# Patient Record
Sex: FEMALE | Race: WHITE | Employment: FULL TIME | URBAN - METROPOLITAN AREA
[De-identification: names, ages, dates, MRNs, and addresses within clinical notes are randomized per-mention and may not be internally consistent; named-entity substitution may affect disease eponyms.]

---

## 2017-07-18 ENCOUNTER — OFFICE VISIT (OUTPATIENT)
Dept: FAMILY MEDICINE CLINIC | Age: 37
End: 2017-07-18

## 2017-07-18 VITALS
HEART RATE: 106 BPM | SYSTOLIC BLOOD PRESSURE: 138 MMHG | HEIGHT: 66 IN | DIASTOLIC BLOOD PRESSURE: 98 MMHG | BODY MASS INDEX: 36.48 KG/M2 | WEIGHT: 227 LBS

## 2017-07-18 DIAGNOSIS — I10 ESSENTIAL HYPERTENSION: Primary | ICD-10-CM

## 2017-07-18 DIAGNOSIS — L40.9 PSORIASIS: ICD-10-CM

## 2017-07-18 DIAGNOSIS — Z23 NEED FOR TDAP VACCINATION: ICD-10-CM

## 2017-07-18 DIAGNOSIS — E11.9 TYPE 2 DIABETES MELLITUS WITHOUT COMPLICATION, WITHOUT LONG-TERM CURRENT USE OF INSULIN (HCC): ICD-10-CM

## 2017-07-18 DIAGNOSIS — R49.0 HOARSENESS: ICD-10-CM

## 2017-07-18 PROCEDURE — 99204 OFFICE O/P NEW MOD 45 MIN: CPT | Performed by: FAMILY MEDICINE

## 2017-07-18 RX ORDER — PREDNISONE 20 MG/1
TABLET ORAL
Refills: 0 | COMMUNITY
Start: 2017-05-10 | End: 2017-09-18 | Stop reason: ALTCHOICE

## 2017-07-18 RX ORDER — FOLIC ACID 1 MG/1
TABLET ORAL
Refills: 1 | COMMUNITY
Start: 2017-07-03 | End: 2017-09-18 | Stop reason: SDUPTHER

## 2017-07-18 RX ORDER — LISINOPRIL 10 MG/1
10 TABLET ORAL DAILY
Qty: 30 TABLET | Refills: 2 | Status: SHIPPED | OUTPATIENT
Start: 2017-07-18 | End: 2017-10-22 | Stop reason: SDUPTHER

## 2017-07-18 RX ORDER — METFORMIN HYDROCHLORIDE 500 MG/1
TABLET, EXTENDED RELEASE ORAL
Refills: 1 | COMMUNITY
Start: 2017-07-03 | End: 2017-09-18 | Stop reason: SDUPTHER

## 2017-07-18 RX ORDER — PHENOBARBITAL, HYOSCYAMINE SULFATE, ATROPINE SULFATE, SCOPOLAMINE HYDROBROMIDE 16.2; .1037; .0194; .0065 MG/1; MG/1; MG/1; MG/1
TABLET ORAL
Refills: 1 | COMMUNITY
Start: 2017-06-05 | End: 2018-08-28

## 2017-07-18 RX ORDER — RANITIDINE 150 MG/1
TABLET ORAL
Refills: 2 | COMMUNITY
Start: 2017-05-04 | End: 2018-08-28

## 2017-07-18 RX ORDER — LISINOPRIL 5 MG/1
TABLET ORAL
Refills: 1 | COMMUNITY
Start: 2017-05-04 | End: 2017-07-18 | Stop reason: SDUPTHER

## 2017-07-22 ASSESSMENT — ENCOUNTER SYMPTOMS
EYES NEGATIVE: 1
TROUBLE SWALLOWING: 0
RESPIRATORY NEGATIVE: 1
RHINORRHEA: 0
BACK PAIN: 0
COLOR CHANGE: 0
ALLERGIC/IMMUNOLOGIC NEGATIVE: 1
GASTROINTESTINAL NEGATIVE: 1
SINUS PRESSURE: 0
VOICE CHANGE: 1
SORE THROAT: 1

## 2017-07-25 ENCOUNTER — OFFICE VISIT (OUTPATIENT)
Dept: ENT CLINIC | Age: 37
End: 2017-07-25

## 2017-07-25 VITALS
BODY MASS INDEX: 37.82 KG/M2 | HEART RATE: 87 BPM | WEIGHT: 227 LBS | DIASTOLIC BLOOD PRESSURE: 77 MMHG | HEIGHT: 65 IN | SYSTOLIC BLOOD PRESSURE: 120 MMHG

## 2017-07-25 DIAGNOSIS — J03.91 RECURRENT TONSILLITIS: Primary | ICD-10-CM

## 2017-07-25 DIAGNOSIS — J35.1 TONSILLAR HYPERTROPHY: ICD-10-CM

## 2017-07-25 PROCEDURE — 99203 OFFICE O/P NEW LOW 30 MIN: CPT | Performed by: OTOLARYNGOLOGY

## 2017-08-24 LAB
ALBUMIN SERPL-MCNC: 4.5 G/DL (ref 3.4–5)
ALP BLD-CCNC: 64 U/L (ref 40–129)
ALT SERPL-CCNC: 49 U/L (ref 10–40)
ANION GAP SERPL CALCULATED.3IONS-SCNC: 15 MMOL/L (ref 3–16)
AST SERPL-CCNC: 20 U/L (ref 15–37)
BILIRUB SERPL-MCNC: 0.4 MG/DL (ref 0–1)
BILIRUBIN DIRECT: <0.2 MG/DL (ref 0–0.3)
BILIRUBIN, INDIRECT: ABNORMAL MG/DL (ref 0–1)
BUN BLDV-MCNC: 12 MG/DL (ref 7–20)
CALCIUM SERPL-MCNC: 9.1 MG/DL (ref 8.3–10.6)
CHLORIDE BLD-SCNC: 104 MMOL/L (ref 99–110)
CHOLESTEROL, TOTAL: 179 MG/DL (ref 0–199)
CO2: 19 MMOL/L (ref 21–32)
CREAT SERPL-MCNC: 0.6 MG/DL (ref 0.6–1.1)
GFR AFRICAN AMERICAN: >60
GFR NON-AFRICAN AMERICAN: >60
GLUCOSE BLD-MCNC: 134 MG/DL (ref 70–99)
HDLC SERPL-MCNC: 39 MG/DL (ref 40–60)
LDL CHOLESTEROL CALCULATED: 123 MG/DL
POTASSIUM SERPL-SCNC: 4.5 MMOL/L (ref 3.5–5.1)
SODIUM BLD-SCNC: 138 MMOL/L (ref 136–145)
TOTAL PROTEIN: 7.9 G/DL (ref 6.4–8.2)
TRIGL SERPL-MCNC: 85 MG/DL (ref 0–150)
VLDLC SERPL CALC-MCNC: 17 MG/DL

## 2017-09-18 ENCOUNTER — OFFICE VISIT (OUTPATIENT)
Dept: FAMILY MEDICINE CLINIC | Age: 37
End: 2017-09-18

## 2017-09-18 VITALS
SYSTOLIC BLOOD PRESSURE: 120 MMHG | BODY MASS INDEX: 35.82 KG/M2 | WEIGHT: 215 LBS | DIASTOLIC BLOOD PRESSURE: 88 MMHG | HEART RATE: 84 BPM | HEIGHT: 65 IN

## 2017-09-18 DIAGNOSIS — Z23 NEED FOR TDAP VACCINATION: ICD-10-CM

## 2017-09-18 DIAGNOSIS — E53.8 B12 DEFICIENCY: ICD-10-CM

## 2017-09-18 DIAGNOSIS — I10 ESSENTIAL HYPERTENSION: ICD-10-CM

## 2017-09-18 DIAGNOSIS — Z23 NEEDS FLU SHOT: ICD-10-CM

## 2017-09-18 DIAGNOSIS — Z23 NEED FOR PROPHYLACTIC VACCINATION AGAINST DIPHTHERIA-TETANUS-PERTUSSIS (DTP): ICD-10-CM

## 2017-09-18 DIAGNOSIS — E11.9 TYPE 2 DIABETES MELLITUS WITHOUT COMPLICATION, WITHOUT LONG-TERM CURRENT USE OF INSULIN (HCC): Primary | ICD-10-CM

## 2017-09-18 LAB
BASOPHILS ABSOLUTE: 0.1 K/UL (ref 0–0.2)
BASOPHILS RELATIVE PERCENT: 0.7 %
EOSINOPHILS ABSOLUTE: 0.5 K/UL (ref 0–0.6)
EOSINOPHILS RELATIVE PERCENT: 6.1 %
FOLATE: 11.64 NG/ML (ref 4.78–24.2)
HCT VFR BLD CALC: 40.7 % (ref 36–48)
HEMOGLOBIN: 13.5 G/DL (ref 12–16)
LYMPHOCYTES ABSOLUTE: 1.8 K/UL (ref 1–5.1)
LYMPHOCYTES RELATIVE PERCENT: 23.8 %
MCH RBC QN AUTO: 31.3 PG (ref 26–34)
MCHC RBC AUTO-ENTMCNC: 33.2 G/DL (ref 31–36)
MCV RBC AUTO: 94.4 FL (ref 80–100)
MONOCYTES ABSOLUTE: 0.6 K/UL (ref 0–1.3)
MONOCYTES RELATIVE PERCENT: 7.2 %
NEUTROPHILS ABSOLUTE: 4.8 K/UL (ref 1.7–7.7)
NEUTROPHILS RELATIVE PERCENT: 62.2 %
PDW BLD-RTO: 14.2 % (ref 12.4–15.4)
PLATELET # BLD: 261 K/UL (ref 135–450)
PMV BLD AUTO: 8.8 FL (ref 5–10.5)
RBC # BLD: 4.32 M/UL (ref 4–5.2)
VITAMIN B-12: 269 PG/ML (ref 211–911)
VITAMIN D 25-HYDROXY: 41.2 NG/ML
WBC # BLD: 7.8 K/UL (ref 4–11)

## 2017-09-18 PROCEDURE — 99213 OFFICE O/P EST LOW 20 MIN: CPT | Performed by: FAMILY MEDICINE

## 2017-09-18 PROCEDURE — 90471 IMMUNIZATION ADMIN: CPT | Performed by: FAMILY MEDICINE

## 2017-09-18 PROCEDURE — 90715 TDAP VACCINE 7 YRS/> IM: CPT | Performed by: FAMILY MEDICINE

## 2017-09-18 PROCEDURE — 90472 IMMUNIZATION ADMIN EACH ADD: CPT | Performed by: FAMILY MEDICINE

## 2017-09-18 PROCEDURE — 90654 INFLUENZA, TRIV, 18-64 YRS, ID, PF, MICRO INJ, 0.1ML (FLUZONE TRIV, PF, ID): CPT | Performed by: FAMILY MEDICINE

## 2017-09-18 RX ORDER — FOLIC ACID 1 MG/1
1 TABLET ORAL DAILY
Qty: 30 TABLET | Refills: 5 | Status: SHIPPED | OUTPATIENT
Start: 2017-09-18 | End: 2018-03-02 | Stop reason: SDUPTHER

## 2017-09-18 RX ORDER — METFORMIN HYDROCHLORIDE 500 MG/1
500 TABLET, EXTENDED RELEASE ORAL
Qty: 30 TABLET | Refills: 5 | Status: SHIPPED | OUTPATIENT
Start: 2017-09-18 | End: 2018-03-02 | Stop reason: SDUPTHER

## 2017-09-19 LAB
ESTIMATED AVERAGE GLUCOSE: 148.5 MG/DL
HBA1C MFR BLD: 6.8 %

## 2017-10-22 DIAGNOSIS — I10 ESSENTIAL HYPERTENSION: ICD-10-CM

## 2017-10-23 RX ORDER — LISINOPRIL 10 MG/1
10 TABLET ORAL DAILY
Qty: 30 TABLET | Refills: 2 | Status: SHIPPED | OUTPATIENT
Start: 2017-10-23 | End: 2018-01-24 | Stop reason: SDUPTHER

## 2018-01-24 DIAGNOSIS — I10 ESSENTIAL HYPERTENSION: ICD-10-CM

## 2018-01-25 RX ORDER — LISINOPRIL 10 MG/1
10 TABLET ORAL DAILY
Qty: 30 TABLET | Refills: 2 | Status: SHIPPED | OUTPATIENT
Start: 2018-01-25 | End: 2018-05-02 | Stop reason: SDUPTHER

## 2018-03-02 DIAGNOSIS — E11.9 TYPE 2 DIABETES MELLITUS WITHOUT COMPLICATION, WITHOUT LONG-TERM CURRENT USE OF INSULIN (HCC): ICD-10-CM

## 2018-03-02 RX ORDER — METFORMIN HYDROCHLORIDE 500 MG/1
500 TABLET, EXTENDED RELEASE ORAL
Qty: 30 TABLET | Refills: 0 | Status: SHIPPED | OUTPATIENT
Start: 2018-03-02 | End: 2018-04-20 | Stop reason: SDUPTHER

## 2018-03-02 RX ORDER — FOLIC ACID 1 MG/1
1 TABLET ORAL DAILY
Qty: 90 TABLET | Refills: 0 | Status: SHIPPED | OUTPATIENT
Start: 2018-03-02 | End: 2018-07-15 | Stop reason: SDUPTHER

## 2018-03-19 ENCOUNTER — OFFICE VISIT (OUTPATIENT)
Dept: FAMILY MEDICINE CLINIC | Age: 38
End: 2018-03-19

## 2018-03-19 VITALS
RESPIRATION RATE: 14 BRPM | DIASTOLIC BLOOD PRESSURE: 90 MMHG | BODY MASS INDEX: 35.51 KG/M2 | WEIGHT: 213.4 LBS | OXYGEN SATURATION: 98 % | HEART RATE: 70 BPM | SYSTOLIC BLOOD PRESSURE: 134 MMHG

## 2018-03-19 DIAGNOSIS — E11.9 TYPE 2 DIABETES MELLITUS WITHOUT COMPLICATION, WITHOUT LONG-TERM CURRENT USE OF INSULIN (HCC): ICD-10-CM

## 2018-03-19 DIAGNOSIS — I10 ESSENTIAL HYPERTENSION: Primary | ICD-10-CM

## 2018-03-19 PROCEDURE — 99213 OFFICE O/P EST LOW 20 MIN: CPT | Performed by: FAMILY MEDICINE

## 2018-03-19 ASSESSMENT — PATIENT HEALTH QUESTIONNAIRE - PHQ9
1. LITTLE INTEREST OR PLEASURE IN DOING THINGS: 0
SUM OF ALL RESPONSES TO PHQ9 QUESTIONS 1 & 2: 0
2. FEELING DOWN, DEPRESSED OR HOPELESS: 0
SUM OF ALL RESPONSES TO PHQ QUESTIONS 1-9: 0

## 2018-03-19 NOTE — PROGRESS NOTES
date: none. Foot right at the arch no trauma no injury no pain unless she is tender from the hours she does not be attention much to her shoes   Review of Systems   Constitutional: Negative. Negative for chills and fever. HENT:  Negative for postnasal drip, rhinorrhea, sinus pressure, tinnitus and trouble swallowing. Eyes: Negative. Respiratory: Negative. Cardiovascular: Negative. Gastrointestinal: Negative. Endocrine: Negative. Genitourinary: Negative. Musculoskeletal: . Negative for back pain, gait problem, joint swelling, neck pain and neck stiffness. Skin:. Negative for color change and pallor. Allergic/Immunologic: Negative. Neurological: Negative. Psychiatric/Behavioral: Negative. All other systems reviewed and are negative. Objective:   Physical Exam   Constitutional: She appears well-developed and well-nourished. No distress. HENT:   Head: Normocephalic and atraumatic. Right Ear: External ear normal.   Left Ear: External ear normal.   Nose: Nose normal.   Mouth/Throat: Oropharynx is clear and moist. No oropharyngeal exudate. Eyes: Conjunctivae and EOM are normal. Pupils are equal, round, and reactive to light. Right eye exhibits no discharge. Left eye exhibits no discharge. No scleral icterus. Neck: Normal range of motion. Neck supple. No JVD present. No tracheal deviation present. No thyromegaly present. Cardiovascular: Normal rate, regular rhythm, normal heart sounds and intact distal pulses. Pulmonary/Chest: Effort normal and breath sounds normal. No stridor. No respiratory distress. She has no wheezes. She has no rales. She exhibits no tenderness. Abdominal: Soft. Bowel sounds are normal. She exhibits no distension and no mass. There is no tenderness. There is no rebound and no guarding. Musculoskeletal: Normal range of motion. She exhibits no edema or tenderness. Lymphadenopathy:     She has no cervical adenopathy.    Skin: Skin is warm and dry. No rash noted. She is not diaphoretic. No erythema. No pallor. Left foot on the medial aspect anterior to the heel. He soft subcutaneous mass very soft rounded no pain no skin lesion changes   microfilaments test exam was negative   Vitals reviewed. Assessment:        1. Essential hypertension  Basic Metabolic Panel    Hepatic Function Panel    CBC   2.  Type 2 diabetes mellitus without complication, without long-term current use of insulin (HCC)  Basic Metabolic Panel    Lipid Panel    Hemoglobin A1C    TSH without Reflex    CBC     DIABETES EYE EXAM    Ambulatory referral to Ophthalmology     DIABETES FOOT EXAM     DIABETES FOOT EXAM         Plan:      See orders,   Assure the patient about what she felt and no foot to come in to monitor it, supportive shoes, continue to have issue my need to podiatrist he'll let me now  Normal HEENT exam just monitor the symptoms

## 2018-04-20 DIAGNOSIS — E11.9 TYPE 2 DIABETES MELLITUS WITHOUT COMPLICATION, WITHOUT LONG-TERM CURRENT USE OF INSULIN (HCC): ICD-10-CM

## 2018-04-20 RX ORDER — METFORMIN HYDROCHLORIDE 500 MG/1
500 TABLET, EXTENDED RELEASE ORAL
Qty: 30 TABLET | Refills: 0 | Status: SHIPPED | OUTPATIENT
Start: 2018-04-20 | End: 2018-06-14 | Stop reason: SDUPTHER

## 2018-05-02 DIAGNOSIS — I10 ESSENTIAL HYPERTENSION: ICD-10-CM

## 2018-05-03 RX ORDER — LISINOPRIL 10 MG/1
10 TABLET ORAL DAILY
Qty: 30 TABLET | Refills: 2 | Status: SHIPPED | OUTPATIENT
Start: 2018-05-03 | End: 2018-06-30 | Stop reason: SDUPTHER

## 2018-06-05 ENCOUNTER — OFFICE VISIT (OUTPATIENT)
Dept: FAMILY MEDICINE CLINIC | Age: 38
End: 2018-06-05

## 2018-06-05 VITALS
TEMPERATURE: 98 F | DIASTOLIC BLOOD PRESSURE: 70 MMHG | SYSTOLIC BLOOD PRESSURE: 124 MMHG | OXYGEN SATURATION: 98 % | BODY MASS INDEX: 34.58 KG/M2 | WEIGHT: 207.8 LBS | HEART RATE: 91 BPM

## 2018-06-05 DIAGNOSIS — J01.10 ACUTE NON-RECURRENT FRONTAL SINUSITIS: Primary | ICD-10-CM

## 2018-06-05 PROCEDURE — 99214 OFFICE O/P EST MOD 30 MIN: CPT | Performed by: FAMILY MEDICINE

## 2018-06-05 PROCEDURE — G8427 DOCREV CUR MEDS BY ELIG CLIN: HCPCS | Performed by: FAMILY MEDICINE

## 2018-06-05 PROCEDURE — G8417 CALC BMI ABV UP PARAM F/U: HCPCS | Performed by: FAMILY MEDICINE

## 2018-06-05 PROCEDURE — 1036F TOBACCO NON-USER: CPT | Performed by: FAMILY MEDICINE

## 2018-06-05 RX ORDER — BENZONATATE 100 MG/1
100 CAPSULE ORAL 3 TIMES DAILY
Refills: 0 | COMMUNITY
Start: 2018-06-03 | End: 2018-08-17 | Stop reason: ALTCHOICE

## 2018-06-05 RX ORDER — METHYLPREDNISOLONE 4 MG/1
TABLET ORAL
Qty: 1 KIT | Refills: 0 | Status: SHIPPED | OUTPATIENT
Start: 2018-06-05 | End: 2018-08-17 | Stop reason: ALTCHOICE

## 2018-06-05 RX ORDER — AMOXICILLIN 500 MG/1
2 CAPSULE ORAL 2 TIMES DAILY
Refills: 0 | COMMUNITY
Start: 2018-06-03 | End: 2018-08-17 | Stop reason: ALTCHOICE

## 2018-06-14 DIAGNOSIS — E11.9 TYPE 2 DIABETES MELLITUS WITHOUT COMPLICATION, WITHOUT LONG-TERM CURRENT USE OF INSULIN (HCC): ICD-10-CM

## 2018-06-14 RX ORDER — METFORMIN HYDROCHLORIDE 500 MG/1
TABLET, EXTENDED RELEASE ORAL
Qty: 30 TABLET | Refills: 0 | Status: SHIPPED | OUTPATIENT
Start: 2018-06-14 | End: 2018-07-15 | Stop reason: SDUPTHER

## 2018-06-30 DIAGNOSIS — I10 ESSENTIAL HYPERTENSION: ICD-10-CM

## 2018-07-02 RX ORDER — LISINOPRIL 10 MG/1
TABLET ORAL
Qty: 30 TABLET | Refills: 2 | Status: SHIPPED | OUTPATIENT
Start: 2018-07-02 | End: 2018-10-08 | Stop reason: SDUPTHER

## 2018-07-15 DIAGNOSIS — E11.9 TYPE 2 DIABETES MELLITUS WITHOUT COMPLICATION, WITHOUT LONG-TERM CURRENT USE OF INSULIN (HCC): ICD-10-CM

## 2018-07-16 RX ORDER — METFORMIN HYDROCHLORIDE 500 MG/1
TABLET, EXTENDED RELEASE ORAL
Qty: 30 TABLET | Refills: 0 | Status: SHIPPED | OUTPATIENT
Start: 2018-07-16 | End: 2018-08-17 | Stop reason: SDUPTHER

## 2018-07-16 RX ORDER — FOLIC ACID 1 MG/1
1 TABLET ORAL DAILY
Qty: 90 TABLET | Refills: 0 | Status: SHIPPED | OUTPATIENT
Start: 2018-07-16 | End: 2018-10-16 | Stop reason: SDUPTHER

## 2018-08-17 ENCOUNTER — OFFICE VISIT (OUTPATIENT)
Dept: FAMILY MEDICINE CLINIC | Age: 38
End: 2018-08-17

## 2018-08-17 VITALS
WEIGHT: 209.2 LBS | RESPIRATION RATE: 14 BRPM | DIASTOLIC BLOOD PRESSURE: 70 MMHG | SYSTOLIC BLOOD PRESSURE: 112 MMHG | OXYGEN SATURATION: 98 % | HEART RATE: 84 BPM | BODY MASS INDEX: 34.81 KG/M2

## 2018-08-17 DIAGNOSIS — J06.9 VIRAL URI: Primary | ICD-10-CM

## 2018-08-17 DIAGNOSIS — R53.83 FATIGUE, UNSPECIFIED TYPE: ICD-10-CM

## 2018-08-17 DIAGNOSIS — E11.9 TYPE 2 DIABETES MELLITUS WITHOUT COMPLICATION, WITHOUT LONG-TERM CURRENT USE OF INSULIN (HCC): ICD-10-CM

## 2018-08-17 LAB
ALBUMIN SERPL-MCNC: 4.6 G/DL (ref 3.4–5)
ALP BLD-CCNC: 80 U/L (ref 40–129)
ALT SERPL-CCNC: 31 U/L (ref 10–40)
ANION GAP SERPL CALCULATED.3IONS-SCNC: 12 MMOL/L (ref 3–16)
AST SERPL-CCNC: 16 U/L (ref 15–37)
BILIRUB SERPL-MCNC: 0.3 MG/DL (ref 0–1)
BILIRUBIN DIRECT: <0.2 MG/DL (ref 0–0.3)
BILIRUBIN, INDIRECT: ABNORMAL MG/DL (ref 0–1)
BILIRUBIN, POC: NORMAL
BLOOD URINE, POC: NORMAL
BUN BLDV-MCNC: 11 MG/DL (ref 7–20)
CALCIUM SERPL-MCNC: 9.5 MG/DL (ref 8.3–10.6)
CHLORIDE BLD-SCNC: 101 MMOL/L (ref 99–110)
CLARITY, POC: CLEAR
CO2: 24 MMOL/L (ref 21–32)
COLOR, POC: YELLOW
CREAT SERPL-MCNC: 0.6 MG/DL (ref 0.6–1.1)
CREATININE URINE: 46.9 MG/DL (ref 28–259)
GFR AFRICAN AMERICAN: >60
GFR NON-AFRICAN AMERICAN: >60
GLUCOSE BLD-MCNC: 118 MG/DL (ref 70–99)
GLUCOSE URINE, POC: NORMAL
HCT VFR BLD CALC: 42.6 % (ref 36–48)
HEMOGLOBIN: 14.1 G/DL (ref 12–16)
KETONES, POC: NORMAL
LEUKOCYTE EST, POC: NORMAL
MCH RBC QN AUTO: 30.9 PG (ref 26–34)
MCHC RBC AUTO-ENTMCNC: 33 G/DL (ref 31–36)
MCV RBC AUTO: 93.5 FL (ref 80–100)
MICROALBUMIN UR-MCNC: <1.2 MG/DL
MICROALBUMIN/CREAT UR-RTO: NORMAL MG/G (ref 0–30)
MONO TEST: NEGATIVE
NITRITE, POC: NORMAL
PDW BLD-RTO: 13.9 % (ref 12.4–15.4)
PH, POC: 6
PLATELET # BLD: 336 K/UL (ref 135–450)
PMV BLD AUTO: 8.5 FL (ref 5–10.5)
POTASSIUM SERPL-SCNC: 4.6 MMOL/L (ref 3.5–5.1)
PROTEIN, POC: NORMAL
RBC # BLD: 4.55 M/UL (ref 4–5.2)
S PYO AG THROAT QL: NORMAL
SODIUM BLD-SCNC: 137 MMOL/L (ref 136–145)
SPECIFIC GRAVITY, POC: 1.01
TOTAL PROTEIN: 8.6 G/DL (ref 6.4–8.2)
TSH SERPL DL<=0.05 MIU/L-ACNC: 2.23 UIU/ML (ref 0.27–4.2)
UROBILINOGEN, POC: 0.2
WBC # BLD: 7.6 K/UL (ref 4–11)

## 2018-08-17 PROCEDURE — 36415 COLL VENOUS BLD VENIPUNCTURE: CPT | Performed by: FAMILY MEDICINE

## 2018-08-17 PROCEDURE — G8417 CALC BMI ABV UP PARAM F/U: HCPCS | Performed by: FAMILY MEDICINE

## 2018-08-17 PROCEDURE — 87880 STREP A ASSAY W/OPTIC: CPT | Performed by: FAMILY MEDICINE

## 2018-08-17 PROCEDURE — G8427 DOCREV CUR MEDS BY ELIG CLIN: HCPCS | Performed by: FAMILY MEDICINE

## 2018-08-17 PROCEDURE — 81002 URINALYSIS NONAUTO W/O SCOPE: CPT | Performed by: FAMILY MEDICINE

## 2018-08-17 PROCEDURE — 99214 OFFICE O/P EST MOD 30 MIN: CPT | Performed by: FAMILY MEDICINE

## 2018-08-17 PROCEDURE — 1036F TOBACCO NON-USER: CPT | Performed by: FAMILY MEDICINE

## 2018-08-17 RX ORDER — DIPHENOXYLATE HYDROCHLORIDE AND ATROPINE SULFATE 2.5; .025 MG/1; MG/1
TABLET ORAL
COMMUNITY

## 2018-08-17 RX ORDER — METFORMIN HYDROCHLORIDE 500 MG/1
TABLET, EXTENDED RELEASE ORAL
Qty: 30 TABLET | Refills: 0 | Status: SHIPPED | OUTPATIENT
Start: 2018-08-17 | End: 2018-09-16 | Stop reason: SDUPTHER

## 2018-08-17 RX ORDER — BUDESONIDE 3 MG/1
CAPSULE, COATED PELLETS ORAL
COMMUNITY
End: 2018-08-28

## 2018-08-17 NOTE — PROGRESS NOTES
abnormal findings: mild oropharyngeal erythema  Neck: no adenopathy, no carotid bruit, no JVD, supple, symmetrical, trachea midline and thyroid not enlarged, symmetric, no tenderness/mass/nodules  Lungs: clear to auscultation bilaterally  Heart: regular rate and rhythm, S1, S2 normal, no murmur, click, rub or gallop  Abdomen: soft, non-tender; bowel sounds normal; no masses,  no organomegaly     Assessment:      Diagnosis Orders   1. Viral URI  POCT rapid strep A    Mononucleosis Screen    Mononucleosis Screen   2. Fatigue, unspecified type  POCT rapid strep A    CBC    Basic Metabolic Panel    TSH without Reflex    Hepatic Function Panel    Hemoglobin A1C    MICROALBUMIN / CREATININE URINE RATIO    Mononucleosis Screen    CBC    Basic Metabolic Panel    TSH without Reflex    Hepatic Function Panel    Hemoglobin A1C    MICROALBUMIN / CREATININE URINE RATIO    Mononucleosis Screen    POCT Urinalysis no Micro         Plan:      Discussed dx and tx of URIs  Suggested symptomatic OTC remedies. Nasal saline sprays for congestion. RTC prn.     See orders

## 2018-08-19 LAB
ESTIMATED AVERAGE GLUCOSE: 142.7 MG/DL
HBA1C MFR BLD: 6.6 %

## 2018-08-28 ENCOUNTER — OFFICE VISIT (OUTPATIENT)
Dept: ENT CLINIC | Age: 38
End: 2018-08-28

## 2018-08-28 VITALS — DIASTOLIC BLOOD PRESSURE: 82 MMHG | OXYGEN SATURATION: 98 % | SYSTOLIC BLOOD PRESSURE: 120 MMHG | HEART RATE: 88 BPM

## 2018-08-28 DIAGNOSIS — R43.0 ANOSMIA: Primary | ICD-10-CM

## 2018-08-28 PROCEDURE — G8427 DOCREV CUR MEDS BY ELIG CLIN: HCPCS | Performed by: OTOLARYNGOLOGY

## 2018-08-28 PROCEDURE — G8417 CALC BMI ABV UP PARAM F/U: HCPCS | Performed by: OTOLARYNGOLOGY

## 2018-08-28 PROCEDURE — 1036F TOBACCO NON-USER: CPT | Performed by: OTOLARYNGOLOGY

## 2018-08-28 PROCEDURE — 99202 OFFICE O/P NEW SF 15 MIN: CPT | Performed by: OTOLARYNGOLOGY

## 2018-08-28 RX ORDER — DICYCLOMINE HCL 20 MG
20 TABLET ORAL EVERY 6 HOURS
Status: ON HOLD | COMMUNITY
End: 2020-04-03

## 2018-08-28 RX ORDER — PREDNISONE 10 MG/1
10 TABLET ORAL DAILY
Qty: 30 TABLET | Refills: 0 | Status: SHIPPED | OUTPATIENT
Start: 2018-08-28 | End: 2020-02-06 | Stop reason: ALTCHOICE

## 2018-08-28 RX ORDER — ZINC SULFATE 50(220)MG
220 CAPSULE ORAL DAILY
Qty: 30 CAPSULE | Refills: 1 | Status: SHIPPED | OUTPATIENT
Start: 2018-08-28 | End: 2018-09-07 | Stop reason: SDUPTHER

## 2018-08-28 NOTE — PROGRESS NOTES
The patient presents with a loss of olfaction coinciding with an upper respiratory infection approximately 2 months ago. Prior to that time there had been no medical or surgical issues related to the nose, upper airway, or olfaction. There is a general absence of olfaction other than extremely pungent odors. This has been accompanied by alteration in her sensation of sweet and salty taste. Denies epistaxis or facial hypesthesias. No recent dental problems. No past history of nasal surgery or nasal trauma. No family history of nasal polyps or allergy. She has been described as borderline diabetic, recently with a normal A1c. Her other medications include lisinopril, methotrexate, Humira. Her irritable bowel is generally well controlled. The patient is a non smoker and is not exposed to second hand smoke or irritants. There have been no known contagious contacts. There are  no other symptoms referable to the upper aerodigestive tract. Exam:    The patient appears well developed and well nourished.; oriented to person, place, and time. The head is normocephalic and atraumatic; no facial scars or weakness are noted. The facial skeleton is normal.The voice has a normal quality and tonality to it. Eyes: Conjunctivae and lids normal. Full EOM. The skin is warm and dry. No pallor. The external nose is unremarkable including the dorsum, columella, nasion, and alae. The turbinates respond well to vasoconstriction. The middle and inferior meatuses appeared clear. There is no polyp, ulceration, or masses visualized either naris. The septum is not deviated or deflected to a significant degree. The mirror exam of the nasopharynx shows no mucosal disease or obstruction. The lips and oral cavity are normal with no discrete mucosal disease, and normally hydrated. Tongue mobility is normal. The fungiform and vallate papillae are normal appearing.  Dentition is unremarkable including the

## 2018-09-07 ENCOUNTER — TELEPHONE (OUTPATIENT)
Dept: ENT CLINIC | Age: 38
End: 2018-09-07

## 2018-09-07 DIAGNOSIS — R43.0 ANOSMIA: ICD-10-CM

## 2018-09-07 RX ORDER — ZINC SULFATE 50(220)MG
220 CAPSULE ORAL DAILY
Qty: 30 CAPSULE | Refills: 1 | COMMUNITY
Start: 2018-09-07 | End: 2020-12-30

## 2018-09-07 RX ORDER — METHYLPREDNISOLONE 4 MG/1
TABLET ORAL
Qty: 1 KIT | Refills: 0 | Status: SHIPPED | OUTPATIENT
Start: 2018-09-07 | End: 2018-09-13

## 2018-09-16 DIAGNOSIS — E11.9 TYPE 2 DIABETES MELLITUS WITHOUT COMPLICATION, WITHOUT LONG-TERM CURRENT USE OF INSULIN (HCC): ICD-10-CM

## 2018-09-17 RX ORDER — METFORMIN HYDROCHLORIDE 500 MG/1
TABLET, EXTENDED RELEASE ORAL
Qty: 30 TABLET | Refills: 0 | Status: SHIPPED | OUTPATIENT
Start: 2018-09-17 | End: 2018-10-08 | Stop reason: SDUPTHER

## 2018-09-17 NOTE — TELEPHONE ENCOUNTER
Medication:   Requested Prescriptions     Pending Prescriptions Disp Refills    metFORMIN (GLUCOPHAGE-XR) 500 MG extended release tablet [Pharmacy Med Name: METFORMIN  MG TABLET] 30 tablet 0     Sig: TAKE 1 TABLET BY MOUTH EVERY DAY     Last Filled:  8.23.18    Last appt: 8/17/2018   Next appt: Visit date not found    Last Labs DM:   Lab Results   Component Value Date    LABA1C 6.6 08/17/2018

## 2018-09-19 ENCOUNTER — TELEPHONE (OUTPATIENT)
Dept: ENT CLINIC | Age: 38
End: 2018-09-19

## 2018-09-19 NOTE — TELEPHONE ENCOUNTER
2nd update: The patient called stating that there has been some improvement but not much she states that she can now smell coffee but it is faint. She can also smell fresh edinson and orange.      Should she continue the zinc?

## 2018-09-21 ENCOUNTER — TELEPHONE (OUTPATIENT)
Dept: ENT CLINIC | Age: 38
End: 2018-09-21

## 2018-09-21 DIAGNOSIS — R43.0 ANOSMIA: Primary | ICD-10-CM

## 2018-09-21 NOTE — TELEPHONE ENCOUNTER
Called and spoke to patient with Dr. Semaj Penaloza message below. Central Scheduling phone number given. Patient expresses understanding.

## 2018-10-08 ENCOUNTER — OFFICE VISIT (OUTPATIENT)
Dept: FAMILY MEDICINE CLINIC | Age: 38
End: 2018-10-08
Payer: COMMERCIAL

## 2018-10-08 VITALS
WEIGHT: 213 LBS | RESPIRATION RATE: 14 BRPM | BODY MASS INDEX: 35.45 KG/M2 | HEART RATE: 77 BPM | SYSTOLIC BLOOD PRESSURE: 136 MMHG | OXYGEN SATURATION: 98 % | DIASTOLIC BLOOD PRESSURE: 88 MMHG

## 2018-10-08 DIAGNOSIS — Z23 NEED FOR INFLUENZA VACCINATION: ICD-10-CM

## 2018-10-08 DIAGNOSIS — I10 ESSENTIAL HYPERTENSION: ICD-10-CM

## 2018-10-08 DIAGNOSIS — F41.8 ANXIETY WITH DEPRESSION: ICD-10-CM

## 2018-10-08 DIAGNOSIS — E11.9 TYPE 2 DIABETES MELLITUS WITHOUT COMPLICATION, WITHOUT LONG-TERM CURRENT USE OF INSULIN (HCC): Primary | ICD-10-CM

## 2018-10-08 PROCEDURE — 90686 IIV4 VACC NO PRSV 0.5 ML IM: CPT | Performed by: FAMILY MEDICINE

## 2018-10-08 PROCEDURE — 1036F TOBACCO NON-USER: CPT | Performed by: FAMILY MEDICINE

## 2018-10-08 PROCEDURE — G8482 FLU IMMUNIZE ORDER/ADMIN: HCPCS | Performed by: FAMILY MEDICINE

## 2018-10-08 PROCEDURE — G8427 DOCREV CUR MEDS BY ELIG CLIN: HCPCS | Performed by: FAMILY MEDICINE

## 2018-10-08 PROCEDURE — G8417 CALC BMI ABV UP PARAM F/U: HCPCS | Performed by: FAMILY MEDICINE

## 2018-10-08 PROCEDURE — 3044F HG A1C LEVEL LT 7.0%: CPT | Performed by: FAMILY MEDICINE

## 2018-10-08 PROCEDURE — 90471 IMMUNIZATION ADMIN: CPT | Performed by: FAMILY MEDICINE

## 2018-10-08 PROCEDURE — 2022F DILAT RTA XM EVC RTNOPTHY: CPT | Performed by: FAMILY MEDICINE

## 2018-10-08 PROCEDURE — 99214 OFFICE O/P EST MOD 30 MIN: CPT | Performed by: FAMILY MEDICINE

## 2018-10-08 RX ORDER — LISINOPRIL 10 MG/1
TABLET ORAL
Qty: 90 TABLET | Refills: 1 | Status: SHIPPED | OUTPATIENT
Start: 2018-10-08 | End: 2019-07-07 | Stop reason: SDUPTHER

## 2018-10-08 RX ORDER — BLOOD-GLUCOSE METER
1 KIT MISCELLANEOUS DAILY PRN
Qty: 1 KIT | Refills: 0 | Status: SHIPPED | OUTPATIENT
Start: 2018-10-08

## 2018-10-08 RX ORDER — METFORMIN HYDROCHLORIDE 500 MG/1
TABLET, EXTENDED RELEASE ORAL
Qty: 90 TABLET | Refills: 1 | Status: SHIPPED | OUTPATIENT
Start: 2018-10-08 | End: 2019-04-30 | Stop reason: SDUPTHER

## 2018-10-08 NOTE — PATIENT INSTRUCTIONS
virus vaccine is also available in a nasal spray form, which is a \"live virus\" vaccine. Influenza virus vaccine works by exposing you to a small dose of the virus, which helps your body to develop immunity to the disease. Influenza virus vaccine will not treat an active infection that has already developed in the body. Influenza virus vaccine is for use in adults and children who are at least 7 months old. Becoming infected with influenza is much more dangerous to your health than receiving this vaccine. Influenza causes thousands of deaths each year, and hundreds of thousands of hospitalizations. However, like any medicine, this vaccine can cause side effects but the risk of serious side effects is extremely low. Like any vaccine, influenza virus vaccine may not provide protection from disease in every person. This vaccine will not prevent illness caused by yemi flu (\"bird flu\"). What should I discuss with my healthcare provider before receiving this vaccine? You may not be able to receive this vaccine if you are allergic to eggs, or if you have:  · a history of severe allergic reaction to a flu vaccine; or  · a history of Guillain-[place] syndrome (within 6 weeks after receiving a flu vaccine). To make sure this vaccine is safe for you, tell your doctor if you have ever had:  · a bleeding or blood clotting disorder such as hemophilia or easy bruising;  · a neurologic disorder or disease affecting the brain (or if this was a reaction to a previous vaccine);  · seizures;  · a weak immune system caused by disease, bone marrow transplant, or by using certain medicines or receiving cancer treatments; or  · if you are allergic to latex rubber. You can still receive a vaccine if you have a minor cold. If you have a severe illness with a fever or any type of infection, wait until you get better before receiving this vaccine.   The Centers for Disease Control and Prevention recommends that pregnant women get a flu instructions. What happens if I overdose? An overdose of this vaccine is unlikely to occur. What should I avoid before or after receiving this vaccine? Follow your doctor's instructions about any restrictions on food, beverages, or activity. What are the possible side effects of influenza virus injectable vaccine? Get emergency medical help if you have signs of an allergic reaction: hives; difficulty breathing; swelling of your face, lips, tongue, or throat. You should not receive a booster vaccine if you had a life-threatening allergic reaction after the first shot. Keep track of any and all side effects you have after receiving this vaccine. If you ever need to receive influenza virus vaccine in the future, you will need to tell your doctor if the previous shot caused any side effects. Influenza virus injectable (killed virus) vaccine will not cause you to become ill with the flu virus that it contains. However, you may have flu-like symptoms at any time during flu season that may be caused by other strains of influenza virus. Call your doctor at once if you have:  · a light-headed feeling, like you might pass out;  · severe weakness or unusual feeling in your arms and legs (may occur 2 to 4 weeks after you receive the vaccine);  · high fever;  · seizure (convulsions); or  · unusual bleeding. Common side effects may include:  · low fever, chills;  · mild fussiness or crying;  · redness, bruising, pain, swelling, or a lump where the vaccine was injected;  · headache, tired feeling; or  · joint or muscle pain. This is not a complete list of side effects and others may occur. Call your doctor for medical advice about side effects. You may report vaccine side effects to the [place] [address] and Human Services at [phone]. What other drugs will affect influenza virus injectable vaccine?   If you are using any of the following medicines, you may not be able to receive the vaccine, or may need to

## 2018-10-12 RX ORDER — LANCETS 30 GAUGE
1 EACH MISCELLANEOUS DAILY
Qty: 100 EACH | Refills: 0 | Status: SHIPPED | OUTPATIENT
Start: 2018-10-12 | End: 2019-02-13 | Stop reason: SDUPTHER

## 2018-10-12 NOTE — TELEPHONE ENCOUNTER
Pt received Blood Glucose Monitoring Suppl (ONE TOUCH ULTRA MINI) kit   But never received the test strips or lancets   Pt states she doesn't know how often to test.   Please review

## 2018-10-16 ENCOUNTER — HOSPITAL ENCOUNTER (OUTPATIENT)
Dept: CT IMAGING | Age: 38
Discharge: HOME | End: 2018-10-16
Payer: COMMERCIAL

## 2018-10-16 DIAGNOSIS — R43.0 ANOSMIA: ICD-10-CM

## 2018-10-16 PROCEDURE — 70486 CT MAXILLOFACIAL W/O DYE: CPT

## 2018-10-16 RX ORDER — FOLIC ACID 1 MG/1
1 TABLET ORAL DAILY
Qty: 90 TABLET | Refills: 0 | Status: SHIPPED | OUTPATIENT
Start: 2018-10-16 | End: 2019-02-20 | Stop reason: SDUPTHER

## 2018-11-06 DIAGNOSIS — I10 ESSENTIAL HYPERTENSION: ICD-10-CM

## 2018-11-06 DIAGNOSIS — E11.9 TYPE 2 DIABETES MELLITUS WITHOUT COMPLICATION, WITHOUT LONG-TERM CURRENT USE OF INSULIN (HCC): ICD-10-CM

## 2018-11-06 LAB
ANION GAP SERPL CALCULATED.3IONS-SCNC: 12 MMOL/L (ref 3–16)
BUN BLDV-MCNC: 9 MG/DL (ref 7–20)
CALCIUM SERPL-MCNC: 9.2 MG/DL (ref 8.3–10.6)
CHLORIDE BLD-SCNC: 104 MMOL/L (ref 99–110)
CHOLESTEROL, TOTAL: 162 MG/DL (ref 0–199)
CO2: 23 MMOL/L (ref 21–32)
CREAT SERPL-MCNC: 0.6 MG/DL (ref 0.6–1.1)
GFR AFRICAN AMERICAN: >60
GFR NON-AFRICAN AMERICAN: >60
GLUCOSE BLD-MCNC: 140 MG/DL (ref 70–99)
HDLC SERPL-MCNC: 42 MG/DL (ref 40–60)
LDL CHOLESTEROL CALCULATED: 107 MG/DL
POTASSIUM SERPL-SCNC: 5.1 MMOL/L (ref 3.5–5.1)
SODIUM BLD-SCNC: 139 MMOL/L (ref 136–145)
TRIGL SERPL-MCNC: 64 MG/DL (ref 0–150)
VLDLC SERPL CALC-MCNC: 13 MG/DL

## 2018-11-06 PROCEDURE — 36415 COLL VENOUS BLD VENIPUNCTURE: CPT | Performed by: FAMILY MEDICINE

## 2018-11-07 LAB
ESTIMATED AVERAGE GLUCOSE: 165.7 MG/DL
HBA1C MFR BLD: 7.4 %

## 2018-11-08 ENCOUNTER — OFFICE VISIT (OUTPATIENT)
Dept: FAMILY MEDICINE CLINIC | Age: 38
End: 2018-11-08
Payer: COMMERCIAL

## 2018-11-08 ENCOUNTER — TELEPHONE (OUTPATIENT)
Dept: FAMILY MEDICINE CLINIC | Age: 38
End: 2018-11-08

## 2018-11-08 VITALS
DIASTOLIC BLOOD PRESSURE: 70 MMHG | OXYGEN SATURATION: 99 % | BODY MASS INDEX: 35.78 KG/M2 | WEIGHT: 215 LBS | RESPIRATION RATE: 15 BRPM | TEMPERATURE: 98.7 F | SYSTOLIC BLOOD PRESSURE: 122 MMHG | HEART RATE: 86 BPM

## 2018-11-08 DIAGNOSIS — H65.92 OME (OTITIS MEDIA WITH EFFUSION), LEFT: ICD-10-CM

## 2018-11-08 DIAGNOSIS — J20.9 ACUTE BRONCHITIS, UNSPECIFIED ORGANISM: Primary | ICD-10-CM

## 2018-11-08 PROCEDURE — G8427 DOCREV CUR MEDS BY ELIG CLIN: HCPCS | Performed by: FAMILY MEDICINE

## 2018-11-08 PROCEDURE — 99214 OFFICE O/P EST MOD 30 MIN: CPT | Performed by: FAMILY MEDICINE

## 2018-11-08 PROCEDURE — 1036F TOBACCO NON-USER: CPT | Performed by: FAMILY MEDICINE

## 2018-11-08 PROCEDURE — G8417 CALC BMI ABV UP PARAM F/U: HCPCS | Performed by: FAMILY MEDICINE

## 2018-11-08 PROCEDURE — G8482 FLU IMMUNIZE ORDER/ADMIN: HCPCS | Performed by: FAMILY MEDICINE

## 2018-11-08 RX ORDER — ALBUTEROL SULFATE 90 UG/1
2 AEROSOL, METERED RESPIRATORY (INHALATION) EVERY 6 HOURS PRN
Qty: 1 INHALER | Refills: 3 | Status: ON HOLD | OUTPATIENT
Start: 2018-11-08 | End: 2020-04-03 | Stop reason: ALTCHOICE

## 2018-11-08 RX ORDER — AMOXICILLIN AND CLAVULANATE POTASSIUM 875; 125 MG/1; MG/1
1 TABLET, FILM COATED ORAL 2 TIMES DAILY
Qty: 20 TABLET | Refills: 0 | Status: SHIPPED | OUTPATIENT
Start: 2018-11-08 | End: 2018-11-18

## 2019-02-11 ENCOUNTER — OFFICE VISIT (OUTPATIENT)
Dept: FAMILY MEDICINE CLINIC | Age: 39
End: 2019-02-11
Payer: COMMERCIAL

## 2019-02-11 VITALS
OXYGEN SATURATION: 98 % | SYSTOLIC BLOOD PRESSURE: 126 MMHG | WEIGHT: 211.2 LBS | RESPIRATION RATE: 14 BRPM | HEART RATE: 85 BPM | DIASTOLIC BLOOD PRESSURE: 84 MMHG | BODY MASS INDEX: 35.15 KG/M2

## 2019-02-11 DIAGNOSIS — E11.9 TYPE 2 DIABETES MELLITUS WITHOUT COMPLICATION, WITHOUT LONG-TERM CURRENT USE OF INSULIN (HCC): ICD-10-CM

## 2019-02-11 DIAGNOSIS — Z23 NEED FOR PROPHYLACTIC VACCINATION AGAINST STREPTOCOCCUS PNEUMONIAE (PNEUMOCOCCUS): Primary | ICD-10-CM

## 2019-02-11 DIAGNOSIS — F41.8 ANXIETY WITH DEPRESSION: ICD-10-CM

## 2019-02-11 DIAGNOSIS — I10 ESSENTIAL HYPERTENSION: ICD-10-CM

## 2019-02-11 PROCEDURE — 3046F HEMOGLOBIN A1C LEVEL >9.0%: CPT | Performed by: FAMILY MEDICINE

## 2019-02-11 PROCEDURE — 2022F DILAT RTA XM EVC RTNOPTHY: CPT | Performed by: FAMILY MEDICINE

## 2019-02-11 PROCEDURE — G8417 CALC BMI ABV UP PARAM F/U: HCPCS | Performed by: FAMILY MEDICINE

## 2019-02-11 PROCEDURE — 1036F TOBACCO NON-USER: CPT | Performed by: FAMILY MEDICINE

## 2019-02-11 PROCEDURE — G8482 FLU IMMUNIZE ORDER/ADMIN: HCPCS | Performed by: FAMILY MEDICINE

## 2019-02-11 PROCEDURE — G8427 DOCREV CUR MEDS BY ELIG CLIN: HCPCS | Performed by: FAMILY MEDICINE

## 2019-02-11 PROCEDURE — 99214 OFFICE O/P EST MOD 30 MIN: CPT | Performed by: FAMILY MEDICINE

## 2019-02-11 PROCEDURE — 90732 PPSV23 VACC 2 YRS+ SUBQ/IM: CPT | Performed by: FAMILY MEDICINE

## 2019-02-11 PROCEDURE — 90471 IMMUNIZATION ADMIN: CPT | Performed by: FAMILY MEDICINE

## 2019-02-11 RX ORDER — ESCITALOPRAM OXALATE 10 MG/1
10 TABLET ORAL DAILY
Qty: 30 TABLET | Refills: 3 | Status: SHIPPED | OUTPATIENT
Start: 2019-02-11 | End: 2019-06-06 | Stop reason: SDUPTHER

## 2019-02-11 ASSESSMENT — PATIENT HEALTH QUESTIONNAIRE - PHQ9
SUM OF ALL RESPONSES TO PHQ QUESTIONS 1-9: 0
1. LITTLE INTEREST OR PLEASURE IN DOING THINGS: 0
SUM OF ALL RESPONSES TO PHQ QUESTIONS 1-9: 0
SUM OF ALL RESPONSES TO PHQ9 QUESTIONS 1 & 2: 0
2. FEELING DOWN, DEPRESSED OR HOPELESS: 0

## 2019-02-14 DIAGNOSIS — I10 ESSENTIAL HYPERTENSION: ICD-10-CM

## 2019-02-14 DIAGNOSIS — E11.9 TYPE 2 DIABETES MELLITUS WITHOUT COMPLICATION, WITHOUT LONG-TERM CURRENT USE OF INSULIN (HCC): ICD-10-CM

## 2019-02-14 LAB
ALBUMIN SERPL-MCNC: 3.9 G/DL (ref 3.4–5)
ALP BLD-CCNC: 75 U/L (ref 40–129)
ALT SERPL-CCNC: 18 U/L (ref 10–40)
ANION GAP SERPL CALCULATED.3IONS-SCNC: 11 MMOL/L (ref 3–16)
AST SERPL-CCNC: 13 U/L (ref 15–37)
BILIRUB SERPL-MCNC: 0.4 MG/DL (ref 0–1)
BILIRUBIN DIRECT: <0.2 MG/DL (ref 0–0.3)
BILIRUBIN, INDIRECT: ABNORMAL MG/DL (ref 0–1)
BUN BLDV-MCNC: 9 MG/DL (ref 7–20)
CALCIUM SERPL-MCNC: 8.8 MG/DL (ref 8.3–10.6)
CHLORIDE BLD-SCNC: 102 MMOL/L (ref 99–110)
CHOLESTEROL, TOTAL: 155 MG/DL (ref 0–199)
CO2: 21 MMOL/L (ref 21–32)
CREAT SERPL-MCNC: 0.7 MG/DL (ref 0.6–1.1)
GFR AFRICAN AMERICAN: >60
GFR NON-AFRICAN AMERICAN: >60
GLUCOSE BLD-MCNC: 156 MG/DL (ref 70–99)
HCT VFR BLD CALC: 40.8 % (ref 36–48)
HDLC SERPL-MCNC: 42 MG/DL (ref 40–60)
HEMOGLOBIN: 13.4 G/DL (ref 12–16)
LDL CHOLESTEROL CALCULATED: 96 MG/DL
MCH RBC QN AUTO: 29.9 PG (ref 26–34)
MCHC RBC AUTO-ENTMCNC: 32.9 G/DL (ref 31–36)
MCV RBC AUTO: 90.8 FL (ref 80–100)
PDW BLD-RTO: 14.3 % (ref 12.4–15.4)
PLATELET # BLD: 312 K/UL (ref 135–450)
PMV BLD AUTO: 8.3 FL (ref 5–10.5)
POTASSIUM SERPL-SCNC: 4.3 MMOL/L (ref 3.5–5.1)
RBC # BLD: 4.49 M/UL (ref 4–5.2)
SODIUM BLD-SCNC: 134 MMOL/L (ref 136–145)
TOTAL PROTEIN: 7.8 G/DL (ref 6.4–8.2)
TRIGL SERPL-MCNC: 87 MG/DL (ref 0–150)
TSH SERPL DL<=0.05 MIU/L-ACNC: 1.23 UIU/ML (ref 0.27–4.2)
VLDLC SERPL CALC-MCNC: 17 MG/DL
WBC # BLD: 8.1 K/UL (ref 4–11)

## 2019-02-14 PROCEDURE — 36415 COLL VENOUS BLD VENIPUNCTURE: CPT | Performed by: FAMILY MEDICINE

## 2019-02-14 RX ORDER — LANCETS 33 GAUGE
EACH MISCELLANEOUS
Qty: 100 EACH | Refills: 0 | Status: SHIPPED | OUTPATIENT
Start: 2019-02-14 | End: 2019-05-21 | Stop reason: SDUPTHER

## 2019-02-15 LAB
ESTIMATED AVERAGE GLUCOSE: 157.1 MG/DL
HBA1C MFR BLD: 7.1 %

## 2019-02-21 RX ORDER — FOLIC ACID 1 MG/1
1 TABLET ORAL DAILY
Qty: 90 TABLET | Refills: 0 | Status: SHIPPED | OUTPATIENT
Start: 2019-02-21 | End: 2019-05-17 | Stop reason: SDUPTHER

## 2019-03-08 ENCOUNTER — OFFICE VISIT (OUTPATIENT)
Dept: ORTHOPEDIC SURGERY | Age: 39
End: 2019-03-08
Payer: COMMERCIAL

## 2019-03-08 ENCOUNTER — OFFICE VISIT (OUTPATIENT)
Dept: FAMILY MEDICINE CLINIC | Age: 39
End: 2019-03-08
Payer: COMMERCIAL

## 2019-03-08 VITALS
HEART RATE: 85 BPM | SYSTOLIC BLOOD PRESSURE: 129 MMHG | DIASTOLIC BLOOD PRESSURE: 87 MMHG | WEIGHT: 208 LBS | HEIGHT: 64 IN | BODY MASS INDEX: 35.51 KG/M2

## 2019-03-08 VITALS
RESPIRATION RATE: 14 BRPM | BODY MASS INDEX: 34.71 KG/M2 | OXYGEN SATURATION: 98 % | HEART RATE: 87 BPM | DIASTOLIC BLOOD PRESSURE: 76 MMHG | WEIGHT: 208.6 LBS | SYSTOLIC BLOOD PRESSURE: 122 MMHG

## 2019-03-08 DIAGNOSIS — M25.512 ACUTE PAIN OF LEFT SHOULDER: Primary | ICD-10-CM

## 2019-03-08 PROCEDURE — G8482 FLU IMMUNIZE ORDER/ADMIN: HCPCS | Performed by: PHYSICIAN ASSISTANT

## 2019-03-08 PROCEDURE — G8417 CALC BMI ABV UP PARAM F/U: HCPCS | Performed by: PHYSICIAN ASSISTANT

## 2019-03-08 PROCEDURE — 1036F TOBACCO NON-USER: CPT | Performed by: FAMILY MEDICINE

## 2019-03-08 PROCEDURE — G8427 DOCREV CUR MEDS BY ELIG CLIN: HCPCS | Performed by: FAMILY MEDICINE

## 2019-03-08 PROCEDURE — 99203 OFFICE O/P NEW LOW 30 MIN: CPT | Performed by: PHYSICIAN ASSISTANT

## 2019-03-08 PROCEDURE — G8482 FLU IMMUNIZE ORDER/ADMIN: HCPCS | Performed by: FAMILY MEDICINE

## 2019-03-08 PROCEDURE — 1036F TOBACCO NON-USER: CPT | Performed by: PHYSICIAN ASSISTANT

## 2019-03-08 PROCEDURE — G8427 DOCREV CUR MEDS BY ELIG CLIN: HCPCS | Performed by: PHYSICIAN ASSISTANT

## 2019-03-08 PROCEDURE — 99213 OFFICE O/P EST LOW 20 MIN: CPT | Performed by: FAMILY MEDICINE

## 2019-03-08 PROCEDURE — G8417 CALC BMI ABV UP PARAM F/U: HCPCS | Performed by: FAMILY MEDICINE

## 2019-03-08 RX ORDER — TIZANIDINE 4 MG/1
4 TABLET ORAL EVERY 6 HOURS PRN
Qty: 30 TABLET | Refills: 0 | Status: ON HOLD | OUTPATIENT
Start: 2019-03-08 | End: 2020-04-03 | Stop reason: ALTCHOICE

## 2019-03-08 RX ORDER — MELOXICAM 15 MG/1
15 TABLET ORAL DAILY
Qty: 30 TABLET | Refills: 3 | Status: SHIPPED | OUTPATIENT
Start: 2019-03-08 | End: 2019-05-13 | Stop reason: ALTCHOICE

## 2019-03-08 ASSESSMENT — ENCOUNTER SYMPTOMS
EYES NEGATIVE: 1
RESPIRATORY NEGATIVE: 1
GASTROINTESTINAL NEGATIVE: 1

## 2019-03-12 ENCOUNTER — OFFICE VISIT (OUTPATIENT)
Dept: FAMILY MEDICINE CLINIC | Age: 39
End: 2019-03-12
Payer: COMMERCIAL

## 2019-03-12 VITALS
WEIGHT: 209 LBS | OXYGEN SATURATION: 98 % | HEART RATE: 72 BPM | DIASTOLIC BLOOD PRESSURE: 70 MMHG | RESPIRATION RATE: 14 BRPM | SYSTOLIC BLOOD PRESSURE: 106 MMHG | BODY MASS INDEX: 35.87 KG/M2

## 2019-03-12 DIAGNOSIS — F41.8 ANXIETY WITH DEPRESSION: Primary | ICD-10-CM

## 2019-03-12 PROCEDURE — G8427 DOCREV CUR MEDS BY ELIG CLIN: HCPCS | Performed by: FAMILY MEDICINE

## 2019-03-12 PROCEDURE — G8417 CALC BMI ABV UP PARAM F/U: HCPCS | Performed by: FAMILY MEDICINE

## 2019-03-12 PROCEDURE — 99213 OFFICE O/P EST LOW 20 MIN: CPT | Performed by: FAMILY MEDICINE

## 2019-03-12 PROCEDURE — G8482 FLU IMMUNIZE ORDER/ADMIN: HCPCS | Performed by: FAMILY MEDICINE

## 2019-03-12 PROCEDURE — 1036F TOBACCO NON-USER: CPT | Performed by: FAMILY MEDICINE

## 2019-03-12 ASSESSMENT — ENCOUNTER SYMPTOMS
EYES NEGATIVE: 1
RESPIRATORY NEGATIVE: 1
GASTROINTESTINAL NEGATIVE: 1

## 2019-04-25 ENCOUNTER — OFFICE VISIT (OUTPATIENT)
Dept: FAMILY MEDICINE CLINIC | Age: 39
End: 2019-04-25
Payer: COMMERCIAL

## 2019-04-25 VITALS
HEART RATE: 85 BPM | DIASTOLIC BLOOD PRESSURE: 80 MMHG | TEMPERATURE: 97.6 F | SYSTOLIC BLOOD PRESSURE: 120 MMHG | WEIGHT: 207 LBS | OXYGEN SATURATION: 99 % | BODY MASS INDEX: 35.53 KG/M2

## 2019-04-25 DIAGNOSIS — K21.9 GASTROESOPHAGEAL REFLUX DISEASE, ESOPHAGITIS PRESENCE NOT SPECIFIED: Primary | ICD-10-CM

## 2019-04-25 DIAGNOSIS — J06.9 VIRAL URI: ICD-10-CM

## 2019-04-25 DIAGNOSIS — R35.0 URINARY FREQUENCY: ICD-10-CM

## 2019-04-25 DIAGNOSIS — E11.9 TYPE 2 DIABETES MELLITUS WITHOUT COMPLICATION, WITHOUT LONG-TERM CURRENT USE OF INSULIN (HCC): ICD-10-CM

## 2019-04-25 LAB
BILIRUBIN, POC: NORMAL
BLOOD URINE, POC: NORMAL
CHP ED QC CHECK: NORMAL
CLARITY, POC: CLEAR
COLOR, POC: YELLOW
GLUCOSE BLD-MCNC: 112 MG/DL
GLUCOSE URINE, POC: NORMAL
HBA1C MFR BLD: 7.2 %
KETONES, POC: NORMAL
LEUKOCYTE EST, POC: NORMAL
NITRITE, POC: NORMAL
PH, POC: 5.5
PROTEIN, POC: NORMAL
S PYO AG THROAT QL: NORMAL
SPECIFIC GRAVITY, POC: 1.02
UROBILINOGEN, POC: 0.2

## 2019-04-25 PROCEDURE — 2022F DILAT RTA XM EVC RTNOPTHY: CPT | Performed by: FAMILY MEDICINE

## 2019-04-25 PROCEDURE — 3045F PR MOST RECENT HEMOGLOBIN A1C LEVEL 7.0-9.0%: CPT | Performed by: FAMILY MEDICINE

## 2019-04-25 PROCEDURE — 99214 OFFICE O/P EST MOD 30 MIN: CPT | Performed by: FAMILY MEDICINE

## 2019-04-25 PROCEDURE — 82962 GLUCOSE BLOOD TEST: CPT | Performed by: FAMILY MEDICINE

## 2019-04-25 PROCEDURE — 81002 URINALYSIS NONAUTO W/O SCOPE: CPT | Performed by: FAMILY MEDICINE

## 2019-04-25 PROCEDURE — 83036 HEMOGLOBIN GLYCOSYLATED A1C: CPT | Performed by: FAMILY MEDICINE

## 2019-04-25 PROCEDURE — G8417 CALC BMI ABV UP PARAM F/U: HCPCS | Performed by: FAMILY MEDICINE

## 2019-04-25 PROCEDURE — G8427 DOCREV CUR MEDS BY ELIG CLIN: HCPCS | Performed by: FAMILY MEDICINE

## 2019-04-25 PROCEDURE — 87880 STREP A ASSAY W/OPTIC: CPT | Performed by: FAMILY MEDICINE

## 2019-04-25 PROCEDURE — 1036F TOBACCO NON-USER: CPT | Performed by: FAMILY MEDICINE

## 2019-04-25 RX ORDER — OMEPRAZOLE 40 MG/1
40 CAPSULE, DELAYED RELEASE ORAL DAILY
Qty: 30 CAPSULE | Refills: 3 | Status: SHIPPED | OUTPATIENT
Start: 2019-04-25 | End: 2019-08-17 | Stop reason: SDUPTHER

## 2019-04-25 RX ORDER — AMOXICILLIN AND CLAVULANATE POTASSIUM 875; 125 MG/1; MG/1
1 TABLET, FILM COATED ORAL 2 TIMES DAILY
Qty: 20 TABLET | Refills: 0 | Status: SHIPPED | OUTPATIENT
Start: 2019-04-25 | End: 2019-05-05

## 2019-04-25 RX ORDER — FLUTICASONE PROPIONATE 50 MCG
2 SPRAY, SUSPENSION (ML) NASAL DAILY
Qty: 1 BOTTLE | Refills: 1 | Status: SHIPPED | OUTPATIENT
Start: 2019-04-25 | End: 2019-05-17 | Stop reason: SDUPTHER

## 2019-04-25 NOTE — PROGRESS NOTES
Subjective:       History was provided by the patient. Armaan Arias is a 35 y.o. female who presents for evaluation of symptoms of a URI. Symptoms include dry cough, headache, hoarseness, nasal blockage, night sweats, post nasal drip, sinus and nasal congestion and sore throat. Onset of symptoms was a few days ago, gradually worsening since that time. Associated symptoms include achiness, congestion, nasal congestion, post nasal drip and sinus pressure. She is drinking moderate amounts of fluids. Evaluation to date: none. Treatment to date: antihistamines  GERD: Patient complains of epigastric pain and sore throat worsen at night and in the AM. Symptoms have been present for approximately a few days. Symptoms include no other symptoms. The patient denies abdominal bloating, choking on food, difficulty swallowing and early satiety. Symptoms appear to be worsened by lying down. Risk factors present for GERD include on Prednison . Risk factors absent for GERD are oral corticosteroid use. Studies performed so far include none. Treatments tried so far include none. Results of treatment: not applicable. Patient with acute flare of Crohn disease she isn't high-dose prednisone that's what trigger most of her symptoms  She is having Polyuria week up at night her sugar has been reading high she is tapering down she started with 30 mg a day today.   Past Medical History:   Diagnosis Date    [facility] ([facility]     IBD (inflammatory bowel disease)     PCOS (polycystic ovarian syndrome)     Psoriasis     [facility] ([facility]     sees Rheumatology      Patient Active Problem List    Diagnosis Date Noted    Type 2 diabetes mellitus without complication, without long-term current use of insulin ([facility] 2019    Anosmia 2018    Tonsillar hypertrophy 2017    Recurrent tonsillitis 2017     Past Surgical History:   Procedure Laterality Date     SECTION  2006     SECTION, mouth every 6 hours as needed (spasms) 30 tablet 0    folic acid (FOLVITE) 1 MG tablet TAKE 1 TABLET BY MOUTH DAILY 90 tablet 0    ONETOUCH DELICA LANCETS 56K MISC USE ONE DAILY 100 each 0    escitalopram (LEXAPRO) 10 MG tablet Take 1 tablet by mouth daily 30 tablet 3    ONE TOUCH ULTRA TEST strip TEST ONCE DAILY 100 strip 0    albuterol sulfate HFA (PROAIR HFA) 108 (90 Base) MCG/ACT inhaler Inhale 2 puffs into the lungs every 6 hours as needed for Wheezing 1 Inhaler 3    ustekinumab (STELARA) 130 MG/26ML SOLN Infuse 260 mg intravenously      lisinopril (PRINIVIL;ZESTRIL) 10 MG tablet TAKE 1 TABLET BY MOUTH EVERY DAY 90 tablet 1    metFORMIN (GLUCOPHAGE-XR) 500 MG extended release tablet TAKE 1 TABLET BY MOUTH EVERY DAY 90 tablet 1    Blood Glucose Monitoring Suppl (ONE TOUCH ULTRA MINI) w/Device KIT 1 kit by Does not apply route daily as needed (fasting) 1 kit 0    zinc sulfate (ORAZINC) 220 (50 Zn) MG capsule Take 1 capsule by mouth daily 30 capsule 1    dicyclomine (BENTYL) 20 MG tablet Take 20 mg by mouth every 6 hours      predniSONE (DELTASONE) 10 MG tablet Take 1 tablet by mouth daily Take 5 pills each morning for 2 days,Take 4 pills each morning for 2 days,Take 3 pills each morning for 2 days,Take 2 pills each morning for 2 days,Take 1 pill each morning for 2 days 30 tablet 0    diphenoxylate-atropine (LOMOTIL) 2.5-0.025 MG per tablet diphenoxylate-atropine 2.5 mg-0.025 mg tablet      Methotrexate, PF, (OTREXUP) 20 MG/0.4ML chemo injection pen Inject 20 mg into the skin once a week      adalimumab (HUMIRA) 40 MG/0.8ML injection Inject 40 mg into the skin once       No current facility-administered medications for this visit.       Current Outpatient Medications on File Prior to Visit   Medication Sig Dispense Refill    meloxicam (MOBIC) 15 MG tablet Take 1 tablet by mouth daily 30 tablet 3    methotrexate (RHEUMATREX) 2.5 MG chemo tablet Take 20 mg by mouth once a week      tiZANidine (ZANAFLEX) 4 MG tablet Take 1 tablet by mouth every 6 hours as needed (spasms) 30 tablet 0    folic acid (FOLVITE) 1 MG tablet TAKE 1 TABLET BY MOUTH DAILY 90 tablet 0    ONETOUCH DELICA LANCETS 90L MISC USE ONE DAILY 100 each 0    escitalopram (LEXAPRO) 10 MG tablet Take 1 tablet by mouth daily 30 tablet 3    ONE TOUCH ULTRA TEST strip TEST ONCE DAILY 100 strip 0    albuterol sulfate HFA (PROAIR HFA) 108 (90 Base) MCG/ACT inhaler Inhale 2 puffs into the lungs every 6 hours as needed for Wheezing 1 Inhaler 3    ustekinumab (STELARA) 130 MG/26ML SOLN Infuse 260 mg intravenously      lisinopril (PRINIVIL;ZESTRIL) 10 MG tablet TAKE 1 TABLET BY MOUTH EVERY DAY 90 tablet 1    metFORMIN (GLUCOPHAGE-XR) 500 MG extended release tablet TAKE 1 TABLET BY MOUTH EVERY DAY 90 tablet 1    Blood Glucose Monitoring Suppl (ONE TOUCH ULTRA MINI) w/Device KIT 1 kit by Does not apply route daily as needed (fasting) 1 kit 0    zinc sulfate (ORAZINC) 220 (50 Zn) MG capsule Take 1 capsule by mouth daily 30 capsule 1    dicyclomine (BENTYL) 20 MG tablet Take 20 mg by mouth every 6 hours      predniSONE (DELTASONE) 10 MG tablet Take 1 tablet by mouth daily Take 5 pills each morning for 2 days,Take 4 pills each morning for 2 days,Take 3 pills each morning for 2 days,Take 2 pills each morning for 2 days,Take 1 pill each morning for 2 days 30 tablet 0    diphenoxylate-atropine (LOMOTIL) 2.5-0.025 MG per tablet diphenoxylate-atropine 2.5 mg-0.025 mg tablet      Methotrexate, PF, (OTREXUP) 20 MG/0.4ML chemo injection pen Inject 20 mg into the skin once a week      adalimumab (HUMIRA) 40 MG/0.8ML injection Inject 40 mg into the skin once       No current facility-administered medications on file prior to visit. No Known Allergies    Review of Systems  Pertinent items are noted in HPI.       Objective:      /80 (Site: Left Upper Arm, Position: Sitting, Cuff Size: Medium Adult)   Pulse 85   Temp 97.6 °F (36.4 °C) (Oral) Wt 207 lb (93.9 kg)   SpO2 99%   BMI 35.53 kg/m²   General appearance: alert, appears stated age and cooperative  Head: Normocephalic, without obvious abnormality, atraumatic  Eyes: conjunctivae/corneas clear. PERRL, EOM's intact. Fundi benign. Ears: normal TM's and external ear canals both ears  Nose: Nares normal. Septum midline. Mucosa normal. No drainage or sinus tenderness. Throat: lips, mucosa, and tongue normal; teeth and gums normal  Neck: no adenopathy, no carotid bruit, no JVD, supple, symmetrical, trachea midline and thyroid not enlarged, symmetric, no tenderness/mass/nodules  Breasts: normal appearance, no masses or tenderness  Heart: regular rate and rhythm, S1, S2 normal, no murmur, click, rub or gallop  Abdomen: soft, non-tender; bowel sounds normal; no masses,  no organomegaly         Assessment:         Diagnosis Orders   1. Gastroesophageal reflux disease, esophagitis presence not specified  omeprazole (PRILOSEC) 40 MG delayed release capsule   2. Urinary frequency  POCT Urinalysis no Micro   3. Viral URI     4. Type 2 diabetes mellitus without complication, without long-term current use of insulin (HCC)  POCT glycosylated hemoglobin (Hb A1C)    POCT Glucose       Plan:      Discussed dx and tx of URIs  Discussed the dx and tx of sinusitis. Suggested symptomatic OTC remedies. Nasal saline sprays for congestion. RTC prn.     See orders   HbA1c and POCT BG are okay   Continue to monitor BG at home  If symptoms or URI not better in 2 days she can start Abx

## 2019-04-29 ENCOUNTER — OFFICE VISIT (OUTPATIENT)
Dept: FAMILY MEDICINE CLINIC | Age: 39
End: 2019-04-29
Payer: COMMERCIAL

## 2019-04-29 VITALS
SYSTOLIC BLOOD PRESSURE: 122 MMHG | DIASTOLIC BLOOD PRESSURE: 74 MMHG | OXYGEN SATURATION: 98 % | HEART RATE: 106 BPM | BODY MASS INDEX: 36.39 KG/M2 | RESPIRATION RATE: 14 BRPM | WEIGHT: 212 LBS

## 2019-04-29 DIAGNOSIS — L02.91 ABSCESS: Primary | ICD-10-CM

## 2019-04-29 PROCEDURE — G8417 CALC BMI ABV UP PARAM F/U: HCPCS | Performed by: FAMILY MEDICINE

## 2019-04-29 PROCEDURE — G8427 DOCREV CUR MEDS BY ELIG CLIN: HCPCS | Performed by: FAMILY MEDICINE

## 2019-04-29 PROCEDURE — 99213 OFFICE O/P EST LOW 20 MIN: CPT | Performed by: FAMILY MEDICINE

## 2019-04-29 PROCEDURE — 1036F TOBACCO NON-USER: CPT | Performed by: FAMILY MEDICINE

## 2019-04-29 RX ORDER — DOXYCYCLINE HYCLATE 100 MG
100 TABLET ORAL 2 TIMES DAILY
Qty: 20 TABLET | Refills: 0 | Status: SHIPPED | OUTPATIENT
Start: 2019-04-29 | End: 2019-05-09

## 2019-04-29 NOTE — PROGRESS NOTES
SUBJECTIVE:  Patient ID: Romel Marcus is a 35 y.o. y.o. female     HPI   Patient is here with a concern about 25 in the lower back buttock area very tender to sit, seems getting worse patient with history crown on medication  No trauma or injury or any trigger  She was seen last week for upper respiratory infection hasn't started antibiotic yet  No fever no chills no nausea vomiting or diarrhea.     Past Medical History:   Diagnosis Date    [facility]'s disease ([facility]     IBD (inflammatory bowel disease)     PCOS (polycystic ovarian syndrome)     Psoriasis     Psoriatic arthritis ([facility]     sees Rheumatology       Past Surgical History:   Procedure Laterality Date     SECTION  2006     SECTION, CLASSIC  2009     Family History   Problem Relation Age of Onset    Diabetes Mother     Cancer Mother     Stroke Father      Social History     Socioeconomic History    Marital status:      Spouse name: None    Number of children: None    Years of education: None    Highest education level: None   Occupational History    None   Social Needs    Financial resource strain: None    Food insecurity:     Worry: None     Inability: None    Transportation needs:     Medical: None     Non-medical: None   Tobacco Use    Smoking status: Former Smoker     Packs/day: 1.00     Years: 10.00     Pack years: 10.00     Last attempt to quit: 2005     Years since quittin.6    Smokeless tobacco: Never Used   Substance and Sexual Activity    Alcohol use: Yes     Comment: ocassionally    Drug use: No    Sexual activity: Yes     Partners: Male     Birth control/protection: IUD   Lifestyle    Physical activity:     Days per week: None     Minutes per session: None    Stress: None   Relationships    Social connections:     Talks on phone: None     Gets together: None     Attends Yazidi service: None     Active member of club or organization: None     Attends meetings of clubs or organizations: None     Relationship status: None    Intimate partner violence:     Fear of current or ex partner: None     Emotionally abused: None     Physically abused: None     Forced sexual activity: None   Other Topics Concern    None   Social History Narrative    None     Current Outpatient Medications   Medication Sig Dispense Refill    amoxicillin-clavulanate (AUGMENTIN) 875-125 MG per tablet Take 1 tablet by mouth 2 times daily for 10 days 20 tablet 0    omeprazole (PRILOSEC) 40 MG delayed release capsule Take 1 capsule by mouth daily 30 capsule 3    fluticasone (FLONASE) 50 MCG/ACT nasal spray 2 sprays by Nasal route daily 1 Bottle 1    meloxicam (MOBIC) 15 MG tablet Take 1 tablet by mouth daily 30 tablet 3    methotrexate (RHEUMATREX) 2.5 MG chemo tablet Take 20 mg by mouth once a week      tiZANidine (ZANAFLEX) 4 MG tablet Take 1 tablet by mouth every 6 hours as needed (spasms) 30 tablet 0    folic acid (FOLVITE) 1 MG tablet TAKE 1 TABLET BY MOUTH DAILY 90 tablet 0    ONETOUCH DELICA LANCETS 97B MISC USE ONE DAILY 100 each 0    escitalopram (LEXAPRO) 10 MG tablet Take 1 tablet by mouth daily 30 tablet 3    ONE TOUCH ULTRA TEST strip TEST ONCE DAILY 100 strip 0    albuterol sulfate HFA (PROAIR HFA) 108 (90 Base) MCG/ACT inhaler Inhale 2 puffs into the lungs every 6 hours as needed for Wheezing 1 Inhaler 3    ustekinumab (STELARA) 130 MG/26ML SOLN Infuse 260 mg intravenously      lisinopril (PRINIVIL;ZESTRIL) 10 MG tablet TAKE 1 TABLET BY MOUTH EVERY DAY 90 tablet 1    metFORMIN (GLUCOPHAGE-XR) 500 MG extended release tablet TAKE 1 TABLET BY MOUTH EVERY DAY 90 tablet 1    Blood Glucose Monitoring Suppl (ONE TOUCH ULTRA MINI) w/Device KIT 1 kit by Does not apply route daily as needed (fasting) 1 kit 0    zinc sulfate (ORAZINC) 220 (50 Zn) MG capsule Take 1 capsule by mouth daily 30 capsule 1    dicyclomine (BENTYL) 20 MG tablet Take 20 mg by mouth every 6 hours      predniSONE (DELTASONE) 10 MG oriented to person, place, and time. Skin: No rash noted. There is erythema. Psychiatric: She has a normal mood and affect. Vitals reviewed. ASSESSMENT:     Diagnosis Orders   1.  Qian Mujica MD, General Surgery, [facility]       PLAN:    Skin care is discussed   Medication and sent to her pharmacy  Call with any change in symptoms my need immediate attention

## 2019-04-29 NOTE — LETTER
[address] [place] [address] Dr Mendoza [facility] [address]  [address] [place] [address]  Phone: [phone]  Fax: [phone]    Jessica Branch MD        April 29, 2019     Patient: Kimber Buitrago   YOB: 1980   Date of Visit: 4/29/2019       To Whom it May Concern:    Kimber Buitrago was seen in my clinic on 4/29/2019. She may return to work on 5/1/19. If you have any questions or concerns, please don't hesitate to call.     Sincerely,         Jessica Branch MD

## 2019-04-30 ENCOUNTER — TELEPHONE (OUTPATIENT)
Dept: FAMILY MEDICINE CLINIC | Age: 39
End: 2019-04-30

## 2019-04-30 DIAGNOSIS — E11.9 TYPE 2 DIABETES MELLITUS WITHOUT COMPLICATION, WITHOUT LONG-TERM CURRENT USE OF INSULIN (HCC): ICD-10-CM

## 2019-04-30 RX ORDER — METFORMIN HYDROCHLORIDE 500 MG/1
TABLET, EXTENDED RELEASE ORAL
Qty: 90 TABLET | Refills: 1 | Status: SHIPPED | OUTPATIENT
Start: 2019-04-30 | End: 2019-05-13 | Stop reason: SDUPTHER

## 2019-04-30 ASSESSMENT — ENCOUNTER SYMPTOMS
GASTROINTESTINAL NEGATIVE: 1
EYES NEGATIVE: 1
RESPIRATORY NEGATIVE: 1

## 2019-04-30 NOTE — TELEPHONE ENCOUNTER
Medication:   Requested Prescriptions     Pending Prescriptions Disp Refills    metFORMIN (GLUCOPHAGE-XR) 500 MG extended release tablet [Pharmacy Med Name: METFORMIN  MG TABLET] 90 tablet 1     Sig: TAKE 1 TABLET BY MOUTH EVERY DAY     Last Filled:  10.8.18  Last appt: 4/29/2019   Next appt: 5/13/2019    Last Labs DM:   Lab Results   Component Value Date    LABA1C 7.2 04/25/2019

## 2019-04-30 NOTE — TELEPHONE ENCOUNTER
[phone] (home)   Called and spoke w/patient is having some discomfort in the area of the abscess, pt confirmed appt tomorrow with Dr Linda Lovelace, states that her  is going with her to the appointment.       Pt was advised to contact GI ([facility]) and inform about abscess

## 2019-05-01 ENCOUNTER — OFFICE VISIT (OUTPATIENT)
Dept: SURGERY | Age: 39
End: 2019-05-01
Payer: COMMERCIAL

## 2019-05-01 VITALS
TEMPERATURE: 99.2 F | WEIGHT: 208 LBS | BODY MASS INDEX: 35.51 KG/M2 | DIASTOLIC BLOOD PRESSURE: 90 MMHG | HEART RATE: 109 BPM | RESPIRATION RATE: 16 BRPM | HEIGHT: 64 IN | SYSTOLIC BLOOD PRESSURE: 129 MMHG

## 2019-05-01 DIAGNOSIS — L05.01 PILONIDAL ABSCESS: Primary | ICD-10-CM

## 2019-05-01 PROCEDURE — 10081 I&D PILONIDAL CYST COMP: CPT | Performed by: SURGERY

## 2019-05-01 PROCEDURE — G8427 DOCREV CUR MEDS BY ELIG CLIN: HCPCS | Performed by: SURGERY

## 2019-05-01 PROCEDURE — G8417 CALC BMI ABV UP PARAM F/U: HCPCS | Performed by: SURGERY

## 2019-05-01 PROCEDURE — 99243 OFF/OP CNSLTJ NEW/EST LOW 30: CPT | Performed by: SURGERY

## 2019-05-01 NOTE — PROGRESS NOTES
Subjective:     Patient is a 35 y.o. woman with pilonidal abscess    The patient is referred by Dr. Reji Crenshaw MD. Results of consultation will be shared via electronic medical record    HPI: Several days of pain, redness, swelling, warmth around tailbone. Pain is severe. She never had this before. She is on Stelara and steroids for Crohn's. No prior bowel resection or perianal fistula    Patient Active Problem List    Diagnosis Date Noted    Type 2 diabetes mellitus without complication, without long-term current use of insulin ([facility] 2019    Anosmia 2018    Tonsillar hypertrophy 2017    Recurrent tonsillitis 2017     Past Medical History:   Diagnosis Date    Crohn's disease ([facility]     IBD (inflammatory bowel disease)     PCOS (polycystic ovarian syndrome)     Psoriasis     Psoriatic arthritis ([facility]     sees Rheumatology       Past Surgical History:   Procedure Laterality Date     SECTION  2006     SECTION, CLASSIC  2009      Not in a hospital admission. No Known Allergies   Social History     Tobacco Use    Smoking status: Former Smoker     Packs/day: 1.00     Years: 10.00     Pack years: 10.00     Last attempt to quit: 2005     Years since quittin.6    Smokeless tobacco: Never Used   Substance Use Topics    Alcohol use: Yes     Comment: ocassionally      Family History   Problem Relation Age of Onset    Diabetes Mother     Cancer Mother     Stroke Father       Review of Systems    GEN: reviewed and negative except as noted in HPI. GI: reviewed and negative except as noted in HPI. No bleeding, no constipation, + diarrhea from Crohn's.  No prior anal fistula     A 14 point complete review of systems was conducted and was negative except as noted in HPI       Objective:     GEN: appears well, no distress, appears stated age  PSYCH: normal mood, normal affect  NECK: no neck masses, trachea midline  ENT: moist oral mucosa; anicteric  SKIN: no rash or jaundice. There is a 5 cm red warm fluctuant area above the gluteal cleft consistent with abscess   CV: regular heart rate and rhythm  PULM: normal respiratory effort, no wheezing  GI: soft non tender abdomen. Normal bowel sounds  RECTAL: deferred   EXT/NEURO: normal gait, strength/sensation grossly intact in all extremities      Assessment:     Pilonidal abscess     Plan:     RBA of incision and drainage discussed. Patient consents and agrees to proceed    Jada Rivera M.D.  5/1/19   11:12 AM      After informed consent was obtained the patient was taken to the clinic room. Local anesthesia was given. Time out was called to confirm key components. The patient was placed in the prone position with appropriate padding. The patient was then prepped and draped in the usual sterile fashion. We saw an abscess in the midline. We incised this with a scalpel blade in cruciate fashion and released a large amount of foul smelling pus. We then probed the cavity and broke up loculations. We irrigated out the area and left 1/2 inch iodoform packing in place as a wick/drain. This was covered with gauze    Dr. El Bello was present and scrubbed throughout and performed all portions. No immediate complications. See me Monday.  Patient was instructed to pull packing Friday if it does not fall out before    Jada Rivera M.D.  5/1/19   11:13 AM

## 2019-05-06 ENCOUNTER — PROCEDURE VISIT (OUTPATIENT)
Dept: SURGERY | Age: 39
End: 2019-05-06

## 2019-05-06 VITALS
RESPIRATION RATE: 20 BRPM | DIASTOLIC BLOOD PRESSURE: 88 MMHG | SYSTOLIC BLOOD PRESSURE: 116 MMHG | BODY MASS INDEX: 36.02 KG/M2 | WEIGHT: 211 LBS | HEIGHT: 64 IN

## 2019-05-06 DIAGNOSIS — L05.01 PILONIDAL ABSCESS: Primary | ICD-10-CM

## 2019-05-06 PROCEDURE — 99024 POSTOP FOLLOW-UP VISIT: CPT | Performed by: SURGERY

## 2019-05-06 NOTE — PROGRESS NOTES
Subjective:     Patient is a 35 y.o. with pilonidal abscess    HPI: Doing well since in office drainage last week. Pain is resolved. No bleeding. Minimal drainage from area     Patient Active Problem List    Diagnosis Date Noted    Type 2 diabetes mellitus without complication, without long-term current use of insulin ([facility] 2019    Anosmia 2018    Tonsillar hypertrophy 2017    Recurrent tonsillitis 2017     Past Medical History:   Diagnosis Date    Crohn's disease ([facility]     IBD (inflammatory bowel disease)     PCOS (polycystic ovarian syndrome)     Psoriasis     Psoriatic arthritis ([facility]     sees Rheumatology       Past Surgical History:   Procedure Laterality Date     SECTION  2006     SECTION, CLASSIC  2009      Not in a hospital admission. No Known Allergies   Social History     Tobacco Use    Smoking status: Former Smoker     Packs/day: 1.00     Years: 10.00     Pack years: 10.00     Last attempt to quit: 2005     Years since quittin.6    Smokeless tobacco: Never Used   Substance Use Topics    Alcohol use: Yes     Comment: ocassionally      Family History   Problem Relation Age of Onset    Diabetes Mother     Cancer Mother     Stroke Father       Review of Systems    GEN: reviewed and negative except as noted in HPI. GI: reviewed and negative except as noted in HPI. No bleeding, constipation or diarrhea. A 14 point complete review of systems was conducted and was negative except as noted in HPI       Objective:     GEN: appears well, no distress, appears stated age  PSYCH: normal mood, normal affect  NECK: no neck masses, trachea midline  ENT: moist oral mucosa; anicteric  SKIN: no rash or jaundice. Pilonidal abscess area greatly improved. No further redness or fluctuance   CV: regular heart rate and rhythm  PULM: normal respiratory effort, no wheezing  GI: soft non tender abdomen.  Normal bowel sounds  RECTAL: deferred   EXT/NEURO: normal gait, strength/sensation grossly intact in all extremities      Assessment:     Pilonidal abscess resolved     Plan:     Discussed return precautions. Discussed RBA of pilonidal cystectomy. Given her immune modulating medications for Crohn's would observe area for now.      Samira Jurado M.D.  5/6/19   11:27 AM

## 2019-05-08 DIAGNOSIS — F41.8 ANXIETY WITH DEPRESSION: ICD-10-CM

## 2019-05-08 LAB
ALBUMIN SERPL-MCNC: 4.1 G/DL (ref 3.4–5)
ALP BLD-CCNC: 88 U/L (ref 40–129)
ALT SERPL-CCNC: 18 U/L (ref 10–40)
ANION GAP SERPL CALCULATED.3IONS-SCNC: 12 MMOL/L (ref 3–16)
AST SERPL-CCNC: 10 U/L (ref 15–37)
BILIRUB SERPL-MCNC: 0.3 MG/DL (ref 0–1)
BILIRUBIN DIRECT: <0.2 MG/DL (ref 0–0.3)
BILIRUBIN, INDIRECT: ABNORMAL MG/DL (ref 0–1)
BUN BLDV-MCNC: 11 MG/DL (ref 7–20)
CALCIUM SERPL-MCNC: 9.1 MG/DL (ref 8.3–10.6)
CHLORIDE BLD-SCNC: 99 MMOL/L (ref 99–110)
CHOLESTEROL, TOTAL: 200 MG/DL (ref 0–199)
CO2: 23 MMOL/L (ref 21–32)
CREAT SERPL-MCNC: <0.5 MG/DL (ref 0.6–1.1)
GFR AFRICAN AMERICAN: >60
GFR NON-AFRICAN AMERICAN: >60
GLUCOSE BLD-MCNC: 150 MG/DL (ref 70–99)
HCT VFR BLD CALC: 40.9 % (ref 36–48)
HDLC SERPL-MCNC: 59 MG/DL (ref 40–60)
HEMOGLOBIN: 13.5 G/DL (ref 12–16)
LDL CHOLESTEROL CALCULATED: 120 MG/DL
MCH RBC QN AUTO: 29.8 PG (ref 26–34)
MCHC RBC AUTO-ENTMCNC: 33 G/DL (ref 31–36)
MCV RBC AUTO: 90.5 FL (ref 80–100)
PDW BLD-RTO: 14.6 % (ref 12.4–15.4)
PLATELET # BLD: 363 K/UL (ref 135–450)
PMV BLD AUTO: 7.8 FL (ref 5–10.5)
POTASSIUM SERPL-SCNC: 4.7 MMOL/L (ref 3.5–5.1)
RBC # BLD: 4.52 M/UL (ref 4–5.2)
SODIUM BLD-SCNC: 134 MMOL/L (ref 136–145)
TOTAL PROTEIN: 7.8 G/DL (ref 6.4–8.2)
TRIGL SERPL-MCNC: 105 MG/DL (ref 0–150)
TSH SERPL DL<=0.05 MIU/L-ACNC: 0.87 UIU/ML (ref 0.27–4.2)
VLDLC SERPL CALC-MCNC: 21 MG/DL
WBC # BLD: 11 K/UL (ref 4–11)

## 2019-05-08 PROCEDURE — 36415 COLL VENOUS BLD VENIPUNCTURE: CPT | Performed by: FAMILY MEDICINE

## 2019-05-09 LAB
ESTIMATED AVERAGE GLUCOSE: 191.5 MG/DL
HBA1C MFR BLD: 8.3 %

## 2019-05-13 ENCOUNTER — OFFICE VISIT (OUTPATIENT)
Dept: FAMILY MEDICINE CLINIC | Age: 39
End: 2019-05-13
Payer: COMMERCIAL

## 2019-05-13 VITALS
HEART RATE: 87 BPM | BODY MASS INDEX: 36.09 KG/M2 | OXYGEN SATURATION: 94 % | WEIGHT: 211.4 LBS | DIASTOLIC BLOOD PRESSURE: 72 MMHG | SYSTOLIC BLOOD PRESSURE: 108 MMHG | HEIGHT: 64 IN

## 2019-05-13 DIAGNOSIS — E11.9 TYPE 2 DIABETES MELLITUS WITHOUT COMPLICATION, WITHOUT LONG-TERM CURRENT USE OF INSULIN (HCC): ICD-10-CM

## 2019-05-13 DIAGNOSIS — K50.90 CROHN'S DISEASE WITHOUT COMPLICATION, UNSPECIFIED GASTROINTESTINAL TRACT LOCATION (HCC): ICD-10-CM

## 2019-05-13 DIAGNOSIS — I10 ESSENTIAL HYPERTENSION: Primary | ICD-10-CM

## 2019-05-13 DIAGNOSIS — K21.9 GASTROESOPHAGEAL REFLUX DISEASE, ESOPHAGITIS PRESENCE NOT SPECIFIED: ICD-10-CM

## 2019-05-13 PROCEDURE — 99214 OFFICE O/P EST MOD 30 MIN: CPT | Performed by: FAMILY MEDICINE

## 2019-05-13 PROCEDURE — 2022F DILAT RTA XM EVC RTNOPTHY: CPT | Performed by: FAMILY MEDICINE

## 2019-05-13 PROCEDURE — G8427 DOCREV CUR MEDS BY ELIG CLIN: HCPCS | Performed by: FAMILY MEDICINE

## 2019-05-13 PROCEDURE — G8417 CALC BMI ABV UP PARAM F/U: HCPCS | Performed by: FAMILY MEDICINE

## 2019-05-13 PROCEDURE — 1036F TOBACCO NON-USER: CPT | Performed by: FAMILY MEDICINE

## 2019-05-13 PROCEDURE — 3045F PR MOST RECENT HEMOGLOBIN A1C LEVEL 7.0-9.0%: CPT | Performed by: FAMILY MEDICINE

## 2019-05-13 RX ORDER — METFORMIN HYDROCHLORIDE 500 MG/1
TABLET, EXTENDED RELEASE ORAL
Qty: 180 TABLET | Refills: 1 | Status: SHIPPED | OUTPATIENT
Start: 2019-05-13 | End: 2020-02-06 | Stop reason: ALTCHOICE

## 2019-05-13 NOTE — PROGRESS NOTES
Subjective:      Patient ID: Wendy Bradford is a 35 y.o. female. HPI    Hypertension: Patient is here follow up on  elevated blood pressures. Age at onset of elevated blood pressure:  Few years. Cardiac symptoms none. Patient denies chest pain, chest pressure/discomfort, dyspnea, exertional chest pressure/discomfort, lower extremity edema, near-syncope, orthopnea and palpitations. Cardiovascular risk factors: dyslipidemia, hypertension, obesity (BMI >= 30 kg/m2) and sedentary lifestyle. Use of agents associated with hypertension: steroids. History of target organ damage: none. She's been feeling really well low she exercises and needs been eating healthy she lost weight, working toward that overall feels really well  Diabetes Mellitus Type II, Follow-up: Patient here for follow-up of Type 2 diabetes mellitus. Current symptoms/problems include none and have been stable. Symptoms have been present for a few years. Known diabetic complications: none  Cardiovascular risk factors: diabetes mellitus, hypertension and obesity (BMI >= 30 kg/m2)  Current diabetic medications include see med's list .     Eye exam current (within one year): yes  Weight trend: stable  Prior visit with dietician: no  Current diet: in general, a \"healthy\" diet    Current exercise: none    Current monitoring regimen: home blood tests - weekly  Home blood sugar records: fasting range: 100's  Any episodes of hypoglycemia? no  Pt with history of GERD doing okay on the med's    Pt with history of chron's disease on med's followed closely by GI currently on prednisone she had a flareup and that had affected her blood glucose level  She is doing better in terms of her Crohn exacerbation she will be done with her steroid next week   Review of Systems   Constitutional: Negative. Negative for chills and fever. HENT:  Negative for postnasal drip, rhinorrhea, sinus pressure, tinnitus and trouble swallowing. Eyes: Negative.     Respiratory: Negative. Cardiovascular: Negative. Gastrointestinal: Negative. Endocrine: Negative. Genitourinary: Negative. Musculoskeletal: . Negative for back pain, gait problem, joint swelling, neck pain and neck stiffness. Skin:. Negative for color change and pallor. Allergic/Immunologic: Negative. Neurological: Negative. Psychiatric/Behavioral: Negative. All other systems reviewed and are negative.   Past Medical History:   Diagnosis Date    Crohn's disease ([facility]     IBD (inflammatory bowel disease)     PCOS (polycystic ovarian syndrome)     Psoriasis     Psoriatic arthritis ([facility]     sees Rheumatology       Past Surgical History:   Procedure Laterality Date     SECTION  2006     SECTION, CLASSIC  2009     Family History   Problem Relation Age of Onset    Diabetes Mother     Cancer Mother     Stroke Father      Social History     Socioeconomic History    Marital status:      Spouse name: None    Number of children: None    Years of education: None    Highest education level: None   Occupational History    None   Social Needs    Financial resource strain: None    Food insecurity:     Worry: None     Inability: None    Transportation needs:     Medical: None     Non-medical: None   Tobacco Use    Smoking status: Former Smoker     Packs/day: 1.00     Years: 10.00     Pack years: 10.00     Last attempt to quit: 2005     Years since quittin.7    Smokeless tobacco: Never Used   Substance and Sexual Activity    Alcohol use: Yes     Comment: ocassionally    Drug use: No    Sexual activity: Yes     Partners: Male     Birth control/protection: IUD   Lifestyle    Physical activity:     Days per week: None     Minutes per session: None    Stress: None   Relationships    Social connections:     Talks on phone: None     Gets together: None     Attends Cheondoism service: None     Active member of club or organization: None     Attends meetings of clubs or organizations: None     Relationship status: None    Intimate partner violence:     Fear of current or ex partner: None     Emotionally abused: None     Physically abused: None     Forced sexual activity: None   Other Topics Concern    None   Social History Narrative    None     Current Outpatient Medications   Medication Sig Dispense Refill    metFORMIN (GLUCOPHAGE-XR) 500 MG extended release tablet One tablet BID with meals 180 tablet 1    omeprazole (PRILOSEC) 40 MG delayed release capsule Take 1 capsule by mouth daily 30 capsule 3    fluticasone (FLONASE) 50 MCG/ACT nasal spray 2 sprays by Nasal route daily 1 Bottle 1    methotrexate (RHEUMATREX) 2.5 MG chemo tablet Take 20 mg by mouth once a week      tiZANidine (ZANAFLEX) 4 MG tablet Take 1 tablet by mouth every 6 hours as needed (spasms) 30 tablet 0    folic acid (FOLVITE) 1 MG tablet TAKE 1 TABLET BY MOUTH DAILY 90 tablet 0    ONETOUCH DELICA LANCETS 29J MISC USE ONE DAILY 100 each 0    escitalopram (LEXAPRO) 10 MG tablet Take 1 tablet by mouth daily 30 tablet 3    ONE TOUCH ULTRA TEST strip TEST ONCE DAILY 100 strip 0    albuterol sulfate HFA (PROAIR HFA) 108 (90 Base) MCG/ACT inhaler Inhale 2 puffs into the lungs every 6 hours as needed for Wheezing 1 Inhaler 3    ustekinumab (STELARA) 130 MG/26ML SOLN Infuse 260 mg intravenously      lisinopril (PRINIVIL;ZESTRIL) 10 MG tablet TAKE 1 TABLET BY MOUTH EVERY DAY 90 tablet 1    Blood Glucose Monitoring Suppl (ONE TOUCH ULTRA MINI) w/Device KIT 1 kit by Does not apply route daily as needed (fasting) 1 kit 0    predniSONE (DELTASONE) 10 MG tablet Take 1 tablet by mouth daily Take 5 pills each morning for 2 days,Take 4 pills each morning for 2 days,Take 3 pills each morning for 2 days,Take 2 pills each morning for 2 days,Take 1 pill each morning for 2 days 30 tablet 0    diphenoxylate-atropine (LOMOTIL) 2.5-0.025 MG per tablet diphenoxylate-atropine 2.5 mg-0.025 mg tablet      zinc sulfate (ORAZINC) 220 (50 Zn) MG capsule Take 1 capsule by mouth daily 30 capsule 1    dicyclomine (BENTYL) 20 MG tablet Take 20 mg by mouth every 6 hours      Methotrexate, PF, (OTREXUP) 20 MG/0.4ML chemo injection pen Inject 20 mg into the skin once a week       No current facility-administered medications for this visit. No Known Allergies      Objective:   Physical Exam   Constitutional: She appears well-developed and well-nourished. No distress. HENT:   Head: Normocephalic and atraumatic. Right Ear: External ear normal.   Left Ear: External ear normal.   Nose: Nose normal.   Mouth/Throat: Oropharynx is clear and moist. No oropharyngeal exudate. Eyes: Conjunctivae and EOM are normal. Pupils are equal, round, and reactive to light. Right eye exhibits no discharge. Left eye exhibits no discharge. No scleral icterus. Neck: Normal range of motion. Neck supple. No JVD present. No tracheal deviation present. No thyromegaly present. Cardiovascular: Normal rate, regular rhythm, normal heart sounds and intact distal pulses. Pulmonary/Chest: Effort normal and breath sounds normal. No stridor. No respiratory distress. She has no wheezes. She has no rales. She exhibits no tenderness. Abdominal: Soft. Bowel sounds are normal. She exhibits no distension and no mass. There is no tenderness. There is no rebound and no guarding. Musculoskeletal: Normal range of motion. She exhibits no edema or tenderness. Lymphadenopathy:     She has no cervical adenopathy. Skin: Skin is warm and dry. No rash noted. She is not diaphoretic. No erythema. No pallor. Left foot on the medial aspect anterior to the heel. He soft subcutaneous mass very soft rounded no pain no skin lesion changes   microfilaments test exam was negative   Vitals reviewed. Assessment:           Diagnosis Orders   1. Essential hypertension     2.  Type 2 diabetes mellitus without complication, without long-term current use of insulin ([address] metFORMIN (GLUCOPHAGE-XR) 500 MG extended release tablet    Basic Metabolic Panel    Hemoglobin A1C    HM DIABETES FOOT EXAM   3. Gastroesophageal reflux disease, esophagitis presence not specified     4.  Crohn's disease without complication, unspecified gastrointestinal tract location [facility])         Plan:      See orders,   Reviewed recent blood work increase in overall control blood sugar  Increase metformin to twice a day 5 mg may need to go up to thousand twice a day depending her readings  Patient will check her sugar and call me with her readings  Follow-up in 3 months

## 2019-05-17 DIAGNOSIS — J06.9 VIRAL URI: ICD-10-CM

## 2019-05-17 RX ORDER — FOLIC ACID 1 MG/1
1 TABLET ORAL DAILY
Qty: 90 TABLET | Refills: 0 | Status: SHIPPED | OUTPATIENT
Start: 2019-05-17 | End: 2019-08-18 | Stop reason: SDUPTHER

## 2019-05-17 RX ORDER — FLUTICASONE PROPIONATE 50 MCG
SPRAY, SUSPENSION (ML) NASAL
Qty: 1 BOTTLE | Refills: 1 | Status: SHIPPED | OUTPATIENT
Start: 2019-05-17 | End: 2021-01-25 | Stop reason: CLARIF

## 2019-05-17 NOTE — TELEPHONE ENCOUNTER
Medication:   Requested Prescriptions     Pending Prescriptions Disp Refills    fluticasone (FLONASE) 50 MCG/ACT nasal spray [Pharmacy Med Name: FLUTICASONE PROP 50 MCG SPRAY]  0     Sig: SPRAY 2 SPRAYS INTO EACH NOSTRIL EVERY DAY     Last Filled:  4/25/19    Last appt: 5/13/2019   Next appt: 8/13/2019

## 2019-05-17 NOTE — TELEPHONE ENCOUNTER
Medication:   Requested Prescriptions     Pending Prescriptions Disp Refills    folic acid (FOLVITE) 1 MG tablet [Pharmacy Med Name: FOLIC ACID 1 MG TABLET] 90 tablet 0     Sig: TAKE 1 TABLET BY MOUTH DAILY     Last Filled:  2/21/19    Last appt: 5/13/19   Next appt: 8/13/19

## 2019-05-21 NOTE — TELEPHONE ENCOUNTER
Medication:   Requested Prescriptions     Pending Prescriptions Disp Refills    [address] [Pharmacy Med Name: ONE TOUCH DELICA 51I LANCETS]  0     Sig: USE ONE DAILY     Last Filled:  2/14/19    Last appt: 5/13/2019   Next appt: 8/13/2019    Last Labs DM:   Lab Results   Component Value Date    LABA1C 8.3 05/08/2019

## 2019-06-06 RX ORDER — ESCITALOPRAM OXALATE 10 MG/1
TABLET ORAL
Qty: 30 TABLET | Refills: 5 | Status: SHIPPED | OUTPATIENT
Start: 2019-06-06 | End: 2019-12-20

## 2019-06-06 NOTE — TELEPHONE ENCOUNTER
Medication:   Requested Prescriptions     Pending Prescriptions Disp Refills    escitalopram (LEXAPRO) 10 MG tablet [Pharmacy Med Name: ESCITALOPRAM 10 MG TABLET] 30 tablet 3     Sig: TAKE 1 TABLET BY MOUTH EVERY DAY     Last Filled:  2/11/19    Last appt: 5/13/2019   Next appt: 8/13/2019

## 2019-06-14 DIAGNOSIS — J06.9 VIRAL URI: ICD-10-CM

## 2019-06-14 RX ORDER — FLUTICASONE PROPIONATE 50 MCG
SPRAY, SUSPENSION (ML) NASAL
Qty: 16 G | Refills: 1 | Status: SHIPPED | OUTPATIENT
Start: 2019-06-14 | End: 2020-02-06

## 2019-06-14 NOTE — TELEPHONE ENCOUNTER
Medication:   Requested Prescriptions     Pending Prescriptions Disp Refills    fluticasone (FLONASE) 50 MCG/ACT nasal spray [Pharmacy Med Name: FLUTICASONE PROP 50 MCG SPRAY]  1     Sig: SPRAY 2 SPRAYS INTO EACH NOSTRIL EVERY DAY     Last Filled: 5.17.19     Last appt: 5/13/2019   Next appt: 8/13/2019    Last OARRS: No flowsheet data found.

## 2019-06-30 ENCOUNTER — PATIENT MESSAGE (OUTPATIENT)
Dept: FAMILY MEDICINE CLINIC | Age: 39
End: 2019-06-30

## 2019-07-01 NOTE — TELEPHONE ENCOUNTER
From: Roderick Hooker  To: Yola Blanca MD  Sent: 6/30/2019 4:44 PM EDT  Subject: Non-Urgent Medical Question    Hi Dr. Dexter Duane,  I know I was supposed to do this faster but honestly it took my this long to get my evenings (because I would forget). Here are my Blood sugars. Most of my evenings are post walking. I am working hard on my diet, I am using the ADA website, using their recipes and walking regularly. I have even lost a little weight. I may need more help with medication.   5/15 6:13am 151  5/16 6:00am 171  5/17 6:19am 151  5/19 9:00AM 161  5/21 6:10am 158  5/28 6:13am 180  6/2 9:30am 169  6/5 9:45am 203  6/6 5:30am 248 9:45pm 225  6/8 7:55am 185  6/9 8:30am 168  6/10 7:00am 181  6/11 7:00am 182  6/24 6:11am 200 9:10pm 144  6/25 9:20pm 155  6/26 6:00am 218 9:40pm 144  6/27 9:30pm 143  6/28 6:00am 180  6/29 7:00am 155

## 2019-07-07 DIAGNOSIS — I10 ESSENTIAL HYPERTENSION: ICD-10-CM

## 2019-07-08 RX ORDER — LISINOPRIL 10 MG/1
TABLET ORAL
Qty: 90 TABLET | Refills: 1 | Status: SHIPPED | OUTPATIENT
Start: 2019-07-08 | End: 2020-01-06

## 2019-08-13 ENCOUNTER — OFFICE VISIT (OUTPATIENT)
Dept: PRIMARY CARE CLINIC | Age: 39
End: 2019-08-13
Payer: COMMERCIAL

## 2019-08-13 VITALS
SYSTOLIC BLOOD PRESSURE: 122 MMHG | HEART RATE: 85 BPM | DIASTOLIC BLOOD PRESSURE: 84 MMHG | WEIGHT: 213.8 LBS | RESPIRATION RATE: 14 BRPM | OXYGEN SATURATION: 97 % | BODY MASS INDEX: 36.7 KG/M2

## 2019-08-13 DIAGNOSIS — I10 ESSENTIAL HYPERTENSION: ICD-10-CM

## 2019-08-13 DIAGNOSIS — E78.2 MIXED HYPERLIPIDEMIA: ICD-10-CM

## 2019-08-13 DIAGNOSIS — E11.9 TYPE 2 DIABETES MELLITUS WITHOUT COMPLICATION, WITHOUT LONG-TERM CURRENT USE OF INSULIN (HCC): Primary | ICD-10-CM

## 2019-08-13 DIAGNOSIS — M02.9: ICD-10-CM

## 2019-08-13 PROCEDURE — 2022F DILAT RTA XM EVC RTNOPTHY: CPT | Performed by: FAMILY MEDICINE

## 2019-08-13 PROCEDURE — 1036F TOBACCO NON-USER: CPT | Performed by: FAMILY MEDICINE

## 2019-08-13 PROCEDURE — 99214 OFFICE O/P EST MOD 30 MIN: CPT | Performed by: FAMILY MEDICINE

## 2019-08-13 PROCEDURE — 3045F PR MOST RECENT HEMOGLOBIN A1C LEVEL 7.0-9.0%: CPT | Performed by: FAMILY MEDICINE

## 2019-08-13 PROCEDURE — G8427 DOCREV CUR MEDS BY ELIG CLIN: HCPCS | Performed by: FAMILY MEDICINE

## 2019-08-13 PROCEDURE — G8417 CALC BMI ABV UP PARAM F/U: HCPCS | Performed by: FAMILY MEDICINE

## 2019-08-13 NOTE — PROGRESS NOTES
Genitourinary: Negative. Musculoskeletal: . Negative for back pain, gait problem, joint swelling, neck pain and neck stiffness. Skin:. Negative for color change and pallor. Allergic/Immunologic: Negative. Neurological: Negative. Psychiatric/Behavioral: Negative. All other systems reviewed and are negative.   Past Medical History:   Diagnosis Date    Crohn's disease ([facility]     IBD (inflammatory bowel disease)     PCOS (polycystic ovarian syndrome)     Psoriasis     Psoriatic arthritis ([facility]     sees Rheumatology       Past Surgical History:   Procedure Laterality Date     SECTION  2006     SECTION, CLASSIC  2009     Family History   Problem Relation Age of Onset    Diabetes Mother     Cancer Mother     Stroke Father      Social History     Socioeconomic History    Marital status:      Spouse name: None    Number of children: None    Years of education: None    Highest education level: None   Occupational History    None   Social Needs    Financial resource strain: None    Food insecurity:     Worry: None     Inability: None    Transportation needs:     Medical: None     Non-medical: None   Tobacco Use    Smoking status: Former Smoker     Packs/day: 1.00     Years: 10.00     Pack years: 10.00     Last attempt to quit: 2005     Years since quittin.9    Smokeless tobacco: Never Used   Substance and Sexual Activity    Alcohol use: Yes     Comment: ocassionally    Drug use: No    Sexual activity: Yes     Partners: Male     Birth control/protection: IUD   Lifestyle    Physical activity:     Days per week: None     Minutes per session: None    Stress: None   Relationships    Social connections:     Talks on phone: None     Gets together: None     Attends Scientology service: None     Active member of club or organization: None     Attends meetings of clubs or organizations: None     Relationship status: None    Intimate partner violence:     Fear of

## 2019-08-17 DIAGNOSIS — K21.9 GASTROESOPHAGEAL REFLUX DISEASE, ESOPHAGITIS PRESENCE NOT SPECIFIED: ICD-10-CM

## 2019-08-17 NOTE — TELEPHONE ENCOUNTER
Medication:   Requested Prescriptions     Pending Prescriptions Disp Refills    omeprazole (PRILOSEC) 40 MG delayed release capsule [Pharmacy Med Name: OMEPRAZOLE DR 40 MG CAPSULE] 90 capsule 1     Sig: TAKE 1 CAPSULE BY MOUTH EVERY DAY     Last Filled:  4/25/19    Last appt: 8/13/2019   Next appt: Visit date not found

## 2019-08-19 RX ORDER — OMEPRAZOLE 40 MG/1
CAPSULE, DELAYED RELEASE ORAL
Qty: 90 CAPSULE | Refills: 1 | Status: SHIPPED | OUTPATIENT
Start: 2019-08-19 | End: 2020-02-21

## 2019-08-19 RX ORDER — FOLIC ACID 1 MG/1
1 TABLET ORAL DAILY
Qty: 90 TABLET | Refills: 0 | Status: SHIPPED | OUTPATIENT
Start: 2019-08-19 | End: 2019-11-18 | Stop reason: SDUPTHER

## 2019-08-21 DIAGNOSIS — I10 ESSENTIAL HYPERTENSION: ICD-10-CM

## 2019-08-21 DIAGNOSIS — E11.9 TYPE 2 DIABETES MELLITUS WITHOUT COMPLICATION, WITHOUT LONG-TERM CURRENT USE OF INSULIN (HCC): ICD-10-CM

## 2019-08-21 LAB
ALBUMIN SERPL-MCNC: 4.3 G/DL (ref 3.4–5)
ALP BLD-CCNC: 78 U/L (ref 40–129)
ALT SERPL-CCNC: 17 U/L (ref 10–40)
ANION GAP SERPL CALCULATED.3IONS-SCNC: 12 MMOL/L (ref 3–16)
AST SERPL-CCNC: 12 U/L (ref 15–37)
BILIRUB SERPL-MCNC: 0.3 MG/DL (ref 0–1)
BILIRUBIN DIRECT: <0.2 MG/DL (ref 0–0.3)
BILIRUBIN, INDIRECT: ABNORMAL MG/DL (ref 0–1)
BUN BLDV-MCNC: 14 MG/DL (ref 7–20)
CALCIUM SERPL-MCNC: 9 MG/DL (ref 8.3–10.6)
CHLORIDE BLD-SCNC: 98 MMOL/L (ref 99–110)
CHOLESTEROL, TOTAL: 161 MG/DL (ref 0–199)
CO2: 24 MMOL/L (ref 21–32)
CREAT SERPL-MCNC: 0.7 MG/DL (ref 0.6–1.1)
GFR AFRICAN AMERICAN: >60
GFR NON-AFRICAN AMERICAN: >60
GLUCOSE BLD-MCNC: 154 MG/DL (ref 70–99)
HCT VFR BLD CALC: 40.9 % (ref 36–48)
HDLC SERPL-MCNC: 35 MG/DL (ref 40–60)
HEMOGLOBIN: 13.5 G/DL (ref 12–16)
LDL CHOLESTEROL CALCULATED: 93 MG/DL
MCH RBC QN AUTO: 30.1 PG (ref 26–34)
MCHC RBC AUTO-ENTMCNC: 32.9 G/DL (ref 31–36)
MCV RBC AUTO: 91.3 FL (ref 80–100)
PDW BLD-RTO: 14.3 % (ref 12.4–15.4)
PLATELET # BLD: 327 K/UL (ref 135–450)
PMV BLD AUTO: 8.2 FL (ref 5–10.5)
POTASSIUM SERPL-SCNC: 4.7 MMOL/L (ref 3.5–5.1)
RBC # BLD: 4.48 M/UL (ref 4–5.2)
SODIUM BLD-SCNC: 134 MMOL/L (ref 136–145)
TOTAL PROTEIN: 7.6 G/DL (ref 6.4–8.2)
TRIGL SERPL-MCNC: 163 MG/DL (ref 0–150)
TSH SERPL DL<=0.05 MIU/L-ACNC: 1.36 UIU/ML (ref 0.27–4.2)
VLDLC SERPL CALC-MCNC: 33 MG/DL
WBC # BLD: 7.6 K/UL (ref 4–11)

## 2019-08-22 LAB
ESTIMATED AVERAGE GLUCOSE: 148.5 MG/DL
HBA1C MFR BLD: 6.8 %

## 2019-08-23 ENCOUNTER — PATIENT MESSAGE (OUTPATIENT)
Dept: PRIMARY CARE CLINIC | Age: 39
End: 2019-08-23

## 2019-08-25 ENCOUNTER — PATIENT MESSAGE (OUTPATIENT)
Dept: PRIMARY CARE CLINIC | Age: 39
End: 2019-08-25

## 2019-11-14 ENCOUNTER — OFFICE VISIT (OUTPATIENT)
Dept: PRIMARY CARE CLINIC | Age: 39
End: 2019-11-14
Payer: COMMERCIAL

## 2019-11-14 VITALS
WEIGHT: 206.4 LBS | OXYGEN SATURATION: 99 % | BODY MASS INDEX: 35.43 KG/M2 | SYSTOLIC BLOOD PRESSURE: 110 MMHG | HEART RATE: 97 BPM | RESPIRATION RATE: 12 BRPM | DIASTOLIC BLOOD PRESSURE: 80 MMHG | TEMPERATURE: 98.6 F

## 2019-11-14 DIAGNOSIS — I10 ESSENTIAL HYPERTENSION: ICD-10-CM

## 2019-11-14 DIAGNOSIS — J06.9 VIRAL UPPER RESPIRATORY TRACT INFECTION: ICD-10-CM

## 2019-11-14 DIAGNOSIS — E78.2 MIXED HYPERLIPIDEMIA: ICD-10-CM

## 2019-11-14 DIAGNOSIS — E11.9 TYPE 2 DIABETES MELLITUS WITHOUT COMPLICATION, WITHOUT LONG-TERM CURRENT USE OF INSULIN (HCC): Primary | ICD-10-CM

## 2019-11-14 LAB
CREATININE URINE POCT: 100
MICROALBUMIN/CREAT 24H UR: 10 MG/G{CREAT}
MICROALBUMIN/CREAT UR-RTO: 30

## 2019-11-14 PROCEDURE — 3044F HG A1C LEVEL LT 7.0%: CPT | Performed by: FAMILY MEDICINE

## 2019-11-14 PROCEDURE — G8427 DOCREV CUR MEDS BY ELIG CLIN: HCPCS | Performed by: FAMILY MEDICINE

## 2019-11-14 PROCEDURE — G8484 FLU IMMUNIZE NO ADMIN: HCPCS | Performed by: FAMILY MEDICINE

## 2019-11-14 PROCEDURE — 1036F TOBACCO NON-USER: CPT | Performed by: FAMILY MEDICINE

## 2019-11-14 PROCEDURE — 2022F DILAT RTA XM EVC RTNOPTHY: CPT | Performed by: FAMILY MEDICINE

## 2019-11-14 PROCEDURE — 99214 OFFICE O/P EST MOD 30 MIN: CPT | Performed by: FAMILY MEDICINE

## 2019-11-14 PROCEDURE — 82044 UR ALBUMIN SEMIQUANTITATIVE: CPT | Performed by: FAMILY MEDICINE

## 2019-11-14 PROCEDURE — G8417 CALC BMI ABV UP PARAM F/U: HCPCS | Performed by: FAMILY MEDICINE

## 2019-11-14 RX ORDER — BENZONATATE 100 MG/1
100 CAPSULE ORAL 3 TIMES DAILY PRN
Qty: 60 CAPSULE | Refills: 0 | Status: SHIPPED | OUTPATIENT
Start: 2019-11-14 | End: 2019-12-14

## 2019-11-18 RX ORDER — FOLIC ACID 1 MG/1
1 TABLET ORAL DAILY
Qty: 90 TABLET | Refills: 0 | Status: SHIPPED | OUTPATIENT
Start: 2019-11-18 | End: 2020-01-09

## 2019-11-19 DIAGNOSIS — E11.9 TYPE 2 DIABETES MELLITUS WITHOUT COMPLICATION, WITHOUT LONG-TERM CURRENT USE OF INSULIN (HCC): ICD-10-CM

## 2019-11-19 LAB
ANION GAP SERPL CALCULATED.3IONS-SCNC: 12 MMOL/L (ref 3–16)
BUN BLDV-MCNC: 13 MG/DL (ref 7–20)
CALCIUM SERPL-MCNC: 9 MG/DL (ref 8.3–10.6)
CHLORIDE BLD-SCNC: 102 MMOL/L (ref 99–110)
CO2: 22 MMOL/L (ref 21–32)
CREAT SERPL-MCNC: 0.6 MG/DL (ref 0.6–1.1)
GFR AFRICAN AMERICAN: >60
GFR NON-AFRICAN AMERICAN: >60
GLUCOSE BLD-MCNC: 154 MG/DL (ref 70–99)
POTASSIUM SERPL-SCNC: 4.8 MMOL/L (ref 3.5–5.1)
SODIUM BLD-SCNC: 136 MMOL/L (ref 136–145)

## 2019-11-20 LAB
ESTIMATED AVERAGE GLUCOSE: 142.7 MG/DL
HBA1C MFR BLD: 6.6 %

## 2019-11-21 ENCOUNTER — PATIENT MESSAGE (OUTPATIENT)
Dept: PRIMARY CARE CLINIC | Age: 39
End: 2019-11-21

## 2019-12-20 ENCOUNTER — OFFICE VISIT (OUTPATIENT)
Dept: PRIMARY CARE CLINIC | Age: 39
End: 2019-12-20
Payer: COMMERCIAL

## 2019-12-20 VITALS
RESPIRATION RATE: 12 BRPM | OXYGEN SATURATION: 97 % | HEART RATE: 79 BPM | TEMPERATURE: 97 F | DIASTOLIC BLOOD PRESSURE: 81 MMHG | SYSTOLIC BLOOD PRESSURE: 118 MMHG | BODY MASS INDEX: 34.23 KG/M2 | WEIGHT: 199.4 LBS

## 2019-12-20 DIAGNOSIS — J06.9 VIRAL UPPER RESPIRATORY TRACT INFECTION: ICD-10-CM

## 2019-12-20 DIAGNOSIS — J01.00 ACUTE NON-RECURRENT MAXILLARY SINUSITIS: Primary | ICD-10-CM

## 2019-12-20 PROCEDURE — 1036F TOBACCO NON-USER: CPT | Performed by: FAMILY MEDICINE

## 2019-12-20 PROCEDURE — G8484 FLU IMMUNIZE NO ADMIN: HCPCS | Performed by: FAMILY MEDICINE

## 2019-12-20 PROCEDURE — 99214 OFFICE O/P EST MOD 30 MIN: CPT | Performed by: FAMILY MEDICINE

## 2019-12-20 PROCEDURE — G8427 DOCREV CUR MEDS BY ELIG CLIN: HCPCS | Performed by: FAMILY MEDICINE

## 2019-12-20 PROCEDURE — G8417 CALC BMI ABV UP PARAM F/U: HCPCS | Performed by: FAMILY MEDICINE

## 2019-12-20 RX ORDER — ESCITALOPRAM OXALATE 10 MG/1
TABLET ORAL
Qty: 90 TABLET | Refills: 1 | Status: SHIPPED | OUTPATIENT
Start: 2019-12-20 | End: 2020-02-06 | Stop reason: SDUPTHER

## 2019-12-20 RX ORDER — AMOXICILLIN AND CLAVULANATE POTASSIUM 875; 125 MG/1; MG/1
1 TABLET, FILM COATED ORAL 2 TIMES DAILY
Qty: 20 TABLET | Refills: 0 | Status: SHIPPED | OUTPATIENT
Start: 2019-12-20 | End: 2019-12-30

## 2020-01-06 RX ORDER — LISINOPRIL 10 MG/1
TABLET ORAL
Qty: 90 TABLET | Refills: 1 | Status: ON HOLD | OUTPATIENT
Start: 2020-01-06 | End: 2020-04-03 | Stop reason: ALTCHOICE

## 2020-01-06 NOTE — TELEPHONE ENCOUNTER
Medication:   Requested Prescriptions     Pending Prescriptions Disp Refills    lisinopril (PRINIVIL;ZESTRIL) 10 MG tablet [Pharmacy Med Name: LISINOPRIL 10 MG TABLET] 90 tablet 1     Sig: TAKE 1 TABLET BY MOUTH EVERY DAY     Last Filled:  7/8/19    Last appt: 12/20/2019   Next appt: 2/18/2020    Last Labs DM:   Lab Results   Component Value Date    LABA1C 6.6 11/19/2019     Last Lipid:   Lab Results   Component Value Date    CHOL 161 08/21/2019    TRIG 163 08/21/2019    HDL 35 08/21/2019    LDLCALC 93 08/21/2019     Last PSA: No results found for: PSA  Last Thyroid:   Lab Results   Component Value Date    TSH 1.36 08/21/2019

## 2020-01-08 NOTE — TELEPHONE ENCOUNTER
Medication:   Requested Prescriptions     Pending Prescriptions Disp Refills    folic acid (FOLVITE) 1 MG tablet [Pharmacy Med Name: FOLIC ACID 1 MG TABLET] 90 tablet 0     Sig: TAKE 1 TABLET BY MOUTH DAILY     Last Filled:  11/18/19    Last appt: 12/20/2019   Next appt: 2/18/2020

## 2020-01-08 NOTE — TELEPHONE ENCOUNTER
Patients insurance company will no longer  Cover her farxiga. She adv they will cover  jardiance or invokina.  Please call the patient  Back and let her know what dr. Neli Castillo wants  Her to do.  [phone]

## 2020-01-09 RX ORDER — FOLIC ACID 1 MG/1
1 TABLET ORAL DAILY
Qty: 90 TABLET | Refills: 0 | Status: SHIPPED | OUTPATIENT
Start: 2020-01-09 | End: 2020-02-06 | Stop reason: SDUPTHER

## 2020-01-18 ENCOUNTER — OFFICE VISIT (OUTPATIENT)
Dept: PRIMARY CARE CLINIC | Age: 40
End: 2020-01-18
Payer: COMMERCIAL

## 2020-01-18 ENCOUNTER — TELEPHONE (OUTPATIENT)
Dept: PRIMARY CARE CLINIC | Age: 40
End: 2020-01-18

## 2020-01-18 VITALS
WEIGHT: 198.4 LBS | HEART RATE: 78 BPM | DIASTOLIC BLOOD PRESSURE: 80 MMHG | OXYGEN SATURATION: 97 % | SYSTOLIC BLOOD PRESSURE: 118 MMHG | BODY MASS INDEX: 34.06 KG/M2 | RESPIRATION RATE: 14 BRPM

## 2020-01-18 PROCEDURE — G8427 DOCREV CUR MEDS BY ELIG CLIN: HCPCS | Performed by: INTERNAL MEDICINE

## 2020-01-18 PROCEDURE — 93000 ELECTROCARDIOGRAM COMPLETE: CPT | Performed by: INTERNAL MEDICINE

## 2020-01-18 PROCEDURE — 99214 OFFICE O/P EST MOD 30 MIN: CPT | Performed by: INTERNAL MEDICINE

## 2020-01-18 PROCEDURE — G8417 CALC BMI ABV UP PARAM F/U: HCPCS | Performed by: INTERNAL MEDICINE

## 2020-01-18 PROCEDURE — G8484 FLU IMMUNIZE NO ADMIN: HCPCS | Performed by: INTERNAL MEDICINE

## 2020-01-18 PROCEDURE — 1036F TOBACCO NON-USER: CPT | Performed by: INTERNAL MEDICINE

## 2020-01-18 ASSESSMENT — PATIENT HEALTH QUESTIONNAIRE - PHQ9
SUM OF ALL RESPONSES TO PHQ9 QUESTIONS 1 & 2: 0
1. LITTLE INTEREST OR PLEASURE IN DOING THINGS: 0
2. FEELING DOWN, DEPRESSED OR HOPELESS: 0
SUM OF ALL RESPONSES TO PHQ QUESTIONS 1-9: 0
SUM OF ALL RESPONSES TO PHQ QUESTIONS 1-9: 0

## 2020-01-18 ASSESSMENT — ENCOUNTER SYMPTOMS
CHEST TIGHTNESS: 0
NAUSEA: 1

## 2020-01-18 NOTE — TELEPHONE ENCOUNTER
[phone] (home)   Spoke with patient, BP last night   Pt is holding Lisinopril   7pm  91/70    745pm 93/66  845pm  118/72    Blood Sugar 213    Pt scheduled @ 10am

## 2020-01-18 NOTE — PROGRESS NOTES
Subjective:      Patient ID: Jt Howard is a 35 y.o. female. Had sudden onset of dizziness (room spinning) while sitting at the kitchen table chatting. No change in head position. Friend said that face got grayish. She put her head down between her knees, and when she got up, friend said her coloring was better. She was nauseated and thought she would pass out, but didn't. She went to the bathroom because she thought she might throw up or have diarrhea, but she only passed a normal stool for her. When she returned from the bathroom, she checked blood glucose and it was 213. BP was 91/70. Retaken 45 minutes later, was 93/66. On advice of friends, drank water and ate salty things, and bp came up to 118/72. This morning, BP was  101/77. Vertigo lasted about 2 hours. Has had brief spells of spinning dizziness before, but only lasted a few seconds. Father has history of atrial fibrillation and would have spells of dizziness. Hypertension   Pertinent negatives include no chest pain or palpitations. Review of Systems   Constitutional: Positive for appetite change and fatigue. Negative for diaphoresis. HENT: Negative for ear pain and tinnitus. No hearing loss   Respiratory: Negative for chest tightness. Cardiovascular: Negative for chest pain and palpitations. Gastrointestinal: Positive for nausea. Neurological: Positive for dizziness and light-headedness. Negative for tremors, facial asymmetry, speech difficulty and numbness. All other systems reviewed and are negative. Objective:   Physical Exam  Vitals signs and nursing note reviewed. Constitutional:       General: She is not in acute distress. Appearance: Normal appearance. HENT:      Head: Normocephalic. Right Ear: Tympanic membrane, ear canal and external ear normal.      Left Ear: Tympanic membrane, ear canal and external ear normal.   Eyes:      Extraocular Movements: Extraocular movements intact.

## 2020-01-20 RX ORDER — FOLIC ACID 1 MG/1
1 TABLET ORAL DAILY
Qty: 90 TABLET | Refills: 0 | OUTPATIENT
Start: 2020-01-20

## 2020-01-20 NOTE — TELEPHONE ENCOUNTER
Medication:   Requested Prescriptions     Pending Prescriptions Disp Refills    folic acid (FOLVITE) 1 MG tablet 90 tablet 0     Sig: Take 1 tablet by mouth daily     Last Filled:  1/9/20  #90  X 0    Last appt: 1/18/2020   Next appt: 2/18/2020

## 2020-01-23 ENCOUNTER — HOSPITAL ENCOUNTER (OUTPATIENT)
Dept: NON INVASIVE DIAGNOSTICS | Age: 40
Discharge: HOME | End: 2020-01-23
Payer: COMMERCIAL

## 2020-01-23 PROCEDURE — 93225 XTRNL ECG REC<48 HRS REC: CPT

## 2020-01-23 PROCEDURE — 93226 XTRNL ECG REC<48 HR SCAN A/R: CPT

## 2020-01-30 LAB
ACQUISITION DURATION: NORMAL S
AVERAGE HEART RATE: 84 BPM
EKG DIAGNOSIS: NORMAL
FASTEST SUPRAVENTRICULAR RATE: 90 BPM
FASTEST VENTRICULAR RATE: 100 BPM
HOLTER MAX HEART RATE: 127 BPM
HOOKUP DATE: NORMAL
HOOKUP TIME: NORMAL
LONGEST SUPRAVENTRICULAR RATE: 90 BPM
LONGEST VE RUN RATE: 100 BPM
MAX HEART RATE TIME/DATE: NORMAL
MIN HEART RATE TIME/DATE: NORMAL
MIN HEART RATE: 62 BPM
NUMBER OF FASTEST SUPRAVENTRICULAR BEATS: 3
NUMBER OF FASTEST VENTRICULAR BEATS: 3
NUMBER OF LONGEST SUPRAVENTRICULAR BEATS: 3
NUMBER OF LONGEST VENTRICULAR BEATS: 3
NUMBER OF QRS COMPLEXES: NORMAL
NUMBER OF SUPRAVENTRICULAR BEATS IN RUNS: 3
NUMBER OF SUPRAVENTRICULAR COUPLETS: 1
NUMBER OF SUPRAVENTRICULAR ECTOPICS: 25
NUMBER OF SUPRAVENTRICULAR ISOLATED BEATS: 20
NUMBER OF SUPRAVENTRICULAR RUNS: 1
NUMBER OF VENTRICULAR BEATS IN RUNS: 6
NUMBER OF VENTRICULAR BIGEMINAL CYCLES: 32
NUMBER OF VENTRICULAR COUPLETS: 66
NUMBER OF VENTRICULAR ECTOPICS: 2133
NUMBER OF VENTRICULAR ISOLATED BEATS: 1992
NUMBER OF VENTRICULAR RUNS: 2

## 2020-02-06 ENCOUNTER — OFFICE VISIT (OUTPATIENT)
Dept: PRIMARY CARE CLINIC | Age: 40
End: 2020-02-06
Payer: COMMERCIAL

## 2020-02-06 ENCOUNTER — HOSPITAL ENCOUNTER (OUTPATIENT)
Dept: GENERAL RADIOLOGY | Age: 40
Discharge: HOME | End: 2020-02-06
Payer: COMMERCIAL

## 2020-02-06 ENCOUNTER — HOSPITAL ENCOUNTER (OUTPATIENT)
Age: 40
Discharge: HOME | End: 2020-02-06
Payer: COMMERCIAL

## 2020-02-06 VITALS
DIASTOLIC BLOOD PRESSURE: 83 MMHG | BODY MASS INDEX: 33.95 KG/M2 | RESPIRATION RATE: 12 BRPM | TEMPERATURE: 97.2 F | WEIGHT: 197.8 LBS | OXYGEN SATURATION: 98 % | HEART RATE: 89 BPM | SYSTOLIC BLOOD PRESSURE: 106 MMHG

## 2020-02-06 PROCEDURE — 71046 X-RAY EXAM CHEST 2 VIEWS: CPT

## 2020-02-06 PROCEDURE — 1036F TOBACCO NON-USER: CPT | Performed by: FAMILY MEDICINE

## 2020-02-06 PROCEDURE — 3046F HEMOGLOBIN A1C LEVEL >9.0%: CPT | Performed by: FAMILY MEDICINE

## 2020-02-06 PROCEDURE — 2022F DILAT RTA XM EVC RTNOPTHY: CPT | Performed by: FAMILY MEDICINE

## 2020-02-06 PROCEDURE — 99214 OFFICE O/P EST MOD 30 MIN: CPT | Performed by: FAMILY MEDICINE

## 2020-02-06 PROCEDURE — G8484 FLU IMMUNIZE NO ADMIN: HCPCS | Performed by: FAMILY MEDICINE

## 2020-02-06 PROCEDURE — G8417 CALC BMI ABV UP PARAM F/U: HCPCS | Performed by: FAMILY MEDICINE

## 2020-02-06 PROCEDURE — G8427 DOCREV CUR MEDS BY ELIG CLIN: HCPCS | Performed by: FAMILY MEDICINE

## 2020-02-06 RX ORDER — FOLIC ACID 1 MG/1
1 TABLET ORAL DAILY
Qty: 90 TABLET | Refills: 0 | Status: SHIPPED | OUTPATIENT
Start: 2020-02-06 | End: 2020-05-01

## 2020-02-06 RX ORDER — ESCITALOPRAM OXALATE 10 MG/1
15 TABLET ORAL DAILY
Qty: 135 TABLET | Refills: 1 | Status: SHIPPED | OUTPATIENT
Start: 2020-02-06 | End: 2020-08-10

## 2020-02-06 ASSESSMENT — ENCOUNTER SYMPTOMS
GASTROINTESTINAL NEGATIVE: 1
RESPIRATORY NEGATIVE: 1
EYES NEGATIVE: 1

## 2020-02-06 NOTE — PROGRESS NOTES
SUBJECTIVE:  Patient ID: Meghan Woodall is a 35 y.o. y.o. female     HPI   Palpitations: Patient complains of palpitations, rapid heart beat and near syncopy, the way she felt when it happened with her few weeks ago. The symptoms are of mild severity, occurring intermittently and lasting a few seconds per episode. Cardiac risk factors include: diabetes mellitus, dyslipidemia, hypertension and obesity (BMI >= 30 kg/m2). Aggravating factors: none. Relieving factors: spontaneous. Associated signs and symptoms: none  She was seen in the office a couple of weeks ago when she had that episode EKG show some scattered PVC's and Holter monitor done which shows PAC's and PVC's not many of them she had those on and off for many years per her she does not think her anxiety level higher but she will like to increase her medication to see if that would help her  Patient had been losing weight not trying to lose she is worry she is thinking something wrong she is on Jardiance which again make patient lose weight she was not aware of that  She is feeling the Lump on the lower sternum bone and has started him she is concerned about that she just start feeling in the last few weeks no pain no injury no trauma  Diabetes Mellitus Type II, Follow-up: Patient here for follow-up of Type 2 diabetes mellitus. Current symptoms/problems include none and have been stable.  Symptoms have been present for a few years.     Known diabetic complications: none  Cardiovascular risk factors: diabetes mellitus, hypertension and obesity (BMI >= 30 kg/m2)  Current diabetic medications include see med's list .      Eye exam current (within one year): yes  Weight trend: stable  Prior visit with dietician: no  Current diet: in general, a \"healthy\" diet    Current exercise: none     Current monitoring regimen: home blood tests - weekly  Home blood sugar records: fasting range: 100's  Any episodes of hypoglycemia? no  Pt with history of GERD doing okay on the med's    Pt with history of chron's disease on med's followed closely by GI doing well stable   Pt with reactive arthritis see Rheumatology on med's doing ok   Past Medical History:   Diagnosis Date    [facility]     IBD (inflammatory bowel disease)     PCOS (polycystic ovarian syndrome)     Psoriasis     P[facility]atic arthritis ([facility] [address] [facility]     sees Rheumatology       Past Surgical History:   Procedure Laterality Date     SECTION  2006     SECTION, CLASSIC  2009     Family History   Problem Relation Age of Onset    Diabetes Mother     Cancer Mother     Stroke Father      Social History     Socioeconomic History    Marital status:      Spouse name: None    Number of children: None    Years of education: None    Highest education level: None   Occupational History    None   Social Needs    Financial resource strain: None    Food insecurity:     Worry: None     Inability: None    Transportation needs:     Medical: None     Non-medical: None   Tobacco Use    Smoking status: Former Smoker     Packs/day: 1.00     Years: 10.00     Pack years: 10.00     Last attempt to quit: 2005     Years since quittin.4    Smokeless tobacco: Never Used   Substance and Sexual Activity    Alcohol use: Yes     Comment: ocassionally    Drug use: No    Sexual activity: Yes     Partners: Male     Birth control/protection: I.U.D.    Lifestyle    Physical activity:     Days per week: None     Minutes per session: None    Stress: None   Relationships    Social connections:     Talks on phone: None     Gets together: None     Attends Islam service: None     Active member of club or organization: None     Attends meetings of clubs or organizations: None     Relationship status: None    Intimate partner violence:     Fear of current or ex partner: None     Emotionally abused: None     Physically abused: None     Forced sexual activity: None   Other Topics Concern    None   Social History Narrative    None     Current Outpatient Medications   Medication Sig Dispense Refill    folic acid (FOLVITE) 1 MG tablet TAKE 1 TABLET BY MOUTH DAILY 90 tablet 0    empagliflozin (JARDIANCE) 25 MG tablet Take 25 mg by mouth daily 30 tablet 3    escitalopram (LEXAPRO) 10 MG tablet TAKE 1 TABLET BY MOUTH EVERY DAY 90 tablet 1    ONE TOUCH ULTRA TEST strip TEST ONCE DAILY 100 strip 0    omeprazole (PRILOSEC) 40 MG delayed release capsule TAKE 1 CAPSULE BY MOUTH EVERY DAY 90 capsule 1    ONETOUCH DELICA LANCETS FINE MISC USE ONE DAILY 100 each 1    fluticasone (FLONASE) 50 MCG/ACT nasal spray SPRAY 2 SPRAYS INTO EACH NOSTRIL EVERY DAY 1 Bottle 1    methotrexate (RHEUMATREX) 2.5 MG chemo tablet Take 20 mg by mouth once a week      albuterol sulfate HFA (PROAIR HFA) 108 (90 Base) MCG/ACT inhaler Inhale 2 puffs into the lungs every 6 hours as needed for Wheezing 1 Inhaler 3    ustekinumab (STELARA) 130 MG/26ML SOLN Infuse 260 mg intravenously      Blood Glucose Monitoring Suppl (ONE TOUCH ULTRA MINI) w/Device KIT 1 kit by Does not apply route daily as needed (fasting) 1 kit 0    dicyclomine (BENTYL) 20 MG tablet Take 20 mg by mouth every 6 hours      diphenoxylate-atropine (LOMOTIL) 2.5-0.025 MG per tablet diphenoxylate-atropine 2.5 mg-0.025 mg tablet      Methotrexate, PF, (OTREXUP) 20 MG/0.4ML chemo injection pen Inject 20 mg into the skin once a week      lisinopril (PRINIVIL;ZESTRIL) 10 MG tablet TAKE 1 TABLET BY MOUTH EVERY DAY (Patient not taking: Reported on 2/6/2020) 90 tablet 1    tiZANidine (ZANAFLEX) 4 MG tablet Take 1 tablet by mouth every 6 hours as needed (spasms) (Patient not taking: Reported on 2/6/2020) 30 tablet 0    zinc sulfate (ORAZINC) 220 (50 Zn) MG capsule Take 1 capsule by mouth daily 30 capsule 1     No current facility-administered medications for this visit. No Known Allergies    Review of Systems   Constitutional: Negative. HENT: Negative. Eyes: Negative.

## 2020-02-11 DIAGNOSIS — R63.4 WEIGHT LOSS: ICD-10-CM

## 2020-02-11 DIAGNOSIS — E11.9 TYPE 2 DIABETES MELLITUS WITHOUT COMPLICATION, WITHOUT LONG-TERM CURRENT USE OF INSULIN (HCC): ICD-10-CM

## 2020-02-11 LAB
ALBUMIN SERPL-MCNC: 4 G/DL (ref 3.4–5)
ALP BLD-CCNC: 76 U/L (ref 40–129)
ALT SERPL-CCNC: 10 U/L (ref 10–40)
ANION GAP SERPL CALCULATED.3IONS-SCNC: 11 MMOL/L (ref 3–16)
AST SERPL-CCNC: 10 U/L (ref 15–37)
BILIRUB SERPL-MCNC: 0.4 MG/DL (ref 0–1)
BILIRUBIN DIRECT: <0.2 MG/DL (ref 0–0.3)
BILIRUBIN, INDIRECT: ABNORMAL MG/DL (ref 0–1)
BUN BLDV-MCNC: 10 MG/DL (ref 7–20)
CALCIUM SERPL-MCNC: 9.3 MG/DL (ref 8.3–10.6)
CHLORIDE BLD-SCNC: 102 MMOL/L (ref 99–110)
CHOLESTEROL, TOTAL: 145 MG/DL (ref 0–199)
CO2: 25 MMOL/L (ref 21–32)
CREAT SERPL-MCNC: 0.7 MG/DL (ref 0.6–1.1)
FOLATE: 16.11 NG/ML (ref 4.78–24.2)
GFR AFRICAN AMERICAN: >60
GFR NON-AFRICAN AMERICAN: >60
GLUCOSE BLD-MCNC: 141 MG/DL (ref 70–99)
HCT VFR BLD CALC: 38.4 % (ref 36–48)
HDLC SERPL-MCNC: 40 MG/DL (ref 40–60)
HEMOGLOBIN: 12.8 G/DL (ref 12–16)
LDL CHOLESTEROL CALCULATED: 89 MG/DL
MCH RBC QN AUTO: 29.4 PG (ref 26–34)
MCHC RBC AUTO-ENTMCNC: 33.2 G/DL (ref 31–36)
MCV RBC AUTO: 88.5 FL (ref 80–100)
PDW BLD-RTO: 14.4 % (ref 12.4–15.4)
PLATELET # BLD: 416 K/UL (ref 135–450)
PMV BLD AUTO: 7.6 FL (ref 5–10.5)
POTASSIUM SERPL-SCNC: 5.2 MMOL/L (ref 3.5–5.1)
RBC # BLD: 4.34 M/UL (ref 4–5.2)
SODIUM BLD-SCNC: 138 MMOL/L (ref 136–145)
TOTAL PROTEIN: 7.8 G/DL (ref 6.4–8.2)
TRIGL SERPL-MCNC: 80 MG/DL (ref 0–150)
TSH SERPL DL<=0.05 MIU/L-ACNC: 1.17 UIU/ML (ref 0.27–4.2)
VITAMIN B-12: 392 PG/ML (ref 211–911)
VLDLC SERPL CALC-MCNC: 16 MG/DL
WBC # BLD: 10.2 K/UL (ref 4–11)

## 2020-02-12 LAB
ESTIMATED AVERAGE GLUCOSE: 157.1 MG/DL
HBA1C MFR BLD: 7.1 %

## 2020-02-13 ENCOUNTER — PATIENT MESSAGE (OUTPATIENT)
Dept: PRIMARY CARE CLINIC | Age: 40
End: 2020-02-13

## 2020-02-13 NOTE — TELEPHONE ENCOUNTER
From: Sanjay WERNER  To: Willi Avendano  Sent: 2/13/2020 1:27 PM EST  Subject: lab results    Willi Avendano  [address] [address]    Dr Josette Beckett has reviewed your recent lab results, see below Dr Sofia Rueda findings. Blood work was stable, will review more at next office visit.      Be Well

## 2020-02-21 RX ORDER — OMEPRAZOLE 40 MG/1
CAPSULE, DELAYED RELEASE ORAL
Qty: 90 CAPSULE | Refills: 1 | Status: SHIPPED | OUTPATIENT
Start: 2020-02-21 | End: 2020-08-27

## 2020-02-25 ENCOUNTER — HOSPITAL ENCOUNTER (OUTPATIENT)
Dept: NON INVASIVE DIAGNOSTICS | Age: 40
Discharge: HOME | End: 2020-02-25
Payer: COMMERCIAL

## 2020-02-25 ENCOUNTER — HOSPITAL ENCOUNTER (OUTPATIENT)
Dept: CT IMAGING | Age: 40
Discharge: HOME | End: 2020-02-25
Payer: COMMERCIAL

## 2020-02-25 LAB
LV EF: 58 %
LVEF MODALITY: NORMAL

## 2020-02-25 PROCEDURE — 6360000004 HC RX CONTRAST MEDICATION: Performed by: INTERNAL MEDICINE

## 2020-02-25 PROCEDURE — 93306 TTE W/DOPPLER COMPLETE: CPT

## 2020-02-25 PROCEDURE — 71275 CT ANGIOGRAPHY CHEST: CPT

## 2020-02-25 RX ADMIN — IOPAMIDOL 80 ML: 755 INJECTION, SOLUTION INTRAVENOUS at 10:08

## 2020-02-25 RX ADMIN — PERFLUTREN 2.2 MG: 6.52 INJECTION, SUSPENSION INTRAVENOUS at 08:47

## 2020-03-17 ENCOUNTER — OFFICE VISIT (OUTPATIENT)
Dept: PRIMARY CARE CLINIC | Age: 40
End: 2020-03-17
Payer: COMMERCIAL

## 2020-03-17 VITALS
RESPIRATION RATE: 12 BRPM | DIASTOLIC BLOOD PRESSURE: 84 MMHG | OXYGEN SATURATION: 98 % | HEART RATE: 89 BPM | BODY MASS INDEX: 33.64 KG/M2 | SYSTOLIC BLOOD PRESSURE: 113 MMHG | WEIGHT: 196 LBS

## 2020-03-17 LAB — HBA1C MFR BLD: 7.3 %

## 2020-03-17 PROCEDURE — G8427 DOCREV CUR MEDS BY ELIG CLIN: HCPCS | Performed by: FAMILY MEDICINE

## 2020-03-17 PROCEDURE — 1036F TOBACCO NON-USER: CPT | Performed by: FAMILY MEDICINE

## 2020-03-17 PROCEDURE — 3051F HG A1C>EQUAL 7.0%<8.0%: CPT | Performed by: FAMILY MEDICINE

## 2020-03-17 PROCEDURE — 83036 HEMOGLOBIN GLYCOSYLATED A1C: CPT | Performed by: FAMILY MEDICINE

## 2020-03-17 PROCEDURE — G8417 CALC BMI ABV UP PARAM F/U: HCPCS | Performed by: FAMILY MEDICINE

## 2020-03-17 PROCEDURE — 2022F DILAT RTA XM EVC RTNOPTHY: CPT | Performed by: FAMILY MEDICINE

## 2020-03-17 PROCEDURE — G8484 FLU IMMUNIZE NO ADMIN: HCPCS | Performed by: FAMILY MEDICINE

## 2020-03-17 PROCEDURE — 99214 OFFICE O/P EST MOD 30 MIN: CPT | Performed by: FAMILY MEDICINE

## 2020-03-17 RX ORDER — FLASH GLUCOSE SENSOR
1 KIT MISCELLANEOUS 2 TIMES DAILY
Qty: 1 DEVICE | Refills: 0 | Status: SHIPPED | OUTPATIENT
Start: 2020-03-17

## 2020-03-17 RX ORDER — PREDNISONE 10 MG/1
60 TABLET ORAL DAILY
Status: ON HOLD | COMMUNITY
Start: 2020-03-05 | End: 2020-04-06 | Stop reason: HOSPADM

## 2020-03-17 RX ORDER — FLASH GLUCOSE SENSOR
KIT MISCELLANEOUS
Qty: 100 EACH | Refills: 1 | Status: SHIPPED | OUTPATIENT
Start: 2020-03-17

## 2020-03-17 RX ORDER — GLIMEPIRIDE 2 MG/1
2 TABLET ORAL EVERY MORNING
Qty: 30 TABLET | Refills: 3 | Status: SHIPPED | OUTPATIENT
Start: 2020-03-17 | End: 2020-07-06

## 2020-04-03 ENCOUNTER — HOSPITAL ENCOUNTER (INPATIENT)
Age: 40
LOS: 3 days | Discharge: HOME | DRG: 387 | End: 2020-04-06
Attending: EMERGENCY MEDICINE | Admitting: INTERNAL MEDICINE
Payer: COMMERCIAL

## 2020-04-03 ENCOUNTER — APPOINTMENT (OUTPATIENT)
Dept: CT IMAGING | Age: 40
DRG: 387 | End: 2020-04-03
Payer: COMMERCIAL

## 2020-04-03 PROBLEM — K50.10 EXACERBATION OF CROHN'S DISEASE OF LARGE INTESTINE (HCC): Status: ACTIVE | Noted: 2020-04-03

## 2020-04-03 LAB
ALBUMIN SERPL-MCNC: 3.4 G/DL (ref 3.4–5)
ALP BLD-CCNC: 83 U/L (ref 40–129)
ALT SERPL-CCNC: 8 U/L (ref 10–40)
ANION GAP SERPL CALCULATED.3IONS-SCNC: 15 MMOL/L (ref 3–16)
AST SERPL-CCNC: 6 U/L (ref 15–37)
BASOPHILS ABSOLUTE: 0 K/UL (ref 0–0.2)
BASOPHILS RELATIVE PERCENT: 0.1 %
BILIRUB SERPL-MCNC: <0.2 MG/DL (ref 0–1)
BILIRUBIN DIRECT: <0.2 MG/DL (ref 0–0.3)
BILIRUBIN URINE: NEGATIVE
BILIRUBIN, INDIRECT: ABNORMAL MG/DL (ref 0–1)
BLOOD, URINE: NEGATIVE
BUN BLDV-MCNC: 13 MG/DL (ref 7–20)
C-REACTIVE PROTEIN: 139.7 MG/L (ref 0–5.1)
CALCIUM SERPL-MCNC: 9 MG/DL (ref 8.3–10.6)
CHLORIDE BLD-SCNC: 98 MMOL/L (ref 99–110)
CLARITY: CLEAR
CO2: 21 MMOL/L (ref 21–32)
COLOR: YELLOW
CREAT SERPL-MCNC: 0.6 MG/DL (ref 0.6–1.1)
EOSINOPHILS ABSOLUTE: 0 K/UL (ref 0–0.6)
EOSINOPHILS RELATIVE PERCENT: 0 %
GFR AFRICAN AMERICAN: >60
GFR NON-AFRICAN AMERICAN: >60
GLUCOSE BLD-MCNC: 118 MG/DL (ref 70–99)
GLUCOSE BLD-MCNC: 309 MG/DL (ref 70–99)
GLUCOSE URINE: >=1000 MG/DL
HCG(URINE) PREGNANCY TEST: NEGATIVE
HCT VFR BLD CALC: 38 % (ref 36–48)
HEMOGLOBIN: 12.6 G/DL (ref 12–16)
KETONES, URINE: NEGATIVE MG/DL
LACTIC ACID: 1.1 MMOL/L (ref 0.4–2)
LEUKOCYTE ESTERASE, URINE: NEGATIVE
LIPASE: 24 U/L (ref 13–60)
LYMPHOCYTES ABSOLUTE: 0.4 K/UL (ref 1–5.1)
LYMPHOCYTES RELATIVE PERCENT: 2.1 %
MCH RBC QN AUTO: 27.6 PG (ref 26–34)
MCHC RBC AUTO-ENTMCNC: 33.1 G/DL (ref 31–36)
MCV RBC AUTO: 83.1 FL (ref 80–100)
MICROSCOPIC EXAMINATION: ABNORMAL
MONOCYTES ABSOLUTE: 0.4 K/UL (ref 0–1.3)
MONOCYTES RELATIVE PERCENT: 2 %
NEUTROPHILS ABSOLUTE: 17.5 K/UL (ref 1.7–7.7)
NEUTROPHILS RELATIVE PERCENT: 95.8 %
NITRITE, URINE: NEGATIVE
PDW BLD-RTO: 15.4 % (ref 12.4–15.4)
PERFORMED ON: ABNORMAL
PH UA: 6 (ref 5–8)
PLATELET # BLD: 399 K/UL (ref 135–450)
PMV BLD AUTO: 7.1 FL (ref 5–10.5)
POTASSIUM REFLEX MAGNESIUM: 4.4 MMOL/L (ref 3.5–5.1)
PROTEIN UA: NEGATIVE MG/DL
RBC # BLD: 4.57 M/UL (ref 4–5.2)
SEDIMENTATION RATE, ERYTHROCYTE: 63 MM/HR (ref 0–20)
SODIUM BLD-SCNC: 134 MMOL/L (ref 136–145)
SPECIFIC GRAVITY UA: 1.01 (ref 1–1.03)
TOTAL PROTEIN: 7.6 G/DL (ref 6.4–8.2)
URINE TYPE: ABNORMAL
UROBILINOGEN, URINE: 0.2 E.U./DL
WBC # BLD: 18.3 K/UL (ref 4–11)

## 2020-04-03 PROCEDURE — 83690 ASSAY OF LIPASE: CPT

## 2020-04-03 PROCEDURE — 74176 CT ABD & PELVIS W/O CONTRAST: CPT

## 2020-04-03 PROCEDURE — 2580000003 HC RX 258: Performed by: INTERNAL MEDICINE

## 2020-04-03 PROCEDURE — 83605 ASSAY OF LACTIC ACID: CPT

## 2020-04-03 PROCEDURE — 80076 HEPATIC FUNCTION PANEL: CPT

## 2020-04-03 PROCEDURE — 6360000002 HC RX W HCPCS: Performed by: INTERNAL MEDICINE

## 2020-04-03 PROCEDURE — 80048 BASIC METABOLIC PNL TOTAL CA: CPT

## 2020-04-03 PROCEDURE — 85025 COMPLETE CBC W/AUTO DIFF WBC: CPT

## 2020-04-03 PROCEDURE — 96374 THER/PROPH/DIAG INJ IV PUSH: CPT

## 2020-04-03 PROCEDURE — 36415 COLL VENOUS BLD VENIPUNCTURE: CPT

## 2020-04-03 PROCEDURE — 83036 HEMOGLOBIN GLYCOSYLATED A1C: CPT

## 2020-04-03 PROCEDURE — 84703 CHORIONIC GONADOTROPIN ASSAY: CPT

## 2020-04-03 PROCEDURE — 2580000003 HC RX 258: Performed by: PHYSICIAN ASSISTANT

## 2020-04-03 PROCEDURE — 6360000002 HC RX W HCPCS: Performed by: PHYSICIAN ASSISTANT

## 2020-04-03 PROCEDURE — 86140 C-REACTIVE PROTEIN: CPT

## 2020-04-03 PROCEDURE — 1200000000 HC SEMI PRIVATE

## 2020-04-03 PROCEDURE — 85652 RBC SED RATE AUTOMATED: CPT

## 2020-04-03 PROCEDURE — 81003 URINALYSIS AUTO W/O SCOPE: CPT

## 2020-04-03 PROCEDURE — 99285 EMERGENCY DEPT VISIT HI MDM: CPT

## 2020-04-03 RX ORDER — ONDANSETRON 2 MG/ML
4 INJECTION INTRAMUSCULAR; INTRAVENOUS EVERY 6 HOURS PRN
Status: DISCONTINUED | OUTPATIENT
Start: 2020-04-03 | End: 2020-04-06 | Stop reason: HOSPADM

## 2020-04-03 RX ORDER — ACETAMINOPHEN 325 MG/1
650 TABLET ORAL EVERY 6 HOURS PRN
Status: DISCONTINUED | OUTPATIENT
Start: 2020-04-03 | End: 2020-04-06 | Stop reason: HOSPADM

## 2020-04-03 RX ORDER — FOLIC ACID 1 MG/1
1 TABLET ORAL DAILY
Status: DISCONTINUED | OUTPATIENT
Start: 2020-04-04 | End: 2020-04-06 | Stop reason: HOSPADM

## 2020-04-03 RX ORDER — LISINOPRIL 10 MG/1
10 TABLET ORAL DAILY
Status: DISCONTINUED | OUTPATIENT
Start: 2020-04-04 | End: 2020-04-06 | Stop reason: HOSPADM

## 2020-04-03 RX ORDER — MAGNESIUM SULFATE IN WATER 40 MG/ML
2 INJECTION, SOLUTION INTRAVENOUS PRN
Status: DISCONTINUED | OUTPATIENT
Start: 2020-04-03 | End: 2020-04-06 | Stop reason: HOSPADM

## 2020-04-03 RX ORDER — DIPHENOXYLATE HYDROCHLORIDE AND ATROPINE SULFATE 2.5; .025 MG/1; MG/1
1 TABLET ORAL 4 TIMES DAILY PRN
Status: DISCONTINUED | OUTPATIENT
Start: 2020-04-03 | End: 2020-04-06 | Stop reason: HOSPADM

## 2020-04-03 RX ORDER — SODIUM CHLORIDE 0.9 % (FLUSH) 0.9 %
10 SYRINGE (ML) INJECTION EVERY 12 HOURS SCHEDULED
Status: DISCONTINUED | OUTPATIENT
Start: 2020-04-03 | End: 2020-04-06 | Stop reason: HOSPADM

## 2020-04-03 RX ORDER — DEXTROSE MONOHYDRATE 50 MG/ML
100 INJECTION, SOLUTION INTRAVENOUS PRN
Status: DISCONTINUED | OUTPATIENT
Start: 2020-04-03 | End: 2020-04-06 | Stop reason: HOSPADM

## 2020-04-03 RX ORDER — POLYETHYLENE GLYCOL 3350 17 G/17G
17 POWDER, FOR SOLUTION ORAL DAILY PRN
Status: DISCONTINUED | OUTPATIENT
Start: 2020-04-03 | End: 2020-04-06 | Stop reason: HOSPADM

## 2020-04-03 RX ORDER — ONDANSETRON 4 MG/1
4 TABLET, ORALLY DISINTEGRATING ORAL EVERY 8 HOURS PRN
COMMUNITY

## 2020-04-03 RX ORDER — DEXTROSE MONOHYDRATE 25 G/50ML
12.5 INJECTION, SOLUTION INTRAVENOUS PRN
Status: DISCONTINUED | OUTPATIENT
Start: 2020-04-03 | End: 2020-04-06 | Stop reason: HOSPADM

## 2020-04-03 RX ORDER — METHYLPREDNISOLONE SODIUM SUCCINATE 40 MG/ML
40 INJECTION, POWDER, LYOPHILIZED, FOR SOLUTION INTRAMUSCULAR; INTRAVENOUS EVERY 8 HOURS
Status: DISCONTINUED | OUTPATIENT
Start: 2020-04-03 | End: 2020-04-05

## 2020-04-03 RX ORDER — ACETAMINOPHEN 650 MG/1
650 SUPPOSITORY RECTAL EVERY 6 HOURS PRN
Status: DISCONTINUED | OUTPATIENT
Start: 2020-04-03 | End: 2020-04-06 | Stop reason: HOSPADM

## 2020-04-03 RX ORDER — POTASSIUM CHLORIDE 20 MEQ/1
40 TABLET, EXTENDED RELEASE ORAL PRN
Status: DISCONTINUED | OUTPATIENT
Start: 2020-04-03 | End: 2020-04-06 | Stop reason: HOSPADM

## 2020-04-03 RX ORDER — SODIUM CHLORIDE 0.9 % (FLUSH) 0.9 %
10 SYRINGE (ML) INJECTION PRN
Status: DISCONTINUED | OUTPATIENT
Start: 2020-04-03 | End: 2020-04-06 | Stop reason: HOSPADM

## 2020-04-03 RX ORDER — INSULIN LISPRO 100 [IU]/ML
0-6 INJECTION, SOLUTION INTRAVENOUS; SUBCUTANEOUS NIGHTLY
Status: DISCONTINUED | OUTPATIENT
Start: 2020-04-03 | End: 2020-04-06 | Stop reason: HOSPADM

## 2020-04-03 RX ORDER — INSULIN LISPRO 100 [IU]/ML
0-12 INJECTION, SOLUTION INTRAVENOUS; SUBCUTANEOUS
Status: DISCONTINUED | OUTPATIENT
Start: 2020-04-04 | End: 2020-04-06 | Stop reason: HOSPADM

## 2020-04-03 RX ORDER — 0.9 % SODIUM CHLORIDE 0.9 %
1000 INTRAVENOUS SOLUTION INTRAVENOUS ONCE
Status: COMPLETED | OUTPATIENT
Start: 2020-04-03 | End: 2020-04-03

## 2020-04-03 RX ORDER — TRAMADOL HYDROCHLORIDE 50 MG/1
50 TABLET ORAL EVERY 6 HOURS PRN
COMMUNITY
End: 2021-11-17

## 2020-04-03 RX ORDER — NICOTINE POLACRILEX 4 MG
15 LOZENGE BUCCAL PRN
Status: DISCONTINUED | OUTPATIENT
Start: 2020-04-03 | End: 2020-04-06 | Stop reason: HOSPADM

## 2020-04-03 RX ORDER — PROMETHAZINE HYDROCHLORIDE 25 MG/1
12.5 TABLET ORAL EVERY 6 HOURS PRN
Status: DISCONTINUED | OUTPATIENT
Start: 2020-04-03 | End: 2020-04-06 | Stop reason: HOSPADM

## 2020-04-03 RX ORDER — SODIUM CHLORIDE, SODIUM LACTATE, POTASSIUM CHLORIDE, CALCIUM CHLORIDE 600; 310; 30; 20 MG/100ML; MG/100ML; MG/100ML; MG/100ML
INJECTION, SOLUTION INTRAVENOUS CONTINUOUS
Status: ACTIVE | OUTPATIENT
Start: 2020-04-03 | End: 2020-04-04

## 2020-04-03 RX ORDER — POTASSIUM CHLORIDE 7.45 MG/ML
10 INJECTION INTRAVENOUS PRN
Status: DISCONTINUED | OUTPATIENT
Start: 2020-04-03 | End: 2020-04-06 | Stop reason: HOSPADM

## 2020-04-03 RX ORDER — TRAMADOL HYDROCHLORIDE 50 MG/1
50 TABLET ORAL EVERY 6 HOURS PRN
Status: DISCONTINUED | OUTPATIENT
Start: 2020-04-03 | End: 2020-04-06 | Stop reason: HOSPADM

## 2020-04-03 RX ORDER — GLIMEPIRIDE 2 MG/1
2 TABLET ORAL EVERY MORNING
Status: DISCONTINUED | OUTPATIENT
Start: 2020-04-04 | End: 2020-04-06 | Stop reason: HOSPADM

## 2020-04-03 RX ORDER — DICYCLOMINE HCL 20 MG
20 TABLET ORAL EVERY 6 HOURS
Status: DISCONTINUED | OUTPATIENT
Start: 2020-04-03 | End: 2020-04-04

## 2020-04-03 RX ADMIN — Medication 40 MG: at 20:00

## 2020-04-03 RX ADMIN — ENOXAPARIN SODIUM 40 MG: 40 INJECTION SUBCUTANEOUS at 22:25

## 2020-04-03 RX ADMIN — SODIUM CHLORIDE, SODIUM LACTATE, POTASSIUM CHLORIDE, AND CALCIUM CHLORIDE: 600; 310; 30; 20 INJECTION, SOLUTION INTRAVENOUS at 22:13

## 2020-04-03 RX ADMIN — SODIUM CHLORIDE 1000 ML: 9 INJECTION, SOLUTION INTRAVENOUS at 16:32

## 2020-04-03 ASSESSMENT — PAIN SCALES - GENERAL: PAINLEVEL_OUTOF10: 8

## 2020-04-03 ASSESSMENT — ENCOUNTER SYMPTOMS
NAUSEA: 1
BLOOD IN STOOL: 1
CONSTIPATION: 0
COUGH: 0
DIARRHEA: 1
SHORTNESS OF BREATH: 0
ABDOMINAL PAIN: 1
VOMITING: 0

## 2020-04-03 ASSESSMENT — PAIN DESCRIPTION - PAIN TYPE: TYPE: ACUTE PAIN

## 2020-04-03 ASSESSMENT — PAIN DESCRIPTION - LOCATION: LOCATION: ABDOMEN

## 2020-04-03 NOTE — ED NOTES
Pt resting in bed, 2/2 side rails up, fall precautions in place per Elizabeth fall scale. Pt appears comfortable, no distress noted at this time. IV intact, no complications noted. Dressing clean, dry and intact. Denies any needs at this time. Call light in reach.        Ingrid Song RN  04/03/20 [id number]

## 2020-04-03 NOTE — ED PROVIDER NOTES
Crohn's disease ([facility] IBD (inflammatory bowel disease), PCOS (polycystic ovarian syndrome), Psoriasis, and Psoriatic arthritis ([facility] She has a past surgical history that includes  section () and  section, classic (). Her family history includes Cancer in her mother; Diabetes in her mother; Stroke in her father. She reports that she quit smoking about 14 years ago. She has a 10.00 pack-year smoking history. She has never used smokeless tobacco. She reports current alcohol use. She reports that she does not use drugs.     Medications     Previous Medications    ALBUTEROL SULFATE HFA (PROAIR HFA) 108 (90 BASE) MCG/ACT INHALER    Inhale 2 puffs into the lungs every 6 hours as needed for Wheezing    BLOOD GLUCOSE MONITORING SUPPL (ONE TOUCH ULTRA MINI) W/DEVICE KIT    1 kit by Does not apply route daily as needed (fasting)    CONTINUOUS BLOOD GLUC  (FREESTYLE CESAR READER) ERNESTO    1 Device by In Vitro route 2 times daily    CONTINUOUS BLOOD GLUC SENSOR (FREESTYLE CESAR 14 DAY SENSOR) MISC    Check once a day    DICYCLOMINE (BENTYL) 20 MG TABLET    Take 20 mg by mouth every 6 hours    DIPHENOXYLATE-ATROPINE (LOMOTIL) 2.5-0.025 MG PER TABLET    diphenoxylate-atropine 2.5 mg-0.025 mg tablet    EMPAGLIFLOZIN (JARDIANCE) 25 MG TABLET    Take 25 mg by mouth daily    ESCITALOPRAM (LEXAPRO) 10 MG TABLET    Take 1.5 tablets by mouth daily    FLUTICASONE (FLONASE) 50 MCG/ACT NASAL SPRAY    SPRAY 2 SPRAYS INTO EACH NOSTRIL EVERY DAY    FOLIC ACID (FOLVITE) 1 MG TABLET    Take 1 tablet by mouth daily    GLIMEPIRIDE (AMARYL) 2 MG TABLET    Take 1 tablet by mouth every morning    LISINOPRIL (PRINIVIL;ZESTRIL) 10 MG TABLET    TAKE 1 TABLET BY MOUTH EVERY DAY    METHOTREXATE (RHEUMATREX) 2.5 MG CHEMO TABLET    Take 20 mg by mouth once a week    METHOTREXATE, PF, (OTREXUP) 20 MG/0.4ML CHEMO INJECTION PEN    Inject 20 mg into the skin once a week    OMEPRAZOLE (PRILOSEC) 40 MG DELAYED RELEASE CAPSULE TAKE 1 CAPSULE BY MOUTH EVERY DAY    ONE TOUCH ULTRA TEST STRIP    USE TO TEST ONCE DAILY    ONETOUCH DELICA LANCETS FINE MISC    USE ONE DAILY    PREDNISONE (DELTASONE) 10 MG TABLET    TAKE 4 TABLETS BY MOUTH EVERY DAY    TIZANIDINE (ZANAFLEX) 4 MG TABLET    Take 1 tablet by mouth every 6 hours as needed (spasms)    USTEKINUMAB (STELARA) 130 MG/26ML SOLN    Infuse 260 mg intravenously    ZINC SULFATE (ORAZINC) 220 (50 ZN) MG CAPSULE    Take 1 capsule by mouth daily       Allergies     She has No Known Allergies. Physical Exam     INITIAL VITALS: BP: 119/89,Temp: 98.4 °F (36.9 °C), Pulse: 85, Resp: 16, SpO2: 95 %   Physical Exam  Vitals signs and nursing note reviewed. Constitutional:       General: She is not in acute distress. HENT:      Head: Normocephalic and atraumatic. Mouth/Throat:      Mouth: Mucous membranes are moist.      Pharynx: Oropharynx is clear. Eyes:      Extraocular Movements: Extraocular movements intact. Conjunctiva/sclera: Conjunctivae normal.   Neck:      Musculoskeletal: Neck supple. Cardiovascular:      Rate and Rhythm: Normal rate and regular rhythm. Pulmonary:      Effort: Pulmonary effort is normal. No respiratory distress. Breath sounds: Normal breath sounds. No wheezing, rhonchi or rales. Abdominal:      General: Bowel sounds are normal. There is no distension. Palpations: Abdomen is soft. Tenderness: There is abdominal tenderness. There is no guarding or rebound. Musculoskeletal:         General: No deformity. Skin:     General: Skin is warm and dry. Neurological:      Mental Status: She is alert and oriented to person, place, and time.    Psychiatric:         Mood and Affect: Mood normal.         Behavior: Behavior normal.         Diagnostic Results     RADIOLOGY:  No orders to display       LABS:   Results for orders placed or performed during the hospital encounter of 04/03/20   CBC Auto Differential   Result Value Ref Range    WBC 18.3

## 2020-04-03 NOTE — ED PROVIDER NOTES
Non- >60 >60    GFR African American >60 >60    Calcium 9.0 8.3 - 10.6 mg/dL   Hepatic Function Panel   Result Value Ref Range    Total Protein 7.6 6.4 - 8.2 g/dL    Alb 3.4 3.4 - 5.0 g/dL    Alkaline Phosphatase 83 40 - 129 U/L    ALT 8 (L) 10 - 40 U/L    AST 6 (L) 15 - 37 U/L    Total Bilirubin <0.2 0.0 - 1.0 mg/dL    Bilirubin, Direct <0.2 0.0 - 0.3 mg/dL    Bilirubin, Indirect see below 0.0 - 1.0 mg/dL   Lipase   Result Value Ref Range    Lipase 24.0 13.0 - 60.0 U/L   Lactate, plasma   Result Value Ref Range    Lactic Acid 1.1 0.4 - 2.0 mmol/L   Pregnancy, Urine   Result Value Ref Range    HCG(Urine) Pregnancy Test Negative Detects HCG level >20 MIU/mL   Urinalysis, reflex to microscopic   Result Value Ref Range    Color, UA Yellow Straw/Yellow    Clarity, UA Clear Clear    Glucose, Ur >=1000 (A) Negative mg/dL    Bilirubin Urine Negative Negative    Ketones, Urine Negative Negative mg/dL    Specific Gravity, UA 1.010 1.005 - 1.030    Blood, Urine Negative Negative    pH, UA 6.0 5.0 - 8.0    Protein, UA Negative Negative mg/dL    Urobilinogen, Urine 0.2 <2.0 E.U./dL    Nitrite, Urine Negative Negative    Leukocyte Esterase, Urine Negative Negative    Microscopic Examination Not Indicated     Urine Type Voided    Sedimentation Rate   Result Value Ref Range    Sed Rate 63 (H) 0 - 20 mm/Hr   CRP   Result Value Ref Range    .7 (H) 0.0 - 5.1 mg/L       RECENT VITALS:  BP: 119/89, Temp: 98.4 °F (36.9 °C), Pulse: 85, Resp: 16, SpO2: 95 %     Procedures     none    ED Course     The patient was given the following medications:  Orders Placed This Encounter   Medications    0.9 % sodium chloride bolus    methylPREDNISolone sodium (SOLU-MEDROL) injection 40 mg       CONSULTS:  IP CONSULT TO GI  IP CONSULT TO HOSPITALIST    [address] / ASSESSMENT / Christiano Bertha is a 38 y.o. female presenting with flare of Crohn's disease.   Patient was already on prednisone 60 mg for the

## 2020-04-03 NOTE — LETTER
[address] [facility] [address]  Phone: [phone]             April 6, 2020    Patient: Torsten Parks   YOB: 1980   Date of Visit: 4/3/2020       To Whom It May Concern:    Darrelyn Meckel was seen and treated in our facility  beginning 4/3/2020 until 4/6/2020. She can return to work on 4/9/2020.       Sincerely,       Sd Hinkle MD         Signature:__________________________________

## 2020-04-03 NOTE — ED TRIAGE NOTES
Pt sent to ED by GI for IV steroids. Hx Crohn's disease. Prednisone was increased to 60 mg on 03/31 and states that she has felt better and complains of worsening symptoms starting Wednesday.

## 2020-04-04 LAB
ANION GAP SERPL CALCULATED.3IONS-SCNC: 13 MMOL/L (ref 3–16)
BUN BLDV-MCNC: 12 MG/DL (ref 7–20)
CALCIUM SERPL-MCNC: 9.1 MG/DL (ref 8.3–10.6)
CHLORIDE BLD-SCNC: 103 MMOL/L (ref 99–110)
CO2: 22 MMOL/L (ref 21–32)
CREAT SERPL-MCNC: 0.7 MG/DL (ref 0.6–1.1)
ESTIMATED AVERAGE GLUCOSE: 174.3 MG/DL
GFR AFRICAN AMERICAN: >60
GFR NON-AFRICAN AMERICAN: >60
GLUCOSE BLD-MCNC: 140 MG/DL (ref 70–99)
GLUCOSE BLD-MCNC: 142 MG/DL (ref 70–99)
GLUCOSE BLD-MCNC: 142 MG/DL (ref 70–99)
GLUCOSE BLD-MCNC: 147 MG/DL (ref 70–99)
GLUCOSE BLD-MCNC: 155 MG/DL (ref 70–99)
HBA1C MFR BLD: 7.7 %
PERFORMED ON: ABNORMAL
POTASSIUM REFLEX MAGNESIUM: 5 MMOL/L (ref 3.5–5.1)
SODIUM BLD-SCNC: 138 MMOL/L (ref 136–145)

## 2020-04-04 PROCEDURE — 36415 COLL VENOUS BLD VENIPUNCTURE: CPT

## 2020-04-04 PROCEDURE — 1200000000 HC SEMI PRIVATE

## 2020-04-04 PROCEDURE — 2580000003 HC RX 258: Performed by: INTERNAL MEDICINE

## 2020-04-04 PROCEDURE — 6360000002 HC RX W HCPCS: Performed by: INTERNAL MEDICINE

## 2020-04-04 PROCEDURE — 6370000000 HC RX 637 (ALT 250 FOR IP): Performed by: INTERNAL MEDICINE

## 2020-04-04 PROCEDURE — 80048 BASIC METABOLIC PNL TOTAL CA: CPT

## 2020-04-04 RX ADMIN — INSULIN LISPRO 2 UNITS: 100 INJECTION, SOLUTION INTRAVENOUS; SUBCUTANEOUS at 16:43

## 2020-04-04 RX ADMIN — ENOXAPARIN SODIUM 40 MG: 40 INJECTION SUBCUTANEOUS at 08:47

## 2020-04-04 RX ADMIN — Medication 40 MG: at 18:48

## 2020-04-04 RX ADMIN — FOLIC ACID 1 MG: 1 TABLET ORAL at 08:47

## 2020-04-04 RX ADMIN — SODIUM CHLORIDE, SODIUM LACTATE, POTASSIUM CHLORIDE, AND CALCIUM CHLORIDE: 600; 310; 30; 20 INJECTION, SOLUTION INTRAVENOUS at 16:43

## 2020-04-04 RX ADMIN — Medication 40 MG: at 03:28

## 2020-04-04 RX ADMIN — INSULIN LISPRO 1 UNITS: 100 INJECTION, SOLUTION INTRAVENOUS; SUBCUTANEOUS at 20:51

## 2020-04-04 RX ADMIN — ESCITALOPRAM OXALATE 15 MG: 5 TABLET, FILM COATED ORAL at 08:47

## 2020-04-04 RX ADMIN — Medication 40 MG: at 11:34

## 2020-04-04 RX ADMIN — ONDANSETRON 4 MG: 2 INJECTION INTRAMUSCULAR; INTRAVENOUS at 01:45

## 2020-04-04 RX ADMIN — GLIMEPIRIDE 2 MG: 2 TABLET ORAL at 08:47

## 2020-04-04 RX ADMIN — INSULIN LISPRO 2 UNITS: 100 INJECTION, SOLUTION INTRAVENOUS; SUBCUTANEOUS at 08:46

## 2020-04-04 RX ADMIN — TRAMADOL HYDROCHLORIDE 50 MG: 50 TABLET, FILM COATED ORAL at 01:45

## 2020-04-04 RX ADMIN — INSULIN LISPRO 2 UNITS: 100 INJECTION, SOLUTION INTRAVENOUS; SUBCUTANEOUS at 11:34

## 2020-04-04 ASSESSMENT — PAIN SCALES - GENERAL
PAINLEVEL_OUTOF10: 6
PAINLEVEL_OUTOF10: 0

## 2020-04-04 NOTE — PROGRESS NOTES
Units Subcutaneous Nightly     PRN Meds: diphenoxylate-atropine, sodium chloride flush, acetaminophen **OR** acetaminophen, polyethylene glycol, promethazine **OR** ondansetron, potassium chloride **OR** potassium alternative oral replacement **OR** potassium chloride, magnesium sulfate, traMADol, glucose, dextrose, glucagon (rDNA), dextrose      Intake/Output Summary (Last 24 hours) at 4/4/2020 0911  Last data filed at 4/4/2020 0727  Gross per 24 hour   Intake 460 ml   Output --   Net 460 ml       Physical Exam Performed:    /75   Pulse 77   Temp 97.7 °F (36.5 °C) (Oral)   Resp 16   Ht 5' 4\" (1.626 m)   Wt 192 lb 7.4 oz (87.3 kg)   SpO2 96%   Breastfeeding No   BMI 33.04 kg/m²     General appearance: No apparent distress, appears stated age and cooperative. HEENT: Pupils equal, round, and reactive to light. Conjunctivae/corneas clear. Neck: Supple, with full range of motion. No jugular venous distention. Trachea midline. Respiratory:  Normal respiratory effort. Clear to auscultation, bilaterally without Rales/Wheezes/Rhonchi. Cardiovascular: Regular rate and rhythm with normal S1/S2 without murmurs, rubs or gallops. Abdomen: Soft, diffuse -tender, non-distended with normal bowel sounds. Musculoskeletal: No clubbing, cyanosis or edema bilaterally. Full range of motion without deformity. Skin: Skin color, texture, turgor normal.  No rashes or lesions. Neurologic:  Neurovascularly intact without any focal sensory/motor deficits.  Cranial nerves: II-XII intact, grossly non-focal.  Psychiatric: Alert and oriented, thought content appropriate, normal insight  Capillary Refill: Brisk,< 3 seconds   Peripheral Pulses: +2 palpable, equal bilaterally       Labs:   Recent Labs     04/03/20  1628   WBC 18.3*   HGB 12.6   HCT 38.0        Recent Labs     04/03/20  1628 04/04/20  0448   * 138   K 4.4 5.0   CL 98* 103   CO2 21 22   BUN 13 12   CREATININE 0.6 0.7   CALCIUM 9.0 9.1     Recent Labs

## 2020-04-04 NOTE — PROGRESS NOTES
Patient alert and oriented x4. VSS. Patient denies any pain. Patient has active bowel sounds. Patient has not had any bowel movements this shift. Patient is UAL. Patient tolerating clear liquid diet. Patient denies any nausea. Will continue to monitor. Patient denies any other needs at this time. Bed in the lowest position and call light within reach. Will continue to monitor for changes in status.      \

## 2020-04-04 NOTE — H&P
non-tender, no guarding or rigidity, non-distended with hypoactive bowel sounds. Musculoskeletal:  No clubbing, cyanosis or edema bilaterally. Full range of motion without deformity. Skin: Skin color, texture, turgor normal.  No rashes or lesions. Neurologic:  Neurovascularly intact without any focal sensory/motor deficits. Cranial nerves: II-XII intact, grossly non-focal.  Psychiatric:  Alert and oriented, thought content appropriate, normal insight  Capillary Refill: Brisk,< 3 seconds   Peripheral Pulses: +2 palpable, equal bilaterally       Labs:     Recent Labs     04/03/20  1628   WBC 18.3*   HGB 12.6   HCT 38.0        Recent Labs     04/03/20  1628   *   K 4.4   CL 98*   CO2 21   BUN 13   CREATININE 0.6   CALCIUM 9.0     Recent Labs     04/03/20  1628   AST 6*   ALT 8*   BILIDIR <0.2   BILITOT <0.2   ALKPHOS 83     No results for input(s): INR in the last 72 hours. No results for input(s): Eddye Lipschutz in the last 72 hours. Urinalysis:      Lab Results   Component Value Date    NITRU Negative 04/03/2020    BLOODU Negative 04/03/2020    SPECGRAV 1.010 04/03/2020    GLUCOSEU >=1000 04/03/2020       Radiology:     CXR: I have reviewed the CXR with the following interpretation: NA  EKG:  I have reviewed the EKG with the following interpretation: NA    CT ABDOMEN PELVIS WO CONTRAST Additional Contrast? None   Final Result      1. Inflammatory colitis involving the terminal ileum and descending and sigmoid colon consistent with history of Crohn's disease. 2.  Mild hepatic steatosis.              ASSESSMENT:    Active Hospital Problems    Diagnosis Date Noted    Exacerbation of Crohn's disease of large intestine ([facility] [address] [K50.10] 04/03/2020   CRP = 139, ESR = 63  DM-2 hyperglycemia, likely secondary to steroids  Leukocytosis likely secondary to steroid use, patient remains afebrile, not tachycardic and lactic acid-normal  Obesity with BMI of 33    PLAN:  Continue IV steroids as per GI recommendations  Liquid diet as tolerated  Pain relief PRN  Continue on home doses of Jardiance, Amaryl and current insulin regimen  Check A1C  Monitor electrolytes and renal function  Supportive therapy    DVT Prophylaxis: Lovenox  Diet: DIET CLEAR LIQUID;  Code Status: Full Code    PT/OT Eval Status: As tolerated    Dispo -GMF with telemetry       Ethan Yoo MD    Thank you Phillip Orosco MD for the opportunity to be involved in this patient's care. If you have any questions or concerns please feel free to contact me at [phone].

## 2020-04-04 NOTE — PROGRESS NOTES
Pt to room 5313 from ED, oriented to room and call light. Patient belongings along bedside. Admission orders obtained. Hospitalist to bedside. IVF initiated @100ml/hr. Provided w ice/pitcher. Denies pain/nausea. Resting comfortably @ this time. Bed remains in lowest position, call light @ side. All needs met.  Will cont to monitor

## 2020-04-04 NOTE — PROGRESS NOTES
Nutrition education/counseling/coordination of care: Continue Inpatient Monitoring     NUTRITION MONITORING & EVALUATION:  Evaluation:Goals set   Goals: Pt will tolerate diet advancement to consume >/=50% of meals. Monitoring: Diarrhea , Diet advancement , Diet Tolerance , GI Function , Meal Intake , Nausea  or Weight      OBJECTIVE DATA:  · Nutrition-Focused Physical Findings: no edema   · Wounds None      Past Medical History:   Diagnosis Date    Crohn's disease ([facility]     IBD (inflammatory bowel disease)     PCOS (polycystic ovarian syndrome)     Psoriasis     Psoriatic arthritis ([facility]     sees Rheumatology         ANTHROPOMETRICS  Current Height: 5' 4\" (162.6 cm)  Current Weight: 190 lb (86.2 kg)    Admission weight: 190 lb (86.2 kg)  Ideal Bodyweight 120 lb     Weight Changes COLBY       BMI BMI (Calculated): 32.7    Wt Readings from Last 50 Encounters:   04/03/20 190 lb (86.2 kg)   03/17/20 196 lb (88.9 kg)   02/06/20 197 lb 12.8 oz (89.7 kg)   01/18/20 198 lb 6.4 oz (90 kg)   12/20/19 199 lb 6.4 oz (90.4 kg)   11/14/19 206 lb 6.4 oz (93.6 kg)     COMPARATIVE STANDARDS  Estimated Total Kcals/Day : 15-18 Current Bodyweight (86 kg) 5441-0042 kcal    Estimated Total Protein (g/day) : 1.3-1.5 Ideal Bodyweight  (86 kg) 112-129 g/day  Estimated Daily Total Fluid (ml/day): 1 mL/kcal per day     Food / Nutrition-Related History  Pre-Admission / Home Diet:  Pre-Admission/Home Diet: General   Home Supplements / Herbals:    none noted  Food Restrictions / Cultural Requests:    none noted    Diet Orders / Intake / Nutrition Support  Current diet/supplement order: DIET CLEAR LIQUID;     NSG Recorded PO:   PO Fluids P.O.: 240 mL  PO Meals     PO Intake: n/a  PO Supplement: None   PO Supplement Intake: n/a    NUTRITION RISK LEVEL: Risk Level:  Moderate    Leonides Lopes LD  [place]:  [phone]  Office:  [phone]

## 2020-04-05 LAB
BASOPHILS ABSOLUTE: 0 K/UL (ref 0–0.2)
BASOPHILS RELATIVE PERCENT: 0.1 %
C DIFF TOXIN/ANTIGEN: NORMAL
EOSINOPHILS ABSOLUTE: 0 K/UL (ref 0–0.6)
EOSINOPHILS RELATIVE PERCENT: 0 %
GLUCOSE BLD-MCNC: 132 MG/DL (ref 70–99)
GLUCOSE BLD-MCNC: 150 MG/DL (ref 70–99)
GLUCOSE BLD-MCNC: 188 MG/DL (ref 70–99)
GLUCOSE BLD-MCNC: 268 MG/DL (ref 70–99)
HCT VFR BLD CALC: 37.9 % (ref 36–48)
HEMOGLOBIN: 12.1 G/DL (ref 12–16)
LYMPHOCYTES ABSOLUTE: 0.5 K/UL (ref 1–5.1)
LYMPHOCYTES RELATIVE PERCENT: 3.3 %
MCH RBC QN AUTO: 26.7 PG (ref 26–34)
MCHC RBC AUTO-ENTMCNC: 32 G/DL (ref 31–36)
MCV RBC AUTO: 83.6 FL (ref 80–100)
MONOCYTES ABSOLUTE: 0.2 K/UL (ref 0–1.3)
MONOCYTES RELATIVE PERCENT: 1.4 %
NEUTROPHILS ABSOLUTE: 15 K/UL (ref 1.7–7.7)
NEUTROPHILS RELATIVE PERCENT: 95.2 %
PDW BLD-RTO: 15.4 % (ref 12.4–15.4)
PERFORMED ON: ABNORMAL
PLATELET # BLD: 402 K/UL (ref 135–450)
PMV BLD AUTO: 7.5 FL (ref 5–10.5)
RBC # BLD: 4.54 M/UL (ref 4–5.2)
WBC # BLD: 15.8 K/UL (ref 4–11)

## 2020-04-05 PROCEDURE — 6360000002 HC RX W HCPCS: Performed by: INTERNAL MEDICINE

## 2020-04-05 PROCEDURE — 85025 COMPLETE CBC W/AUTO DIFF WBC: CPT

## 2020-04-05 PROCEDURE — 1200000000 HC SEMI PRIVATE

## 2020-04-05 PROCEDURE — 2580000003 HC RX 258: Performed by: INTERNAL MEDICINE

## 2020-04-05 PROCEDURE — 87324 CLOSTRIDIUM AG IA: CPT

## 2020-04-05 PROCEDURE — 6370000000 HC RX 637 (ALT 250 FOR IP): Performed by: INTERNAL MEDICINE

## 2020-04-05 PROCEDURE — 87505 NFCT AGENT DETECTION GI: CPT

## 2020-04-05 PROCEDURE — 36415 COLL VENOUS BLD VENIPUNCTURE: CPT

## 2020-04-05 PROCEDURE — 87449 NOS EACH ORGANISM AG IA: CPT

## 2020-04-05 RX ORDER — METHYLPREDNISOLONE SODIUM SUCCINATE 40 MG/ML
20 INJECTION, POWDER, LYOPHILIZED, FOR SOLUTION INTRAMUSCULAR; INTRAVENOUS EVERY 8 HOURS
Status: DISCONTINUED | OUTPATIENT
Start: 2020-04-05 | End: 2020-04-06

## 2020-04-05 RX ADMIN — METHYLPREDNISOLONE SODIUM SUCCINATE 20 MG: 40 INJECTION, POWDER, FOR SOLUTION INTRAMUSCULAR; INTRAVENOUS at 19:37

## 2020-04-05 RX ADMIN — ACETAMINOPHEN 650 MG: 325 TABLET ORAL at 02:33

## 2020-04-05 RX ADMIN — TRAMADOL HYDROCHLORIDE 50 MG: 50 TABLET, FILM COATED ORAL at 02:34

## 2020-04-05 RX ADMIN — Medication 40 MG: at 02:42

## 2020-04-05 RX ADMIN — ESCITALOPRAM OXALATE 15 MG: 5 TABLET, FILM COATED ORAL at 10:10

## 2020-04-05 RX ADMIN — Medication 10 ML: at 09:12

## 2020-04-05 RX ADMIN — INSULIN LISPRO 3 UNITS: 100 INJECTION, SOLUTION INTRAVENOUS; SUBCUTANEOUS at 21:39

## 2020-04-05 RX ADMIN — INSULIN LISPRO 2 UNITS: 100 INJECTION, SOLUTION INTRAVENOUS; SUBCUTANEOUS at 09:13

## 2020-04-05 RX ADMIN — METHYLPREDNISOLONE SODIUM SUCCINATE 20 MG: 40 INJECTION, POWDER, FOR SOLUTION INTRAMUSCULAR; INTRAVENOUS at 12:51

## 2020-04-05 RX ADMIN — Medication 10 ML: at 21:37

## 2020-04-05 RX ADMIN — LISINOPRIL 10 MG: 10 TABLET ORAL at 09:11

## 2020-04-05 RX ADMIN — INSULIN LISPRO 2 UNITS: 100 INJECTION, SOLUTION INTRAVENOUS; SUBCUTANEOUS at 17:47

## 2020-04-05 RX ADMIN — GLIMEPIRIDE 2 MG: 2 TABLET ORAL at 09:11

## 2020-04-05 RX ADMIN — FOLIC ACID 1 MG: 1 TABLET ORAL at 09:11

## 2020-04-05 ASSESSMENT — PAIN DESCRIPTION - LOCATION
LOCATION: ABDOMEN
LOCATION: ABDOMEN

## 2020-04-05 ASSESSMENT — PAIN SCALES - GENERAL
PAINLEVEL_OUTOF10: 0
PAINLEVEL_OUTOF10: 0
PAINLEVEL_OUTOF10: 5
PAINLEVEL_OUTOF10: 0
PAINLEVEL_OUTOF10: 3
PAINLEVEL_OUTOF10: 0
PAINLEVEL_OUTOF10: 0

## 2020-04-05 ASSESSMENT — PAIN DESCRIPTION - DESCRIPTORS
DESCRIPTORS: CRAMPING;SHARP
DESCRIPTORS: CRAMPING

## 2020-04-05 ASSESSMENT — PAIN DESCRIPTION - DIRECTION
RADIATING_TOWARDS: RIGHT
RADIATING_TOWARDS: MIDDLE

## 2020-04-05 ASSESSMENT — PAIN DESCRIPTION - ONSET
ONSET: PROGRESSIVE
ONSET: AWAKENED FROM SLEEP

## 2020-04-05 ASSESSMENT — PAIN DESCRIPTION - PAIN TYPE
TYPE: ACUTE PAIN
TYPE: ACUTE PAIN

## 2020-04-05 ASSESSMENT — PAIN - FUNCTIONAL ASSESSMENT
PAIN_FUNCTIONAL_ASSESSMENT: ACTIVITIES ARE NOT PREVENTED
PAIN_FUNCTIONAL_ASSESSMENT: ACTIVITIES ARE NOT PREVENTED

## 2020-04-05 ASSESSMENT — PAIN DESCRIPTION - FREQUENCY
FREQUENCY: INTERMITTENT
FREQUENCY: INTERMITTENT

## 2020-04-05 ASSESSMENT — PAIN DESCRIPTION - PROGRESSION
CLINICAL_PROGRESSION: GRADUALLY WORSENING
CLINICAL_PROGRESSION: GRADUALLY WORSENING

## 2020-04-05 ASSESSMENT — PAIN DESCRIPTION - ORIENTATION
ORIENTATION: MID
ORIENTATION: RIGHT

## 2020-04-05 NOTE — PROGRESS NOTES
% solution, 100 mL/hr, Intravenous, PRN  insulin lispro (1 Unit Dial) 0-12 Units, 0-12 Units, Subcutaneous, TID WC  insulin lispro (1 Unit Dial) 0-6 Units, 0-6 Units, Subcutaneous, Nightly    Physical    VITALS:  /74   Pulse 93   Temp 97.1 °F (36.2 °C) (Oral)   Resp 16   Ht 5' 4\" (1.626 m)   Wt 192 lb 7.4 oz (87.3 kg)   SpO2 97%   Breastfeeding No   BMI 33.04 kg/m²   TEMPERATURE:  Current - Temp: 97.1 °F (36.2 °C); Max - Temp  Av.3 °F (36.3 °C)  Min: 96.5 °F (35.8 °C)  Max: 98.1 °F (36.7 °C)    NAD  Eyes: No icterus  RRR  Lungs CTA Bilaterally, normal effort  Abdomen soft, ND, NT, Bowel sounds normal.  Ext: no edema  Neuro: No tremor  Psych: A&Ox3    Data    Data Review:    Recent Labs     20  1628 20  0555   WBC 18.3* 15.8*   HGB 12.6 12.1   HCT 38.0 37.9   MCV 83.1 83.6    402     Recent Labs     20  1628 20  0448   * 138   K 4.4 5.0   CL 98* 103   CO2 21 22   BUN 13 12   CREATININE 0.6 0.7     Recent Labs     20  1628   AST 6*   ALT 8*   BILIDIR <0.2   BILITOT <0.2   ALKPHOS 83     Recent Labs     20  1628   LIPASE 24.0     No results for input(s): PROTIME, INR in the last 72 hours. No results for input(s): PTT in the last 72 hours. ASSESSMENT:  38 y.o. female admitted with Exacerbation of Crohn's disease of large intestine ([facility] [address] [K50.10]  Exacerbation of Crohn's disease of large intestine ([facility] [address] [K50.10] who is seen in consult for Crohn's flare. Follows with Dr. Fausto Solis for Crohn's disease diagnosed in  with ileocolonic disease. Was on humira starting in  and increased to weekly dosing . Took 6MP in past - ? ineffecitve or adverse reaction. Got \"sicker\" on remicade and developed arthritis (prior to the humira). Also has been on methotrexate 15mg weekly. Had adequate levels of humira and no antibodies but broke through humira so switched to stelara .   Colon 2020 showed active colonic crohn's throughout and could not

## 2020-04-06 VITALS
RESPIRATION RATE: 18 BRPM | DIASTOLIC BLOOD PRESSURE: 67 MMHG | HEART RATE: 69 BPM | OXYGEN SATURATION: 99 % | SYSTOLIC BLOOD PRESSURE: 101 MMHG | TEMPERATURE: 97.4 F | WEIGHT: 192.46 LBS | HEIGHT: 64 IN | BODY MASS INDEX: 32.86 KG/M2

## 2020-04-06 LAB
ANION GAP SERPL CALCULATED.3IONS-SCNC: 11 MMOL/L (ref 3–16)
BUN BLDV-MCNC: 13 MG/DL (ref 7–20)
CALCIUM SERPL-MCNC: 8.9 MG/DL (ref 8.3–10.6)
CHLORIDE BLD-SCNC: 98 MMOL/L (ref 99–110)
CO2: 26 MMOL/L (ref 21–32)
CREAT SERPL-MCNC: 0.5 MG/DL (ref 0.6–1.1)
GFR AFRICAN AMERICAN: >60
GFR NON-AFRICAN AMERICAN: >60
GI BACTERIAL PATHOGENS BY PCR: NORMAL
GLUCOSE BLD-MCNC: 165 MG/DL (ref 70–99)
GLUCOSE BLD-MCNC: 178 MG/DL (ref 70–99)
GLUCOSE BLD-MCNC: 188 MG/DL (ref 70–99)
HCT VFR BLD CALC: 39.2 % (ref 36–48)
HEMOGLOBIN: 12.4 G/DL (ref 12–16)
MAGNESIUM: 2 MG/DL (ref 1.8–2.4)
PERFORMED ON: ABNORMAL
PERFORMED ON: ABNORMAL
POTASSIUM SERPL-SCNC: 4.3 MMOL/L (ref 3.5–5.1)
SODIUM BLD-SCNC: 135 MMOL/L (ref 136–145)

## 2020-04-06 PROCEDURE — 85018 HEMOGLOBIN: CPT

## 2020-04-06 PROCEDURE — 36415 COLL VENOUS BLD VENIPUNCTURE: CPT

## 2020-04-06 PROCEDURE — 83735 ASSAY OF MAGNESIUM: CPT

## 2020-04-06 PROCEDURE — 6360000002 HC RX W HCPCS: Performed by: INTERNAL MEDICINE

## 2020-04-06 PROCEDURE — 85014 HEMATOCRIT: CPT

## 2020-04-06 PROCEDURE — 2580000003 HC RX 258: Performed by: INTERNAL MEDICINE

## 2020-04-06 PROCEDURE — 6370000000 HC RX 637 (ALT 250 FOR IP): Performed by: INTERNAL MEDICINE

## 2020-04-06 PROCEDURE — 80048 BASIC METABOLIC PNL TOTAL CA: CPT

## 2020-04-06 RX ORDER — PREDNISONE 10 MG/1
TABLET ORAL
Qty: 102 TABLET | Refills: 0 | Status: SHIPPED | OUTPATIENT
Start: 2020-04-07 | End: 2020-05-19

## 2020-04-06 RX ORDER — PREDNISONE 20 MG/1
40 TABLET ORAL DAILY
Status: DISCONTINUED | OUTPATIENT
Start: 2020-04-06 | End: 2020-04-06 | Stop reason: HOSPADM

## 2020-04-06 RX ADMIN — PREDNISONE 40 MG: 20 TABLET ORAL at 12:20

## 2020-04-06 RX ADMIN — ESCITALOPRAM OXALATE 15 MG: 5 TABLET, FILM COATED ORAL at 10:38

## 2020-04-06 RX ADMIN — LISINOPRIL 10 MG: 10 TABLET ORAL at 08:16

## 2020-04-06 RX ADMIN — GLIMEPIRIDE 2 MG: 2 TABLET ORAL at 08:16

## 2020-04-06 RX ADMIN — INSULIN LISPRO 2 UNITS: 100 INJECTION, SOLUTION INTRAVENOUS; SUBCUTANEOUS at 08:17

## 2020-04-06 RX ADMIN — Medication 10 ML: at 08:16

## 2020-04-06 RX ADMIN — METHYLPREDNISOLONE SODIUM SUCCINATE 20 MG: 40 INJECTION, POWDER, FOR SOLUTION INTRAMUSCULAR; INTRAVENOUS at 03:34

## 2020-04-06 RX ADMIN — FOLIC ACID 1 MG: 1 TABLET ORAL at 08:16

## 2020-04-06 RX ADMIN — INSULIN LISPRO 2 UNITS: 100 INJECTION, SOLUTION INTRAVENOUS; SUBCUTANEOUS at 12:20

## 2020-04-06 ASSESSMENT — PAIN SCALES - GENERAL
PAINLEVEL_OUTOF10: 0
PAINLEVEL_OUTOF10: 0

## 2020-04-06 NOTE — PROGRESS NOTES
Pt alert and oriented. VSS. Pt denies pain at this time. Pt tolerating diet well. Pt currently up as tolerated. Pt up to chair with belongings and call light in reach. Will continue to monitor.

## 2020-04-06 NOTE — PROGRESS NOTES
[address] [place] [address]  GI Progress Note      Padmini Madrid is a 38 y.o. female patient. 1. Generalized abdominal pain    2. Crohn's disease with rectal bleeding, unspecified gastrointestinal tract location [facility])        Admit Date: 4/3/2020    Subjective:       No pain; denies diarrhea; ready to go home      ROS:  As per above    Scheduled Meds:   predniSONE  40 mg Oral Daily    empagliflozin  25 mg Oral Daily    escitalopram  15 mg Oral Daily    folic acid  1 mg Oral Daily    glimepiride  2 mg Oral QAM    lisinopril  10 mg Oral Daily    sodium chloride flush  10 mL Intravenous 2 times per day    insulin lispro  0-12 Units Subcutaneous TID WC    insulin lispro  0-6 Units Subcutaneous Nightly       Continuous Infusions:   dextrose         PRN Meds:  diphenoxylate-atropine, sodium chloride flush, acetaminophen **OR** acetaminophen, polyethylene glycol, promethazine **OR** ondansetron, potassium chloride **OR** potassium alternative oral replacement **OR** potassium chloride, magnesium sulfate, traMADol, glucose, dextrose, glucagon (rDNA), dextrose      Objective:       Patient Vitals for the past 24 hrs:   BP Temp Temp src Pulse Resp SpO2   20 0801 105/76 97.6 °F (36.4 °C) Oral 65 16 98 %   20 0349 109/74 97.4 °F (36.3 °C) -- 67 16 99 %   20 0009 112/78 97.4 °F (36.3 °C) Oral 78 16 99 %   20 2136 101/70 97.7 °F (36.5 °C) Oral 76 16 98 %   20 1659 100/71 97.1 °F (36.2 °C) Oral 77 18 97 %   20 1258 109/74 97.1 °F (36.2 °C) Oral 93 16 97 %   20 0909 132/86 98.1 °F (36.7 °C) Oral 67 16 97 %       Exam:  VITALS:  /76   Pulse 65   Temp 97.6 °F (36.4 °C) (Oral)   Resp 16   Ht 5' 4\" (1.626 m)   Wt 192 lb 7.4 oz (87.3 kg)   SpO2 98%   Breastfeeding No   BMI 33.04 kg/m²   TEMPERATURE:  Current - Temp: 97.6 °F (36.4 °C);  Max - Temp  Av.5 °F (36.4 °C)  Min: 97.1 °F (36.2 °C)  Max: 98.1 °F (36.7 °C)      General appearance: alert, appears stated age, cooperative and no distress  Head: Normocephalic, without obvious abnormality, atraumatic  Neck: supple, symmetrical, trachea midline and thyroid not enlarged, symmetric, no tenderness/mass/nodules  CVS:  RRR, Nl s1s2  Lungs CTA Bilaterally, normal effort  Abdomen: soft, non-tender; bowel sounds normal; no masses,  no organomegaly  AAOx3, No asterixis or encephalopathy  Extremities: No edema. Recent Labs     04/03/20  1628 04/05/20  0555 04/06/20  0606   WBC 18.3* 15.8*  --    HGB 12.6 12.1 12.4   HCT 38.0 37.9 39.2   MCV 83.1 83.6  --     402  --      Recent Labs     04/03/20  1628 04/04/20  0448 04/06/20  0606   * 138 135*   K 4.4 5.0 4.3   CL 98* 103 98*   CO2 21 22 26   BUN 13 12 13   CREATININE 0.6 0.7 0.5*     Recent Labs     04/03/20  1628   AST 6*   ALT 8*   BILIDIR <0.2   BILITOT <0.2   ALKPHOS 83     Recent Labs     04/03/20  1628   LIPASE 24.0     Recent Labs     04/03/20  1628   PROT 7.6     No results for input(s): PTT in the last 72 hours. No results for input(s): OCCULTBLD in the last 72 hours.       Assessment:       Crohn's    Recommendations:       No pain; no diarrhea; tolerating PO  Change to PO prednisone; 40mg x 2 weeks then f/u with Dr. Sofia Sánchez within that 2 weeks; will need tapered by 10mg every week  Lomotil as needed   can pick her up at 1600  Call back with questions or concerns    Laurel Shown  4/6/2020  8:26 AM

## 2020-04-06 NOTE — PROGRESS NOTES
Hospitalist Progress Note      PCP: Carolin Campbell MD    Date of Admission: 4/3/2020    Chief Complaint: abd pain    Hospital Course: 38 y.o. female who presents with complaints of abdominal pain with associated nausea, and diarrhea. Patient has history of Crohn's disease for which he takes Stelara and methotrexate, and diabetes mellitus. According to the patient she has been having above symptoms for the past month. She follows with [facility] as well as [address] for her Crohn's disease. She saw her GI specialist on 3/4 when she was placed on prednisone 40 mg daily. She has been taking the medication as prescribed but over the past couple of weeks patient felt that her symptoms are not improving. Then she had a telemedicine visit with [facility] specialist and her prednisone was increased to 60 mg daily, which she took for the past 3 days. However 2 days ago she started to feel that her abdominal pain is getting worse, which she describes as cramping in nature and sharp at times. The pain is associated with nausea but she has not vomited. Patient continues to have diarrhea with intermittent blood in her stools. Patient had subjective fevers and on Monday when she checked it was 100.2 °F.    Patient was dx with Chron's flare up because her ESR/CRP were both elevated and abd CT show evidance of inflammatory colitis. Subjective: patient is doing better today, she tolerated diet well, no N/V. Had no pain after last BM and it was non bloody. C. Diff negative. Bacterial pathogen pending.        Medications:  Reviewed    Infusion Medications    dextrose       Scheduled Medications    predniSONE  40 mg Oral Daily    empagliflozin  25 mg Oral Daily    escitalopram  15 mg Oral Daily    folic acid  1 mg Oral Daily    glimepiride  2 mg Oral QAM    lisinopril  10 mg Oral Daily    sodium chloride flush  10 mL Intravenous 2 times per day    insulin lispro  0-12 Units Subcutaneous TID     insulin lispro  0-6 Units

## 2020-04-06 NOTE — DISCHARGE SUMMARY
CESAR 14 DAY SENSOR) MISC Check once a day  Qty: 100 each, Refills: 1      omeprazole (PRILOSEC) 40 MG delayed release capsule TAKE 1 CAPSULE BY MOUTH EVERY DAY  Qty: 90 capsule, Refills: 1    Associated Diagnoses: Gastroesophageal reflux disease, esophagitis presence not specified      ONE TOUCH ULTRA TEST strip USE TO TEST ONCE DAILY  Qty: 100 strip, Refills: 0      escitalopram (LEXAPRO) 10 MG tablet Take 1.5 tablets by mouth daily  Qty: 135 tablet, Refills: 1    Associated Diagnoses: Anxiety      folic acid (FOLVITE) 1 MG tablet Take 1 tablet by mouth daily  Qty: 90 tablet, Refills: 0      empagliflozin (JARDIANCE) 25 MG tablet Take 25 mg by mouth daily  Qty: 30 tablet, Refills: 3      ONETOUCH DELICA LANCETS FINE MISC USE ONE DAILY  Qty: 100 each, Refills: 1      fluticasone (FLONASE) 50 MCG/ACT nasal spray SPRAY 2 SPRAYS INTO EACH NOSTRIL EVERY DAY  Qty: 1 Bottle, Refills: 1    Associated Diagnoses: Viral URI      methotrexate (RHEUMATREX) 2.5 MG chemo tablet Take 20 mg by mouth once a week Every friday      ustekinumab (STELARA) 130 MG/26ML SOLN Infuse 260 mg intravenously Every friday      Blood Glucose Monitoring Suppl (ONE TOUCH ULTRA MINI) w/Device KIT 1 kit by Does not apply route daily as needed (fasting)  Qty: 1 kit, Refills: 0      diphenoxylate-atropine (LOMOTIL) 2.5-0.025 MG per tablet diphenoxylate-atropine 2.5 mg-0.025 mg tablet      zinc sulfate (ORAZINC) 220 (50 Zn) MG capsule Take 1 capsule by mouth daily  Qty: 30 capsule, Refills: 1    Associated Diagnoses: Anosmia                Time Spent on discharge is more than 30 minutes in the examination, evaluation, counseling and review of medications and discharge plan. Signed:  Shashank Velarde MD   4/6/2020      Thank you Stanford Chou MD for the opportunity to be involved in this patient's care. If you have any questions or concerns please feel free to contact me at [phone].

## 2020-04-07 ENCOUNTER — TELEPHONE (OUTPATIENT)
Dept: PRIMARY CARE CLINIC | Age: 40
End: 2020-04-07

## 2020-04-09 ENCOUNTER — TELEPHONE (OUTPATIENT)
Dept: PRIMARY CARE CLINIC | Age: 40
End: 2020-04-09

## 2020-04-09 ENCOUNTER — VIRTUAL VISIT (OUTPATIENT)
Dept: PRIMARY CARE CLINIC | Age: 40
End: 2020-04-09
Payer: COMMERCIAL

## 2020-04-09 PROCEDURE — G8417 CALC BMI ABV UP PARAM F/U: HCPCS | Performed by: FAMILY MEDICINE

## 2020-04-09 PROCEDURE — 99213 OFFICE O/P EST LOW 20 MIN: CPT | Performed by: FAMILY MEDICINE

## 2020-04-09 PROCEDURE — 1036F TOBACCO NON-USER: CPT | Performed by: FAMILY MEDICINE

## 2020-04-09 PROCEDURE — 3051F HG A1C>EQUAL 7.0%<8.0%: CPT | Performed by: FAMILY MEDICINE

## 2020-04-09 PROCEDURE — 2022F DILAT RTA XM EVC RTNOPTHY: CPT | Performed by: FAMILY MEDICINE

## 2020-04-09 PROCEDURE — G8427 DOCREV CUR MEDS BY ELIG CLIN: HCPCS | Performed by: FAMILY MEDICINE

## 2020-04-09 PROCEDURE — 1111F DSCHRG MED/CURRENT MED MERGE: CPT | Performed by: FAMILY MEDICINE

## 2020-04-09 ASSESSMENT — ENCOUNTER SYMPTOMS
EYES NEGATIVE: 1
GASTROINTESTINAL NEGATIVE: 1
RESPIRATORY NEGATIVE: 1

## 2020-04-09 NOTE — PROGRESS NOTES
INSTRUCTIONS:  \"[x]\" Indicates a positive item  \"[]\" Indicates a negative item  -- DELETE ALL ITEMS NOT EXAMINED]  Vital Signs: (As obtained by patient/caregiver or practitioner observation)    Blood pressure-  Heart rate-    Respiratory rate-    Temperature-  Pulse oximetry-     Constitutional: [x] Appears well-developed and well-nourished [] No apparent distress      [] Abnormal-   Mental status  [x] Alert and awake  [x] Oriented to person/place/time []Able to follow commands      Eyes:  EOM    []  Normal  [] Abnormal-  Sclera  []  Normal  [] Abnormal -         Discharge []  None visible  [] Abnormal -    HENT:   [x] Normocephalic, atraumatic. [] Abnormal   [] Mouth/Throat: Mucous membranes are moist.     External Ears [] Normal  [] Abnormal-     Neck: [x] No visualized mass     Pulmonary/Chest: [x] Respiratory effort normal.  [] No visualized signs of difficulty breathing or respiratory distress        [] Abnormal-      Musculoskeletal:   [] Normal gait with no signs of ataxia         [] Normal range of motion of neck        [] Abnormal-       Neurological:        [x] No Facial Asymmetry (Cranial nerve 7 motor function) (limited exam to video visit)          [] No gaze palsy        [] Abnormal-         Skin:        [x] No significant exanthematous lesions or discoloration noted on facial skin         [] Abnormal-            Psychiatric:       [x] Normal Affect [x] No Hallucinations        [] Abnormal-     Other pertinent observable physical exam findings-     ASSESSMENT/PLAN:   Diagnosis Orders   1. Type 2 diabetes mellitus without complication, without long-term current use of insulin ([facility]     2.  Exacerbation of Crohn's disease of large intestine ([facility]     Reviewed her recent blood glucose reading in detail with the patient her fastings been okay  Her postprandial yesterday was in the 300s  Did take Amaryl in the morning and to take Jardiance in the evening  Diabetic educator referral will be put in her chart  To call her blood glucose readings  Might need to use insulin which she had used in the hospital  Reviewed her hospital course consultant notes and labs    Anne-Marie Pepper is a 38 y.o. female being evaluated by a Virtual Visit (video visit) encounter to address concerns as mentioned above. A caregiver was present when appropriate. Due to this being a TeleHealth encounter ([facility]), evaluation of the following organ systems was limited: Vitals/Constitutional/EENT/Resp/CV/GI//MS/Neuro/Skin/Heme-Lymph-Imm. Pursuant to the emergency declaration under the [address] [place] [address], [address] [place] [address] authority and the [employer] and [employer] Act, this Virtual Visit was conducted with patient's (and/or legal guardian's) consent, to reduce the patient's risk of exposure to COVID-19 and provide necessary medical care. The patient (and/or legal guardian) has also been advised to contact this office for worsening conditions or problems, and seek emergency medical treatment and/or call 911 if deemed necessary. Services were provided through a video synchronous discussion virtually to substitute for in-person clinic visit. Patient and provider were located at their individual homes. --Rosie Jha MD on 4/9/2020 at 9:32 AM    An electronic signature was used to authenticate this note.

## 2020-04-09 NOTE — TELEPHONE ENCOUNTER
Spoke with the patient now she does not have a fever she feels okay  Recommend Tylenol as needed monitor temperature increase hydration  Call if fever worsen or persistent  Discussed her situation being immunocompromised with her disease and being prednisone  To be careful with her household/family  Practice social distancing,face mask, gloves frequent handwashing.

## 2020-04-17 ENCOUNTER — TELEPHONE (OUTPATIENT)
Dept: PRIMARY CARE CLINIC | Age: 40
End: 2020-04-17

## 2020-04-17 NOTE — TELEPHONE ENCOUNTER
Pt states she had a fever last night of 100.4 she took some tylenol and now it has gone away. Please advise.

## 2020-05-01 RX ORDER — FOLIC ACID 1 MG/1
TABLET ORAL
Qty: 90 TABLET | Refills: 0 | Status: SHIPPED | OUTPATIENT
Start: 2020-05-01 | End: 2020-08-05

## 2020-05-04 RX ORDER — EMPAGLIFLOZIN 25 MG/1
TABLET, FILM COATED ORAL
Qty: 30 TABLET | Refills: 3 | Status: SHIPPED | OUTPATIENT
Start: 2020-05-04 | End: 2020-09-14

## 2020-06-01 ENCOUNTER — TELEPHONE (OUTPATIENT)
Dept: FAMILY MEDICINE CLINIC | Age: 40
End: 2020-06-01

## 2020-06-01 RX ORDER — CLONAZEPAM 0.5 MG/1
0.5 TABLET ORAL 2 TIMES DAILY PRN
Qty: 15 TABLET | Refills: 0 | Status: SHIPPED | OUTPATIENT
Start: 2020-06-01 | End: 2020-12-30

## 2020-06-01 NOTE — TELEPHONE ENCOUNTER
[facility] received a call from: pt    Name of Caller:     Relationship to patient:self    Organization name:     Best contact number: [phone]      Reason for call: pt would like to inform pcp that she has developed and new symptom she began to have trembles, she says her anxiety has been at an all time high here lately and shes not sure if it has anything to do with the medication or not. .please contact pt in regards to this medication. as soon as possible.

## 2020-06-18 ENCOUNTER — TELEPHONE (OUTPATIENT)
Dept: PRIMARY CARE CLINIC | Age: 40
End: 2020-06-18

## 2020-06-19 ENCOUNTER — OFFICE VISIT (OUTPATIENT)
Dept: PRIMARY CARE CLINIC | Age: 40
End: 2020-06-19
Payer: COMMERCIAL

## 2020-06-19 VITALS — HEART RATE: 97 BPM | OXYGEN SATURATION: 98 % | TEMPERATURE: 99.1 F

## 2020-06-19 PROCEDURE — G8417 CALC BMI ABV UP PARAM F/U: HCPCS | Performed by: NURSE PRACTITIONER

## 2020-06-19 PROCEDURE — 99211 OFF/OP EST MAY X REQ PHY/QHP: CPT | Performed by: NURSE PRACTITIONER

## 2020-06-19 PROCEDURE — 1036F TOBACCO NON-USER: CPT | Performed by: NURSE PRACTITIONER

## 2020-06-19 PROCEDURE — G8428 CUR MEDS NOT DOCUMENT: HCPCS | Performed by: NURSE PRACTITIONER

## 2020-06-19 NOTE — PATIENT INSTRUCTIONS
bathroom, if available. Animals: You should restrict contact with pets and other animals while you are sick with COVID-19, just like you would around other people. Although there have not been reports of pets or other animals becoming sick with COVID-19, it is still recommended that people sick with COVID-19 limit contact with animals until more information is known about the virus. When possible, have another member of your household care for your animals while you are sick. If you are sick with COVID-19, avoid contact with your pet, including petting, snuggling, being kissed or licked, and sharing food. If you must care for your pet or be around animals while you are sick, wash your hands before and after you interact with pets and wear a facemask. Call ahead before visiting your doctor  If you have a medical appointment, call the healthcare provider and tell them that you have or may have COVID-19. This will help the healthcare providers office take steps to keep other people from getting infected or exposed. Wear a facemask  You should wear a facemask when you are around other people (e.g., sharing a room or vehicle) or pets and before you enter a healthcare providers office. If you are not able to wear a facemask (for example, because it causes trouble breathing), then people who live with you should not stay in the same room with you, or they should wear a facemask if they enter your room. Cover your coughs and sneezes  Cover your mouth and nose with a tissue when you cough or sneeze. Throw used tissues in a lined trash can. Immediately wash your hands with soap and water for at least 20 seconds or, if soap and water are not available, clean your hands with an alcohol-based hand  that contains at least 60% alcohol.   Clean your hands often  Wash your hands often with soap and water for at least 20 seconds, especially after blowing your nose, coughing, or sneezing; going to the bathroom; and You can now view your medical record. Additional Information  If you have questions, please contact your physician practice where you receive care. Remember, [facility] is NOT to be used for urgent needs. For medical emergencies, dial 911.

## 2020-06-19 NOTE — PROGRESS NOTES
6/19/2020    FLU/COVID-19 CLINIC EVALUATION    HPI Pt. Presents to the [facility] COVID-19 clinic for evaluation of fever and fatigue as well as need for COVID-19 testing.     SYMPTOMS:    Symptom duration, days:  [] 1   [] 2   [] 3   [] 4   [] 5   [] 6   [] 7   [] 8   [] 9   [] 10   [] 11   [] 12   [] 13 [] 14 +    [x] Alert   [x]Oriented to person/place/time    [x]No apparent distress     []Toxic appearing    [x]Breathing appears normal     []Appears tachypneic         [x]Speaking in full sentences     Symptom course:   [] Worsening     [x] Stable     [] Improving    [x] Fevers  [x] Symptom (not measured)  [] Measured (Result: )  [] Chills  [] Cough  [] Coughing up blood  []Productive  []Dry  [] Chest Congestion  []Chest Tightness  [] Nasal Congestion  []Runny Nose  [] Feeling short of breath  [x]Fatigue  [] Chest pain  [] Headaches  []Tolerable  [] Severe  [] Sore throat  [] Muscle aches  [] Nausea  [] Decreased appetite  [] Vomiting  []Unable to keep fluids down  [] Diarrhea  []Severe    [] OTHER SYMPTOMS:      RISK FACTORS:    [] Pregnant or possibly pregnant  [] Age over 61  [] Diabetes  [] Heart disease  [] Asthma  [] COPD/Other chronic lung diseases  [] Active Cancer  [] On Chemotherapy  [] Taking oral steroids  [] History Lymphoma/Leukemia  [] Close contact with a lab confirmed COVID-19 patient within 14 days of symptom onset  [] History of travel from affected geographical areas within 14 days of symptom onset  [] Health Care Worker Exposure no symptoms  [] Health Care Worker Exposure symptomatic      VITALS:  Vitals:    06/19/20 1644   Pulse: 97   Temp: 99.1 °F (37.3 °C)   SpO2: 98%      PHYSICAL EXAMINATION:        [x]Alert   [x]Oriented to person/place/time    [x]No apparent distress     []Toxic appearing    [x]Breathing appears normal     []Appears tachypneic         [x]Speaking in full sentences     TESTS:    POCT FLU:  [] Positive     []Negative  POCT STREP:  [] Positive     []Negative    [x] COVID-19 Test

## 2020-06-22 ENCOUNTER — TELEPHONE (OUTPATIENT)
Dept: PRIMARY CARE CLINIC | Age: 40
End: 2020-06-22

## 2020-06-22 LAB
SARS-COV-2: NOT DETECTED
SOURCE: NORMAL

## 2020-07-02 ENCOUNTER — OFFICE VISIT (OUTPATIENT)
Dept: PRIMARY CARE CLINIC | Age: 40
End: 2020-07-02
Payer: COMMERCIAL

## 2020-07-02 VITALS
TEMPERATURE: 97.2 F | OXYGEN SATURATION: 97 % | HEART RATE: 110 BPM | WEIGHT: 202.4 LBS | DIASTOLIC BLOOD PRESSURE: 77 MMHG | BODY MASS INDEX: 34.74 KG/M2 | RESPIRATION RATE: 14 BRPM | SYSTOLIC BLOOD PRESSURE: 110 MMHG

## 2020-07-02 PROCEDURE — G8428 CUR MEDS NOT DOCUMENT: HCPCS | Performed by: FAMILY MEDICINE

## 2020-07-02 PROCEDURE — G8417 CALC BMI ABV UP PARAM F/U: HCPCS | Performed by: FAMILY MEDICINE

## 2020-07-02 PROCEDURE — 1036F TOBACCO NON-USER: CPT | Performed by: FAMILY MEDICINE

## 2020-07-02 PROCEDURE — 99212 OFFICE O/P EST SF 10 MIN: CPT | Performed by: FAMILY MEDICINE

## 2020-07-02 RX ORDER — AMOXICILLIN 875 MG/1
875 TABLET, COATED ORAL 2 TIMES DAILY
COMMUNITY
End: 2020-12-30 | Stop reason: CLARIF

## 2020-07-02 SDOH — ECONOMIC STABILITY: FOOD INSECURITY: WITHIN THE PAST 12 MONTHS, THE FOOD YOU BOUGHT JUST DIDN'T LAST AND YOU DIDN'T HAVE MONEY TO GET MORE.: NEVER TRUE

## 2020-07-02 SDOH — ECONOMIC STABILITY: INCOME INSECURITY: HOW HARD IS IT FOR YOU TO PAY FOR THE VERY BASICS LIKE FOOD, HOUSING, MEDICAL CARE, AND HEATING?: NOT HARD AT ALL

## 2020-07-02 SDOH — ECONOMIC STABILITY: TRANSPORTATION INSECURITY
IN THE PAST 12 MONTHS, HAS LACK OF TRANSPORTATION KEPT YOU FROM MEETINGS, WORK, OR FROM GETTING THINGS NEEDED FOR DAILY LIVING?: NO

## 2020-07-02 SDOH — ECONOMIC STABILITY: FOOD INSECURITY: WITHIN THE PAST 12 MONTHS, YOU WORRIED THAT YOUR FOOD WOULD RUN OUT BEFORE YOU GOT MONEY TO BUY MORE.: NEVER TRUE

## 2020-07-02 SDOH — ECONOMIC STABILITY: TRANSPORTATION INSECURITY
IN THE PAST 12 MONTHS, HAS THE LACK OF TRANSPORTATION KEPT YOU FROM MEDICAL APPOINTMENTS OR FROM GETTING MEDICATIONS?: NO

## 2020-07-02 NOTE — PROGRESS NOTES
SUBJECTIVE:  Patient ID: Gianfranco Leahy is a 38 y.o. y.o. female     HPI   Patient is here with concern about a red spot in her lower leg she has multiple medical problem including diabetes she has been struggling with Crohn disease flareup for a while now she has been on multiple medication  No direct trauma or injury she can think of anything treated she was worried about possible blood clot she has been outdoors lately more often  She was diagnosed a friend  is on antibiotic currently  She is already had the COVID testing was negative  No fever no leg swelling  Past Medical History:   Diagnosis Date    [facility]'s [facility] ([facility]     IBD (inflammatory bowel disease)     PCOS (polycystic ovarian syndrome)     Psoriasis     Psoriatic arthritis ([facility]     sees Rheumatology       Past Surgical History:   Procedure Laterality Date     SECTION  2006     SECTION, CLASSIC  2009     Family History   Problem Relation Age of Onset    Diabetes Mother     Cancer Mother     Stroke Father      Social History     Socioeconomic History    Marital status:      Spouse name: None    Number of children: None    Years of education: None    Highest education level: None   Occupational History    None   Social Needs    Financial resource strain: Not hard at all   Gustavo-Sen insecurity     Worry: Never true     Inability: Never true    Transportation needs     Medical: No     Non-medical: No   Tobacco Use    Smoking status: Former Smoker     Packs/day: 1.00     Years: 10.00     Pack years: 10.00     Last attempt to quit: 2005     Years since quittin.8    Smokeless tobacco: Never Used   Substance and Sexual Activity    Alcohol use: Yes     Comment: ocassionally    Drug use: No    Sexual activity: Yes     Partners: Male     Birth control/protection: I.U.D.    Lifestyle    Physical activity     Days per week: None     Minutes per session: None    Stress: None   Relationships    Social connections     Talks on phone: None     Gets together: None     Attends Hinduism service: None     Active member of club or organization: None     Attends meetings of clubs or organizations: None     Relationship status: None    Intimate partner violence     Fear of current or ex partner: None     Emotionally abused: None     Physically abused: None     Forced sexual activity: None   Other Topics Concern    None   Social History Narrative    None     Current Outpatient Medications   Medication Sig Dispense Refill    amoxicillin (AMOXIL) 875 MG tablet Take 875 mg by mouth 2 times daily      JARDIANCE 25 MG tablet TAKE 1 TABLET BY MOUTH EVERY DAY 30 tablet 3    folic acid (FOLVITE) 1 MG tablet TAKE 1 TABLET BY MOUTH EVERY DAY 90 tablet 0    ONE TOUCH ULTRA TEST strip USE TO TEST ONCE DAILY 100 strip 0    ondansetron (ZOFRAN-ODT) 4 MG disintegrating tablet Take 4 mg by mouth every 8 hours as needed for Nausea or Vomiting      traMADol (ULTRAM) 50 MG tablet Take 50 mg by mouth every 6 hours as needed for Pain.       glimepiride (AMARYL) 2 MG tablet Take 1 tablet by mouth every morning 30 tablet 3    Continuous Blood Gluc  (FREESTYLE CESAR READER) ERNESTO 1 Device by In Vitro route 2 times daily 1 Device 0    Continuous Blood Gluc Sensor (LocatelySTYLE CESAR 14 DAY SENSOR) MISC Check once a day 100 each 1    omeprazole (PRILOSEC) 40 MG delayed release capsule TAKE 1 CAPSULE BY MOUTH EVERY DAY 90 capsule 1    escitalopram (LEXAPRO) 10 MG tablet Take 1.5 tablets by mouth daily 135 tablet 1    ONETOUCH DELICA LANCETS FINE MISC USE ONE DAILY 100 each 1    fluticasone (FLONASE) 50 MCG/ACT nasal spray SPRAY 2 SPRAYS INTO EACH NOSTRIL EVERY DAY 1 Bottle 1    methotrexate (RHEUMATREX) 2.5 MG chemo tablet Take 20 mg by mouth once a week Every friday      ustekinumab (STELARA) 130 MG/26ML SOLN Infuse 260 mg intravenously Every friday      Blood Glucose Monitoring Suppl (ONE TOUCH ULTRA MINI) w/Device KIT 1 kit by Does not apply route daily as needed (fasting) 1 kit 0    diphenoxylate-atropine (LOMOTIL) 2.5-0.025 MG per tablet diphenoxylate-atropine 2.5 mg-0.025 mg tablet      clonazePAM (KLONOPIN) 0.5 MG tablet Take 1 tablet by mouth 2 times daily as needed for Anxiety for up to 8 days. 15 tablet 0    zinc sulfate (ORAZINC) 220 (50 Zn) MG capsule Take 1 capsule by mouth daily 30 capsule 1     No current facility-administered medications for this visit. No Known Allergies    Review of Systems   Constitutional: Negative. HENT: Negative. Eyes: Negative. Respiratory: Negative. Cardiovascular: Negative. Gastrointestinal: Negative. All other systems reviewed and are negative. OBJECTIVE:  /77 (Site: Left Upper Arm, Position: Sitting, Cuff Size: Large Adult)   Pulse 110   Temp 97.2 °F (36.2 °C) (Temporal)   Resp 14   Wt 202 lb 6.4 oz (91.8 kg)   SpO2 97%   BMI 34.74 kg/m²     Physical Exam  Vitals signs reviewed. Musculoskeletal:        Legs:          ASSESSMENT:     Diagnosis Orders   1.  Bug bite, sequela         PLAN:    Discussed skin care,   OTC steroid crm   Call if not better

## 2020-07-06 RX ORDER — LISINOPRIL 10 MG/1
TABLET ORAL
Qty: 90 TABLET | Refills: 1 | Status: SHIPPED | OUTPATIENT
Start: 2020-07-06 | End: 2020-12-30 | Stop reason: CLARIF

## 2020-07-06 RX ORDER — GLIMEPIRIDE 2 MG/1
TABLET ORAL
Qty: 90 TABLET | Refills: 1 | Status: SHIPPED | OUTPATIENT
Start: 2020-07-06 | End: 2021-01-04

## 2020-07-06 ASSESSMENT — ENCOUNTER SYMPTOMS
EYES NEGATIVE: 1
RESPIRATORY NEGATIVE: 1
GASTROINTESTINAL NEGATIVE: 1

## 2020-07-06 NOTE — TELEPHONE ENCOUNTER
Medication:   Requested Prescriptions     Pending Prescriptions Disp Refills    glimepiride (AMARYL) 2 MG tablet [Pharmacy Med Name: GLIMEPIRIDE 2 MG TABLET] 90 tablet 1     Sig: TAKE 1 TABLET BY MOUTH EVERY DAY IN THE MORNING       Last Filled:      Patient Phone Number: [phone] (home)     Last appt: 7/2/2020   Next appt: 7/13/2020    Last Labs DM:   Lab Results   Component Value Date    LABA1C 7.7 04/03/2020       Last OARRS: No flowsheet data found. Preferred Pharmacy:   [facility]/pharmacy [address], [address] [facility] [address] [address] [place] [address] [address]  [address] [place] [address]  Phone: [phone] Fax: [phone]

## 2020-07-06 NOTE — TELEPHONE ENCOUNTER
Medication:   Requested Prescriptions     Pending Prescriptions Disp Refills    lisinopril (PRINIVIL;ZESTRIL) 10 MG tablet [Pharmacy Med Name: LISINOPRIL 10 MG TABLET] 90 tablet 1     Sig: TAKE 1 TABLET BY MOUTH EVERY DAY     Last Filled: 1.6.20    Last appt: 7/2/2020   Next appt: 7/13/2020    Last OARRS: No flowsheet data found.

## 2020-07-13 ENCOUNTER — OFFICE VISIT (OUTPATIENT)
Dept: PRIMARY CARE CLINIC | Age: 40
End: 2020-07-13
Payer: COMMERCIAL

## 2020-07-13 VITALS
HEART RATE: 86 BPM | RESPIRATION RATE: 12 BRPM | OXYGEN SATURATION: 98 % | TEMPERATURE: 98.2 F | DIASTOLIC BLOOD PRESSURE: 100 MMHG | BODY MASS INDEX: 35.19 KG/M2 | SYSTOLIC BLOOD PRESSURE: 130 MMHG | WEIGHT: 205 LBS

## 2020-07-13 PROCEDURE — G8427 DOCREV CUR MEDS BY ELIG CLIN: HCPCS | Performed by: FAMILY MEDICINE

## 2020-07-13 PROCEDURE — 1036F TOBACCO NON-USER: CPT | Performed by: FAMILY MEDICINE

## 2020-07-13 PROCEDURE — 2022F DILAT RTA XM EVC RTNOPTHY: CPT | Performed by: FAMILY MEDICINE

## 2020-07-13 PROCEDURE — G8417 CALC BMI ABV UP PARAM F/U: HCPCS | Performed by: FAMILY MEDICINE

## 2020-07-13 PROCEDURE — 3051F HG A1C>EQUAL 7.0%<8.0%: CPT | Performed by: FAMILY MEDICINE

## 2020-07-13 PROCEDURE — 99214 OFFICE O/P EST MOD 30 MIN: CPT | Performed by: FAMILY MEDICINE

## 2020-07-13 NOTE — PROGRESS NOTES
Subjective:      Patient ID: Matthew Adams is a 38 y.o. female. HPI  Patient is here for follow-up overall doing better  She had been struggling with the exacerbation of Crohn disease she has been seen by GI at [facility] now they try to increase the frequency medication per the patient she seems doing okay she has a follow-up colonoscopy next week  Diabetes Mellitus Type II, Follow-up: Patient here for follow-up of Type 2 diabetes mellitus. Current symptoms/problems include none and have been stable. Symptoms have been present for a few years.     Known diabetic complications: none  Cardiovascular risk factors: diabetes mellitus, hypertension and obesity (BMI >= 30 kg/m2)  Current diabetic medications include see med's list .      Eye exam current (within one year): yes  Weight trend: stable  Prior visit with dietician: no  Current diet: in general, a \"healthy\" diet    Current exercise: none     Current monitoring regimen: home blood tests - weekly  Home blood sugar records: fasting range: 100's  Any episodes of hypoglycemia? no  Hypertension: Patient is here follow up on  elevated blood pressures. Age at onset of elevated blood pressure:  Few years. Cardiac symptoms none. Patient denies chest pain, chest pressure/discomfort, dyspnea, exertional chest pressure/discomfort, lower extremity edema, near-syncope, orthopnea and palpitations. Cardiovascular risk factors: dyslipidemia, hypertension, obesity (BMI >= 30 kg/m2) and sedentary lifestyle. Use of agents associated with hypertension: steroids. History of target organ damage: none.   She's been feeling really well low she exercises and needs been eating healthy she lost weight, working toward that overall feels really well  Pt with reactive arthritis see Rheumatology on med's doing ok   Patient with anxiety stable on Lexapro she did not end up using klonopin much she was told that the whole thing per the patient advised  To use very sparingly if needed to otherwise to save it safe place  Review of Systems   Constitutional: Negative. Negative for chills and fever. HENT:  Negative for postnasal drip, rhinorrhea, sinus pressure, tinnitus and trouble swallowing. Eyes: Negative. Respiratory: Negative. Cardiovascular: Negative. Gastrointestinal: Negative. Endocrine: Negative. Genitourinary: Negative. Musculoskeletal: . Negative for back pain, gait problem, joint swelling, neck pain and neck stiffness. Skin:. Negative for color change and pallor. Allergic/Immunologic: Negative. Neurological: Negative. Psychiatric/Behavioral: Negative. All other systems reviewed and are negative. Past Medical History:   Diagnosis Date    [facility] ([facility]     IBD (inflammatory bowel disease)     PCOS (polycystic ovarian syndrome)     Psoriasis     [facility]     sees Rheumatology       Past Surgical History:   Procedure Laterality Date     SECTION  2006     SECTION, CLASSIC  2009     Family History   Problem Relation Age of Onset    Diabetes Mother     Cancer Mother     Stroke Father      Social History     Socioeconomic History    Marital status:      Spouse name: Not on file    Number of children: Not on file    Years of education: Not on file    Highest education level: Not on file   Occupational History    Not on file   Social Needs    Financial resource strain: Not hard at all   [employer] insecurity     Worry: Never true     Inability: Never true   [employer] needs     Medical: No     Non-medical: No   Tobacco Use    Smoking status: Former Smoker     Packs/day: 1.00     Years: 10.00     Pack years: 10.00     Last attempt to quit: 2005     Years since quittin.8    Smokeless tobacco: Never Used   Substance and Sexual Activity    Alcohol use: Yes     Comment: ocassionally    Drug use: No    Sexual activity: Yes     Partners: Male     Birth control/protection: I.U.D.    Lifestyle    Physical activity     Days per week: Not on file     Minutes per session: Not on file    Stress: Not on file   Relationships    Social connections     Talks on phone: Not on file     Gets together: Not on file     Attends Yarsanism service: Not on file     Active member of club or organization: Not on file     Attends meetings of clubs or organizations: Not on file     Relationship status: Not on file    Intimate partner violence     Fear of current or ex partner: Not on file     Emotionally abused: Not on file     Physically abused: Not on file     Forced sexual activity: Not on file   Other Topics Concern    Not on file   Social History Narrative    Not on file     Current Outpatient Medications   Medication Sig Dispense Refill    glimepiride (AMARYL) 2 MG tablet TAKE 1 TABLET BY MOUTH EVERY DAY IN THE MORNING 90 tablet 1    JARDIANCE 25 MG tablet TAKE 1 TABLET BY MOUTH EVERY DAY 30 tablet 3    folic acid (FOLVITE) 1 MG tablet TAKE 1 TABLET BY MOUTH EVERY DAY 90 tablet 0    ONE TOUCH ULTRA TEST strip USE TO TEST ONCE DAILY 100 strip 0    ondansetron (ZOFRAN-ODT) 4 MG disintegrating tablet Take 4 mg by mouth every 8 hours as needed for Nausea or Vomiting      Continuous Blood Gluc  (FREESTYLE CESAR READER) ERNESTO 1 Device by In Vitro route 2 times daily 1 Device 0    Continuous Blood Gluc Sensor (FREESTYLE CESAR 14 DAY SENSOR) MISC Check once a day 100 each 1    omeprazole (PRILOSEC) 40 MG delayed release capsule TAKE 1 CAPSULE BY MOUTH EVERY DAY 90 capsule 1    escitalopram (LEXAPRO) 10 MG tablet Take 1.5 tablets by mouth daily 135 tablet 1    ONETOUCH DELICA LANCETS FINE MISC USE ONE DAILY 100 each 1    fluticasone (FLONASE) 50 MCG/ACT nasal spray SPRAY 2 SPRAYS INTO EACH NOSTRIL EVERY DAY 1 Bottle 1    methotrexate (RHEUMATREX) 2.5 MG chemo tablet Take 20 mg by mouth once a week Every friday      ustekinumab (STELARA) 130 MG/26ML SOLN Infuse 260 mg intravenously Every friday      Blood Lymphadenopathy:     She has no cervical adenopathy. Skin: Skin is warm and dry. No rash noted. She is not diaphoretic. No erythema. No pallor. Left foot on the medial aspect anterior to the heel. He soft subcutaneous mass very soft rounded no pain no skin lesion changes   microfilaments test exam was negative   Vitals reviewed. Assessment:         Diagnosis Orders   1. Type 2 diabetes mellitus without complication, without long-term current use of insulin ([facility])  Basic Metabolic Panel    CBC    Hepatic Function Panel    Lipid Panel    Hemoglobin A1C    Microalbumin / Creatinine Urine Ratio    HM DIABETES FOOT EXAM   2. Essential hypertension     3. Exacerbation of Crohn's disease of large intestine ([facility]     4.  Anxiety           Plan:      See orders,   To follow GI  Abnormal blood pressure reading to monitor at home and to reduce her resume her lisinopril

## 2020-07-20 RX ORDER — BLOOD SUGAR DIAGNOSTIC
STRIP MISCELLANEOUS
Qty: 100 STRIP | Refills: 2 | Status: SHIPPED | OUTPATIENT
Start: 2020-07-20

## 2020-07-20 NOTE — TELEPHONE ENCOUNTER
Medication:   Requested Prescriptions     Pending Prescriptions Disp Refills    ONETOUCH ULTRA strip [Pharmacy Med Name: ONE TOUCH ULTRA BLUE TEST STRP] 100 strip 0     Sig: USE TO TEST ONCE DAILY     Last Filled:  5/1/20    Last appt: 7/13/2020   Next appt: Visit date not found    Last Labs DM:   Lab Results   Component Value Date    LABA1C 7.7 04/03/2020

## 2020-08-04 ENCOUNTER — OFFICE VISIT (OUTPATIENT)
Dept: PRIMARY CARE CLINIC | Age: 40
End: 2020-08-04
Payer: COMMERCIAL

## 2020-08-04 VITALS
SYSTOLIC BLOOD PRESSURE: 109 MMHG | OXYGEN SATURATION: 98 % | HEART RATE: 97 BPM | BODY MASS INDEX: 35.36 KG/M2 | TEMPERATURE: 97.7 F | DIASTOLIC BLOOD PRESSURE: 83 MMHG | WEIGHT: 206 LBS

## 2020-08-04 PROCEDURE — 3051F HG A1C>EQUAL 7.0%<8.0%: CPT | Performed by: FAMILY MEDICINE

## 2020-08-04 PROCEDURE — 99214 OFFICE O/P EST MOD 30 MIN: CPT | Performed by: FAMILY MEDICINE

## 2020-08-04 PROCEDURE — 2022F DILAT RTA XM EVC RTNOPTHY: CPT | Performed by: FAMILY MEDICINE

## 2020-08-04 PROCEDURE — 1036F TOBACCO NON-USER: CPT | Performed by: FAMILY MEDICINE

## 2020-08-04 PROCEDURE — G8427 DOCREV CUR MEDS BY ELIG CLIN: HCPCS | Performed by: FAMILY MEDICINE

## 2020-08-04 PROCEDURE — G8417 CALC BMI ABV UP PARAM F/U: HCPCS | Performed by: FAMILY MEDICINE

## 2020-08-04 NOTE — PROGRESS NOTES
Subjective:      Patient ID: Shawna Muhammad is a 38 y.o. female. HPI  Patient is here for follow-up   She was seen by GI at [address] [place] [address], no medication will be started she is awaiting her insurance approval  She is concerned about occasional ankle pain no trigger no trauma or injury patient had history in the past with are reactive arthritis associated with her Crohn disease, per the patient symptoms are tolerable  She had some skin changes discolored around the ankle area in the right side she had a bug bite before seems a little bit worsened toward the end of the day per the patient she does have a dermatologist recommend to follow-up with them. Diabetes Mellitus Type II, Follow-up: Patient here for follow-up of Type 2 diabetes mellitus. Current symptoms/problems include none and have been stable. Symptoms have been present for a few years.     Known diabetic complications: none  Cardiovascular risk factors: diabetes mellitus, hypertension and obesity (BMI >= 30 kg/m2)  Current diabetic medications include see med's list .      Eye exam current (within one year): yes  Weight trend: stable  Prior visit with dietician: no  Current diet: in general, a \"healthy\" diet    Current exercise: none     Current monitoring regimen: home blood tests - weekly  Home blood sugar records: fasting range: 100's  Any episodes of hypoglycemia? no  Hypertension: Patient is here follow up on  elevated blood pressures. Age at onset of elevated blood pressure:  Few years. Cardiac symptoms none. Patient denies chest pain, chest pressure/discomfort, dyspnea, exertional chest pressure/discomfort, lower extremity edema, near-syncope, orthopnea and palpitations. Cardiovascular risk factors: dyslipidemia, hypertension, obesity (BMI >= 30 kg/m2) and sedentary lifestyle. Use of agents associated with hypertension: steroids. History of target organ damage: none.   She's been feeling really well low she exercises and needs been eating healthy she lost weight, working toward that overall feels really well  Pt with reactive arthritis see Rheumatology on med's doing ok   Patient with anxiety stable on Lexapro she did not end up using klonopin much she was told that the whole thing per the patient advised  To use very sparingly if needed to otherwise to save it safe place  Review of Systems   Constitutional: Negative. Negative for chills and fever. HENT:  Negative for postnasal drip, rhinorrhea, sinus pressure, tinnitus and trouble swallowing. Eyes: Negative. Respiratory: Negative. Cardiovascular: Negative. Gastrointestinal: Negative. Endocrine: Negative. Genitourinary: Negative. Musculoskeletal: . Negative for back pain, gait problem, joint swelling, neck pain and neck stiffness. Skin:. Negative for color change and pallor. Allergic/Immunologic: Negative. Neurological: Negative. Psychiatric/Behavioral: Negative. All other systems reviewed and are negative.   Past Medical History:   Diagnosis Date    Cro[facility]'s disease ([facility]     IBD (inflammatory bowel disease)     PCOS (polycystic ovarian syndrome)     Psoriasis     [facility]iatic [facility] ([facility]     sees Rheumatology       Past Surgical History:   Procedure Laterality Date     SECTION  2006     SECTION, CLASSIC  2009     Family History   Problem Relation Age of Onset    Diabetes Mother     Cancer Mother     Stroke Father      Social History     Socioeconomic History    Marital status:      Spouse name: Not on file    Number of children: Not on file    Years of education: Not on file    Highest education level: Not on file   Occupational History    Not on file   Social Needs    Financial resource strain: Not hard at all   [employer] insecurity     Worry: Never true     Inability: Never true   [employer] needs     Medical: No     Non-medical: No   Tobacco Use    Smoking status: Former Smoker     Packs/day: 1.00     Years: 10.00     Pack years: 10.00     Last attempt to quit: 2005     Years since quittin.9    Smokeless tobacco: Never Used   Substance and Sexual Activity    Alcohol use: Yes     Comment: ocassionally    Drug use: No    Sexual activity: Yes     Partners: Male     Birth control/protection: I.U.D. Lifestyle    Physical activity     Days per week: Not on file     Minutes per session: Not on file    Stress: Not on file   Relationships    Social connections     Talks on phone: Not on file     Gets together: Not on file     Attends Judaism service: Not on file     Active member of club or organization: Not on file     Attends meetings of clubs or organizations: Not on file     Relationship status: Not on file    Intimate partner violence     Fear of current or ex partner: Not on file     Emotionally abused: Not on file     Physically abused: Not on file     Forced sexual activity: Not on file   Other Topics Concern    Not on file   Social History Narrative    Not on file     Current Outpatient Medications   Medication Sig Dispense Refill    ONETOUCH ULTRA strip USE TO TEST ONCE DAILY 100 strip 2    glimepiride (AMARYL) 2 MG tablet TAKE 1 TABLET BY MOUTH EVERY DAY IN THE MORNING 90 tablet 1    JARDIANCE 25 MG tablet TAKE 1 TABLET BY MOUTH EVERY DAY 30 tablet 3    folic acid (FOLVITE) 1 MG tablet TAKE 1 TABLET BY MOUTH EVERY DAY 90 tablet 0    ondansetron (ZOFRAN-ODT) 4 MG disintegrating tablet Take 4 mg by mouth every 8 hours as needed for Nausea or Vomiting      traMADol (ULTRAM) 50 MG tablet Take 50 mg by mouth every 6 hours as needed for Pain.       Continuous Blood Gluc  (FREESTYLE CESAR READER) ERNESTO 1 Device by In Vitro route 2 times daily 1 Device 0    Continuous Blood Gluc Sensor (FREESTYLE CESAR 14 DAY SENSOR) MISC Check once a day 100 each 1    omeprazole (PRILOSEC) 40 MG delayed release capsule TAKE 1 CAPSULE BY MOUTH EVERY DAY 90 capsule 1    escitalopram (LEXAPRO) 10 MG tablet Take 1.5 tablets by mouth daily 135 tablet 1    ONETOUCH DELICA LANCETS FINE MISC USE ONE DAILY 100 each 1    fluticasone (FLONASE) 50 MCG/ACT nasal spray SPRAY 2 SPRAYS INTO EACH NOSTRIL EVERY DAY 1 Bottle 1    methotrexate (RHEUMATREX) 2.5 MG chemo tablet Take 20 mg by mouth once a week Every friday      ustekinumab (STELARA) 130 MG/26ML SOLN Infuse 260 mg intravenously Every friday      Blood Glucose Monitoring Suppl (ONE TOUCH ULTRA MINI) w/Device KIT 1 kit by Does not apply route daily as needed (fasting) 1 kit 0    diphenoxylate-atropine (LOMOTIL) 2.5-0.025 MG per tablet diphenoxylate-atropine 2.5 mg-0.025 mg tablet      lisinopril (PRINIVIL;ZESTRIL) 10 MG tablet TAKE 1 TABLET BY MOUTH EVERY DAY (Patient not taking: Reported on 7/13/2020) 90 tablet 1    amoxicillin (AMOXIL) 875 MG tablet Take 875 mg by mouth 2 times daily      clonazePAM (KLONOPIN) 0.5 MG tablet Take 1 tablet by mouth 2 times daily as needed for Anxiety for up to 8 days. 15 tablet 0    zinc sulfate (ORAZINC) 220 (50 Zn) MG capsule Take 1 capsule by mouth daily 30 capsule 1     No current facility-administered medications for this visit. No Known Allergies      Objective:   Physical Exam   Constitutional: She appears well-developed and well-nourished. No distress. HENT:   Head: Normocephalic and atraumatic. Right Ear: External ear normal.   Left Ear: External ear normal.   Nose: Nose normal.   Mouth/Throat: Oropharynx is clear and moist. No oropharyngeal exudate. Eyes: Conjunctivae and EOM are normal. Pupils are equal, round, and reactive to light. Right eye exhibits no discharge. Left eye exhibits no discharge. No scleral icterus. Neck: Normal range of motion. Neck supple. No JVD present. No tracheal deviation present. No thyromegaly present. Cardiovascular: Normal rate, regular rhythm, normal heart sounds and intact distal pulses. Pulmonary/Chest: Effort normal and breath sounds normal. No stridor. No respiratory distress.  She has no wheezes. She has no rales. She exhibits no tenderness. Abdominal: Soft. Bowel sounds are normal. She exhibits no distension and no mass. There is no tenderness. There is no rebound and no guarding. Musculoskeletal: Normal range of motion. She exhibits no edema or tenderness. Lymphadenopathy:     She has no cervical adenopathy. Skin: Skin is warm and dry. No rash noted. She is not diaphoretic. No erythema. No pallor. Mild irritation anterior above the ankle now right foot mild blanching no other abnormality full range of motion of the ankle both left and right  microfilaments test exam was negative   Vitals reviewed. Assessment:         Diagnosis Orders   1. Type 2 diabetes mellitus without complication, without long-term current use of insulin ([facility])  Basic Metabolic Panel    CBC    Hepatic Function Panel    Hemoglobin A1C    TSH without Reflex    Lipid Panel    Vitamin B12 & Folate   2. Essential hypertension  Basic Metabolic Panel    CBC   3. Crohn's disease without complication, unspecified gastrointestinal tract location ([facility]     4.  Mixed hyperlipidemia           Plan:      See orders,   Monitor symptoms closely  Call with her blood glucose readings  Follow-up with GI  Any change of the symptoms she will let me know

## 2020-08-05 RX ORDER — FOLIC ACID 1 MG/1
TABLET ORAL
Qty: 90 TABLET | Refills: 0 | Status: SHIPPED | OUTPATIENT
Start: 2020-08-05 | End: 2021-06-16 | Stop reason: SDUPTHER

## 2020-08-05 NOTE — TELEPHONE ENCOUNTER
Medication:   Requested Prescriptions     Pending Prescriptions Disp Refills    folic acid (FOLVITE) 1 MG tablet [Pharmacy Med Name: FOLIC ACID 1 MG TABLET] 90 tablet 0     Sig: TAKE 1 TABLET BY MOUTH EVERY DAY     Last Filled: 5. 1.20    Last appt: 8/4/2020   Next appt: Visit date not found    Last OARRS: No flowsheet data found.

## 2020-08-10 RX ORDER — ESCITALOPRAM OXALATE 10 MG/1
TABLET ORAL
Qty: 135 TABLET | Refills: 1 | Status: SHIPPED | OUTPATIENT
Start: 2020-08-10 | End: 2021-02-09 | Stop reason: SDUPTHER

## 2020-08-10 NOTE — TELEPHONE ENCOUNTER
Medication:   Requested Prescriptions     Pending Prescriptions Disp Refills    escitalopram (LEXAPRO) 10 MG tablet [Pharmacy Med Name: ESCITALOPRAM 10 MG TABLET] 135 tablet 1     Sig: TAKE 1 AND 1/2 TABLETS BY MOUTH EVERY DAY        Last Filled:      Patient Phone Number: [phone] (home)     Last appt: 8/4/2020   Next appt: Visit date not found    Last OARRS: No flowsheet data found. Preferred Pharmacy:   [facility] [facility] [address] [address] [address] [place] [address]  Phone: [phone] Fax: [phone]

## 2020-08-12 DIAGNOSIS — I10 ESSENTIAL HYPERTENSION: ICD-10-CM

## 2020-08-12 DIAGNOSIS — E11.9 TYPE 2 DIABETES MELLITUS WITHOUT COMPLICATION, WITHOUT LONG-TERM CURRENT USE OF INSULIN (HCC): ICD-10-CM

## 2020-08-12 LAB
HCT VFR BLD CALC: 38.1 % (ref 36–48)
HEMOGLOBIN: 11.8 G/DL (ref 12–16)
MCH RBC QN AUTO: 24.8 PG (ref 26–34)
MCHC RBC AUTO-ENTMCNC: 31 G/DL (ref 31–36)
MCV RBC AUTO: 80.1 FL (ref 80–100)
PDW BLD-RTO: 17.7 % (ref 12.4–15.4)
PLATELET # BLD: 417 K/UL (ref 135–450)
PMV BLD AUTO: 7.4 FL (ref 5–10.5)
RBC # BLD: 4.76 M/UL (ref 4–5.2)
WBC # BLD: 6.7 K/UL (ref 4–11)

## 2020-08-13 LAB
ALBUMIN SERPL-MCNC: 3.6 G/DL (ref 3.4–5)
ALP BLD-CCNC: 70 U/L (ref 40–129)
ALT SERPL-CCNC: 8 U/L (ref 10–40)
ANION GAP SERPL CALCULATED.3IONS-SCNC: 10 MMOL/L (ref 3–16)
AST SERPL-CCNC: 9 U/L (ref 15–37)
BILIRUB SERPL-MCNC: 0.3 MG/DL (ref 0–1)
BILIRUBIN DIRECT: <0.2 MG/DL (ref 0–0.3)
BILIRUBIN, INDIRECT: ABNORMAL MG/DL (ref 0–1)
BUN BLDV-MCNC: 9 MG/DL (ref 7–20)
CALCIUM SERPL-MCNC: 8.9 MG/DL (ref 8.3–10.6)
CHLORIDE BLD-SCNC: 106 MMOL/L (ref 99–110)
CHOLESTEROL, TOTAL: 144 MG/DL (ref 0–199)
CO2: 26 MMOL/L (ref 21–32)
CREAT SERPL-MCNC: 0.8 MG/DL (ref 0.6–1.1)
ESTIMATED AVERAGE GLUCOSE: 159.9 MG/DL
FOLATE: 15.49 NG/ML (ref 4.78–24.2)
GFR AFRICAN AMERICAN: >60
GFR NON-AFRICAN AMERICAN: >60
GLUCOSE BLD-MCNC: 122 MG/DL (ref 70–99)
HBA1C MFR BLD: 7.2 %
HDLC SERPL-MCNC: 41 MG/DL (ref 40–60)
LDL CHOLESTEROL CALCULATED: 86 MG/DL
POTASSIUM SERPL-SCNC: 4.9 MMOL/L (ref 3.5–5.1)
SODIUM BLD-SCNC: 142 MMOL/L (ref 136–145)
TOTAL PROTEIN: 7.3 G/DL (ref 6.4–8.2)
TRIGL SERPL-MCNC: 85 MG/DL (ref 0–150)
TSH SERPL DL<=0.05 MIU/L-ACNC: 1.96 UIU/ML (ref 0.27–4.2)
VITAMIN B-12: 335 PG/ML (ref 211–911)
VLDLC SERPL CALC-MCNC: 17 MG/DL

## 2020-08-26 NOTE — TELEPHONE ENCOUNTER
Medication:   Requested Prescriptions     Pending Prescriptions Disp Refills    omeprazole (PRILOSEC) 40 MG delayed release capsule [Pharmacy Med Name: OMEPRAZOLE DR 40 MG CAPSULE] 90 capsule 1     Sig: TAKE 1 CAPSULE BY MOUTH EVERY DAY     Last Filled:  2/21/20    Last appt: 8/4/2020   Next appt: 11/2/2020

## 2020-08-27 RX ORDER — OMEPRAZOLE 40 MG/1
CAPSULE, DELAYED RELEASE ORAL
Qty: 90 CAPSULE | Refills: 1 | Status: SHIPPED | OUTPATIENT
Start: 2020-08-27 | End: 2021-02-18

## 2020-09-14 RX ORDER — EMPAGLIFLOZIN 25 MG/1
TABLET, FILM COATED ORAL
Qty: 30 TABLET | Refills: 3 | Status: SHIPPED | OUTPATIENT
Start: 2020-09-14 | End: 2021-01-22

## 2020-09-14 NOTE — TELEPHONE ENCOUNTER
Medication:   Requested Prescriptions     Pending Prescriptions Disp Refills    JARDIANCE 25 MG tablet [Pharmacy Med Name: Lucila Davisine 25 MG TABLET] 30 tablet 3     Sig: TAKE 1 TABLET BY MOUTH EVERY DAY       Last Filled:  5/4/20    Patient Phone Number: [phone] (home)     Last appt: 8/4/2020   Next appt: 11/2/2020    Last Labs DM:   Lab Results   Component Value Date    LABA1C 7.2 08/12/2020       Last OARRS: No flowsheet data found. Preferred Pharmacy:   [facility]/pharmacy [address] [facility] [address] [address] [address]  [address] [place] [address]  Phone: [phone] Fax: [phone]

## 2020-11-02 ENCOUNTER — OFFICE VISIT (OUTPATIENT)
Dept: PRIMARY CARE CLINIC | Age: 40
End: 2020-11-02
Payer: COMMERCIAL

## 2020-11-02 VITALS
HEART RATE: 98 BPM | SYSTOLIC BLOOD PRESSURE: 118 MMHG | OXYGEN SATURATION: 97 % | TEMPERATURE: 97.2 F | BODY MASS INDEX: 35.53 KG/M2 | WEIGHT: 207 LBS | RESPIRATION RATE: 14 BRPM | DIASTOLIC BLOOD PRESSURE: 70 MMHG

## 2020-11-02 PROCEDURE — G8417 CALC BMI ABV UP PARAM F/U: HCPCS | Performed by: FAMILY MEDICINE

## 2020-11-02 PROCEDURE — 90471 IMMUNIZATION ADMIN: CPT | Performed by: FAMILY MEDICINE

## 2020-11-02 PROCEDURE — 90686 IIV4 VACC NO PRSV 0.5 ML IM: CPT | Performed by: FAMILY MEDICINE

## 2020-11-02 PROCEDURE — 99214 OFFICE O/P EST MOD 30 MIN: CPT | Performed by: FAMILY MEDICINE

## 2020-11-02 PROCEDURE — 3051F HG A1C>EQUAL 7.0%<8.0%: CPT | Performed by: FAMILY MEDICINE

## 2020-11-02 PROCEDURE — 2022F DILAT RTA XM EVC RTNOPTHY: CPT | Performed by: FAMILY MEDICINE

## 2020-11-02 PROCEDURE — G8482 FLU IMMUNIZE ORDER/ADMIN: HCPCS | Performed by: FAMILY MEDICINE

## 2020-11-02 PROCEDURE — 1036F TOBACCO NON-USER: CPT | Performed by: FAMILY MEDICINE

## 2020-11-02 PROCEDURE — G8427 DOCREV CUR MEDS BY ELIG CLIN: HCPCS | Performed by: FAMILY MEDICINE

## 2020-11-02 RX ORDER — CLOTRIMAZOLE 1 %
CREAM (GRAM) TOPICAL 2 TIMES DAILY
COMMUNITY
End: 2021-06-16

## 2020-11-02 NOTE — PROGRESS NOTES
Subjective:      Patient ID: Ermias Wheeler is a 38 y.o. female. HPI  Patient is here for follow-up   She was seen by GI at [facility], on new medication doing okay  She has new lesion in oral cavity was seen by Derm referred to see neurosurgeon who told her it is related to her Crohn's is given some topical to use she is monitoring them closely she has had emailed her prior GI care at [facility]  She is concerned about occasional ankle pain no trigger no trauma or injury patient had history in the past with are reactive arthritis associated with her Crohn disease, per the patient symptoms are tolerable  Diabetes Mellitus Type II, Follow-up: Patient here for follow-up of Type 2 diabetes mellitus. Current symptoms/problems include none and have been stable. Symptoms have been present for a few years.     Known diabetic complications: none  Cardiovascular risk factors: diabetes mellitus, hypertension and obesity (BMI >= 30 kg/m2)  Current diabetic medications include see med's list .      Eye exam current (within one year): yes  Weight trend: stable  Prior visit with dietician: no  Current diet: in general, a \"healthy\" diet    Current exercise: none     Current monitoring regimen: home blood tests - weekly  Home blood sugar records: fasting range: She has not been checking her sugar lately  Any episodes of hypoglycemia? no  Hypertension: Patient is here follow up on  elevated blood pressures. Age at onset of elevated blood pressure:  Few years. Cardiac symptoms none. Patient denies chest pain, chest pressure/discomfort, dyspnea, exertional chest pressure/discomfort, lower extremity edema, near-syncope, orthopnea and palpitations. Cardiovascular risk factors: dyslipidemia, hypertension, obesity (BMI >= 30 kg/m2) and sedentary lifestyle. Use of agents associated with hypertension: steroids. History of target organ damage: none.     Pt with reactive arthritis see Rheumatology on med's doing ok   Patient with anxiety stable on Lexapro  Her rheumatologist since she has been on steroids for long time, on and off  he wantes to have a DEXA scan  Review of Systems   Constitutional: Negative. Negative for chills and fever. HENT:  Negative for postnasal drip, rhinorrhea, sinus pressure, tinnitus and trouble swallowing. Eyes: Negative. Respiratory: Negative. Cardiovascular: Negative. Gastrointestinal: Negative. Endocrine: Negative. Genitourinary: Negative. Musculoskeletal: . Negative for back pain, gait problem, joint swelling, neck pain and neck stiffness. Skin:. Negative for color change and pallor. Allergic/Immunologic: Negative. Neurological: Negative. Psychiatric/Behavioral: Negative. All other systems reviewed and are negative.   Past Medical History:   Diagnosis Date    [facility]     IBD (inflammatory bowel disease)     PCOS (polycystic ovarian syndrome)     Psoriasis     P[facility]iatic arthritis ([facility]     sees Rheumatology       Past Surgical History:   Procedure Laterality Date     SECTION  2006     SECTION, CLASSIC  2009     Family History   Problem Relation Age of Onset    Diabetes Mother     Cancer Mother     Stroke Father      Social History     Socioeconomic History    Marital status:      Spouse name: None    Number of children: None    Years of education: None    Highest education level: None   Occupational History    None   Social Needs    Financial resource strain: Not hard at all   Gustavo-Sen insecurity     Worry: Never true     Inability: Never true    Transportation needs     Medical: No     Non-medical: No   Tobacco Use    Smoking status: Former Smoker     Packs/day: 1.00     Years: 10.00     Pack years: 10.00     Last attempt to quit: 2005     Years since quitting: 15.1    Smokeless tobacco: Never Used   Substance and Sexual Activity    Alcohol use: Yes     Comment: ocassionally    Drug use: No    Sexual activity: Yes     Partners: Male Birth control/protection: I.U.D. Lifestyle    Physical activity     Days per week: None     Minutes per session: None    Stress: None   Relationships    Social connections     Talks on phone: None     Gets together: None     Attends Confucianist service: None     Active member of club or organization: None     Attends meetings of clubs or organizations: None     Relationship status: None    Intimate partner violence     Fear of current or ex partner: None     Emotionally abused: None     Physically abused: None     Forced sexual activity: None   Other Topics Concern    None   Social History Narrative    None     Current Outpatient Medications   Medication Sig Dispense Refill    clotrimazole (LOTRIMIN) 1 % cream Apply topically 2 times daily Apply topically 2 times daily.  JARDIANCE 25 MG tablet TAKE 1 TABLET BY MOUTH EVERY DAY 30 tablet 3    omeprazole (PRILOSEC) 40 MG delayed release capsule TAKE 1 CAPSULE BY MOUTH EVERY DAY 90 capsule 1    escitalopram (LEXAPRO) 10 MG tablet TAKE 1 AND 1/2 TABLETS BY MOUTH EVERY  tablet 1    folic acid (FOLVITE) 1 MG tablet TAKE 1 TABLET BY MOUTH EVERY DAY 90 tablet 0    ONETOUCH ULTRA strip USE TO TEST ONCE DAILY 100 strip 2    lisinopril (PRINIVIL;ZESTRIL) 10 MG tablet TAKE 1 TABLET BY MOUTH EVERY DAY (Patient not taking: Reported on 7/13/2020) 90 tablet 1    glimepiride (AMARYL) 2 MG tablet TAKE 1 TABLET BY MOUTH EVERY DAY IN THE MORNING 90 tablet 1    amoxicillin (AMOXIL) 875 MG tablet Take 875 mg by mouth 2 times daily      clonazePAM (KLONOPIN) 0.5 MG tablet Take 1 tablet by mouth 2 times daily as needed for Anxiety for up to 8 days. 15 tablet 0    ondansetron (ZOFRAN-ODT) 4 MG disintegrating tablet Take 4 mg by mouth every 8 hours as needed for Nausea or Vomiting      traMADol (ULTRAM) 50 MG tablet Take 50 mg by mouth every 6 hours as needed for Pain.       Continuous Blood Gluc  (FREESTYLE CESAR READER) ERNESTO 1 Device by In Vitro route 2 times daily 1 Device 0    Continuous Blood Gluc Sensor (FREESTYLE CESAR 14 DAY SENSOR) MISC Check once a day 100 each 1    ONETOUCH DELICA LANCETS FINE MISC USE ONE DAILY 100 each 1    fluticasone (FLONASE) 50 MCG/ACT nasal spray SPRAY 2 SPRAYS INTO EACH NOSTRIL EVERY DAY 1 Bottle 1    methotrexate (RHEUMATREX) 2.5 MG chemo tablet Take 20 mg by mouth once a week Every friday      ustekinumab (STELARA) 130 MG/26ML SOLN Infuse 260 mg intravenously Every friday      Blood Glucose Monitoring Suppl (ONE TOUCH ULTRA MINI) w/Device KIT 1 kit by Does not apply route daily as needed (fasting) 1 kit 0    zinc sulfate (ORAZINC) 220 (50 Zn) MG capsule Take 1 capsule by mouth daily 30 capsule 1    diphenoxylate-atropine (LOMOTIL) 2.5-0.025 MG per tablet diphenoxylate-atropine 2.5 mg-0.025 mg tablet       No current facility-administered medications for this visit. No Known Allergies      Objective:   Physical Exam   Constitutional: She appears well-developed and well-nourished. No distress. HENT:   Head: Normocephalic and atraumatic. Right Ear: External ear normal.   Left Ear: External ear normal.   Nose: Nose normal.   Mouth/Throat: Oropharynx is clear and moist. No oropharyngeal exudate. Eyes: Conjunctivae and EOM are normal. Pupils are equal, round, and reactive to light. Right eye exhibits no discharge. Left eye exhibits no discharge. No scleral icterus. Neck: Normal range of motion. Neck supple. No JVD present. No tracheal deviation present. No thyromegaly present. Cardiovascular: Normal rate, regular rhythm, normal heart sounds and intact distal pulses. Pulmonary/Chest: Effort normal and breath sounds normal. No stridor. No respiratory distress. She has no wheezes. She has no rales. She exhibits no tenderness. Abdominal: Soft. Bowel sounds are normal. She exhibits no distension and no mass. There is no tenderness. There is no rebound and no guarding.    Musculoskeletal: Normal range of motion. She exhibits no edema or tenderness. Lymphadenopathy:     She has no cervical adenopathy. Skin: Skin is warm and dry. No rash noted. She is not diaphoretic. No erythema. No pallor. Mild irritation anterior above the ankle now right foot mild blanching no other abnormality full range of motion of the ankle both left and right  microfilaments test exam was negative   Vitals reviewed. Assessment:      Diagnosis Orders   1. Type 2 diabetes mellitus without complication, without long-term current use of insulin ([facility])  Basic Metabolic Panel    CBC    Hepatic Function Panel    Hemoglobin A1C    Lipid Panel    Vitamin B12 & Folate    Microalbumin / Creatinine Urine Ratio   2. Need for influenza vaccination  INFLUENZA, QUADV, 6 MO AND OLDER, IM, PF, PREFILL SYR, 0.5ML (FLUARIX QUADV, PF)   3. Essential hypertension     4. Mixed hyperlipidemia     5. Exacerbation of Crohn's disease of large intestine (HCC)  DEXA BONE DENSITY 2 SITES   6. Oral lesion     7.  Long term systemic steroid user  DEXA BONE DENSITY 2 SITES         Plan:      See orders,   Monitor symptoms closely  Follow-up with GI  Continue the same  Recommend the patient to continue check her blood glucose

## 2020-11-02 NOTE — PROGRESS NOTES
Vaccine Information Sheet, \"Influenza - Inactivated\"  given to Laura Dorman, or parent/legal guardian of  Laura Dorman and verbalized understanding. Patient responses:    Have you ever had a reaction to a flu vaccine? No  Are you able to eat eggs without adverse effects? Yes  Do you have any current illness? No  Have you ever had Guillian Alexander Syndrome? No    Immunizations Administered     Name Date Dose Route    Influenza, Quadv, IM, PF (6 mo and older Fluzone, Flulaval, Fluarix, and 3 yrs and older Afluria) 11/2/2020 0.5 mL Intramuscular    Site: Deltoid- Left    Lot: [id number]    NDC: 50344-522-01          Flu vaccine given per order. Please see immunization tab. Patient instructed to remain in clinic for 20 minutes after injection and was advised to report any adverse reaction to me immediately.

## 2020-11-13 ENCOUNTER — HOSPITAL ENCOUNTER (OUTPATIENT)
Dept: GENERAL RADIOLOGY | Age: 40
Discharge: HOME | End: 2020-11-13
Payer: COMMERCIAL

## 2020-11-13 PROCEDURE — 77080 DXA BONE DENSITY AXIAL: CPT

## 2020-11-20 DIAGNOSIS — E11.9 TYPE 2 DIABETES MELLITUS WITHOUT COMPLICATION, WITHOUT LONG-TERM CURRENT USE OF INSULIN (HCC): ICD-10-CM

## 2020-11-20 LAB
ALBUMIN SERPL-MCNC: 3.8 G/DL (ref 3.4–5)
ALP BLD-CCNC: 82 U/L (ref 40–129)
ALT SERPL-CCNC: 7 U/L (ref 10–40)
ANION GAP SERPL CALCULATED.3IONS-SCNC: 11 MMOL/L (ref 3–16)
AST SERPL-CCNC: 8 U/L (ref 15–37)
BILIRUB SERPL-MCNC: 0.3 MG/DL (ref 0–1)
BILIRUBIN DIRECT: <0.2 MG/DL (ref 0–0.3)
BILIRUBIN, INDIRECT: ABNORMAL MG/DL (ref 0–1)
BUN BLDV-MCNC: 9 MG/DL (ref 7–20)
CALCIUM SERPL-MCNC: 9 MG/DL (ref 8.3–10.6)
CHLORIDE BLD-SCNC: 104 MMOL/L (ref 99–110)
CHOLESTEROL, TOTAL: 147 MG/DL (ref 0–199)
CO2: 25 MMOL/L (ref 21–32)
CREAT SERPL-MCNC: 0.7 MG/DL (ref 0.6–1.1)
CREATININE URINE: 155.2 MG/DL (ref 28–259)
FOLATE: 13.59 NG/ML (ref 4.78–24.2)
GFR AFRICAN AMERICAN: >60
GFR NON-AFRICAN AMERICAN: >60
GLUCOSE BLD-MCNC: 112 MG/DL (ref 70–99)
HCT VFR BLD CALC: 35 % (ref 36–48)
HDLC SERPL-MCNC: 43 MG/DL (ref 40–60)
HEMOGLOBIN: 11.2 G/DL (ref 12–16)
LDL CHOLESTEROL CALCULATED: 85 MG/DL
MCH RBC QN AUTO: 24.4 PG (ref 26–34)
MCHC RBC AUTO-ENTMCNC: 32 G/DL (ref 31–36)
MCV RBC AUTO: 76.3 FL (ref 80–100)
MICROALBUMIN UR-MCNC: <1.2 MG/DL
MICROALBUMIN/CREAT UR-RTO: NORMAL MG/G (ref 0–30)
PDW BLD-RTO: 17.8 % (ref 12.4–15.4)
PLATELET # BLD: 493 K/UL (ref 135–450)
PMV BLD AUTO: 7.1 FL (ref 5–10.5)
POTASSIUM SERPL-SCNC: 5 MMOL/L (ref 3.5–5.1)
RBC # BLD: 4.59 M/UL (ref 4–5.2)
SODIUM BLD-SCNC: 140 MMOL/L (ref 136–145)
TOTAL PROTEIN: 7.4 G/DL (ref 6.4–8.2)
TRIGL SERPL-MCNC: 95 MG/DL (ref 0–150)
VITAMIN B-12: 376 PG/ML (ref 211–911)
VLDLC SERPL CALC-MCNC: 19 MG/DL
WBC # BLD: 8.8 K/UL (ref 4–11)

## 2020-11-21 LAB
ESTIMATED AVERAGE GLUCOSE: 162.8 MG/DL
HBA1C MFR BLD: 7.3 %

## 2020-11-23 ENCOUNTER — TELEPHONE (OUTPATIENT)
Dept: PRIMARY CARE CLINIC | Age: 40
End: 2020-11-23

## 2020-11-25 ENCOUNTER — OFFICE VISIT (OUTPATIENT)
Dept: PRIMARY CARE CLINIC | Age: 40
End: 2020-11-25
Payer: COMMERCIAL

## 2020-11-25 PROCEDURE — G8417 CALC BMI ABV UP PARAM F/U: HCPCS | Performed by: NURSE PRACTITIONER

## 2020-11-25 PROCEDURE — 99211 OFF/OP EST MAY X REQ PHY/QHP: CPT | Performed by: NURSE PRACTITIONER

## 2020-11-25 PROCEDURE — G8428 CUR MEDS NOT DOCUMENT: HCPCS | Performed by: NURSE PRACTITIONER

## 2020-11-25 NOTE — PROGRESS NOTES
Denverjovan Valadez received a viral test for COVID-19. They were educated on isolation and quarantine as appropriate. For any symptoms, they were directed to seek care from their PCP, given contact information to establish with a doctor, directed to an urgent care or the emergency room.

## 2020-11-28 LAB — SARS-COV-2, NAA: NOT DETECTED

## 2020-12-29 ENCOUNTER — TELEPHONE (OUTPATIENT)
Dept: PRIMARY CARE CLINIC | Age: 40
End: 2020-12-29

## 2020-12-29 NOTE — TELEPHONE ENCOUNTER
If this pt has thrush in the back of her throat. Should she go somewhere to get her throat tested or make a VV?

## 2020-12-29 NOTE — TELEPHONE ENCOUNTER
----- Message from Christian Juarez sent at 12/29/2020  7:28 AM EST -----  Subject: Appointment Request    Reason for Call: Urgent (Patient Request) No Script    QUESTIONS  Type of Appointment? Established Patient  Reason for appointment request? Available appointments did not meet   patient need  Additional Information for Provider? Pt has thrush   she has white patches in mouth and a sore throat   pls call to schedule  ---------------------------------------------------------------------------  --------------  CALL BACK INFO  What is the best way for the office to contact you? OK to leave message on   voicemail  Preferred Call Back Phone Number? [phone]  ---------------------------------------------------------------------------  --------------  SCRIPT ANSWERS  Relationship to Patient? Self  Appointment reason? Symptomatic  Select script based on patient symptoms? Adult No Script  (Is the patient requesting to see the provider for a procedure?)? No  (Is the patient requesting to see the provider urgently  today or   tomorrow. )? Yes  Have you been diagnosed with   tested for   or told that you are suspected of having COVID-19 (Coronavirus)? Yes  Did your symptoms begin or have you been tested for COVID-19 in the last   10 days? No  Have you had a fever or taken medication to treat a fever within the past   3 days? No  Have you had a cough   shortness of breath or flu-like symptoms within the past 3 days? No  Do you currently have flu-like symptoms including fever or chills   cough   shortness of breath   or difficulty breathing   or new loss of taste or smell? No  (Service Expert  click yes below to proceed with [employer] As Usual   Scheduling)?  Yes

## 2020-12-30 ENCOUNTER — VIRTUAL VISIT (OUTPATIENT)
Dept: PRIMARY CARE CLINIC | Age: 40
End: 2020-12-30
Payer: COMMERCIAL

## 2020-12-30 PROCEDURE — G8417 CALC BMI ABV UP PARAM F/U: HCPCS | Performed by: FAMILY MEDICINE

## 2020-12-30 PROCEDURE — 1036F TOBACCO NON-USER: CPT | Performed by: FAMILY MEDICINE

## 2020-12-30 PROCEDURE — G8428 CUR MEDS NOT DOCUMENT: HCPCS | Performed by: FAMILY MEDICINE

## 2020-12-30 PROCEDURE — 2022F DILAT RTA XM EVC RTNOPTHY: CPT | Performed by: FAMILY MEDICINE

## 2020-12-30 PROCEDURE — 3051F HG A1C>EQUAL 7.0%<8.0%: CPT | Performed by: FAMILY MEDICINE

## 2020-12-30 PROCEDURE — G8482 FLU IMMUNIZE ORDER/ADMIN: HCPCS | Performed by: FAMILY MEDICINE

## 2020-12-30 PROCEDURE — 99213 OFFICE O/P EST LOW 20 MIN: CPT | Performed by: FAMILY MEDICINE

## 2020-12-30 RX ORDER — CLOBETASOL PROPIONATE 0.5 MG/G
CREAM TOPICAL
COMMUNITY
Start: 2020-12-07 | End: 2021-06-16

## 2020-12-30 RX ORDER — AMOXICILLIN 875 MG/1
875 TABLET, COATED ORAL 2 TIMES DAILY
Qty: 20 TABLET | Refills: 0 | Status: SHIPPED | OUTPATIENT
Start: 2020-12-30 | End: 2021-01-09

## 2020-12-30 RX ORDER — CLOTRIMAZOLE AND BETAMETHASONE DIPROPIONATE 10; .64 MG/G; MG/G
CREAM TOPICAL
COMMUNITY
Start: 2020-10-31 | End: 2021-06-16

## 2020-12-30 RX ORDER — TACROLIMUS 1 MG/1
1 CAPSULE ORAL 2 TIMES DAILY
COMMUNITY
Start: 2020-11-23 | End: 2021-06-16

## 2020-12-30 ASSESSMENT — ENCOUNTER SYMPTOMS
ALLERGIC/IMMUNOLOGIC COMMENTS: SEASONAL
CHEST TIGHTNESS: 0
RESPIRATORY NEGATIVE: 1
ROS SKIN COMMENTS: PSORIASIS
BACK PAIN: 0
SHORTNESS OF BREATH: 0
EYES NEGATIVE: 1
SORE THROAT: 1

## 2020-12-30 ASSESSMENT — PATIENT HEALTH QUESTIONNAIRE - PHQ9
2. FEELING DOWN, DEPRESSED OR HOPELESS: 0
SUM OF ALL RESPONSES TO PHQ QUESTIONS 1-9: 0
SUM OF ALL RESPONSES TO PHQ9 QUESTIONS 1 & 2: 0
SUM OF ALL RESPONSES TO PHQ QUESTIONS 1-9: 0
SUM OF ALL RESPONSES TO PHQ QUESTIONS 1-9: 0
1. LITTLE INTEREST OR PLEASURE IN DOING THINGS: 0

## 2020-12-30 NOTE — PROGRESS NOTES
2020    TELEHEALTH EVALUATION -- Audio/Visual (During [place]-16 public health emergency)    HPI:    Laura Dorman (:  1980) has requested an audio/video evaluation for the following concern(s):    Thrush & Sore throat x 4-5 days  Dx Crohn  GI @[facility]  DM  No fever  No chills    Past Medical History:   Diagnosis Date    Cro[facility]'s [facility] ([facility] 2006    GI [facility]    IBD (inflammatory bowel disease)     PCOS (polycystic ovarian syndrome)     Psoriasis     Psoriatic arthritis ([facility]     sees Rheumatology      Past Surgical History:   Procedure Laterality Date     SECTION       SECTION, CLASSIC       No Known Allergies    Family History   Problem Relation Age of Onset    Diabetes Mother         age 68    Cancer Mother     Stroke Father          age 80      Social History     Social History Narrative         [employer]    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 Tend to run low grade temp due Crohn   HENT: Positive for sore throat. Negative for congestion. Oral Crohn  + sore area due Crohn  Tongue hurt x x 2 days  Sore throat when she eat   Eyes: Negative. Respiratory: Negative. Negative for chest tightness and shortness of breath. Cardiovascular: Negative. Negative for chest pain, palpitations and leg swelling. Irregular beat once a while   Gastrointestinal:        Crohn since age 31year old  Entyvio infusion Q  8 weeks  Nurse comes to her home  Prescribed by GI @[facility]   Endocrine:        DM   PCOS  Hx Gestational Diabetes x 2   Genitourinary: Negative for flank pain, frequency, hematuria and urgency. IUD   No cycle   Musculoskeletal: Negative for back pain, gait problem and neck pain. Skin:        Psoriasis     Allergic/Immunologic: Positive for environmental allergies.         Seasonal Neurological: Negative for dizziness and headaches. Psychiatric/Behavioral: Positive for sleep disturbance. Negative for behavioral problems. The patient is not nervous/anxious. Wake up through night once a while to use [facility]       Prior to Visit Medications    Medication Sig Taking? Authorizing Provider   clotrimazole (LOTRIMIN) 1 % cream Apply topically 2 times daily Apply topically 2 times daily. Yes Historical Provider, MD   JARDIANCE 25 MG tablet TAKE 1 TABLET BY Janet Blount Yes Raya Felipe MD   omeprazole (PRILOSEC) 40 MG delayed release capsule TAKE 1 Eulah Collar Yes Raya Felipe MD   escitalopram (LEXAPRO) 10 MG tablet TAKE 1 AND 1/2 TABLETS BY MOUTH EVERY DAY Yes Raya Felipe MD   folic acid (FOLVITE) 1 MG tablet TAKE 1 TABLET BY MOUTH EVERY DAY Yes Raya Felipe MD   ONETOUCH ULTRA strip USE TO TEST Ryan Aurea Yes Raya Felipe MD   glimepiride (AMARYL) 2 MG tablet TAKE 1 TABLET BY MOUTH EVERY DAY IN THE MORNING Yes Raya Felipe MD   ondansetron (ZOFRAN-ODT) 4 MG disintegrating tablet Take 4 mg by mouth every 8 hours as needed for Nausea or Vomiting Yes Historical Provider, MD   traMADol (ULTRAM) 50 MG tablet Take 50 mg by mouth every 6 hours as needed for Pain.  Yes Historical Provider, MD   Continuous Blood Gluc  (FREESTYLE CESAR READER) ERNESTO 1 Device by In Vitro route 2 times daily Yes Raya Felipe MD   Continuous Blood Gluc Sensor (FREESTYLE CESAR 14 DAY SENSOR) MISC Check once a day Yes Raya Felipe MD   Ernst Octave Yes Raya Felipe MD   fluticasone (FLONASE) 50 MCG/ACT nasal spray SPRAY 2 SPRAYS INTO EACH NOSTRIL EVERY DAY Yes Raya Felipe MD   methotrexate (RHEUMATREX) 2.5 MG chemo tablet Take 20 mg by mouth once a week Every friday Yes Historical Provider, MD   ustekinumab (STELARA) 130 MG/26ML SOLN Infuse 260 mg intravenously Every friday Yes Historical Provider, MD Blood Glucose Monitoring Suppl (ONE TOUCH ULTRA MINI) w/Device KIT 1 kit by Does not apply route daily as needed (fasting) Yes Michelle Manning MD   diphenoxylate-atropine (LOMOTIL) 2.5-0.025 MG per tablet diphenoxylate-atropine 2.5 mg-0.025 mg tablet Yes Historical Provider, MD   tacrolimus (PROGRAF) 1 MG capsule Take 1 capsule by mouth 2 times daily  Historical Provider, MD   clotrimazole-betamethasone (LOTRISONE) 1-0.05 % cream APPLY TO AFFECTED AREA TWICE DAILY **INS WILL ONLY COVER 15G AT A TIME**  Historical Provider, MD   clobetasol (TEMOVATE) 0.05 % cream APPLY TO AFFECTED AREAS 1 2 TIMES DAILY  Historical Provider, MD   clonazePAM (KLONOPIN) 0.5 MG tablet Take 1 tablet by mouth 2 times daily as needed for Anxiety for up to 8 days. Michelle Manning MD   zinc sulfate (ORAZINC) 220 (50 Zn) MG capsule Take 1 capsule by mouth daily  Sophie Lee MD       Social History     Tobacco Use    Smoking status: Former Smoker     Packs/day: 1.00     Years: 10.00     Pack years: 10.00     Quit date: 9/1/2005     Years since quitting: 15.3    Smokeless tobacco: Never Used   Substance Use Topics    Alcohol use: Yes     Comment: ocassionally    Drug use: No            PHYSICAL EXAMINATION:  [ INSTRUCTIONS:  \"[x]\" Indicates a positive item  \"[]\" Indicates a negative item  -- DELETE ALL ITEMS NOT EXAMINED]  Vital Signs: (As obtained by patient/caregiver or practitioner observation)    Blood pressure-  Heart rate-    Respiratory rate-    Temperature-  Pulse oximetry-     Constitutional: [x] Appears well-developed and well-nourished [x] No apparent distress      [] Abnormal-   Mental status  [x] Alert and awake  [x] Oriented to person/place/time []Able to follow commands      Eyes:  EOM    [x]  Normal  [] Abnormal-  Sclera  []  Normal  [] Abnormal -         Discharge []  None visible  [] Abnormal -    HENT:   [x] Normocephalic, atraumatic.   [] Abnormal   [] Mouth/Throat: Mucous membranes are moist. External Ears [x] Normal  [] Abnormal-     Neck: [x] No visualized mass     Pulmonary/Chest: [x] Respiratory effort normal.  [x] No visualized signs of difficulty breathing or respiratory distress        [] Abnormal-      Musculoskeletal:   [] Normal gait with no signs of ataxia         [] Normal range of motion of neck        [] Abnormal-       Neurological:        [x] No Facial Asymmetry (Cranial nerve 7 motor function) (limited exam to video visit)          [] No gaze palsy        [] Abnormal-         Skin:        [x] No significant exanthematous lesions or discoloration noted on facial skin         [] Abnormal-            Psychiatric:       [x] Normal Affect [] No Hallucinations        [] Abnormal-     Other pertinent observable physical exam findings-     ASSESSMENT/PLAN:     Diagnosis Orders   1. Thrush     2. Sore throat  amoxicillin (AMOXIL) 875 MG tablet   3. Crohn's disease without complication, unspecified gastrointestinal tract location ([facility]     4.  Type 2 diabetes mellitus without complication, without long-term current use of insulin ([facility]       Rx Mycostatin E script  Covid test  Maged Wagner is a 38 y.o. female being evaluated by a Virtual Visit (video visit) encounter to address concerns as mentioned above. A caregiver was present when appropriate. Due to this being a TeleHealth encounter (During [facility] public health emergency), evaluation of the following organ systems was limited: Vitals/Constitutional/EENT/Resp/CV/GI//MS/Neuro/Skin/Heme-Lymph-Imm. Pursuant to the emergency declaration under the [address] [place] [address] and the [employer] Act, this Virtual Visit was conducted with patient's (and/or legal guardian's) consent, to reduce the patient's risk of exposure to COVID-19 and provide necessary medical care. The patient (and/or legal guardian) has also been advised to contact this office for worsening conditions or problems, and seek emergency medical treatment and/or call 911 if deemed necessary. Patient identification was verified at the start of the visit: Yes    Total time spent on this encounter: 20    Services were provided through a video synchronous discussion virtually to substitute for in-person clinic visit. Patient and provider were located at their individual homes. --Cassandra Santoro MD on 12/30/2020 at 3:14 PM    An electronic signature was used to authenticate this note.

## 2020-12-31 ENCOUNTER — OFFICE VISIT (OUTPATIENT)
Dept: PRIMARY CARE CLINIC | Age: 40
End: 2020-12-31
Payer: COMMERCIAL

## 2020-12-31 PROCEDURE — 99211 OFF/OP EST MAY X REQ PHY/QHP: CPT | Performed by: NURSE PRACTITIONER

## 2020-12-31 PROCEDURE — G8417 CALC BMI ABV UP PARAM F/U: HCPCS | Performed by: NURSE PRACTITIONER

## 2020-12-31 PROCEDURE — G8428 CUR MEDS NOT DOCUMENT: HCPCS | Performed by: NURSE PRACTITIONER

## 2020-12-31 NOTE — PROGRESS NOTES
Chevy Hernandez received a viral test for COVID-19. They were educated on isolation and quarantine as appropriate. For any symptoms, they were directed to seek care from their PCP, given contact information to establish with a doctor, directed to an urgent care or the emergency room.

## 2020-12-31 NOTE — PATIENT INSTRUCTIONS

## 2021-01-02 LAB — SARS-COV-2, NAA: NOT DETECTED

## 2021-01-04 RX ORDER — GLIMEPIRIDE 2 MG/1
TABLET ORAL
Qty: 90 TABLET | Refills: 1 | Status: SHIPPED | OUTPATIENT
Start: 2021-01-04 | End: 2021-05-04 | Stop reason: SINTOL

## 2021-01-04 NOTE — TELEPHONE ENCOUNTER
Medication:   Requested Prescriptions     Pending Prescriptions Disp Refills    glimepiride (AMARYL) 2 MG tablet [Pharmacy Med Name: GLIMEPIRIDE 2 MG TABLET] 90 tablet 1     Sig: TAKE 1 TABLET BY MOUTH EVERY DAY IN THE MORNING       Last Filled:      Patient Phone Number: [phone] (home)     Last appt: 12/30/2020   Next appt: Visit date not found    Last Labs DM:   Lab Results   Component Value Date    LABA1C 7.3 11/20/2020       Last OARRS: No flowsheet data found. Preferred Pharmacy:   [facility]/pharmacy [address] [facility] [address] [address] [place] [address] [address]  [address] [place] [address]  Phone: [phone] Fax: [phone]

## 2021-01-22 RX ORDER — EMPAGLIFLOZIN 25 MG/1
TABLET, FILM COATED ORAL
Qty: 30 TABLET | Refills: 3 | Status: SHIPPED | OUTPATIENT
Start: 2021-01-22 | End: 2021-05-04 | Stop reason: SDUPTHER

## 2021-01-22 NOTE — TELEPHONE ENCOUNTER
Medication:   Requested Prescriptions     Pending Prescriptions Disp Refills    JARDIANCE 25 MG tablet [Pharmacy Med Name: Hyacinth Denton 25 MG TABLET] 30 tablet 3     Sig: TAKE 1 TABLET BY MOUTH EVERY DAY       Last Filled:  9/14/20    Patient Phone Number: [phone] (home)     Last appt: 12/30/2020  VV  Next appt: Visit date not found    Last Labs DM:   Lab Results   Component Value Date    LABA1C 7.3 11/20/2020       Last OARRS: No flowsheet data found. Preferred Pharmacy:   [facility]/pharmacy [address] [facility] [address] [address] [address]  [address] [place] [address]  Phone: [phone] Fax: [phone]

## 2021-01-25 ENCOUNTER — HOSPITAL ENCOUNTER (OUTPATIENT)
Dept: CT IMAGING | Age: 41
Discharge: HOME | End: 2021-01-25
Payer: COMMERCIAL

## 2021-01-25 ENCOUNTER — VIRTUAL VISIT (OUTPATIENT)
Dept: PRIMARY CARE CLINIC | Age: 41
End: 2021-01-25
Payer: COMMERCIAL

## 2021-01-25 DIAGNOSIS — K50.90 CROHN'S DISEASE WITHOUT COMPLICATION, UNSPECIFIED GASTROINTESTINAL TRACT LOCATION (HCC): ICD-10-CM

## 2021-01-25 DIAGNOSIS — R10.84 GENERALIZED ABDOMINAL PAIN: ICD-10-CM

## 2021-01-25 DIAGNOSIS — R10.84 GENERALIZED ABDOMINAL PAIN: Primary | ICD-10-CM

## 2021-01-25 LAB
GFR AFRICAN AMERICAN: >60
GFR NON-AFRICAN AMERICAN: >60
PERFORMED ON: NORMAL
POC CREATININE: 0.7 MG/DL (ref 0.6–1.1)
POC SAMPLE TYPE: NORMAL

## 2021-01-25 PROCEDURE — G8427 DOCREV CUR MEDS BY ELIG CLIN: HCPCS | Performed by: FAMILY MEDICINE

## 2021-01-25 PROCEDURE — 82565 ASSAY OF CREATININE: CPT

## 2021-01-25 PROCEDURE — 74177 CT ABD & PELVIS W/CONTRAST: CPT

## 2021-01-25 PROCEDURE — 1036F TOBACCO NON-USER: CPT | Performed by: FAMILY MEDICINE

## 2021-01-25 PROCEDURE — G8482 FLU IMMUNIZE ORDER/ADMIN: HCPCS | Performed by: FAMILY MEDICINE

## 2021-01-25 PROCEDURE — G8417 CALC BMI ABV UP PARAM F/U: HCPCS | Performed by: FAMILY MEDICINE

## 2021-01-25 PROCEDURE — 99213 OFFICE O/P EST LOW 20 MIN: CPT | Performed by: FAMILY MEDICINE

## 2021-01-25 PROCEDURE — 6360000004 HC RX CONTRAST MEDICATION: Performed by: FAMILY MEDICINE

## 2021-01-25 RX ADMIN — IOHEXOL 50 ML: 240 INJECTION, SOLUTION INTRATHECAL; INTRAVASCULAR; INTRAVENOUS; ORAL at 16:29

## 2021-01-25 RX ADMIN — IOPAMIDOL 80 ML: 755 INJECTION, SOLUTION INTRAVENOUS at 16:29

## 2021-01-25 ASSESSMENT — ENCOUNTER SYMPTOMS
ABDOMINAL PAIN: 1
NAUSEA: 1
DIARRHEA: 1
RESPIRATORY NEGATIVE: 1
EYES NEGATIVE: 1

## 2021-01-25 NOTE — PROGRESS NOTES
2021    TELEHEALTH EVALUATION -- Audio/Visual (During [facility]-16 public health emergency)    HPI:    Bhavin Zurita (:  1980) has requested an audio/video evaluation for the following concern(s):    Abdominal Pain: Patient complains of abdominal pain. The pain is described as aching, colicky and cramping, and is 7/10 in intensity. Pain is located in the periumbilical bilateral without radiation. Onset was several days ago. Symptoms have been gradually worsening since. Aggravating factors: none. Alleviating factors: none. Associated symptoms: chills, diarrhea, fever and nausea. The patient denies arthralgias, belching, flatus, frequency, headache and hematochezia  Patient with Crohn's disease she is seen now by GI at [address] [place] [address] due to her uncontrolled Crohn's symptoms she is on needs her medication has not been doing well lately at all the put in order to do a CT scan now with without contrast she cannot drive to go back to [place] so she was suggested to get it done here after [facility] they told her since he is a doctor at [address] [place] [address] which they cannot schedule her since I am her primary care she wants me to schedule the test and she will follow-up with her GI  Patient's been under a lot of stress crying in she cannot eat she is in pain all the time the diarrhea fever she is on medication there have been working with her to bring the right regimen so far is not working well. Review of Systems   Constitutional: Positive for appetite change, fatigue, fever and unexpected weight change. HENT: Negative. Eyes: Negative. Respiratory: Negative. Cardiovascular: Negative. Gastrointestinal: Positive for abdominal pain, diarrhea and nausea. All other systems reviewed and are negative. Prior to Visit Medications    Medication Sig Taking?  Authorizing Provider   JARDIANCE 25 MG tablet TAKE 1 TABLET BY MOUTH EVERY DAY Yes Josie Hebert MD  Smoking status: Former Smoker     Packs/day: 1.00     Years: 10.00     Pack years: 10.00     Quit date: 2005     Years since quitting: 15.4    Smokeless tobacco: Never Used   Substance Use Topics    Alcohol use: Yes     Comment: ocassionally    Drug use: No        No Known Allergies,   Past Medical History:   Diagnosis Date    Cro[facility]    IBD (inflammatory bowel disease)     PCOS (polycystic ovarian syndrome)     Psoriasis     Psoriatic arthritis ([facility]     sees Rheumatology    ,   Past Surgical History:   Procedure Laterality Date     SECTION       SECTION, CLASSIC     ,   Social History     Tobacco Use    Smoking status: Former Smoker     Packs/day: 1.00     Years: 10.00     Pack years: 10.00     Quit date: 2005     Years since quitting: 15.4    Smokeless tobacco: Never Used   Substance Use Topics    Alcohol use: Yes     Comment: ocassionally    Drug use: No   ,   Family History   Problem Relation Age of Onset   [facility] Diabetes Mother         age 68    Cancer Mother     Stroke Father          age 80    ,   Immunization History   Administered Date(s) Administered    Influenza Vaccine, unspecified formulation 10/08/2018    Influenza Virus Vaccine 10/10/2019    Influenza, High Dose (Fluzone 65 yrs and older) 2017    Influenza, Intradermal, Preservative free 2017    Influenza, Quadv, IM, PF (6 mo and older Fluzone, Flulaval, Fluarix, and 3 yrs and older Afluria) 10/08/2018, 2020    Pneumococcal Polysaccharide ([id number]) 2019    Tdap (Boostrix, Adacel) 2017   ,   Health Maintenance   Topic Date Due    Hepatitis C screen  1980    Diabetic foot exam  2021    Diabetic retinal exam  2021    Cervical cancer screen  2021    A1C test (Diabetic or Prediabetic)  2021    Diabetic microalbuminuria test  2021    Lipid screen  2021  DTaP/Tdap/Td vaccine (2 - Td) 09/18/2027    Flu vaccine  Completed    Pneumococcal 0-64 years Vaccine  Completed    Hepatitis A vaccine  Aged Out    Hib vaccine  Aged Out    Meningococcal (ACWY) vaccine  Aged Out    Hepatitis B vaccine  Discontinued    HIV screen  Discontinued       PHYSICAL EXAMINATION:  [ INSTRUCTIONS:  \"[x]\" Indicates a positive item  \"[]\" Indicates a negative item  -- DELETE ALL ITEMS NOT EXAMINED]  Vital Signs: (As obtained by patient/caregiver or practitioner observation)    Blood pressure-  Heart rate-    Respiratory rate-    Temperature-  Pulse oximetry-     Constitutional: [x] Appears well-developed and well-nourished [x] No apparent distress      [] Abnormal-   Mental status  [x] Alert and awake  [x] Oriented to person/place/time []Able to follow commands      Eyes:  EOM    []  Normal  [] Abnormal-  Sclera  []  Normal  [] Abnormal -         Discharge []  None visible  [] Abnormal -    HENT:   [x] Normocephalic, atraumatic.   [] Abnormal   [x] Mouth/Throat: Mucous membranes are moist.     External Ears [x] Normal  [] Abnormal-     Neck: [x] No visualized mass     Pulmonary/Chest: [x] Respiratory effort normal.  [] No visualized signs of difficulty breathing or respiratory distress        [] Abnormal-      Musculoskeletal:   [] Normal gait with no signs of ataxia         [] Normal range of motion of neck        [] Abnormal-       Neurological:        [] No Facial Asymmetry (Cranial nerve 7 motor function) (limited exam to video visit)          [] No gaze palsy        [] Abnormal-         Skin:        [] No significant exanthematous lesions or discoloration noted on facial skin         [] Abnormal-            Psychiatric:       [] Normal Affect [] No Hallucinations        [] Abnormal-     Other pertinent observable physical exam findings-     ASSESSMENT/PLAN:   Diagnosis Orders 1. Generalized abdominal pain  CT ABDOMEN PELVIS W WO CONTRAST Additional Contrast? Radiologist Recommendation    CT ABDOMEN PELVIS W WO CONTRAST Additional Contrast? Radiologist Recommendation   2. Crohn's disease without complication, unspecified gastrointestinal tract location [facility])  CT ABDOMEN PELVIS W WO CONTRAST Additional Contrast? Radiologist Recommendation    CT ABDOMEN PELVIS W WO CONTRAST Additional Contrast? Radiologist Recommendation       Emely Lovell is a 38 y.o. female being evaluated by a Virtual Visit (video visit) encounter to address concerns as mentioned above. A caregiver was present when appropriate. Due to this being a TeleHealth encounter (During [facility] public health emergency), evaluation of the following organ systems was limited: Vitals/Constitutional/EENT/Resp/CV/GI//MS/Neuro/Skin/Heme-Lymph-Imm. Pursuant to the emergency declaration under the [address] [place] [address], [address] [place] [address] authority and the [employer] and [employer] Act, this Virtual Visit was conducted with patient's (and/or legal guardian's) consent, to reduce the patient's risk of exposure to COVID-19 and provide necessary medical care. The patient (and/or legal guardian) has also been advised to contact this office for worsening conditions or problems, and seek emergency medical treatment and/or call 911 if deemed necessary. Patient identification was verified at the start of the visit: Yes    Total time spent on this encounter: Not billed by time    Services were provided through a video synchronous discussion virtually to substitute for in-person clinic visit. Patient and provider were located at their individual homes. --Renee Wolf MD on 1/25/2021 at 2:17 PM    An electronic signature was used to authenticate this note.

## 2021-02-09 DIAGNOSIS — F41.9 ANXIETY: ICD-10-CM

## 2021-02-09 RX ORDER — ESCITALOPRAM OXALATE 10 MG/1
TABLET ORAL
Qty: 135 TABLET | Refills: 1 | Status: SHIPPED | OUTPATIENT
Start: 2021-02-09 | End: 2021-08-09

## 2021-02-09 NOTE — TELEPHONE ENCOUNTER
Medication:   Requested Prescriptions     Pending Prescriptions Disp Refills    escitalopram (LEXAPRO) 10 MG tablet 135 tablet 1     Sig: TAKE 1 AND 1/2 TABLETS BY MOUTH EVERY DAY     Last Filled:  8/10/20     Last appt: 1/25/2021  VV  Next appt: Visit date not found    Last OARRS: No flowsheet data found.

## 2021-02-18 DIAGNOSIS — K21.9 GASTROESOPHAGEAL REFLUX DISEASE: ICD-10-CM

## 2021-02-18 RX ORDER — OMEPRAZOLE 40 MG/1
CAPSULE, DELAYED RELEASE ORAL
Qty: 90 CAPSULE | Refills: 1 | Status: SHIPPED | OUTPATIENT
Start: 2021-02-18 | End: 2021-05-04 | Stop reason: SDUPTHER

## 2021-02-18 NOTE — TELEPHONE ENCOUNTER
Medication:   Requested Prescriptions     Pending Prescriptions Disp Refills    omeprazole (PRILOSEC) 40 MG delayed release capsule [Pharmacy Med Name: OMEPRAZOLE DR 40 MG CAPSULE] 90 capsule 1     Sig: TAKE 1 CAPSULE BY MOUTH EVERY DAY     Last Filled:  8/27/20    Last appt: 1/25/2021   Next appt: Visit date not found

## 2021-02-23 ENCOUNTER — NURSE TRIAGE (OUTPATIENT)
Dept: OTHER | Facility: CLINIC | Age: 41
End: 2021-02-23

## 2021-02-23 NOTE — TELEPHONE ENCOUNTER
Reason for Disposition   Fainted > 15 minutes ago and still feels weak or dizzy    Answer Assessment - Initial Assessment Questions  1. ONSET: \"How long were you unconscious? \" (minutes) \"When did it happen?\"      10 seconds    2. CONTENT: \"What happened during period of unconsciousness? \" (e.g., seizure activity)       Was sitting on bathroom floor after falling out of shower    3. MENTAL STATUS: \"Alert and oriented now? \" (oriented x 3 = name, month, location)       Yes    4. TRIGGER: \"What do you think caused the fainting? \" \"What were you doing just before you fainted? \"  (e.g., exercise, sudden standing up, prolonged standing)      Unsure, but started Bentyl Friday for Crohn's    5. RECURRENT SYMPTOM: \"Have you ever passed out before? \" If so, ask: \"When was the last time? \" and \"What happened that time? \"       Yes as a child    6. INJURY: \"Did you sustain any injury during the fall? \"       Has bruise on thigh forming    7. CARDIAC SYMPTOMS: \"Have you had any of the following symptoms: chest pain, difficulty breathing, palpitations? \"      No    8. NEUROLOGIC SYMPTOMS: \"Have you had any of the following symptoms: headache, numbness, vertigo, weakness? \"      Weakness, feels lightheaded, no vertigo now    9. GI SYMPTOMS: \"Have you had any of the following symptoms: abdominal pain, vomiting, diarrhea, blood in stools? \"      Has Crohns-flaring up now, blood in stools and diarrhea, running fevers    10. OTHER SYMPTOMS: \"Do you have any other symptoms? \"        Low BP 81/66 and BS is 144 fasting    11. PREGNANCY: \"Is there any chance you are pregnant? \" \"When was your last menstrual period? \"        N/A    Protocols used: FAINTING-ADULT-[place]    Patient called Sana at [facility])  with red flag complaint. Brief description of triage: fainted, weak, dizzy, BS fasting 144, BP 81/66 and 15 min later was 112/82. Crohns flaring up lately and started Bentyl Friday.  Has a call in to Dr Randall Jessica ofc this morning also. Triage indicates for patient to Will eat, try to drink/rehydrate, elevate feet, and then will go to ED if still dizzy or weak.  with her. Wants to see what Dr. Robert Palacios advises as well. Care advice provided, patient verbalizes understanding; denies any other questions or concerns; instructed to call back for any new or worsening symptoms. Attention Provider: Thank you for allowing me to participate in the care of your patient. The patient was connected to triage in response to information provided to the ECC. Please do not respond through this encounter as the response is not directed to a shared pool.

## 2021-03-08 ENCOUNTER — PATIENT MESSAGE (OUTPATIENT)
Dept: PRIMARY CARE CLINIC | Age: 41
End: 2021-03-08

## 2021-03-08 NOTE — TELEPHONE ENCOUNTER
From: Kathy Danielle  To: Jahaira Vann MD  Sent: 3/8/2021 7:19 AM EST  Subject: Visit Follow-Up Question    Blood Sugars:  Dr. Linnea Koehler,  My sugars are all Over the place. I had a scary incident where it plummeted yesterday which involved shaking and sweating. Here are my most recent readings. Im trying to be better about them. And when do you want to see me for this? I am coming in Wed about medication.     2/23/21  7:13 144 (day collapsed in shower)    2/26  7:00 112    3/3/2021  5:15pm 75 shaking    3/421  7:00am 113    3/7  12:30 pm 49 shaking and sweating    3/8  7:00 am 158

## 2021-03-15 ENCOUNTER — OFFICE VISIT (OUTPATIENT)
Dept: PRIMARY CARE CLINIC | Age: 41
End: 2021-03-15
Payer: COMMERCIAL

## 2021-03-15 VITALS
BODY MASS INDEX: 31.24 KG/M2 | OXYGEN SATURATION: 97 % | TEMPERATURE: 97.2 F | HEART RATE: 87 BPM | DIASTOLIC BLOOD PRESSURE: 68 MMHG | WEIGHT: 182 LBS | SYSTOLIC BLOOD PRESSURE: 112 MMHG | RESPIRATION RATE: 12 BRPM

## 2021-03-15 DIAGNOSIS — R10.84 GENERALIZED ABDOMINAL PAIN: Primary | ICD-10-CM

## 2021-03-15 DIAGNOSIS — K50.90 CROHN'S DISEASE WITHOUT COMPLICATION, UNSPECIFIED GASTROINTESTINAL TRACT LOCATION (HCC): ICD-10-CM

## 2021-03-15 PROCEDURE — G8417 CALC BMI ABV UP PARAM F/U: HCPCS | Performed by: FAMILY MEDICINE

## 2021-03-15 PROCEDURE — 1036F TOBACCO NON-USER: CPT | Performed by: FAMILY MEDICINE

## 2021-03-15 PROCEDURE — G8427 DOCREV CUR MEDS BY ELIG CLIN: HCPCS | Performed by: FAMILY MEDICINE

## 2021-03-15 PROCEDURE — G8482 FLU IMMUNIZE ORDER/ADMIN: HCPCS | Performed by: FAMILY MEDICINE

## 2021-03-15 PROCEDURE — 99213 OFFICE O/P EST LOW 20 MIN: CPT | Performed by: FAMILY MEDICINE

## 2021-03-15 RX ORDER — TRAMADOL HYDROCHLORIDE 50 MG/1
50 TABLET ORAL EVERY 6 HOURS PRN
Qty: 40 TABLET | Refills: 0 | Status: SHIPPED | OUTPATIENT
Start: 2021-03-15 | End: 2021-04-14

## 2021-03-15 ASSESSMENT — PATIENT HEALTH QUESTIONNAIRE - PHQ9
SUM OF ALL RESPONSES TO PHQ QUESTIONS 1-9: 0
SUM OF ALL RESPONSES TO PHQ9 QUESTIONS 1 & 2: 0
2. FEELING DOWN, DEPRESSED OR HOPELESS: 0

## 2021-03-15 NOTE — PROGRESS NOTES
SUBJECTIVE:  Patient ID: Elana Marshall is a 42 y.o. y.o. female     HPI   Pt is here for follow up with concern about abdominal pain, pt with chron's disease she uses for acute abdominal pain, her current GI at [facility] won't do any controlled pain med's. She uses the med's very sparingly   Now over all she is doing well on current management, she wants to have the med's just in case has a flare up at home. Past Medical History:   Diagnosis Date    [facility]    [facility]    IBD (inflammatory bowel disease)     PCOS (polycystic ovarian syndrome)     Psoriasis     Psoriatic arthritis ([facility]     sees Rheumatology       Past Surgical History:   Procedure Laterality Date     SECTION  2006     SECTION, CLASSIC  2009     Family History   Problem Relation Age of Onset    Diabetes Mother         age 68    Cancer Mother     Stroke Father          age 80      Social History     Socioeconomic History    Marital status:      Spouse name: Not on file    Number of children: Not on file    Years of education: Not on file    Highest education level: Not on file   Occupational History    Not on file   Social Needs    Financial resource strain: Not hard at all   Gustavo-Sen insecurity     Worry: Never true     Inability: Never true    Transportation needs     Medical: No     Non-medical: No   Tobacco Use    Smoking status: Former Smoker     Packs/day: 1.00     Years: 10.00     Pack years: 10.00     Quit date: 2005     Years since quitting: 15.5    Smokeless tobacco: Never Used   Substance and Sexual Activity    Alcohol use: Yes     Comment: ocassionally    Drug use: No    Sexual activity: Yes     Partners: Male     Birth control/protection: I.U.D.    Lifestyle    Physical activity     Days per week: Not on file     Minutes per session: Not on file    Stress: Not on file   Relationships    Social connections     Talks on phone: Not on file     Gets together: Not on file     Attends [employer] service: Not on file     Active member of club or organization: Not on file     Attends meetings of clubs or organizations: Not on file     Relationship status: Not on file    Intimate partner violence     Fear of current or ex partner: Not on file     Emotionally abused: Not on file     Physically abused: Not on file     Forced sexual activity: Not on file   Other Topics Concern    Not on file   Social History Narrative         [employer]    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    Children 14 & 11     She has half sister x 3 one     [de-identified] brother x 1     Current Outpatient Medications   Medication Sig Dispense Refill    omeprazole (PRILOSEC) 40 MG delayed release capsule TAKE 1 CAPSULE BY MOUTH EVERY DAY 90 capsule 1    escitalopram (LEXAPRO) 10 MG tablet TAKE 1 AND 1/2 TABLETS BY MOUTH EVERY  tablet 1    JARDIANCE 25 MG tablet TAKE 1 TABLET BY MOUTH EVERY DAY 30 tablet 3    glimepiride (AMARYL) 2 MG tablet TAKE 1 TABLET BY MOUTH EVERY DAY IN THE MORNING 90 tablet 1    tacrolimus (PROGRAF) 1 MG capsule Take 1 capsule by mouth 2 times daily      clotrimazole-betamethasone (LOTRISONE) 1-0.05 % cream APPLY TO AFFECTED AREA TWICE DAILY **INS WILL ONLY COVER 15G AT A TIME**      clobetasol (TEMOVATE) 0.05 % cream APPLY TO AFFECTED AREAS 1 2 TIMES DAILY      clotrimazole (LOTRIMIN) 1 % cream Apply topically 2 times daily Apply topically 2 times daily.  folic acid (FOLVITE) 1 MG tablet TAKE 1 TABLET BY MOUTH EVERY DAY 90 tablet 0    ONETOUCH ULTRA strip USE TO TEST ONCE DAILY 100 strip 2    ondansetron (ZOFRAN-ODT) 4 MG disintegrating tablet Take 4 mg by mouth every 8 hours as needed for Nausea or Vomiting      traMADol (ULTRAM) 50 MG tablet Take 50 mg by mouth every 6 hours as needed for Pain.       Continuous Blood Gluc  (FREESTYLE CESAR READER) ERNESTO 1 Device by In Vitro route 2 times daily 1 Device 0    Continuous Blood Gluc Sensor (FREESTYLE CESAR 14 DAY SENSOR) MISC Check once a day 100 each 1    ONETOUCH DELICA LANCETS FINE MISC USE ONE DAILY 100 each 1    methotrexate (RHEUMATREX) 2.5 MG chemo tablet Take 20 mg by mouth once a week Every friday      Blood Glucose Monitoring Suppl (ONE TOUCH ULTRA MINI) w/Device KIT 1 kit by Does not apply route daily as needed (fasting) 1 kit 0    diphenoxylate-atropine (LOMOTIL) 2.5-0.025 MG per tablet diphenoxylate-atropine 2.5 mg-0.025 mg tablet       No current facility-administered medications for this visit. No Known Allergies    Review of Systems   Constitutional: Negative. HENT: Negative. Eyes: Negative. Respiratory: Negative. Cardiovascular: Negative. Gastrointestinal: Positive for abdominal pain. Negative for abdominal distention. All other systems reviewed and are negative. OBJECTIVE:  /68 (Site: Left Upper Arm, Position: Sitting, Cuff Size: Large Adult)   Pulse 87   Temp 97.2 °F (36.2 °C)   Resp 12   Wt 182 lb (82.6 kg)   SpO2 97%   BMI 31.24 kg/m²     Physical Exam  Vitals signs reviewed. Constitutional:       Appearance: Normal appearance. HENT:      Head: Normocephalic. Eyes:      General: No scleral icterus. Conjunctiva/sclera: Conjunctivae normal.   Neck:      Musculoskeletal: Normal range of motion. Thyroid: No thyromegaly. Vascular: No JVD. Cardiovascular:      Rate and Rhythm: Normal rate and regular rhythm. Heart sounds: Normal heart sounds. No murmur. No friction rub. No gallop. Pulmonary:      Effort: Pulmonary effort is normal.      Breath sounds: Normal breath sounds. No wheezing or rales. Abdominal:      General: Bowel sounds are normal. There is no distension. Palpations: Abdomen is soft. There is no mass. Tenderness: There is no abdominal tenderness. Hernia: No hernia is present. Lymphadenopathy:      Cervical: No cervical adenopathy. Skin:     Findings: No rash.    Neurological:      Mental Status: She is alert and oriented to person, place, and time. ASSESSMENT:   Diagnosis Orders   1. Generalized abdominal pain  traMADol (ULTRAM) 50 MG tablet   2.  Crohn's disease without complication, unspecified gastrointestinal tract location ([facility]  traMADol (ULTRAM) 50 MG tablet       PLAN:  See orders   Pt signed controlled medicine agreement   To use PRN

## 2021-03-15 NOTE — LETTER
CONTROLLED SUBSTANCE MEDICATION AGREEMENT     Patient Name: She Mackay  Patient YOB: 1980     I understand, that controlled substance medications may be used to help better manage my symptoms and to improve my ability to function at home, work and in social settings. However, I also understand that these medications do have risks, which have been discussed with me, including possible development of physical or psychological dependence. I understand that successful treatment requires mutual trust and honesty between me and my provider. I understand and agree that following this Medication Agreement is necessary in continuing my provider-patient relationship and the success of my treatment plan. Explanation from my Provider: Benefits and Goals of Controlled Substance Medications: There are two potential goals for your treatment: (1) decreased pain and suffering (2) improved daily life functions. There are many possible treatments for your chronic condition(s). Alternatives such as physical therapy, yoga, massage, home daily exercise, meditation, relaxation techniques, injections, chiropractic manipulations, surgery, cognitive therapy, hypnosis and many medications that are not habit-forming may be used. Use of controlled substance medications may be helpful, but they are unlikely to resolve all symptoms or restore all function. Explanation from my Provider: Risks of Controlled Substance Medications:  Opioid pain medications: These medications can lead to problems such as addiction/dependence, sedation, lightheadedness/dizziness, memory issues, falls, constipation, nausea, or vomiting. They may also impair the ability to drive or operate machinery. Additionally, these medications may lower testosterone levels, leading to loss of bone strength, stamina and sex drive.   They may cause problems with breathing, sleep apnea and reduced coughing, which is especially dangerous for patients with lung disease. Overdose or dangerous interactions with alcohol and other medications may occur, leading to death. Hyperalgesia may develop, which means patients receiving opioids for the treatment of pain may become more sensitive to certain painful stimuli, and in some cases, experience pain from ordinarily non-painful stimuli. Women between the ages of 14-53 who could become pregnant should carefully weigh the risks and benefits of opioids with their physicians, as these medications increase the risk of pregnancy complications, including miscarriage,  delivery and stillbirth. It is also possible for babies to be born addicted to opioids. Opioid dependence withdrawal symptoms may include; feelings of uneasiness, increased pain, irritability, belly pain, diarrhea, sweats and goose-flesh. Benzodiazepines and non-benzodiazepine sleep medications: These medications can lead to problems such as addiction/dependence, sedation, fatigue, lightheadedness, dizziness, incoordination, falls, depression, hallucinations, and impaired judgment, memory and concentration. The ability to drive and operate machinery may also be affected. Abnormal sleep-related behaviors have been reported, including sleepwalking, driving, making telephone calls, eating, or having sex while not fully awake. These medications can suppress breathing and worsen sleep apnea, particularly when combined with alcohol or other sedating medications, potentially leading to death. Dependence withdrawal symptoms may include tremors, anxiety, hallucinations and seizures. Stimulants:  Common adverse effects include addiction/dependence, increased blood  pressure and heart rate, decreased appetite, nausea, involuntary weight loss, insomnia,                                                                                                                     Initials:_______   irritability, and headaches.   These risks may increase when these medications are combined with other stimulants, such as caffeine pills or energy drinks, certain weight loss supplements and oral decongestants. Dependence withdrawal symptoms may include depressed mood, loss of interest, suicidal thoughts, anxiety, fatigue, appetite changes and agitation. Testosterone replacement therapy:  Potential side effects include increased risk of stroke and heart attack, blood clots, increased blood pressure, increased cholesterol, enlarged prostate, sleep apnea, irritability/aggression and other mood disorders, and decreased fertility. I agree and understand that I and my prescriber have the following rights and responsibilities regarding my treatment plan:     1. MY RIGHTS:  To be informed of my treatment and medication plan. To be an active participant in my health and wellbeing. 2. MY RESPONSIBILITY AND UNDERSTANDING FOR USE OF MEDICATIONS   I will take medications at the dose and frequency as directed. For my safety, I will not increase or change how I take my medications without the recommendation of my healthcare provider.  I will actively participate in any program recommended by my provider which may improve function, including social, physical, psychological programs.  I will not take my medications with alcohol or other drugs not prescribed to me. I understand that drinking alcohol with my medications increases the chances of side effects, including reduced breathing rate and could lead to personal injury when operating machinery.  I understand that if I have a history of substance use disorders, including alcohol or other illicit drugs, that I may be at increased risk of addiction to my medications.  I agree to notify my provider immediately if I should become pregnant so that my treatment plan can be adjusted.    I agree and understand that I shall only receive controlled substance medications from the prescriber that signed this agreement unless there is written agreement among other prescribers of controlled substances outlining the responsibility of the medications being prescribed.  I understand that the if the controlled medication is not helping to achieve goals, the dosage may be tapered and no longer prescribed. 3. MY RESPONSIBILITY FOR COMMUNICATION / PRESCRIPTION RENEWALS   I agree that all controlled substance medications that I take will be prescribed only by my provider. If another healthcare provider prescribes me medication in an emergency, I will notify my provider within seventy-two (72) hours.  I will arrange for refills at the prescribed interval ONLY during regular office hours. I will not ask for refills earlier than agreed, after-hours, on holidays or weekends. Refills may take up to 72 hours for processing and prescriptions to reach the pharmacy.  I will inform my other health care providers that I am taking these medications and of the existence of this [id number]. In the event of an emergency, I will provide the same information to the emergency department prescribers.  I will keep my provider updated on the pharmacy I am using for controlled medication prescription filling. Initials:_______  4. MY RESPONSIBILITY FOR PROTECTING MEDICATIONS   I will protect my prescriptions and medications. I understand that lost or misplaced prescriptions will not be replaced.  I will keep medications only for my own use and will not share them with others. I will keep all medications away from children.  I agree that if my medications are adjusted or discontinued, I will properly dispose of any remaining medications. I understand that I will be required to dispose of any remaining controlled medications as, directed by my prescriber, prior to being provided with any prescriptions for other controlled medications.   Medication drop box locations can be found at: [email or web address]    5. MY RESPONSIBILITY WITH ILLEGAL DRUGS    I will not use illegal or street drugs or another person's prescription medications not prescribed to me.  If there are identified addiction type symptoms, then referral to a program may be provided by my provider and I agree to follow through with this recommendation. 6. MY RESPONSIBILITY FOR COOPERATION WITH INVESTIGATIONS   I understand that my provider will comply with any applicable law and may discuss my use and/or possible misuse/abuse of controlled substances and alcohol, as appropriate, with any health care provider involved in my care, pharmacist, or legal authority.  I authorize my provider and pharmacy to cooperate fully with law enforcement agencies (as permitted by law) in the investigation of any possible misuse, sale, or other diversion of my controlled substances.  I agree to waive any applicable privilege or right of privacy or confidentiality with respect to these authorizations. 7. PROVIDERS RIGHT TO MONITOR FOR SAFETY: PRESCRIPTION MONITORING / DRUG TESTING   I consent to drug/toxicology screening and will submit to a drug screen upon my providers request to assure I am only taking the prescribed drugs for my safety monitoring. I understand that a drug screen is a laboratory test in which a sample of my urine, blood or saliva is checked to see what drugs I have been taking. This may entail an observed urine specimen, which means that a nurse or other health care provider may watch me provide urine, and I will cooperate if I am asked to provide an observed specimen.  I understand that my provider will check a copy of my State Prescription Monitoring Program () Report in order to safely prescribe medications.  Pill Counts: I consent to pill counts when requested.   I may be asked to bring all my prescribed controlled substance medications, in their original bottles, to all of my scheduled appointments. In addition, my provider may ask me to come to the practice at any time for a random pill count. 8. TERMINATION OF THIS AGREEMENT  For my safety, my prescriber has the right to stop prescribing controlled substance medications and may end this agreement. Initials:_______   Conditions that may result in termination of this agreement:  a. I do not show any improvement in pain, or my activity has not improved. b. I develop rapid tolerance or loss of improvement, as described in my treatment plan.  c. I develop significant side effects from the medication. d. My behavior is not consistent with the responsibilities outlined above, thereby causing safety concerns to continue prescribing controlled substance medications. e. I fail to follow the terms of this agreement. f. Other:____________________________       UNDERSTANDING THIS MEDICATION AGREEMENT:    I have read the above and have had all my questions answered. For chronic disease management, I know that my symptoms can be managed with many types of treatments. A chronic medication trial may be part of my treatment, but I must be an active participant in my care. Medication therapy is only one part of my symptom management plan. In some cases, there may be limited scientific evidence to support the chronic use of certain medications to improve symptoms and daily function. Furthermore, in certain circumstances, there may be scientific information that suggests that the use of chronic controlled substances may worsen my symptoms and increase my risk of unintentional death directly related to this medication therapy. I know that if my provider feels my risk from controlled medications is greater than my benefit, I will have my controlled substance medication(s) compassionately lowered or removed altogether.      I further agree to allow this office to contact my HIPAA contact if there are concerns about my safety and use of the controlled medications. I have agreed to use the prescribed controlled substance medications to me as instructed by my provider and as stated in this Medication Agreement. My initial on each page and my signature below shows that I have read each page and I have had the opportunity to ask questions with answers provided by my provider.     Patient Name (Printed): _____________________________________  Patient Signature:  ______________________   Date: _____________    Prescriber Name (Printed): ___________________________________  Prescriber Signature: _____________________  Date: _____________

## 2021-03-17 ASSESSMENT — ENCOUNTER SYMPTOMS
EYES NEGATIVE: 1
ABDOMINAL PAIN: 1
ABDOMINAL DISTENTION: 0
RESPIRATORY NEGATIVE: 1

## 2021-04-12 ENCOUNTER — TELEPHONE (OUTPATIENT)
Dept: PRIMARY CARE CLINIC | Age: 41
End: 2021-04-12

## 2021-04-16 NOTE — TELEPHONE ENCOUNTER
Spoke with the patient she will talk to her GI she needs to get this test done at the [facility] facility or other facility may recommend  She agrees on that

## 2021-04-27 ENCOUNTER — OFFICE VISIT (OUTPATIENT)
Dept: PRIMARY CARE CLINIC | Age: 41
End: 2021-04-27
Payer: COMMERCIAL

## 2021-04-27 VITALS
RESPIRATION RATE: 12 BRPM | HEART RATE: 76 BPM | BODY MASS INDEX: 30.55 KG/M2 | DIASTOLIC BLOOD PRESSURE: 66 MMHG | OXYGEN SATURATION: 98 % | WEIGHT: 178 LBS | SYSTOLIC BLOOD PRESSURE: 120 MMHG

## 2021-04-27 DIAGNOSIS — D50.9 IRON DEFICIENCY ANEMIA, UNSPECIFIED IRON DEFICIENCY ANEMIA TYPE: Primary | ICD-10-CM

## 2021-04-27 DIAGNOSIS — D50.9 IRON DEFICIENCY ANEMIA, UNSPECIFIED IRON DEFICIENCY ANEMIA TYPE: ICD-10-CM

## 2021-04-27 LAB
ANISOCYTOSIS: ABNORMAL
BASOPHILS ABSOLUTE: 0.1 K/UL (ref 0–0.2)
BASOPHILS RELATIVE PERCENT: 0.7 %
EOSINOPHILS ABSOLUTE: 0.2 K/UL (ref 0–0.6)
EOSINOPHILS RELATIVE PERCENT: 2.5 %
HCT VFR BLD CALC: 30.6 % (ref 36–48)
HEMATOLOGY PATH CONSULT: YES
HEMOGLOBIN: 9.3 G/DL (ref 12–16)
HYPOCHROMIA: ABNORMAL
LYMPHOCYTES ABSOLUTE: 1.1 K/UL (ref 1–5.1)
LYMPHOCYTES RELATIVE PERCENT: 11.4 %
MCH RBC QN AUTO: 20.8 PG (ref 26–34)
MCHC RBC AUTO-ENTMCNC: 30.4 G/DL (ref 31–36)
MCV RBC AUTO: 68.5 FL (ref 80–100)
MICROCYTES: ABNORMAL
MONOCYTES ABSOLUTE: 0.8 K/UL (ref 0–1.3)
MONOCYTES RELATIVE PERCENT: 8.8 %
NEUTROPHILS ABSOLUTE: 7.1 K/UL (ref 1.7–7.7)
NEUTROPHILS RELATIVE PERCENT: 76.6 %
OVALOCYTES: ABNORMAL
PDW BLD-RTO: 21.2 % (ref 12.4–15.4)
PLATELET # BLD: 579 K/UL (ref 135–450)
PLATELET SLIDE REVIEW: ABNORMAL
PMV BLD AUTO: 6.5 FL (ref 5–10.5)
POLYCHROMASIA: ABNORMAL
RBC # BLD: 4.46 M/UL (ref 4–5.2)
SLIDE REVIEW: ABNORMAL
TEAR DROP CELLS: ABNORMAL
WBC # BLD: 9.3 K/UL (ref 4–11)

## 2021-04-27 PROCEDURE — 99213 OFFICE O/P EST LOW 20 MIN: CPT | Performed by: FAMILY MEDICINE

## 2021-04-27 PROCEDURE — 1036F TOBACCO NON-USER: CPT | Performed by: FAMILY MEDICINE

## 2021-04-27 PROCEDURE — G8417 CALC BMI ABV UP PARAM F/U: HCPCS | Performed by: FAMILY MEDICINE

## 2021-04-27 PROCEDURE — G8427 DOCREV CUR MEDS BY ELIG CLIN: HCPCS | Performed by: FAMILY MEDICINE

## 2021-04-27 ASSESSMENT — ENCOUNTER SYMPTOMS
ABDOMINAL PAIN: 1
RESPIRATORY NEGATIVE: 1
EYES NEGATIVE: 1

## 2021-04-27 NOTE — PROGRESS NOTES
SUBJECTIVE:  Patient ID: Nayeli Leon is a 42 y.o. y.o. female     HPI   Anemia: Patient presents for presents evaluation of anemia. Anemia was found by routine CBC. It has been present for several months. Associated signs & symptoms: fatigue and cold , no CP or SOB. Patient with severe Crohn's disease she has been followed by GI at [facility] recently was seen with the surgeon they are looking for surgery since she is not responding to any medications  She is seeing [facility] possible autoimmune disease the blood work shows that her dry H&H . as she remembered I do not have the record in the chart  She has been taking oral iron, apparently with her Crohn's she is not absorbing any iron through her GI tract.     Past Medical History:   Diagnosis Date    Crohn's disease ([facility] [address] 2006    GI @[facility]    IBD (inflammatory bowel disease)     PCOS (polycystic ovarian syndrome)     Psoriasis     Psoriatic arthritis ([facility]     sees Rheumatology       Past Surgical History:   Procedure Laterality Date     SECTION  2006     SECTION, CLASSIC  2009     Family History   Problem Relation Age of Onset    Diabetes Mother         age 68    Cancer Mother     Stroke Father          age 80      Social History     Socioeconomic History    Marital status:      Spouse name: None    Number of children: None    Years of education: None    Highest education level: None   Occupational History    None   Social Needs    Financial resource strain: Not hard at all   Gustavo-Sen insecurity     Worry: Never true     Inability: Never true    Transportation needs     Medical: No     Non-medical: No   Tobacco Use    Smoking status: Former Smoker     Packs/day: 1.00     Years: 10.00     Pack years: 10.00     Quit date: 2005     Years since quitting: 15.6    Smokeless tobacco: Never Used   Substance and Sexual Activity    Alcohol use: Yes     Comment: ocassionally    Drug use: No    Sexual activity: Yes     Partners: Male     Birth control/protection: I.U.D. Lifestyle    Physical activity     Days per week: None     Minutes per session: None    Stress: None   Relationships    Social connections     Talks on phone: None     Gets together: None     Attends Sabianism service: None     Active member of club or organization: None     Attends meetings of clubs or organizations: None     Relationship status: None    Intimate partner violence     Fear of current or ex partner: None     Emotionally abused: None     Physically abused: None     Forced sexual activity: None   Other Topics Concern    None   Social History Narrative         [employer]    [employer]    Children 14 & 11     She has half sister x 3 one     [de-identified] brother x 1     Current Outpatient Medications   Medication Sig Dispense Refill    omeprazole (PRILOSEC) 40 MG delayed release capsule TAKE 1 CAPSULE BY MOUTH EVERY DAY 90 capsule 1    escitalopram (LEXAPRO) 10 MG tablet TAKE 1 AND 1/2 TABLETS BY MOUTH EVERY  tablet 1    JARDIANCE 25 MG tablet TAKE 1 TABLET BY MOUTH EVERY DAY 30 tablet 3    glimepiride (AMARYL) 2 MG tablet TAKE 1 TABLET BY MOUTH EVERY DAY IN THE MORNING 90 tablet 1    tacrolimus (PROGRAF) 1 MG capsule Take 1 capsule by mouth 2 times daily      clotrimazole-betamethasone (LOTRISONE) 1-0.05 % cream APPLY TO AFFECTED AREA TWICE DAILY **INS WILL ONLY COVER 15G AT A TIME**      clobetasol (TEMOVATE) 0.05 % cream APPLY TO AFFECTED AREAS 1 2 TIMES DAILY      clotrimazole (LOTRIMIN) 1 % cream Apply topically 2 times daily Apply topically 2 times daily.       folic acid (FOLVITE) 1 MG tablet TAKE 1 TABLET BY MOUTH EVERY DAY 90 tablet 0    ONETOUCH ULTRA strip USE TO TEST ONCE DAILY 100 strip 2    ondansetron (ZOFRAN-ODT) 4 MG disintegrating tablet Take 4 mg by mouth every 8 hours as needed for Nausea or Vomiting      traMADol (ULTRAM) 50 MG tablet Take 50 mg by mouth every 6 hours as needed for Pain.  Continuous Blood Gluc  (FREESTYLE CESAR READER) ERNESTO 1 Device by In Vitro route 2 times daily 1 Device 0    Continuous Blood Gluc Sensor (FREESTYLE CESAR 14 DAY SENSOR) MISC Check once a day 100 each 1    ONETOUCH DELICA LANCETS FINE MISC USE ONE DAILY 100 each 1    methotrexate (RHEUMATREX) 2.5 MG chemo tablet Take 20 mg by mouth once a week Every friday      Blood Glucose Monitoring Suppl (ONE TOUCH ULTRA MINI) w/Device KIT 1 kit by Does not apply route daily as needed (fasting) 1 kit 0    diphenoxylate-atropine (LOMOTIL) 2.5-0.025 MG per tablet diphenoxylate-atropine 2.5 mg-0.025 mg tablet       No current facility-administered medications for this visit. No Known Allergies    Review of Systems   Constitutional: Positive for appetite change and fatigue. HENT: Negative. Eyes: Negative. Respiratory: Negative. Cardiovascular: Negative. Gastrointestinal: Positive for abdominal pain. All other systems reviewed and are negative. OBJECTIVE:  /66 (Site: Right Upper Arm, Position: Sitting, Cuff Size: Small Adult)   Pulse 76   Resp 12   Wt 178 lb (80.7 kg)   SpO2 98%   Breastfeeding No   BMI 30.55 kg/m²     Physical Exam  Vitals signs reviewed. Constitutional:       Appearance: Normal appearance. HENT:      Head: Normocephalic and atraumatic. Eyes:      General: No scleral icterus. Conjunctiva/sclera: Conjunctivae normal.   Neck:      Thyroid: No thyromegaly. Vascular: No JVD. Cardiovascular:      Rate and Rhythm: Normal rate and regular rhythm. Heart sounds: Normal heart sounds. No murmur. No friction rub. No gallop. Pulmonary:      Effort: Pulmonary effort is normal.      Breath sounds: Normal breath sounds. No wheezing or rales. Abdominal:      General: Bowel sounds are normal. There is no distension. Palpations: Abdomen is soft. There is no mass. Tenderness: There is no abdominal tenderness.       Hernia: No hernia is present. Lymphadenopathy:      Cervical: No cervical adenopathy. Skin:     Findings: No rash. Neurological:      Mental Status: She is alert and oriented to person, place, and time. ASSESSMENT:   Diagnosis Orders   1.  Iron deficiency anemia, unspecified iron deficiency anemia type  CBC Auto Differential    IRON AND TIBC    FERRITIN    TATI Padilla MD, Oncology, [facility]       PLAN:  See orders  May need to have IV iron infusion  Referral to see hematology is given

## 2021-04-28 LAB
FERRITIN: 56.8 NG/ML (ref 15–150)
HEMATOLOGY PATH CONSULT: NORMAL
IRON SATURATION: 5 % (ref 15–50)
IRON: 11 UG/DL (ref 37–145)
TOTAL IRON BINDING CAPACITY: 207 UG/DL (ref 260–445)

## 2021-05-04 ENCOUNTER — OFFICE VISIT (OUTPATIENT)
Dept: PRIMARY CARE CLINIC | Age: 41
End: 2021-05-04
Payer: COMMERCIAL

## 2021-05-04 VITALS
HEART RATE: 94 BPM | WEIGHT: 174 LBS | DIASTOLIC BLOOD PRESSURE: 84 MMHG | RESPIRATION RATE: 12 BRPM | SYSTOLIC BLOOD PRESSURE: 118 MMHG | BODY MASS INDEX: 29.87 KG/M2 | OXYGEN SATURATION: 97 %

## 2021-05-04 DIAGNOSIS — E11.9 TYPE 2 DIABETES MELLITUS WITHOUT COMPLICATION, WITHOUT LONG-TERM CURRENT USE OF INSULIN (HCC): ICD-10-CM

## 2021-05-04 DIAGNOSIS — D50.9 IRON DEFICIENCY ANEMIA, UNSPECIFIED IRON DEFICIENCY ANEMIA TYPE: ICD-10-CM

## 2021-05-04 DIAGNOSIS — I10 ESSENTIAL HYPERTENSION: ICD-10-CM

## 2021-05-04 DIAGNOSIS — E78.2 MIXED HYPERLIPIDEMIA: ICD-10-CM

## 2021-05-04 DIAGNOSIS — K50.90 CROHN'S DISEASE WITHOUT COMPLICATION, UNSPECIFIED GASTROINTESTINAL TRACT LOCATION (HCC): ICD-10-CM

## 2021-05-04 DIAGNOSIS — E11.9 TYPE 2 DIABETES MELLITUS WITHOUT COMPLICATION, WITHOUT LONG-TERM CURRENT USE OF INSULIN (HCC): Primary | ICD-10-CM

## 2021-05-04 LAB
ALBUMIN SERPL-MCNC: 3.4 G/DL (ref 3.4–5)
ALP BLD-CCNC: 69 U/L (ref 40–129)
ALT SERPL-CCNC: <5 U/L (ref 10–40)
ANION GAP SERPL CALCULATED.3IONS-SCNC: 13 MMOL/L (ref 3–16)
AST SERPL-CCNC: 7 U/L (ref 15–37)
BILIRUB SERPL-MCNC: <0.2 MG/DL (ref 0–1)
BILIRUBIN DIRECT: <0.2 MG/DL (ref 0–0.3)
BILIRUBIN, INDIRECT: ABNORMAL MG/DL (ref 0–1)
BUN BLDV-MCNC: 10 MG/DL (ref 7–20)
CALCIUM SERPL-MCNC: 8.6 MG/DL (ref 8.3–10.6)
CHLORIDE BLD-SCNC: 102 MMOL/L (ref 99–110)
CHOLESTEROL, TOTAL: 131 MG/DL (ref 0–199)
CO2: 24 MMOL/L (ref 21–32)
CREAT SERPL-MCNC: 0.6 MG/DL (ref 0.6–1.1)
FOLATE: 12.87 NG/ML (ref 4.78–24.2)
GFR AFRICAN AMERICAN: >60
GFR NON-AFRICAN AMERICAN: >60
GLUCOSE BLD-MCNC: 98 MG/DL (ref 70–99)
HDLC SERPL-MCNC: 39 MG/DL (ref 40–60)
HEPATITIS C ANTIBODY INTERPRETATION: NORMAL
LDL CHOLESTEROL CALCULATED: 75 MG/DL
POTASSIUM SERPL-SCNC: 4.6 MMOL/L (ref 3.5–5.1)
SODIUM BLD-SCNC: 139 MMOL/L (ref 136–145)
TOTAL PROTEIN: 7.2 G/DL (ref 6.4–8.2)
TRIGL SERPL-MCNC: 86 MG/DL (ref 0–150)
TSH SERPL DL<=0.05 MIU/L-ACNC: 1.32 UIU/ML (ref 0.27–4.2)
VITAMIN B-12: 892 PG/ML (ref 211–911)
VITAMIN D 25-HYDROXY: 27.8 NG/ML
VLDLC SERPL CALC-MCNC: 17 MG/DL

## 2021-05-04 PROCEDURE — 2022F DILAT RTA XM EVC RTNOPTHY: CPT | Performed by: FAMILY MEDICINE

## 2021-05-04 PROCEDURE — G8427 DOCREV CUR MEDS BY ELIG CLIN: HCPCS | Performed by: FAMILY MEDICINE

## 2021-05-04 PROCEDURE — 1036F TOBACCO NON-USER: CPT | Performed by: FAMILY MEDICINE

## 2021-05-04 PROCEDURE — 99214 OFFICE O/P EST MOD 30 MIN: CPT | Performed by: FAMILY MEDICINE

## 2021-05-04 PROCEDURE — G8417 CALC BMI ABV UP PARAM F/U: HCPCS | Performed by: FAMILY MEDICINE

## 2021-05-04 PROCEDURE — 3046F HEMOGLOBIN A1C LEVEL >9.0%: CPT | Performed by: FAMILY MEDICINE

## 2021-05-04 RX ORDER — OMEPRAZOLE 40 MG/1
40 CAPSULE, DELAYED RELEASE ORAL DAILY
Qty: 90 CAPSULE | Refills: 1 | Status: SHIPPED | OUTPATIENT
Start: 2021-05-04 | End: 2021-12-06

## 2021-05-04 RX ORDER — EMPAGLIFLOZIN 25 MG/1
25 TABLET, FILM COATED ORAL DAILY
Qty: 30 TABLET | Refills: 3 | Status: SHIPPED | OUTPATIENT
Start: 2021-05-04 | End: 2022-03-14

## 2021-05-04 ASSESSMENT — PATIENT HEALTH QUESTIONNAIRE - PHQ9
SUM OF ALL RESPONSES TO PHQ QUESTIONS 1-9: 0
SUM OF ALL RESPONSES TO PHQ QUESTIONS 1-9: 0
1. LITTLE INTEREST OR PLEASURE IN DOING THINGS: 0

## 2021-05-04 NOTE — PROGRESS NOTES
Subjective:      Patient ID: Devyn Farias is a 42 y.o. female. HPI  Patient is here for follow-up   She was seen by GI at [facility],   Her disease is not controlled she is supposed to have surgery soon  Last blood work show anemia she is not absorbing iron in her GI tract she needs to have fusion she is scheduled to see hematology tomorrow   past with are reactive arthritis associated with her Crohn disease, per the patient symptoms are tolerable. Diabetes Mellitus Type II, Follow-up: Patient here for follow-up of Type 2 diabetes mellitus. Current symptoms/problems include none and have been stable. Symptoms have been present for a few years.     Known diabetic complications: none  Cardiovascular risk factors: diabetes mellitus, hypertension and obesity (BMI >= 30 kg/m2)  Current diabetic medications include see med's list .      Eye exam current (within one year): yes  Weight trend: stable  Prior visit with dietician: no  Current diet: in general, a \"healthy\" diet    Current exercise: none  He has been experiencing low blood sugar so advised the patient stop the glimepiride  Current monitoring regimen: home blood tests - weekly  Home blood sugar records: fasting range: She has not been checking her sugar lately  Any episodes of hypoglycemia? no  Hypertension: Patient is here follow up on  elevated blood pressures. Age at onset of elevated blood pressure:  Few years. Cardiac symptoms none. Patient denies chest pain, chest pressure/discomfort, dyspnea, exertional chest pressure/discomfort, lower extremity edema, near-syncope, orthopnea and palpitations. Cardiovascular risk factors: dyslipidemia, hypertension, obesity (BMI >= 30 kg/m2) and sedentary lifestyle. Use of agents associated with hypertension: steroids. History of target organ damage: none.     Pt with reactive arthritis very happy with her current rheumatologist she wants to see somebody else  Patient with anxiety stable on Lexapro  Her rheumatologist since she has been on steroids for long time, on and off  he wantes to have a DEXA scan  Review of Systems   Constitutional: Negative. Negative for chills and fever. HENT:  Negative for postnasal drip, rhinorrhea, sinus pressure, tinnitus and trouble swallowing. Eyes: Negative. Respiratory: Negative. Cardiovascular: Negative. Gastrointestinal: Negative. Endocrine: Negative. Genitourinary: Negative. Musculoskeletal: . Negative for back pain, gait problem, joint swelling, neck pain and neck stiffness. Skin:. Negative for color change and pallor. Allergic/Immunologic: Negative. Neurological: Negative. Psychiatric/Behavioral: Negative. All other systems reviewed and are negative.   Past Medical History:   Diagnosis Date    Crohn's disease ([facility] 2006    GI @[facility]    IBD (inflammatory bowel disease)     PCOS (polycystic ovarian syndrome)     Psoriasis     Psoriatic arthritis ([facility]     sees Rheumatology       Past Surgical History:   Procedure Laterality Date     SECTION  2006     SECTION, CLASSIC  2009     Family History   Problem Relation Age of Onset    Diabetes Mother         age 68    Cancer Mother     Stroke Father          age 80      Social History     Socioeconomic History    Marital status:      Spouse name: Not on file    Number of children: Not on file    Years of education: Not on file    Highest education level: Not on file   Occupational History    Not on file   Social Needs    Financial resource strain: Not hard at all   Gustavo-Sen insecurity     Worry: Never true     Inability: Never true    Transportation needs     Medical: No     Non-medical: No   Tobacco Use    Smoking status: Former Smoker     Packs/day: 1.00     Years: 10.00     Pack years: 10.00     Quit date: 2005     Years since quitting: 15.6    Smokeless tobacco: Never Used   Substance and Sexual Activity    Alcohol use: Yes     Comment: ocassionally    Drug hours as needed for Nausea or Vomiting      traMADol (ULTRAM) 50 MG tablet Take 50 mg by mouth every 6 hours as needed for Pain.  Continuous Blood Gluc  (FREESTYLE CESAR READER) ERNESTO 1 Device by In Vitro route 2 times daily 1 Device 0    Continuous Blood Gluc Sensor (FREESTYLE CESAR 14 DAY SENSOR) MISC Check once a day 100 each 1    ONETOUCH DELICA LANCETS FINE MISC USE ONE DAILY 100 each 1    methotrexate (RHEUMATREX) 2.5 MG chemo tablet Take 20 mg by mouth once a week Every friday      Blood Glucose Monitoring Suppl (ONE TOUCH ULTRA MINI) w/Device KIT 1 kit by Does not apply route daily as needed (fasting) 1 kit 0    diphenoxylate-atropine (LOMOTIL) 2.5-0.025 MG per tablet diphenoxylate-atropine 2.5 mg-0.025 mg tablet       No current facility-administered medications for this visit. No Known Allergies      Objective:   Physical Exam   Constitutional: She appears well-developed and well-nourished. No distress. HENT:   Head: Normocephalic and atraumatic. Right Ear: External ear normal.   Left Ear: External ear normal.   Nose: Nose normal.   Mouth/Throat: Oropharynx is clear and moist. No oropharyngeal exudate. Eyes: Conjunctivae and EOM are normal. Pupils are equal, round, and reactive to light. Right eye exhibits no discharge. Left eye exhibits no discharge. No scleral icterus. Neck: Normal range of motion. Neck supple. No JVD present. No tracheal deviation present. No thyromegaly present. Cardiovascular: Normal rate, regular rhythm, normal heart sounds and intact distal pulses. Pulmonary/Chest: Effort normal and breath sounds normal. No stridor. No respiratory distress. She has no wheezes. She has no rales. She exhibits no tenderness. Abdominal: Soft. Bowel sounds are normal. She exhibits no distension and no mass. There is no tenderness. There is no rebound and no guarding. Musculoskeletal: Normal range of motion. She exhibits no edema or tenderness.    Lymphadenopathy: She has no cervical adenopathy. Skin: Skin is warm and dry. No rash noted. She is not diaphoretic. No erythema. No pallor. Mild irritation anterior above the ankle now right foot mild blanching no other abnormality full range of motion of the ankle both left and right  microfilaments test exam was negative   Vitals reviewed. Assessment:      Diagnosis Orders   1. Type 2 diabetes mellitus without complication, without long-term current use of insulin (HCC)  Hemoglobin A1C    Lipid Panel    Hepatic Function Panel    Basic Metabolic Panel    HM DIABETES FOOT EXAM    Vitamin B12 & Folate    Vitamin D 25 Hydroxy    TSH without Reflex    Jesica Redmond MD, Rheumatology, [facility]    HEPATITIS C ANTIBODY   2. Crohn's disease without complication, unspecified gastrointestinal tract location [facility])  Elke Lacy MD, Rheumatology, [place]   3. Iron deficiency anemia, unspecified iron deficiency anemia type     4. Essential hypertension  Basic Metabolic Panel   5.  Mixed hyperlipidemia           Plan:      See orders,   Monitor symptoms closely  Follow-up with GI  Off Amaryl  Recommend the patient to continue check her blood glucose

## 2021-05-04 NOTE — PATIENT INSTRUCTIONS
by [facility] ([facility]). If you have questions about a medical condition or this instruction, always ask your healthcare professional. [place] [address] any warranty or liability for your use of this information.

## 2021-05-05 ENCOUNTER — PATIENT MESSAGE (OUTPATIENT)
Dept: PRIMARY CARE CLINIC | Age: 41
End: 2021-05-05

## 2021-05-05 LAB
ESTIMATED AVERAGE GLUCOSE: 145.6 MG/DL
HBA1C MFR BLD: 6.7 %

## 2021-05-06 RX ORDER — ERGOCALCIFEROL 1.25 MG/1
50000 CAPSULE ORAL WEEKLY
Qty: 4 CAPSULE | Refills: 2 | Status: SHIPPED | OUTPATIENT
Start: 2021-05-06 | End: 2021-05-27

## 2021-05-06 NOTE — TELEPHONE ENCOUNTER
From: Barbara Rosas  To: Jarett Rajput MD  Sent: 5/5/2021 9:45 PM EDT  Subject: Visit Follow-Up Question    Reji Bocanegra,  When does Dr. Marcia Willard want me to come in for follow up? And when do I need to come in for my Tramadol follow up? Saw Bjallan Castañedas today; scheduled for infusion next Wed. Waiting on insurance. Also thalidomide was approved but Im waiting for insurance to release for delivery. -H

## 2021-05-26 NOTE — TELEPHONE ENCOUNTER
Medication:   Requested Prescriptions     Pending Prescriptions Disp Refills    vitamin D (ERGOCALCIFEROL) 1.25 MG (10110 UT) CAPS capsule [Pharmacy Med Name: VITAMIN D2 1.25MG(50,000 UNIT)] 4 capsule 2     Sig: TAKE 1 CAPSULE BY MOUTH ONE TIME PER WEEK     Last Filled:  05/06/21    Last appt: 5/4/2021   Next appt: 8/5/2021    Last OARRS: No flowsheet data found.

## 2021-05-27 RX ORDER — ERGOCALCIFEROL 1.25 MG/1
CAPSULE ORAL
Qty: 4 CAPSULE | Refills: 2 | Status: SHIPPED | OUTPATIENT
Start: 2021-05-27 | End: 2021-08-17

## 2021-06-11 NOTE — PROGRESS NOTES
Denys Eckert MD  [facility]) Physicians - Rheumatology    [x] [facility] HOSPITAL:  [address] [facility]  Norbert Shaikh [place] [] [place] Office:  [address] [facility]   Office: [phone]  Fax: [phone]     RHEUMATOLOGY CONSULT NOTE    HISTORY OF PRESENT ILLNESS:    The pt is a 42 y.o. female referred by Willard Wilkinson MD for Crohn's disease. Current rheum meds:  Entyvio per GI  MTX 8 tablets/wk  Vitamin D 50,000 IU wkly w/ 2000 IU daily    Prior rheum meds:  Remicade: first biologic tried in 2011, Q6-8 wk, did not respond  Humira w/ MTX: pt states she took Humira for several yrs, initially worked but stopped working from [id number], did not develop antibodies  Stelara: tried in 2018    Pt states she following w/ [facility] (she sees Q3mo) for severe Crohn's disease. She tells me she was initially diagnosed w/ Crohn's disease after she gave birth to her daughter at age 31. She states her IBD has been very difficulty to control, it's been active for the past 2 yrs. She tells me she has failed Remicade, Humira and Stelara. She is currently on Entyvio and reports active IBD Sx on this. She tells me she is scheduled for a total colectomy on 7/16/21. Pt reports chronic plaque PsO since age 9. She states this has been confirmed by dermatology. She is following w/ Dr. Tami Eaotn. She recalls having plaques on her knees, shins and scalp. She currently has one spot on her R inner thigh. Pt states she was initially diagnosed w/ inflammatory arthritis (IBD related vs PsA) by a rheumatologist in [place]. Most recently she had been following w/ Dr. Margaret Vidal, she is switching her Rheumatology care d/t dissatisfaction. She reports well controlled inflammatory arthritis on MTX 8 tablets/wk. She reports chronic stiffness in her hands which feel stiff for an hr at most in the morning. Knuckles have become swollen during flares in the past but does not occur on a consistent basis.  She reports a prior Hx of dactylitis. Plantar aspect of her MTP joints hurt. Denies chronic LBP or stiffness. Achilles tendons hurt at times but denies swelling or Hx of plantar fasciitis. Denies known FHx of autoimmune disease. She is a former smoker, quit in . She has 2 children. She works as a manager for a worker's comp office.     REVIEW OF SYSTEMS:    Constitutional: denies chronic fatigue, fever/chills, night sweats, unintentional weight loss  Integumentary: denies photosensitivity, rash, +diffuse hair thinning since being on MTX, or Sx of Raynaud's phenomenon  Eyes: denies dry eyes, redness or pain, visual disturbance, or floaters  Ears: denies hearing loss, tinnitus, vertigo, or recurrent ear infections  Nose: denies nasal ulcers or recurrent sinusitis  Oral cavity: denies dry mouth, +recurrent oral ulcers  Cardiovascular: denies CP, palpitations, Hx of pericardial effusion or pericarditis  Respiratory: denies SOB, cough, hemoptysis, or pleurisy  Gastrointestinal: denies heart burn, dysphagia or esophageal dysmotility, denies change in bowel habits or Sx of IBD  Hematologic/Lymphatic: denies abnormal bruising or bleeding, denies Hx of blood clots or recurrent miscarriages, denies swollen LNs  Musculoskeletal:  refer to above HPI   Neurological: denies focal weakness, paresthesias/hyperesthesias or change in sensation, denies Hx of seizure, denies change in gait, balance, or memory  Psychiatric: denies Hx of depression or anxiety  Endocrine: denies cold or heat intolerance  Allergic/Immunologic: denies nasal congestion, chronic asthma, or hives    Past Medical History:   Diagnosis Date    Crohn's disease ([facility] [address] 2006    GI @[facility]    IBD (inflammatory bowel disease)     PCOS (polycystic ovarian syndrome)     Psoriasis     Psoriatic arthritis ([facility] [address]     sees Rheumatology         Past Surgical History:   Procedure Laterality Date     SECTION     [facility], CLASSIC  2009       Social History     Socioeconomic History    Marital status:      Spouse name: Not on file    Number of children: Not on file    Years of education: Not on file    Highest education level: Not on file   Occupational History    Not on file   Tobacco Use    Smoking status: Former Smoker     Packs/day: 1.00     Years: 10.00     Pack years: 10.00     Quit date: 2005     Years since quitting: 15.8    Smokeless tobacco: Never Used   Substance and Sexual Activity    Alcohol use: Yes     Comment: ocassionally    Drug use: No    Sexual activity: Yes     Partners: Male     Birth control/protection: I.U.D. 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    Children 14 & 11     She has half sister x 3 one     [de-identified] brother x 1     Social Determinants of Health     Financial Resource Strain: Low Risk     Difficulty of Paying Living Expenses: Not hard at all   Food Insecurity: No Food Insecurity    Worried About Running Out of Food in the Last Year: Never true    [address] in the Last Year: Never true   Transportation Needs: No Transportation Needs    Lack of Transportation (Medical): No    Lack of Transportation (Non-Medical):  No   Physical Activity:     Days of Exercise per Week:     Minutes of Exercise per Session:    Stress:     Feeling of Stress :    Social Connections:     Frequency of Communication with Friends and Family:     Frequency of Social Gatherings with Friends and Family:     Attends [employer] Services:     Active Member of Clubs or Organizations:     Attends Club or Organization Meetings:     Marital Status:    Intimate Partner Violence:     Fear of Current or Ex-Partner:     Emotionally Abused:     Physically Abused:     Sexually Abused:         Family History   Problem Relation Age of Onset    Diabetes Mother         age 68    Cancer Mother     Stroke Father          age 80 MEDICATIONS:    Current Outpatient Medications:     dexamethasone 0.5 MG/5ML solution, , Disp: , Rfl:     dicyclomine (BENTYL) 10 MG capsule, , Disp: , Rfl:     Ferrous Sulfate (IRON PO), Take by mouth, Disp: , Rfl:     Cyanocobalamin (VITAMIN B 12 PO), Take by mouth, Disp: , Rfl:     Vedolizumab (ENTYVIO IV), Infuse intravenously Every 4 weeks, Disp: , Rfl:     methotrexate (RHEUMATREX) 2.5 MG chemo tablet, Take 8 tablets by mouth once a week Every friday, Disp: 96 tablet, Rfl: 0    folic acid (FOLVITE) 1 MG tablet, Take 1 tablet by mouth daily, Disp: 90 tablet, Rfl: 0    predniSONE (DELTASONE) 10 MG tablet, Take 2 tablets by mouth daily for 10 days, THEN 1 tablet daily for 10 days, THEN 0.5 tablets daily for 10 days. , Disp: 35 tablet, Rfl: 1    vitamin D (ERGOCALCIFEROL) 1.25 MG (35513 UT) CAPS capsule, TAKE 1 CAPSULE BY MOUTH ONE TIME PER WEEK, Disp: 4 capsule, Rfl: 2    empagliflozin (JARDIANCE) 25 MG tablet, Take 25 mg by mouth daily, Disp: 30 tablet, Rfl: 3    omeprazole (PRILOSEC) 40 MG delayed release capsule, Take 1 capsule by mouth daily, Disp: 90 capsule, Rfl: 1    escitalopram (LEXAPRO) 10 MG tablet, TAKE 1 AND 1/2 TABLETS BY MOUTH EVERY DAY, Disp: 135 tablet, Rfl: 1    ONETOUCH ULTRA strip, USE TO TEST ONCE DAILY, Disp: 100 strip, Rfl: 2    ondansetron (ZOFRAN-ODT) 4 MG disintegrating tablet, Take 4 mg by mouth every 8 hours as needed for Nausea or Vomiting, Disp: , Rfl:     traMADol (ULTRAM) 50 MG tablet, Take 50 mg by mouth every 6 hours as needed for Pain., Disp: , Rfl:     Continuous Blood Gluc  (FREESTYLE CESAR READER) ERNESTO, 1 Device by In Vitro route 2 times daily, Disp: 1 Device, Rfl: 0    Continuous Blood Gluc Sensor (FREESTYLE CESAR 14 DAY SENSOR) MISC, Check once a day, Disp: 100 each, Rfl: 1    ONETOUCH DELICA LANCETS FINE MISC, USE ONE DAILY, Disp: 100 each, Rfl: 1    Blood Glucose Monitoring Suppl (ONE TOUCH ULTRA MINI) w/Device KIT, 1 kit by Does not apply route daily as needed (fasting), Disp: 1 kit, Rfl: 0    diphenoxylate-atropine (LOMOTIL) 2.5-0.025 MG per tablet, diphenoxylate-atropine 2.5 mg-0.025 mg tablet, Disp: , Rfl:     ALLERGIES:  Patient has no known allergies. PHYSICAL EXAM:    Vitals:    /66 (Site: Right Upper Arm, Position: Sitting, Cuff Size: Large Adult)   Pulse 77   Ht 5' 4\" (1.626 m)   Wt 163 lb (73.9 kg)   SpO2 98%   BMI 27.98 kg/m²     GEN: AAOx3, in NAD, well-appearing  HEAD: normocephalic, atraumatic  EYES: no injection or icterus  CVS: RRR  LUNGS: in no acute respiratory distress, CTAB  ABDOMEN: +BS, soft, NT/ND  MSK:  Spine: no kyphosis or lordosis, no paraspinal muscle or vertebral tenderness, SI joints NTTP  Upper extremities:   Hands: no synovitis in the MCP, PIP, or DIP joints b/l, no dactylitis, able to make strong full fists, no evidence of sclerodactyly, calcinosis, digital ulcers or pitting   Wrist: no synovitis in the wrist joints b/l, FROM   Elbow: no synovitis or bursitis, FROM  Lower extremities:   Knees: no warmth or effusion present, FROM   Ankles: no synovitis, FROM, Achilles tendons w/o swelling or warmth NTTP   Feet: no toe swelling or pain or warmth on palpation w/ FROM, negative MTP squeeze test  INTEGUMENT: no rash or psoriatic lesions, no petechiae, bruises, or palpable purpura, no patchy alopecia, no nail or periungual changes, no clubbing or digital ulcers  PSYCH: normal mood    Labs:   I personally reviewed prior labs including:    Lab Results   Component Value Date    WBC 9.3 04/27/2021    HGB 9.3 (L) 04/27/2021    HCT 30.6 (L) 04/27/2021    MCV 68.5 (L) 04/27/2021     (H) 04/27/2021    LYMPHOPCT 11.4 04/27/2021    RBC 4.46 04/27/2021    MCH 20.8 (L) 04/27/2021    MCHC 30.4 (L) 04/27/2021    RDW 21.2 (H) 04/27/2021     Lab Results   Component Value Date     05/04/2021    K 4.6 05/04/2021     05/04/2021    CO2 24 05/04/2021    BUN 10 05/04/2021    CREATININE 0.6 05/04/2021    GLUCOSE 98 05/04/2021    CALCIUM 8.6 05/04/2021    PROT 7.2 05/04/2021    LABALBU 3.4 05/04/2021    BILITOT <0.2 05/04/2021    ALKPHOS 69 05/04/2021    AST 7 (L) 05/04/2021    ALT <5 (L) 05/04/2021    LABGLOM >60 05/04/2021    GFRAA >60 05/04/2021     Lab Results   Component Value Date    .7 (H) 04/03/2020     Lab Results   Component Value Date    SEDRATE 63 (H) 04/03/2020     Lab Results   Component Value Date    VITD25 27.8 (L) 05/04/2021      Negative Quantiferon gold (10/5/20)    Radiology:  I personally reviewed prior imaging including:    DEXA study (11/13/20):  Normal T-scores in the L-spine and hips. Procedures:  C-scope (3/24/21):  - Crohn's disease, with ileitis and colitis. - Simple Endoscopic Score for Crohn's Disease: 26, mucosal inflammatory changes secondary to Crohn's disease, with ileitis and colitis. Biopsied.   - Pseudopolyps at the colonic anastomosis. - Mucosal ulceration. Above results were discussed w/ the pt in detail during today's visit. ASSESSMENT/PLAN:  42 y.o. female w/ prior Hx of chronic inflammatory arthritis (peripheral joints, denies axial involvement), severe Crohn's disease and plaque PsO (follows w/ Dr. Braulio Dias) most recently followed by Dr. Oralia Holley. PMHx pertinent for HTN, T2DM, HLD and DONTRELL (followed by Hematology, receives IV iron infusions). Inflammatory arthritis appears to be well controlled on MTX currently, no active synovitis, dactylitis or enthesitis appreciated on exam today. Will obtain rheum w/u as below to evaluate her inflammatory arthritis and obtain prior Rheumatology records from her most recent rheumatologist's office. Cont same dose of MTX, discussed oliva-operative management of MTX w/ her upcoming colectomy. Prescribed low dose Prednisone taper for PRN use if her inflammatory arthritis flares up. Discussed abnormal differential from April, repeat CBC w/ diff now.  Discussed option of switching oral MTX to SC vs switching to SSZ if we need to in the future. Orders Placed This Encounter   Procedures    XR HAND LEFT (MIN 3 VIEWS)     Standing Status:   Future     Standing Expiration Date:   12/16/2021     Order Specific Question:   Reason for exam:     Answer:   evaluate for psoriatic arthritis    XR HAND RIGHT (MIN 3 VIEWS)     Standing Status:   Future     Standing Expiration Date:   12/16/2021     Order Specific Question:   Reason for exam:     Answer:   evaluate for psoriatic arthritis    HLA-B27 Antigen     Standing Status:   Future     Standing Expiration Date:   86/00/9990    Cyclic Citrul Peptide Antibody, IgG     Standing Status:   Future     Standing Expiration Date:   12/16/2021    Rheumatoid Factor     Standing Status:   Future     Standing Expiration Date:   12/16/2021    Hepatitis B Core Antibody, Total     Standing Status:   Future     Standing Expiration Date:   7/16/2021    Hepatitis B Surface Antigen     Standing Status:   Future     Standing Expiration Date:   7/16/2021    Hepatitis C Antibody     Standing Status:   Future     Standing Expiration Date:   7/16/2021    SAULO Reflex to Antibody Cascade     Standing Status:   Future     Standing Expiration Date:   12/16/2021     Outpatient Encounter Medications as of 6/16/2021   Medication Sig Dispense Refill    dexamethasone 0.5 MG/5ML solution       dicyclomine (BENTYL) 10 MG capsule       Ferrous Sulfate (IRON PO) Take by mouth      Cyanocobalamin (VITAMIN B 12 PO) Take by mouth      Vedolizumab (ENTYVIO IV) Infuse intravenously Every 4 weeks      methotrexate (RHEUMATREX) 2.5 MG chemo tablet Take 8 tablets by mouth once a week Every friday 96 tablet 0    folic acid (FOLVITE) 1 MG tablet Take 1 tablet by mouth daily 90 tablet 0    predniSONE (DELTASONE) 10 MG tablet Take 2 tablets by mouth daily for 10 days, THEN 1 tablet daily for 10 days, THEN 0.5 tablets daily for 10 days.  35 tablet 1    vitamin D (ERGOCALCIFEROL) 1.25 MG (04417 UT) CAPS capsule TAKE 1 CAPSULE BY MOUTH ONE TIME PER WEEK 4 capsule 2    empagliflozin (JARDIANCE) 25 MG tablet Take 25 mg by mouth daily 30 tablet 3    omeprazole (PRILOSEC) 40 MG delayed release capsule Take 1 capsule by mouth daily 90 capsule 1    escitalopram (LEXAPRO) 10 MG tablet TAKE 1 AND 1/2 TABLETS BY MOUTH EVERY  tablet 1    ONETOUCH ULTRA strip USE TO TEST ONCE DAILY 100 strip 2    ondansetron (ZOFRAN-ODT) 4 MG disintegrating tablet Take 4 mg by mouth every 8 hours as needed for Nausea or Vomiting      traMADol (ULTRAM) 50 MG tablet Take 50 mg by mouth every 6 hours as needed for Pain.  Continuous Blood Gluc  (FREESTYLE CESAR READER) ERNESTO 1 Device by In Vitro route 2 times daily 1 Device 0    Continuous Blood Gluc Sensor (FREESTYLE CESAR 14 DAY SENSOR) MISC Check once a day 100 each 1    ONETOUCH DELICA LANCETS FINE MISC USE ONE DAILY 100 each 1    Blood Glucose Monitoring Suppl (ONE TOUCH ULTRA MINI) w/Device KIT 1 kit by Does not apply route daily as needed (fasting) 1 kit 0    diphenoxylate-atropine (LOMOTIL) 2.5-0.025 MG per tablet diphenoxylate-atropine 2.5 mg-0.025 mg tablet      [DISCONTINUED] tacrolimus (PROGRAF) 1 MG capsule Take 1 capsule by mouth 2 times daily      [DISCONTINUED] clotrimazole-betamethasone (LOTRISONE) 1-0.05 % cream APPLY TO AFFECTED AREA TWICE DAILY **INS WILL ONLY COVER 15G AT A TIME**      [DISCONTINUED] clobetasol (TEMOVATE) 0.05 % cream APPLY TO AFFECTED AREAS 1 2 TIMES DAILY      [DISCONTINUED] clotrimazole (LOTRIMIN) 1 % cream Apply topically 2 times daily Apply topically 2 times daily.  [DISCONTINUED] folic acid (FOLVITE) 1 MG tablet TAKE 1 TABLET BY MOUTH EVERY DAY 90 tablet 0    [DISCONTINUED] methotrexate (RHEUMATREX) 2.5 MG chemo tablet Take 20 mg by mouth once a week Every friday       No facility-administered encounter medications on file as of 6/16/2021.      Return in about 3 months (around 9/16/2021) for lab result discussion and treatment plan, medication monitoring. 6/16/2021 10:24 AM      Sending consult note to referring provider Rosa Hernandez MD.    Thank you very much for allowing me to participate in this pt's care. Please do not hesitate to contact me if I can be of further assistance. NOTE: This report is transcribed by using voice recognition software dragon. Every effort is made to ensure accuracy; however, inadvertent computerized transcription errors may be present.

## 2021-06-16 ENCOUNTER — OFFICE VISIT (OUTPATIENT)
Dept: RHEUMATOLOGY | Age: 41
End: 2021-06-16
Payer: COMMERCIAL

## 2021-06-16 ENCOUNTER — HOSPITAL ENCOUNTER (OUTPATIENT)
Dept: GENERAL RADIOLOGY | Age: 41
Discharge: HOME | End: 2021-06-16
Payer: COMMERCIAL

## 2021-06-16 VITALS
WEIGHT: 163 LBS | SYSTOLIC BLOOD PRESSURE: 112 MMHG | HEIGHT: 64 IN | HEART RATE: 77 BPM | OXYGEN SATURATION: 98 % | BODY MASS INDEX: 27.83 KG/M2 | DIASTOLIC BLOOD PRESSURE: 66 MMHG

## 2021-06-16 DIAGNOSIS — Z79.899 HIGH RISK MEDICATION USE: ICD-10-CM

## 2021-06-16 DIAGNOSIS — M19.90 CHRONIC INFLAMMATORY ARTHRITIS: ICD-10-CM

## 2021-06-16 DIAGNOSIS — M19.90 CHRONIC INFLAMMATORY ARTHRITIS: Primary | ICD-10-CM

## 2021-06-16 DIAGNOSIS — K50.919 CROHN'S DISEASE WITH COMPLICATION, UNSPECIFIED GASTROINTESTINAL TRACT LOCATION (HCC): ICD-10-CM

## 2021-06-16 DIAGNOSIS — L40.9 PSORIASIS: ICD-10-CM

## 2021-06-16 LAB
HEPATITIS B SURFACE ANTIGEN INTERPRETATION: NORMAL
HEPATITIS C ANTIBODY INTERPRETATION: NORMAL
RHEUMATOID FACTOR: 17 IU/ML

## 2021-06-16 PROCEDURE — G8417 CALC BMI ABV UP PARAM F/U: HCPCS | Performed by: INTERNAL MEDICINE

## 2021-06-16 PROCEDURE — 99204 OFFICE O/P NEW MOD 45 MIN: CPT | Performed by: INTERNAL MEDICINE

## 2021-06-16 PROCEDURE — 73130 X-RAY EXAM OF HAND: CPT

## 2021-06-16 PROCEDURE — G8427 DOCREV CUR MEDS BY ELIG CLIN: HCPCS | Performed by: INTERNAL MEDICINE

## 2021-06-16 RX ORDER — DICYCLOMINE HYDROCHLORIDE 10 MG/1
CAPSULE ORAL
COMMUNITY
Start: 2021-06-07 | End: 2021-11-17

## 2021-06-16 RX ORDER — DEXAMETHASONE 0.5 MG/5ML
SOLUTION ORAL
COMMUNITY
Start: 2021-05-14 | End: 2021-11-17

## 2021-06-16 RX ORDER — FOLIC ACID 1 MG/1
1 TABLET ORAL DAILY
Qty: 90 TABLET | Refills: 0 | Status: SHIPPED | OUTPATIENT
Start: 2021-06-16 | End: 2021-09-21 | Stop reason: SDUPTHER

## 2021-06-16 RX ORDER — PREDNISONE 10 MG/1
TABLET ORAL
Qty: 35 TABLET | Refills: 1 | Status: SHIPPED | OUTPATIENT
Start: 2021-06-16 | End: 2021-07-16

## 2021-06-16 NOTE — PATIENT INSTRUCTIONS
For your hair loss, try taking over the counter biotin supplement. There is a specific vitamin called Hair, Skin & Nails.

## 2021-06-17 LAB
ANTI-NUCLEAR ANTIBODY (ANA): NEGATIVE
CYCLIC CITRULLINATED PEPTIDE ANTIBODY IGG: 0.9 U/ML (ref 0–2.9)

## 2021-06-18 LAB
HEPATITIS B CORE TOTAL ANTIBODY: NEGATIVE
HLA B27: NEGATIVE

## 2021-06-23 ENCOUNTER — PATIENT MESSAGE (OUTPATIENT)
Dept: RHEUMATOLOGY | Age: 41
End: 2021-06-23

## 2021-06-23 NOTE — TELEPHONE ENCOUNTER
From: Gerson Akins  To: Waldemar Bradford MD  Sent: 6/23/2021 2:06 PM EDT  Subject: Prescription Question    Hi Dr. Frank Cardenas  I have checked with my surgeon and am holding the prednisone. Im afraid of the risks/negative impact on my surgery healing. I am taking my other medications as prescribed.   Janell Panda

## 2021-06-24 NOTE — TELEPHONE ENCOUNTER
Crystal can you obtain this pt's prior Rheumatology records from Dr. Luzmaria Huitron office? She's private practice in [facility].

## 2021-07-23 ENCOUNTER — TELEPHONE (OUTPATIENT)
Dept: PRIMARY CARE CLINIC | Age: 41
End: 2021-07-23

## 2021-07-23 NOTE — TELEPHONE ENCOUNTER
[facility]ong Kearns called and would like verbal order from Dr Inés Kenny for skilled nursing for pt. Please call Thong Kearns at your earliest convenience.

## 2021-08-05 ENCOUNTER — VIRTUAL VISIT (OUTPATIENT)
Dept: PRIMARY CARE CLINIC | Age: 41
End: 2021-08-05
Payer: COMMERCIAL

## 2021-08-05 DIAGNOSIS — I10 ESSENTIAL HYPERTENSION: ICD-10-CM

## 2021-08-05 DIAGNOSIS — E11.9 TYPE 2 DIABETES MELLITUS WITHOUT COMPLICATION, WITHOUT LONG-TERM CURRENT USE OF INSULIN (HCC): Primary | ICD-10-CM

## 2021-08-05 DIAGNOSIS — D50.9 IRON DEFICIENCY ANEMIA, UNSPECIFIED IRON DEFICIENCY ANEMIA TYPE: ICD-10-CM

## 2021-08-05 DIAGNOSIS — Z93.2 S/P ILEOSTOMY (HCC): ICD-10-CM

## 2021-08-05 DIAGNOSIS — K50.019 CROHN'S DISEASE OF SMALL INTESTINE WITH COMPLICATION (HCC): ICD-10-CM

## 2021-08-05 DIAGNOSIS — K50.90 CROHN'S DISEASE WITHOUT COMPLICATION, UNSPECIFIED GASTROINTESTINAL TRACT LOCATION (HCC): ICD-10-CM

## 2021-08-05 PROCEDURE — 99214 OFFICE O/P EST MOD 30 MIN: CPT | Performed by: FAMILY MEDICINE

## 2021-08-05 PROCEDURE — 3044F HG A1C LEVEL LT 7.0%: CPT | Performed by: FAMILY MEDICINE

## 2021-08-05 PROCEDURE — G8417 CALC BMI ABV UP PARAM F/U: HCPCS | Performed by: FAMILY MEDICINE

## 2021-08-05 PROCEDURE — 1036F TOBACCO NON-USER: CPT | Performed by: FAMILY MEDICINE

## 2021-08-05 PROCEDURE — G8428 CUR MEDS NOT DOCUMENT: HCPCS | Performed by: FAMILY MEDICINE

## 2021-08-05 PROCEDURE — 2022F DILAT RTA XM EVC RTNOPTHY: CPT | Performed by: FAMILY MEDICINE

## 2021-08-05 RX ORDER — ACETAMINOPHEN 325 MG/1
650 TABLET ORAL EVERY 4 HOURS
COMMUNITY

## 2021-08-05 RX ORDER — OXYCODONE HYDROCHLORIDE 10 MG/1
TABLET ORAL
COMMUNITY
Start: 2021-07-23 | End: 2021-11-17

## 2021-08-05 RX ORDER — GABAPENTIN 100 MG/1
100 CAPSULE ORAL EVERY 8 HOURS
COMMUNITY
Start: 2021-07-23 | End: 2021-11-17

## 2021-08-05 NOTE — PROGRESS NOTES
2021    TELEHEALTH EVALUATION -- Audio/Visual (During [id number] public health emergency)    HPI:    Tana Levy (:  1980) has requested an audio/video evaluation for the following concern(s):  Diabetes Mellitus Type II, Follow-up: Patient here for follow-up of Type 2 diabetes mellitus.  Current symptoms/problems include none and have been stable. Symptoms have been present for a few years.     Known diabetic complications: none  Cardiovascular risk factors: diabetes mellitus, hypertension and obesity (BMI >= 30 kg/m2)  Current diabetic medications include see med's list .      Eye exam current (within one year): yes  Weight trend: stable  Prior visit with dietician: no  Current diet: in general, a \"healthy\" diet    Current exercise: none  He has been experiencing low blood sugar so advised the patient stop the glimepiride  Current monitoring regimen: home blood tests - weekly  Home blood sugar records: fasting range: She has not been checking her sugar lately  Any episodes of hypoglycemia? no  Hypertension: Patient is here follow up on  elevated blood pressures. Age at onset of elevated blood pressure:  Few years. Cardiac symptoms none. Patient denies chest pain, chest pressure/discomfort, dyspnea, exertional chest pressure/discomfort, lower extremity edema, near-syncope, orthopnea and palpitations. Cardiovascular risk factors: dyslipidemia, hypertension, obesity (BMI >= 30 kg/m2) and sedentary lifestyle. Use of agents associated with hypertension: steroids. History of target organ damage: none. Patient is status post total abdominal colectomy with end ileostomy on  was discharged on 723 from the hospital, she is recovering good at home start to eat, energy level getting better she is able to walk she has been followed closely by the GI surgical team at [facility]   Constitutional: Negative. HENT: Negative. Eyes: Negative. Respiratory: Negative.     Cardiovascular: Negative. Gastrointestinal: Negative. All other systems reviewed and are negative. Prior to Visit Medications    Medication Sig Taking? Authorizing Provider   gabapentin (NEURONTIN) 100 MG capsule Take 100 mg by mouth every 8 hours. Yes Historical Provider, MD   acetaminophen (TYLENOL) 325 MG tablet Take 650 mg by mouth every 4 hours  Historical Provider, MD   oxyCODONE HCl (OXY-IR) 10 MG immediate release tablet TAKE 1 TABLET BY MOUTH EVERY 6 HOURS AS NEEDED  Historical Provider, MD   dexamethasone 0.5 MG/5ML solution   Historical Provider, MD   dicyclomine (BENTYL) 10 MG capsule   Historical Provider, MD   Ferrous Sulfate (IRON PO) Take by mouth  Historical Provider, MD   Cyanocobalamin (VITAMIN B 12 PO) Take by mouth  Historical Provider, MD   Vedolizumab (ENTYVIO IV) Infuse intravenously Every 4 weeks  Historical Provider, MD   methotrexate (RHEUMATREX) 2.5 MG chemo tablet Take 8 tablets by mouth once a week Every Catarina Fine MD   folic acid (FOLVITE) 1 MG tablet Take 1 tablet by mouth daily  Kaylie Lee MD   vitamin D (ERGOCALCIFEROL) 1.25 MG (09970 UT) CAPS capsule TAKE 1 CAPSULE BY MOUTH ONE TIME PER WEEK  Niles Norton MD   empagliflozin (JARDIANCE) 25 MG tablet Take 25 mg by mouth daily  Niles Norton MD   omeprazole (PRILOSEC) 40 MG delayed release capsule Take 1 capsule by mouth daily  Niles Norton MD   escitalopram (LEXAPRO) 10 MG tablet TAKE 1 AND 1/2 Narciso Stapleton MD   [facility] ULTRA strip USE TO TEST ONCE DAILY  Niles Norton MD   ondansetron (ZOFRAN-ODT) 4 MG disintegrating tablet Take 4 mg by mouth every 8 hours as needed for Nausea or Vomiting  Historical Provider, MD   traMADol (ULTRAM) 50 MG tablet Take 50 mg by mouth every 6 hours as needed for Pain.   Historical Provider, MD   Continuous Blood Gluc  (FREESTYLE CESAR READER) ERNESTO 1 Device by In Vitro route 2 times daily  Niles Norton MD   Continuous Blood Gluc Sensor (FREESTYLE CESAR 14 DAY SENSOR) MISC Check once a day  MD Stefani Becker MD   Blood Glucose Monitoring Suppl (ONE TOUCH ULTRA MINI) w/Device KIT 1 kit by Does not apply route daily as needed (fasting)  Rocio Soto MD   diphenoxylate-atropine (LOMOTIL) 2.5-0.025 MG per tablet diphenoxylate-atropine 2.5 mg-0.025 mg tablet  Historical Provider, MD       Social History     Tobacco Use    Smoking status: Former Smoker     Packs/day: 1.00     Years: 10.00     Pack years: 10.00     Quit date: 2005     Years since quitting: 15.9    Smokeless tobacco: Never Used   Substance Use Topics    Alcohol use: Yes     Comment: ocassionally    Drug use: No        No Known Allergies,   Past Medical History:   Diagnosis Date    [facility] disease ([facility] [address] [facility] [facility]    [facility]    IBD (inflammatory bowel disease)     PCOS (polycystic ovarian syndrome)     Psoriasis     Psoriatic arthritis ([facility]     sees Rheumatology    ,   Past Surgical History:   Procedure Laterality Date     SECTION       SECTION, CLASSIC     ,   Social History     Tobacco Use    Smoking status: Former Smoker     Packs/day: 1.00     Years: 10.00     Pack years: 10.00     Quit date: 2005     Years since quitting: 15.9    Smokeless tobacco: Never Used   Substance Use Topics    Alcohol use: Yes     Comment: ocassionally    Drug use: No   ,   Family History   Problem Relation Age of Onset    Diabetes Mother         age 68    Cancer Mother     Stroke Father          age 80    ,   Immunization History   Administered Date(s) Administered    COVID-19, Camilo Peter, PF, 30mcg/0.3mL 2021, 2021    Influenza Vaccine, unspecified formulation 10/08/2018    Influenza Virus Vaccine 10/10/2019    Influenza, High Dose (Fluzone 65 yrs and older) 2017    Influenza, Intradermal, Preservative free 2017    Influenza, Quadv, IM, PF (6 mo and older Fluzone, Flulaval, Fluarix, and 3 yrs and older Afluria) 10/08/2018, 11/02/2020    Pneumococcal Polysaccharide ([id number]) 02/11/2019    Tdap (Boostrix, Adacel) 09/18/2017   ,   Health Maintenance   Topic Date Due    Flu vaccine (1) 09/01/2021    Diabetic retinal exam  11/05/2021    Cervical cancer screen  11/19/2021    Diabetic microalbuminuria test  11/20/2021    Diabetic foot exam  05/04/2022    A1C test (Diabetic or Prediabetic)  05/04/2022    Lipid screen  05/04/2022    DTaP/Tdap/Td vaccine (2 - Td or Tdap) 09/18/2027    Pneumococcal 0-64 years Vaccine (2 of 2 - PPSV23) 03/02/2044    COVID-19 Vaccine  Completed    Hepatitis C screen  Completed    Hepatitis A vaccine  Aged Out    Hib vaccine  Aged Out    Meningococcal (ACWY) vaccine  Aged Out    Hepatitis B vaccine  Discontinued    HIV screen  Discontinued       PHYSICAL EXAMINATION:  [ INSTRUCTIONS:  \"[x]\" Indicates a positive item  \"[]\" Indicates a negative item  -- DELETE ALL ITEMS NOT EXAMINED]  Vital Signs: (As obtained by patient/caregiver or practitioner observation)    Blood pressure-  Heart rate-    Respiratory rate-    Temperature-  Pulse oximetry-     Constitutional: [x] Appears well-developed and well-nourished [x] No apparent distress      [] Abnormal-   Mental status  [x] Alert and awake  [x] Oriented to person/place/time []Able to follow commands      Eyes:  EOM    [x]  Normal  [] Abnormal-  Sclera  [x]  Normal  [] Abnormal -         Discharge [x]  None visible  [] Abnormal -    HENT:   [x] Normocephalic, atraumatic.   [] Abnormal   [x] Mouth/Throat: Mucous membranes are moist.     External Ears [x] Normal  [] Abnormal-     Neck: [x] No visualized mass     Pulmonary/Chest: [x] Respiratory effort normal.  [] No visualized signs of difficulty breathing or respiratory distress        [] Abnormal-      Musculoskeletal:   [] Normal gait with no signs of ataxia         [] Normal range of motion of neck        [] Abnormal-       Neurological:        [x] No Facial Asymmetry (Cranial nerve 7 motor function) (limited exam to video visit)          [] No gaze palsy        [] Abnormal-         Skin:        [x] No significant exanthematous lesions or discoloration noted on facial skin         [] Abnormal-            Psychiatric:       [x] Normal Affect [] No Hallucinations        [] Abnormal-     Other pertinent observable physical exam findings-     ASSESSMENT/PLAN:   Diagnosis Orders   1. Type 2 diabetes mellitus without complication, without long-term current use of insulin (HCC)  Basic Metabolic Panel    CBC    Hemoglobin A1C    Hepatic Function Panel    Lipid Panel    TSH without Reflex    Vitamin B12 & Folate   2. Crohn's disease without complication, unspecified gastrointestinal tract location ([facility] [address]     3. Essential hypertension     4. Iron deficiency anemia, unspecified iron deficiency anemia type     5. Crohn's disease of small intestine with complication ([facility] [address]     6. S/P ileostomy ([facility] [address]     continue the same for now   Monitor BG , call with the readings   Reviewed BW/consutlns notes at [address] [place] [address] 1 in 2-3 months     Matt Rivas, was evaluated through a synchronous (real-time) audio-video encounter. The patient (or guardian if applicable) is aware that this is a billable service. Verbal consent to proceed has been obtained within the past 12 months. The visit was conducted pursuant to the emergency declaration under the [address] [place] [address] authority and the [employer] General Act. Patient identification was verified, and a caregiver was present when appropriate. The patient was located in a state where the provider was credentialed to provide care. Total time spent on this encounter: Not billed by time    --Corazon Aguirre MD on 8/5/2021 at 4:24 PM    An electronic signature was used to authenticate this note.

## 2021-08-07 ASSESSMENT — ENCOUNTER SYMPTOMS
RESPIRATORY NEGATIVE: 1
GASTROINTESTINAL NEGATIVE: 1
EYES NEGATIVE: 1

## 2021-08-09 DIAGNOSIS — F41.9 ANXIETY: ICD-10-CM

## 2021-08-09 RX ORDER — ESCITALOPRAM OXALATE 10 MG/1
TABLET ORAL
Qty: 135 TABLET | Refills: 1 | Status: SHIPPED | OUTPATIENT
Start: 2021-08-09 | End: 2021-12-02

## 2021-08-09 NOTE — TELEPHONE ENCOUNTER
Medication:   Requested Prescriptions     Pending Prescriptions Disp Refills    escitalopram (LEXAPRO) 10 MG tablet [Pharmacy Med Name: ESCITALOPRAM 10 MG TABLET] 135 tablet 1     Sig: TAKE 1.5 TABLETS BY MOUTH EVERY DAY     Last Filled:  02/09/21    Last appt: 8/5/2021   Next appt: Visit date not found    Last OARRS: No flowsheet data found.

## 2021-08-17 ENCOUNTER — TELEPHONE (OUTPATIENT)
Dept: PRIMARY CARE CLINIC | Age: 41
End: 2021-08-17

## 2021-08-17 RX ORDER — ERGOCALCIFEROL 1.25 MG/1
CAPSULE ORAL
Qty: 12 CAPSULE | Refills: 1 | Status: SHIPPED | OUTPATIENT
Start: 2021-08-17 | End: 2022-02-24

## 2021-08-17 NOTE — TELEPHONE ENCOUNTER
Winsome from [address] [place] [address] is calling to see if we have a plan of care with the certification period of 7/23/21-9/23/21? They need to have this plan of care faxed back to them. If Dr. Hari Todd does not have it, they can refax it?

## 2021-08-17 NOTE — TELEPHONE ENCOUNTER
Medication:   Requested Prescriptions     Pending Prescriptions Disp Refills    vitamin D (ERGOCALCIFEROL) 1.25 MG (32019 UT) CAPS capsule [Pharmacy Med Name: VITAMIN D2 1.25MG(50,000 UNIT)] 12 capsule      Sig: TAKE 1 CAPSULE BY MOUTH ONE TIME PER WEEK       Last appt: 8/5/2021   Next appt: Visit date not found    Last OARRS: No flowsheet data found.

## 2021-08-23 ENCOUNTER — TELEPHONE (OUTPATIENT)
Dept: PRIMARY CARE CLINIC | Age: 41
End: 2021-08-23
Payer: COMMERCIAL

## 2021-08-23 DIAGNOSIS — Z43.2 ENCOUNTER FOR ATTENTION TO ILEOSTOMY (HCC): Primary | ICD-10-CM

## 2021-08-23 PROCEDURE — G0180 MD CERTIFICATION HHA PATIENT: HCPCS | Performed by: FAMILY MEDICINE

## 2021-09-17 NOTE — PROGRESS NOTES
Connie Pereyra MD  [facility]) Physicians - Rheumatology    [x] [facility]:  [address] [facility]  [facility] [] [facility] [place] [facility] Office:  [address] [facility]   Office: [phone]  Fax: [phone] PROGRESS NOTE    SUBJECTIVE:    Background:   Ginnie Ormond is a 42 y.o. female w/ prior Hx of chronic inflammatory arthritis (peripheral joints, denies axial involvement), severe Crohn's disease (Dx in 2006 refractory to medical therapy s/p open total abdominal colectomy with end ileostomy on 7/16/2021, follows w/ [facility]) and plaque PsO (follows w/ Dr. Rocío Anderson) most recently followed by Dr. Alexandra Pedersen. PMHx pertinent for HTN, T2DM, HLD and DONTRELL (followed by Hematology, receives IV iron infusions). Current rheum meds:  Entyvio per GI  MTX 8 tablets/wk  Vitamin D 50,000 IU wkly w/ 2000 IU daily  Gabapentin     Prior rheum meds:  Remicade: first biologic tried in 2011, Q6-8 wk, did not respond  Humira w/ MTX: pt states she took Humira for several yrs, initially worked but stopped working from [id number], did not develop antibodies  Stelara: tried in 2018    Past medical/surgical history, medications and allergies are reviewed and updated as appropriate. Interval Hx:   Pt was seen for initial visit in June. She underwent open total abdominal colectomy with end ileostomy for severe, refractory Crohn's disease on 7/16/2021. She tells me today that she is feeling significantly better - \"I haven't felt this good in yrs\". She denies any active Sx of IBD. Her fatigue has significantly improved. She tells me she has been discharged from Hematology as her anemia has improved. She remains on Entyvio. She reports generalized gelling phenomenon. Denies any significant arthralgias or joint swelling. Denies prolonged morning stiffness. She tells me she remains on MTX 8 tablets/wk, she did not have to hold it for her GI surgery.  She has not had to take Prednisone taper.     She tells me she has one patch of PsO on her thigh.     Rheumatologic ROS:  Constitutional: +chronic fatigue significantly improved, fever/chills, night sweats, unintentional weight loss  Integumentary: denies photosensitivity, rash, +diffuse hair thinning since being on MTX, or Sx of Raynaud's phenomenon  Eyes: denies dry eyes, redness or pain, visual disturbance, or floaters  Nose: denies nasal ulcers or recurrent sinusitis  Oral cavity: denies dry mouth, +recurrent oral ulcers  Cardiovascular: denies CP, palpitations, Hx of pericardial effusion or pericarditis  Respiratory: denies SOB, cough, hemoptysis, or pleurisy  Gastrointestinal: refer to above HPI  Musculoskeletal:  refer to above HPI     No Known Allergies    Past Medical History:        Diagnosis Date    [facility]    IBD (inflammatory bowel disease)     PCOS (polycystic ovarian syndrome)     Psoriasis     Psoriatic arthritis ([facility]     sees Rheumatology        Past Surgical History:        Procedure Laterality Date     SECTION  2006     SECTION, CLASSIC  2009       Medications:    Current Outpatient Medications   Medication Sig Dispense Refill    folic acid (FOLVITE) 1 MG tablet Take 1 tablet by mouth daily 90 tablet 0    methotrexate (RHEUMATREX) 2.5 MG chemo tablet Take 8 tablets by mouth once a week 96 tablet 0    vitamin D (ERGOCALCIFEROL) 1.25 MG (44992 UT) CAPS capsule TAKE 1 CAPSULE BY MOUTH ONE TIME PER WEEK 12 capsule 1    escitalopram (LEXAPRO) 10 MG tablet TAKE 1.5 TABLETS BY MOUTH EVERY  tablet 1    acetaminophen (TYLENOL) 325 MG tablet Take 650 mg by mouth every 4 hours      oxyCODONE HCl (OXY-IR) 10 MG immediate release tablet TAKE 1 TABLET BY MOUTH EVERY 6 HOURS AS NEEDED      dexamethasone 0.5 MG/5ML solution       dicyclomine (BENTYL) 10 MG capsule       Ferrous Sulfate (IRON PO) Take by mouth      Cyanocobalamin (VITAMIN B 12 PO) Take by mouth      Vedolizumab (ENTYVIO IV) Infuse intravenously Every 4 weeks      empagliflozin (JARDIANCE) 25 MG tablet Take 25 mg by mouth daily 30 tablet 3    omeprazole (PRILOSEC) 40 MG delayed release capsule Take 1 capsule by mouth daily 90 capsule 1    ONETOUCH ULTRA strip USE TO TEST ONCE DAILY 100 strip 2    ondansetron (ZOFRAN-ODT) 4 MG disintegrating tablet Take 4 mg by mouth every 8 hours as needed for Nausea or Vomiting      traMADol (ULTRAM) 50 MG tablet Take 50 mg by mouth every 6 hours as needed for Pain.  Continuous Blood Gluc  (FREESTYLE CESAR READER) ERNESTO 1 Device by In Vitro route 2 times daily 1 Device 0    Continuous Blood Gluc Sensor (FREESTYLE CESAR 14 DAY SENSOR) MISC Check once a day 100 each 1    ONETOUCH DELICA LANCETS FINE MISC USE ONE DAILY 100 each 1    Blood Glucose Monitoring Suppl (ONE TOUCH ULTRA MINI) w/Device KIT 1 kit by Does not apply route daily as needed (fasting) 1 kit 0    diphenoxylate-atropine (LOMOTIL) 2.5-0.025 MG per tablet diphenoxylate-atropine 2.5 mg-0.025 mg tablet      gabapentin (NEURONTIN) 100 MG capsule Take 100 mg by mouth every 8 hours. No current facility-administered medications for this visit.         OBJECTIVE:  Physical Exam:  Ht 5' 4\" (1.626 m)   Wt 175 lb (79.4 kg)   BMI 30.04 kg/m²     GEN: AAOx3, in NAD, well-appearing  HEAD: normocephalic, atraumatic  EYES: no injection or icterus  CVS: RRR  LUNGS: in no acute respiratory distress, CTAB  MSK:  Upper extremities:              Hands: no active synovitis or dactylitis, joints NTTP, full fist formation w/ strong  strength              Wrist: no synovitis in the wrist joints b/l, FROM              Elbow: no synovitis or bursitis, FROM  Lower extremities:              Knees: no warmth or effusion present, FROM              Ankles: no synovitis, FROM, Achilles tendons w/o swelling or warmth NTTP              Feet: no toe swelling or pain or warmth on palpation w/ FROM, negative MTP squeeze test  INTEGUMENT: no rash or psoriatic lesions, no petechiae, bruises, or palpable purpura, no patchy alopecia, no nail or periungual changes, no clubbing or digital ulcers    DATA:  Labs: I personally reviewed interval labs and discussed w/ the pt in detail which showed:    CBC (8/13/21): WBC and plt count wnl, H/H 10.7/36.8  CMP (8/13/21): Scr 0.60, GFR >60, AST and ALT wnl    Lab Results   Component Value Date    VITD25 27.8 (L) 05/04/2021     No results found for: LABURIC    CRP 25.62 mg/L (8/13/21)  ESR 48 (8/13/21)    Lab Results   Component Value Date    .7 (H) 04/03/2020     Lab Results   Component Value Date    SEDRATE 63 (H) 04/03/2020     Negative HLA B27 (6/16/21)  RF 17.0, negative CCP (6/16/21)  Negative SAULO (6/16/21)  Negative hepatitis B and C serologies (6/16/21)  Negative Quantiferon gold (10/5/20)    Imaging:  I personally reviewed interval imaging and discussed w/ the pt in detail which included:    X-rays (6/16/21):  R hand:  FINDINGS:   3 views of the right hand show no soft tissue abnormality. Mineralization is normal.   No fracture or acute bony pathology is seen. Alignment is anatomic. There is joint space narrowing of the proximal and distal interphalangeal joints with small marginal osteophytes. No erosive changes are visualized. IMPRESSION:  Mild degenerative changes distally    L hand:  FINDINGS:   3 views of the left hand were performed. No soft tissue abnormality is seen. Mineralization is normal.   No fracture or acute bony pathology is visualized. Alignment is anatomic. There is joint space narrowing of the proximal and distal interphalangeal joints with small marginal osteophytes. No erosive changes are visualized. IMPRESSION:  Mild degenerative changes    DEXA study (11/13/20):  Normal T-scores in the L-spine and hips.     Procedures:  C-scope (3/24/21):  - Crohn's disease, with ileitis and colitis.    - Simple Endoscopic Score for Crohn's Disease: 26, mucosal inflammatory changes secondary to Crohn's disease, with ileitis and colitis. Biopsied.   - Pseudopolyps at the colonic anastomosis. - Mucosal ulceration. Above results were discussed w/ the pt in detail during today's visit. ASSESSMENT/PLAN:   1. Chronic inflammatory arthritis  - appears quiescent on MTX. Discussed the clinical significance of her positive RF. No radiographic evidence of RA however pt has been treated w/ chronic immunosuppression. Will repeat RF/CCP at next blood draw. Discussed adding SSZ we need to escalate her immunotherapy. - C-Reactive Protein; Future  - Cyclic Citrul Peptide Antibody, IgG; Future  - Rheumatoid Factor; Future  - methotrexate (RHEUMATREX) 2.5 MG chemo tablet; Take 8 tablets by mouth once a week  Dispense: 96 tablet; Refill: 0    2. Psoriasis  - well controlled on MTX  - C-Reactive Protein; Future  - methotrexate (RHEUMATREX) 2.5 MG chemo tablet; Take 8 tablets by mouth once a week  Dispense: 96 tablet; Refill: 0    3. Crohn's disease with complication, unspecified gastrointestinal tract location [facility])  - Dx in 2006 refractory to medical therapy, s/p open total abdominal colectomy with end ileostomy on 7/16/2021, follows w/ [facility] GI  - on Entyvio per GI  - C-Reactive Protein; Future    4. High risk medication use  - she will be due for safety labs for MTX the 2nd wk of November  - ALT; Future  - AST; Future  - CBC Auto Differential; Future  - Creatinine, Serum; Future    5. Rheumatoid factor positive  - refer to above #1  - C-Reactive Protein; Future  - Cyclic Citrul Peptide Antibody, IgG; Future  - Rheumatoid Factor; Future    Return in about 3 months (around 12/21/2021) for lab result discussion and treatment plan, medication monitoring. The risks and benefits of my recommendations, as well as other treatment options, benefits and side effects were discussed with the patient today. Questions were answered.     NOTE: This report is transcribed by using voice recognition software dragon. Every effort is made to ensure accuracy; however, inadvertent computerized  transcription errors may be present.

## 2021-09-21 ENCOUNTER — OFFICE VISIT (OUTPATIENT)
Dept: RHEUMATOLOGY | Age: 41
End: 2021-09-21
Payer: COMMERCIAL

## 2021-09-21 VITALS — BODY MASS INDEX: 29.88 KG/M2 | HEIGHT: 64 IN | WEIGHT: 175 LBS

## 2021-09-21 DIAGNOSIS — Z79.899 HIGH RISK MEDICATION USE: ICD-10-CM

## 2021-09-21 DIAGNOSIS — L40.9 PSORIASIS: ICD-10-CM

## 2021-09-21 DIAGNOSIS — R76.8 RHEUMATOID FACTOR POSITIVE: ICD-10-CM

## 2021-09-21 DIAGNOSIS — K50.919 CROHN'S DISEASE WITH COMPLICATION, UNSPECIFIED GASTROINTESTINAL TRACT LOCATION (HCC): ICD-10-CM

## 2021-09-21 DIAGNOSIS — M19.90 CHRONIC INFLAMMATORY ARTHRITIS: Primary | ICD-10-CM

## 2021-09-21 PROCEDURE — G8417 CALC BMI ABV UP PARAM F/U: HCPCS | Performed by: INTERNAL MEDICINE

## 2021-09-21 PROCEDURE — 99214 OFFICE O/P EST MOD 30 MIN: CPT | Performed by: INTERNAL MEDICINE

## 2021-09-21 PROCEDURE — 1036F TOBACCO NON-USER: CPT | Performed by: INTERNAL MEDICINE

## 2021-09-21 PROCEDURE — G8427 DOCREV CUR MEDS BY ELIG CLIN: HCPCS | Performed by: INTERNAL MEDICINE

## 2021-09-21 RX ORDER — FOLIC ACID 1 MG/1
1 TABLET ORAL DAILY
Qty: 90 TABLET | Refills: 0 | Status: SHIPPED | OUTPATIENT
Start: 2021-09-21 | End: 2021-11-17 | Stop reason: SDUPTHER

## 2021-09-21 NOTE — PATIENT INSTRUCTIONS
Obtain blood work around the week of . We'll see you back in 2-3 months. Instructions for starting sulfasalazine:    Start taking 1 tablet twice daily for the first week. If well-tolerated, then take 2 tablets twice daily. Have your labs checked 4 weeks after starting sulfasalazine. Please call with any questions or concerns. Sulfasalazine (Azulfidine)     Fast Facts     Sulfasalazine is a drug used in the treatment of rheumatoid arthritis and some other autoimmune conditions. It helps with pain and swelling and also slows the progression of arthritis over time. It should be held if there are signs or symptoms of an infection. Although sulfasalazine appears to be safe to take during pregnancy, it should not be taken while breastfeeding as it increases the risk for a type of jaundice in the  (kernicterus) that can cause brain problems in infants younger than two years old. In men, sulfasalazine may lower sperm count, although this should improve after stopping the medication. Sulfasalazine is a sulfa drug and can cause serious problems in patients who are allergic to sulfa medications. Sulfasalazine (Azulfidine) belongs to a class of drugs called sulfa drugs and is used to treat pain and swelling in arthritis. It is a combination of salicylate (the main ingredient in aspirin) and a sulfa antibiotic. Sulfasalazine is also known as a disease modifying antirheumatic drug (DMARD), because it not only decreases the pain and swelling of arthritis, but also may prevent damage to joints. In addition, it may reduce the risk of long term loss of function. f    Uses     Sulfasalazine was first used over 70 years ago to treat rheumatoid arthritis. At one time, rheumatoid arthritis was thought to be caused by a bacterial infection, and Sulfasalazine was prescribed, because it is contains a combination of an aspirin-like anti-inflammatory medicine with a sulfa containing antibiotic.  Though we now know rheumatoid arthritis is not caused by a bacterial infection, sulfasalazine continues to be useful for treating for mild to moderate symptoms, or may be given along with other drugs for more severe symptoms of rheumatoid arthritis. It is also used for other conditions, including juvenile idiopathic arthritis (also called juvenile rheumatoid arthritis), ankylosing spondylitis, psoriatic arthritis, reactive arthritis, and ulcerative colitis. How it works Sulfasalazine is a DMARD. DMARDs work to decrease pain and inflammation, reduce/prevent joint damage, and preserve joint mobility. So, Sulfasalazine treats swelling, pain and stiffness in arthritis. Dosing Sulfasalazine comes in a 500 milligram tablet and should be taken with food and a full glass of water to avoid an upset stomach. The medication is often started at low doses when treating rheumatoid arthritis to prevent side effects, typically 1 to 2 tablets a day. After the first week, the dose may be slowly increased to the usual dosage of 2 tablets (1 gram) twice a day. This dose can be increased to up to 6 pills (3 grams) a day in some situations. An Enteric-coated (or stomach-coated) preparation is available that may lessen some of the side effects associated with sulfasalazine, particularly stomach upset. This form of sulfasalazine should not be crushed or chewed. Time to effect It usually takes between 1 and 3 months to notice any improvement in rheumatoid arthritis symptoms after starting sulfasalazine       Time to effect It usually takes between 1 and 3 months to notice any improvement in rheumatoid arthritis symptoms after starting sulfasalazine     Drug interactions Sulfasalazine may interfere with warfarin (Coumadin), cyclosporine or digoxin, so dose adjustments may be needed if these medications are taken together.  Sulfasalazine increases the risk for liver injury if given with the drug isoniazid (INH) for tuberculosis and may increase the risk for low blood sugar in patients taking certain diabetes drugs  sulfonylureas such as glimepiride (Amaryl), glyburide (Diabeta, Micronase, Glynase) and glipizide (Glucotrol). Tell your doctor if you have ever had any unusual or allergic reaction to any other sulfa medicines as well as medicines that are chemically related to sulfa drugs. Your doctor will then determine whether you should take sulfasalazine. Sulfa drugs and those related to them include: Some antibiotics (often used to treat urinary or upper respiratory infections): trimethoprim-sulfamethoxazole (Bactrim, Septra) sulfadiazine, sulfisoxizole (Gantrisin), and dapsone Fluid and blood pressure pills such as furosemide (Lasix) and thiazide diuretics (hydrochlorothiazide or HCTZ) Some diabetes medications Some glaucoma medications such as acetazolamide (Diamox), dichlorphenamide (Daranide) and methazolamide (Neptazane) Salicylates such as aspirin and the Farias-2 inhibitor celecoxib (Celebrex)     - See more at: [email or web address]

## 2021-11-12 NOTE — PROGRESS NOTES
Clint Mcclellan MD  [facility]) Physicians - Rheumatology    [x] [facility] HOSPITAL:  [address] [facility] [] [place] Office:  [address] [facility]   Office: [phone]  Fax: [phone] PROGRESS NOTE    SUBJECTIVE:    Background:   Susana Hunt is a 42 y.o. female w/ prior Hx of chronic inflammatory arthritis (peripheral joints, denies axial involvement), severe Crohn's disease (Dx in 2006 refractory to medical therapy s/p open total abdominal colectomy with end ileostomy on 7/16/2021, follows w/ [facility]) and plaque PsO (follows w/ Dr. Heaven Gordon) most recently followed by Dr. Ashley Dorsey. PMHx pertinent for HTN, T2DM, HLD and DONTRELL (followed by Hematology, receives IV iron infusions). Current rheum meds:  Prednisone 60 mg daily: per pt started on 40 mg daily by GI in 9/2021  Entyvio per GI  MTX 8 tablets/wk  Vitamin D 50,000 IU wkly w/ 2000 IU daily: per PCP     Prior rheum meds:  Remicade: first biologic tried in 2011, Q6-8 wk, did not respond  Humira w/ MTX: pt states she took Humira for several yrs, initially worked but stopped working from [id number], did not develop antibodies  Stelara: tried in 2018  Gabapentin    Past medical/surgical history, medications and allergies are reviewed and updated as appropriate. Interval Hx:   Pt states she has developed pyoderma gangrenosum around her stoma site since her last visit. She tells me that her GI plans to keep her on MTX and switch her from [facility] to Remicade in the near future. Pt states her GI started her on Prednisone 40 mg daily approximately 1.5 months ago. Most recently her dose was increased to 60 mg daily. She has not noticed any significant improvement in her pyoderma gangrenosum. She denies any active symptoms of IBD. She was reportedly told that she may require resection of her rectum for better control of her Crohn's disease.      She denies any active inflammatory arthralgias or IV) Infuse intravenously Every 4 weeks      empagliflozin (JARDIANCE) 25 MG tablet Take 25 mg by mouth daily 30 tablet 3    omeprazole (PRILOSEC) 40 MG delayed release capsule Take 1 capsule by mouth daily 90 capsule 1    ONETOUCH ULTRA strip USE TO TEST ONCE DAILY 100 strip 2    ondansetron (ZOFRAN-ODT) 4 MG disintegrating tablet Take 4 mg by mouth every 8 hours as needed for Nausea or Vomiting      Continuous Blood Gluc  (FREESTYLE CESAR READER) ERNESTO 1 Device by In Vitro route 2 times daily 1 Device 0    Continuous Blood Gluc Sensor (FREESTYLE CESAR 14 DAY SENSOR) MISC Check once a day 100 each 1    ONETOUCH DELICA LANCETS FINE MISC USE ONE DAILY 100 each 1    Blood Glucose Monitoring Suppl (ONE TOUCH ULTRA MINI) w/Device KIT 1 kit by Does not apply route daily as needed (fasting) 1 kit 0    diphenoxylate-atropine (LOMOTIL) 2.5-0.025 MG per tablet diphenoxylate-atropine 2.5 mg-0.025 mg tablet       No current facility-administered medications for this visit.         OBJECTIVE:  Physical Exam:  /74 (Site: Right Upper Arm, Position: Sitting, Cuff Size: Large Adult)   Pulse 90   Temp 95.9 °F (35.5 °C) (Infrared)   Wt 189 lb (85.7 kg)   SpO2 98%   BMI 32.44 kg/m²     GEN: AAOx3, in NAD, well-appearing  HEAD: normocephalic, atraumatic  EYES: no injection or icterus  CVS: RRR  LUNGS: in no acute respiratory distress, CTAB  MSK:  Upper extremities:              Hands: no active synovitis or dactylitis, joints NTTP, full fist formation w/ strong  strength              Wrist: no synovitis in the wrist joints b/l, FROM              Elbow: no synovitis or bursitis, FROM  Lower extremities:              Knees: no warmth or effusion present, FROM              Ankles: no synovitis, FROM, Achilles tendons w/o swelling or warmth NTTP              Feet: no toe swelling or pain or warmth on palpation w/ FROM, negative MTP squeeze test  INTEGUMENT: no rash or psoriatic lesions, no petechiae, bruises, or palpable purpura, no patchy alopecia, no nail or periungual changes, no clubbing or digital ulcers    DATA:  Labs: I personally reviewed interval labs and discussed w/ the pt in detail which showed:    Lab Results   Component Value Date    WBC 7.6 10/27/2021    HGB 13.6 10/27/2021    HCT 41.2 10/27/2021    MCV 92.5 10/27/2021     10/27/2021    LYMPHOPCT 13.5 10/27/2021    RBC 4.45 10/27/2021    MCH 30.7 10/27/2021    MCHC 33.1 10/27/2021    RDW 13.5 10/27/2021     Lab Results   Component Value Date     (L) 10/27/2021    K 4.4 10/27/2021     10/27/2021    CO2 22 10/27/2021    BUN 10 05/04/2021    CREATININE 0.6 05/04/2021    GLUCOSE 98 05/04/2021    CALCIUM 8.6 05/04/2021    PROT 8.4 (H) 10/27/2021    LABALBU 3.8 10/27/2021    BILITOT <0.2 10/27/2021    ALKPHOS 127 10/27/2021    AST 18 10/27/2021    ALT 19 10/27/2021    LABGLOM >60 05/04/2021    GFRAA >60 05/04/2021   Scr 0.60, GFR >60 (8/13/21)    Lab Results   Component Value Date    VITD25 27.8 (L) 05/04/2021     CRP 25.62 mg/L (8/13/21)  Lab Results   Component Value Date    .7 (H) 04/03/2020     ESR 48 (8/13/21)  Lab Results   Component Value Date    SEDRATE 63 (H) 04/03/2020     No results found for: LABURIC    Negative HLA B27 (6/16/21)  RF 17.0, negative CCP (6/16/21)  Negative SAULO (6/16/21)  Negative hepatitis B and C serologies (6/16/21)  Negative Quantiferon gold (10/5/20)    Imaging:  I personally reviewed interval imaging and discussed w/ the pt in detail which included:    X-rays (6/16/21):  R hand:  FINDINGS:   3 views of the right hand show no soft tissue abnormality. Mineralization is normal.   No fracture or acute bony pathology is seen. Alignment is anatomic. There is joint space narrowing of the proximal and distal interphalangeal joints with small marginal osteophytes. No erosive changes are visualized.    IMPRESSION:  Mild degenerative changes distally    L hand:  FINDINGS:   3 views of the left hand were a plan to switch from [facility] to Remicade. - follows / [facility]. Orders:  -     C-Reactive Protein; Future  3. Psoriasis  Assessment & Plan:  - well controlled on MTX. Orders:  -     methotrexate (RHEUMATREX) 2.5 MG chemo tablet; Take 8 tablets by mouth once a week, Disp-96 tablet, R-0Normal  -     C-Reactive Protein; Future  4. High risk medication use  Assessment & Plan:  - repeat renal function now for MTX.  - she will repeat the rest of her safety labs the last wk of January. - discussed her immunocompromised state. Orders:  -     Hepatic Function Panel; Future  -     CBC Auto Differential; Future  -     Creatinine, Serum; Future  5. Vitamin D deficiency  Assessment & Plan:  - repeat vitamin D in January. Orders:  -     Vitamin D 25 Hydroxy; Future    Return in about 3 months (around 2/17/2022) for lab result discussion and treatment plan, medication monitoring. The risks and benefits of my recommendations, as well as other treatment options, benefits and side effects were discussed with the patient today. Questions were answered. NOTE: This report is transcribed by using voice recognition software dragon. Every effort is made to ensure accuracy; however, inadvertent computerized  transcription errors may be present.

## 2021-11-17 ENCOUNTER — OFFICE VISIT (OUTPATIENT)
Dept: RHEUMATOLOGY | Age: 41
End: 2021-11-17
Payer: COMMERCIAL

## 2021-11-17 VITALS
SYSTOLIC BLOOD PRESSURE: 130 MMHG | BODY MASS INDEX: 32.44 KG/M2 | HEART RATE: 90 BPM | OXYGEN SATURATION: 98 % | DIASTOLIC BLOOD PRESSURE: 74 MMHG | WEIGHT: 189 LBS | TEMPERATURE: 95.9 F

## 2021-11-17 DIAGNOSIS — M19.90 CHRONIC INFLAMMATORY ARTHRITIS: Primary | ICD-10-CM

## 2021-11-17 DIAGNOSIS — L40.9 PSORIASIS: ICD-10-CM

## 2021-11-17 DIAGNOSIS — E55.9 VITAMIN D DEFICIENCY: ICD-10-CM

## 2021-11-17 DIAGNOSIS — K50.919 CROHN'S DISEASE WITH COMPLICATION, UNSPECIFIED GASTROINTESTINAL TRACT LOCATION (HCC): ICD-10-CM

## 2021-11-17 DIAGNOSIS — Z79.899 HIGH RISK MEDICATION USE: ICD-10-CM

## 2021-11-17 LAB
CREAT SERPL-MCNC: 0.7 MG/DL (ref 0.6–1.1)
GFR AFRICAN AMERICAN: >60
GFR NON-AFRICAN AMERICAN: >60

## 2021-11-17 PROCEDURE — G8484 FLU IMMUNIZE NO ADMIN: HCPCS | Performed by: INTERNAL MEDICINE

## 2021-11-17 PROCEDURE — 1036F TOBACCO NON-USER: CPT | Performed by: INTERNAL MEDICINE

## 2021-11-17 PROCEDURE — G8417 CALC BMI ABV UP PARAM F/U: HCPCS | Performed by: INTERNAL MEDICINE

## 2021-11-17 PROCEDURE — G8427 DOCREV CUR MEDS BY ELIG CLIN: HCPCS | Performed by: INTERNAL MEDICINE

## 2021-11-17 PROCEDURE — 99215 OFFICE O/P EST HI 40 MIN: CPT | Performed by: INTERNAL MEDICINE

## 2021-11-17 RX ORDER — BUDESONIDE 3 MG/1
CAPSULE, COATED PELLETS ORAL
COMMUNITY
Start: 2021-11-09 | End: 2022-06-01 | Stop reason: ALTCHOICE

## 2021-11-17 RX ORDER — THALIDOMIDE 100 MG/1
100 CAPSULE ORAL
COMMUNITY
Start: 2021-11-15 | End: 2022-08-03 | Stop reason: ALTCHOICE

## 2021-11-17 RX ORDER — PREDNISONE 10 MG/1
60 TABLET ORAL
COMMUNITY
Start: 2021-11-12 | End: 2022-04-12

## 2021-11-17 RX ORDER — FOLIC ACID 1 MG/1
1 TABLET ORAL DAILY
Qty: 90 TABLET | Refills: 0 | Status: SHIPPED | OUTPATIENT
Start: 2021-11-17 | End: 2022-04-12

## 2021-11-17 NOTE — ASSESSMENT & PLAN NOTE
- repeat renal function now for MTX.  - she will repeat the rest of her safety labs the last wk of January. - discussed her immunocompromised state.

## 2021-11-17 NOTE — ASSESSMENT & PLAN NOTE
- Dx in 2006 refractory to medical therapy, s/p open total abdominal colectomy with end ileostomy on 7/16/2021, c/b pyoderma gangrenosum currently being treated w/ high-dose Prednisone with a plan to switch from Entyvio to Remicade. - follows w/ [address] [facility] [address] [place]

## 2021-11-17 NOTE — ASSESSMENT & PLAN NOTE
- remains quiescent on MTX and high dose Prednisone prescribed by GI. Discussed the clinical significance of her positive RF. No radiographic evidence of RA however pt has been treated w/ chronic immunosuppression. Will repeat RF/CCP at next blood draw in January. - GI plans to switch Entyvio to Remicade in the near future, pt stated Remicade previously stopped working but she did not develop Ab's. Discussed that Remicade also treats inflammatory arthritis. Discussed adding SSZ we need to escalate her immunotherapy on Remicade.

## 2021-11-23 RX ORDER — LISINOPRIL 5 MG/1
5 TABLET ORAL DAILY
Qty: 30 TABLET | Refills: 2 | Status: SHIPPED | OUTPATIENT
Start: 2021-11-23 | End: 2021-12-16

## 2021-11-24 DIAGNOSIS — E11.9 TYPE 2 DIABETES MELLITUS WITHOUT COMPLICATION, WITHOUT LONG-TERM CURRENT USE OF INSULIN (HCC): ICD-10-CM

## 2021-11-24 LAB
ALBUMIN SERPL-MCNC: 4 G/DL (ref 3.4–5)
ALP BLD-CCNC: 108 U/L (ref 40–129)
ALT SERPL-CCNC: 12 U/L (ref 10–40)
ANION GAP SERPL CALCULATED.3IONS-SCNC: 12 MMOL/L (ref 3–16)
AST SERPL-CCNC: 7 U/L (ref 15–37)
BILIRUB SERPL-MCNC: 0.5 MG/DL (ref 0–1)
BILIRUBIN DIRECT: <0.2 MG/DL (ref 0–0.3)
BILIRUBIN, INDIRECT: ABNORMAL MG/DL (ref 0–1)
BUN BLDV-MCNC: 16 MG/DL (ref 7–20)
CALCIUM SERPL-MCNC: 8.7 MG/DL (ref 8.3–10.6)
CHLORIDE BLD-SCNC: 103 MMOL/L (ref 99–110)
CHOLESTEROL, TOTAL: 178 MG/DL (ref 0–199)
CO2: 26 MMOL/L (ref 21–32)
CREAT SERPL-MCNC: 0.6 MG/DL (ref 0.6–1.1)
ESTIMATED AVERAGE GLUCOSE: 180 MG/DL
FOLATE: 12.37 NG/ML (ref 4.78–24.2)
GFR AFRICAN AMERICAN: >60
GFR NON-AFRICAN AMERICAN: >60
GLUCOSE BLD-MCNC: 115 MG/DL (ref 70–99)
HBA1C MFR BLD: 7.9 %
HCT VFR BLD CALC: 43.6 % (ref 36–48)
HDLC SERPL-MCNC: 68 MG/DL (ref 40–60)
HEMOGLOBIN: 14.3 G/DL (ref 12–16)
LDL CHOLESTEROL CALCULATED: 84 MG/DL
MCH RBC QN AUTO: 30.4 PG (ref 26–34)
MCHC RBC AUTO-ENTMCNC: 32.7 G/DL (ref 31–36)
MCV RBC AUTO: 92.8 FL (ref 80–100)
PDW BLD-RTO: 14.3 % (ref 12.4–15.4)
PLATELET # BLD: 265 K/UL (ref 135–450)
PMV BLD AUTO: 7.8 FL (ref 5–10.5)
POTASSIUM SERPL-SCNC: 4.3 MMOL/L (ref 3.5–5.1)
RBC # BLD: 4.7 M/UL (ref 4–5.2)
SODIUM BLD-SCNC: 141 MMOL/L (ref 136–145)
TOTAL PROTEIN: 7.1 G/DL (ref 6.4–8.2)
TRIGL SERPL-MCNC: 131 MG/DL (ref 0–150)
TSH SERPL DL<=0.05 MIU/L-ACNC: 2.33 UIU/ML (ref 0.27–4.2)
VITAMIN B-12: 637 PG/ML (ref 211–911)
VLDLC SERPL CALC-MCNC: 26 MG/DL
WBC # BLD: 10.4 K/UL (ref 4–11)

## 2021-12-02 ENCOUNTER — OFFICE VISIT (OUTPATIENT)
Dept: PRIMARY CARE CLINIC | Age: 41
End: 2021-12-02
Payer: COMMERCIAL

## 2021-12-02 VITALS
RESPIRATION RATE: 12 BRPM | HEART RATE: 96 BPM | HEIGHT: 64 IN | DIASTOLIC BLOOD PRESSURE: 78 MMHG | WEIGHT: 195.6 LBS | BODY MASS INDEX: 33.39 KG/M2 | SYSTOLIC BLOOD PRESSURE: 134 MMHG | OXYGEN SATURATION: 97 % | TEMPERATURE: 97.6 F

## 2021-12-02 DIAGNOSIS — K50.90 CROHN'S DISEASE WITHOUT COMPLICATION, UNSPECIFIED GASTROINTESTINAL TRACT LOCATION (HCC): ICD-10-CM

## 2021-12-02 DIAGNOSIS — Z93.2 S/P ILEOSTOMY (HCC): ICD-10-CM

## 2021-12-02 DIAGNOSIS — F41.9 ANXIETY: ICD-10-CM

## 2021-12-02 DIAGNOSIS — K50.10 EXACERBATION OF CROHN'S DISEASE OF LARGE INTESTINE (HCC): ICD-10-CM

## 2021-12-02 DIAGNOSIS — L88 PYODERMIC GANGRENOSUM: ICD-10-CM

## 2021-12-02 DIAGNOSIS — E11.9 TYPE 2 DIABETES MELLITUS WITHOUT COMPLICATION, WITHOUT LONG-TERM CURRENT USE OF INSULIN (HCC): Primary | ICD-10-CM

## 2021-12-02 DIAGNOSIS — I10 ESSENTIAL HYPERTENSION: ICD-10-CM

## 2021-12-02 LAB
CREATININE URINE POCT: 50
MICROALBUMIN/CREAT 24H UR: 10 MG/G{CREAT}
MICROALBUMIN/CREAT UR-RTO: NORMAL

## 2021-12-02 PROCEDURE — G8417 CALC BMI ABV UP PARAM F/U: HCPCS | Performed by: FAMILY MEDICINE

## 2021-12-02 PROCEDURE — 1036F TOBACCO NON-USER: CPT | Performed by: FAMILY MEDICINE

## 2021-12-02 PROCEDURE — G8484 FLU IMMUNIZE NO ADMIN: HCPCS | Performed by: FAMILY MEDICINE

## 2021-12-02 PROCEDURE — 99214 OFFICE O/P EST MOD 30 MIN: CPT | Performed by: FAMILY MEDICINE

## 2021-12-02 PROCEDURE — 3051F HG A1C>EQUAL 7.0%<8.0%: CPT | Performed by: FAMILY MEDICINE

## 2021-12-02 PROCEDURE — 82044 UR ALBUMIN SEMIQUANTITATIVE: CPT | Performed by: FAMILY MEDICINE

## 2021-12-02 PROCEDURE — 2022F DILAT RTA XM EVC RTNOPTHY: CPT | Performed by: FAMILY MEDICINE

## 2021-12-02 PROCEDURE — G8427 DOCREV CUR MEDS BY ELIG CLIN: HCPCS | Performed by: FAMILY MEDICINE

## 2021-12-02 RX ORDER — ESCITALOPRAM OXALATE 20 MG/1
TABLET ORAL
Qty: 30 TABLET | Refills: 2 | Status: SHIPPED | OUTPATIENT
Start: 2021-12-02 | End: 2021-12-27

## 2021-12-02 RX ORDER — GLIMEPIRIDE 2 MG/1
2 TABLET ORAL EVERY MORNING
Qty: 30 TABLET | Refills: 2 | Status: SHIPPED | OUTPATIENT
Start: 2021-12-02 | End: 2022-04-25

## 2021-12-02 SDOH — ECONOMIC STABILITY: FOOD INSECURITY: WITHIN THE PAST 12 MONTHS, YOU WORRIED THAT YOUR FOOD WOULD RUN OUT BEFORE YOU GOT MONEY TO BUY MORE.: NEVER TRUE

## 2021-12-02 SDOH — ECONOMIC STABILITY: FOOD INSECURITY: WITHIN THE PAST 12 MONTHS, THE FOOD YOU BOUGHT JUST DIDN'T LAST AND YOU DIDN'T HAVE MONEY TO GET MORE.: NEVER TRUE

## 2021-12-02 ASSESSMENT — SOCIAL DETERMINANTS OF HEALTH (SDOH): HOW HARD IS IT FOR YOU TO PAY FOR THE VERY BASICS LIKE FOOD, HOUSING, MEDICAL CARE, AND HEATING?: NOT HARD AT ALL

## 2021-12-02 NOTE — PROGRESS NOTES
Subjective:      Patient ID: Leah Kruse is a 42 y.o. female. HPI  Patient is here for follow-up   Patient with Crohn disease she has been followed closely by [facility], she is ready status post surgery, she is status post colectomy with end ileostomy, she is currently on steroids trying to make her insurance approves Remicade and methotrexate  Recently was diagnosed with pyoderma gangrenosum of the stoma, seen by derm on high dose of steroid to help that, also had injection in the   Diabetes Mellitus Type II, Follow-up: Patient here for follow-up of Type 2 diabetes mellitus. Current symptoms/problems include none and have been stable. Symptoms have been present for a few years.     Known diabetic complications: none  Cardiovascular risk factors: diabetes mellitus, hypertension and obesity (BMI >= 30 kg/m2)  Current diabetic medications include see med's list .      Eye exam current (within one year): yes  Weight trend: stable  Prior visit with dietician: no  Current diet: in general, a \"healthy\" diet    Current exercise: none  He has been experiencing low blood sugar so advised the patient stop the glimepiride  Current monitoring regimen: home blood tests - weekly  Home blood sugar records: fasting range: She has not been checking her sugar lately  Any episodes of hypoglycemia? no  Hypertension: Patient is here follow up on  elevated blood pressures. Age at onset of elevated blood pressure:  Few years. Cardiac symptoms none. Patient denies chest pain, chest pressure/discomfort, dyspnea, exertional chest pressure/discomfort, lower extremity edema, near-syncope, orthopnea and palpitations. Cardiovascular risk factors: dyslipidemia, hypertension, obesity (BMI >= 30 kg/m2) and sedentary lifestyle. Use of agents associated with hypertension: steroids. History of target organ damage: none.     Patient with anxiety currently she has been very anxious stressed says she wants to try to increase Lexapro she is going through a tough time with her Crohn's not  controlled    Review of Systems   Constitutional: Negative. Negative for chills and fever. HENT:  Negative for postnasal drip, rhinorrhea, sinus pressure, tinnitus and trouble swallowing. Eyes: Negative. Respiratory: Negative. Cardiovascular: Negative. Gastrointestinal: bleeding   Endocrine: Negative. Genitourinary: Negative. Musculoskeletal: . Negative for back pain, gait problem, joint swelling, neck pain and neck stiffness. Skin:. Negative for color change and pallor. Allergic/Immunologic: Negative. Neurological: Negative. Psychiatric/Behavioral: Very anxious nervous says she was in tears  All other systems reviewed and are negative. Past Medical History:   Diagnosis Date    Crohn's disease ([facility] 2006    GI @[facility]    IBD (inflammatory bowel disease)     PCOS (polycystic ovarian syndrome)     Psoriasis     Psoriatic arthritis ([facility]     sees Rheumatology       Past Surgical History:   Procedure Laterality Date     SECTION  2006     SECTION, CLASSIC  2009     Family History   Problem Relation Age of Onset    Diabetes Mother         age 68    Cancer Mother     Stroke Father          age 80      Social History     Socioeconomic History    Marital status:      Spouse name: None    Number of children: None    Years of education: None    Highest education level: None   Occupational History    None   Tobacco Use    Smoking status: Former Smoker     Packs/day: 1.00     Years: 10.00     Pack years: 10.00     Quit date: 2005     Years since quittin.2    Smokeless tobacco: Never Used   Substance and Sexual Activity    Alcohol use: Yes     Comment: ocassionally    Drug use: No    Sexual activity: Yes     Partners: Male     Birth control/protection: I.U.D.    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    Children 14 & 11     She has half sister x 3 one     [de-identified] brother x 1     Social Determinants of Health     Financial Resource Strain: Low Risk     Difficulty of Paying Living Expenses: Not hard at all   Food Insecurity: No Food Insecurity    Worried About Running Out of Food in the Last Year: Never true    [address] in the Last Year: Never true   Transportation Needs:     Lack of Transportation (Medical): Not on file    Lack of Transportation (Non-Medical):  Not on file   Physical Activity:     Days of Exercise per Week: Not on file    Minutes of Exercise per Session: Not on file   Stress:     Feeling of Stress : Not on file   Social Connections:     Frequency of Communication with Friends and Family: Not on file    Frequency of Social Gatherings with Friends and Family: Not on file    Attends Zoroastrian Services: Not on file    Active Member of [address] [place] [address] [employer] or Organizations: Not on file    Attends Club or Organization Meetings: Not on file    Marital Status: Not on file   Intimate Partner Violence:     Fear of Current or Ex-Partner: Not on file    Emotionally Abused: Not on file    Physically Abused: Not on file    Sexually Abused: Not on file   Housing Stability:     Unable to Pay for Housing in the Last Year: Not on file    Number of [place] in the Last Year: Not on file    Unstable Housing in the Last Year: Not on file     Current Outpatient Medications   Medication Sig Dispense Refill    lisinopril (PRINIVIL;ZESTRIL) 5 MG tablet Take 1 tablet by mouth daily 30 tablet 2    THALOMID 100 MG capsule       predniSONE (DELTASONE) 10 MG tablet 60 mg      budesonide (ENTOCORT EC) 3 MG extended release capsule Break capsule and apply powder every day to the wounds with each ostomy change      folic acid (FOLVITE) 1 MG tablet Take 1 tablet by mouth daily 90 tablet 0    methotrexate (RHEUMATREX) 2.5 MG chemo tablet Take 8 tablets by mouth once a week 96 tablet 0    vitamin D (ERGOCALCIFEROL) 1.25 MG (72092 UT) CAPS capsule TAKE 1 CAPSULE BY MOUTH ONE TIME PER WEEK 12 capsule 1    escitalopram (LEXAPRO) 10 MG tablet TAKE 1.5 TABLETS BY MOUTH EVERY  tablet 1    acetaminophen (TYLENOL) 325 MG tablet Take 650 mg by mouth every 4 hours      Ferrous Sulfate (IRON PO) Take by mouth      Cyanocobalamin (VITAMIN B 12 PO) Take by mouth      Vedolizumab (ENTYVIO IV) Infuse intravenously Every 4 weeks      empagliflozin (JARDIANCE) 25 MG tablet Take 25 mg by mouth daily 30 tablet 3    omeprazole (PRILOSEC) 40 MG delayed release capsule Take 1 capsule by mouth daily 90 capsule 1    ONETOUCH ULTRA strip USE TO TEST ONCE DAILY 100 strip 2    ondansetron (ZOFRAN-ODT) 4 MG disintegrating tablet Take 4 mg by mouth every 8 hours as needed for Nausea or Vomiting      Continuous Blood Gluc  (FREESTYLE CESAR READER) ERNESTO 1 Device by In Vitro route 2 times daily 1 Device 0    Continuous Blood Gluc Sensor (FREESTYLE CESAR 14 DAY SENSOR) MISC Check once a day 100 each 1    ONETOUCH DELICA LANCETS FINE MISC USE ONE DAILY 100 each 1    Blood Glucose Monitoring Suppl (ONE TOUCH ULTRA MINI) w/Device KIT 1 kit by Does not apply route daily as needed (fasting) 1 kit 0    diphenoxylate-atropine (LOMOTIL) 2.5-0.025 MG per tablet diphenoxylate-atropine 2.5 mg-0.025 mg tablet       No current facility-administered medications for this visit. No Known Allergies      Objective:   Physical Exam   Constitutional: She appears well-developed and well-nourished. No distress. HENT:   Head: Normocephalic and atraumatic. Right Ear: External ear normal.   Left Ear: External ear normal.   Nose: Nose normal.   Mouth/Throat: Oropharynx is clear and moist. No oropharyngeal exudate. Eyes: Conjunctivae and EOM are normal. Pupils are equal, round, and reactive to light. Right eye exhibits no discharge. Left eye exhibits no discharge. No scleral icterus. Neck: Normal range of motion. Neck supple. No JVD present.  No tracheal deviation present. No thyromegaly present. Cardiovascular: Normal rate, regular rhythm, normal heart sounds and intact distal pulses. Pulmonary/Chest: Effort normal and breath sounds normal. No stridor. No respiratory distress. She has no wheezes. She has no rales. She exhibits no tenderness. Abdominal: Soft. Bowel sounds are normal. She exhibits no distension and no mass. There is no tenderness. There is no rebound and no guarding. Musculoskeletal: Normal range of motion. She exhibits no edema or tenderness. Lymphadenopathy:     She has no cervical adenopathy. Skin: Skin is warm and dry. No rash noted. She is not diaphoretic. No erythema. No pallor. Mild irritation anterior above the ankle now right foot mild blanching no other abnormality full range of motion of the ankle both left and right  microfilaments test exam was negative   Vitals reviewed. Assessment:      Diagnosis Orders   1. Type 2 diabetes mellitus without complication, without long-term current use of insulin (HCC)  POCT microalbumin   2. Anxiety  escitalopram (LEXAPRO) 20 MG tablet   3. Pyodermic gangrenosum     4. Crohn's disease without complication, unspecified gastrointestinal tract location ([address]     5. S/P ileostomy ([address]     6. Exacerbation of Crohn's disease of large intestine ([address]     7.  Essential hypertension           Plan:      See orders,   Monitor symptoms closely  Follow-up with GI  Due to steroid blood glucose been high added Amaryl monitor closely May need to add insulin explained the patient important to get her blood glucose under control to help with healing and improve the rest of her medical issue  Increase Lexapro to 20 recommend counseling she has already appointment  Close follow-up  Recommend the patient to continue check her blood glucose

## 2021-12-05 DIAGNOSIS — K50.919 CROHN'S DISEASE, UNSPECIFIED, WITH UNSPECIFIED COMPLICATIONS (HCC): ICD-10-CM

## 2021-12-05 DIAGNOSIS — M19.90 UNSPECIFIED OSTEOARTHRITIS, UNSPECIFIED SITE: ICD-10-CM

## 2021-12-05 DIAGNOSIS — K21.9 GASTRO-ESOPHAGEAL REFLUX DISEASE WITHOUT ESOPHAGITIS: ICD-10-CM

## 2021-12-06 RX ORDER — PREDNISONE 10 MG/1
TABLET ORAL
Qty: 35 TABLET | Refills: 1 | OUTPATIENT
Start: 2021-12-06

## 2021-12-06 RX ORDER — OMEPRAZOLE 40 MG/1
CAPSULE, DELAYED RELEASE ORAL
Qty: 90 CAPSULE | Refills: 1 | Status: SHIPPED | OUTPATIENT
Start: 2021-12-06 | End: 2022-05-31

## 2021-12-06 NOTE — TELEPHONE ENCOUNTER
Medication:   Requested Prescriptions     Pending Prescriptions Disp Refills    omeprazole (PRILOSEC) 40 MG delayed release capsule [Pharmacy Med Name: OMEPRAZOLE DR 40 MG CAPSULE] 90 capsule 1     Sig: TAKE 1 CAPSULE BY MOUTH EVERY DAY       Last appt: 12/2/2021   Next appt: Visit date not found    Last OARRS: No flowsheet data found.

## 2021-12-06 NOTE — TELEPHONE ENCOUNTER
Requested Prescriptions     Pending Prescriptions Disp Refills    predniSONE (DELTASONE) 10 MG tablet [Pharmacy Med Name: PREDNISONE 10 MG TABLET] 35 tablet 1     Sig: TAKE 2 TABS/DAY X 10 DAYS, 1 TAB/DAY X 10 DAYS, THEN 1/2 TAB/DAY FOR 10 DAYS        Last Visit: 11/17/2021  Next Visit: 3/9/2022      Recent Labs:  Lab Results   Component Value Date     11/24/2021    K 4.3 11/24/2021     11/24/2021    CO2 26 11/24/2021    BUN 16 11/24/2021    CREATININE 0.6 11/24/2021    GLUCOSE 115 (H) 11/24/2021    CALCIUM 8.7 11/24/2021    PROT 7.1 11/24/2021    LABALBU 4.0 11/24/2021    BILITOT 0.5 11/24/2021    ALKPHOS 108 11/24/2021    AST 7 (L) 11/24/2021    ALT 12 11/24/2021    LABGLOM >60 11/24/2021    GFRAA >60 11/24/2021     Lab Results   Component Value Date    WBC 10.4 11/24/2021    HGB 14.3 11/24/2021    HCT 43.6 11/24/2021    MCV 92.8 11/24/2021     11/24/2021      Lab Results   Component Value Date    CREATININE 0.6 11/24/2021     Lab Results   Component Value Date    ALKPHOS 108 11/24/2021    ALT 12 11/24/2021    AST 7 11/24/2021    PROT 7.1 11/24/2021    BILITOT 0.5 11/24/2021    BILIDIR <0.2 11/24/2021    LABALBU 4.0 11/24/2021

## 2021-12-08 ENCOUNTER — PATIENT MESSAGE (OUTPATIENT)
Dept: PRIMARY CARE CLINIC | Age: 41
End: 2021-12-08

## 2021-12-08 NOTE — TELEPHONE ENCOUNTER
From: Vanessa Severs Maraucci  To: Dr. Juana Rizo: 12/8/2021 10:45 AM EST  Subject: Blood sugars    Blood Sugar  12/3/21  9:50am 150  5:50pm 247  12/5  7:20am 116  8:30pm 254  12/6  6:45am 98  10:20am 172  7:25pm 118  12/7  6:10am 117  8:14pm 173  12/8  6:10am 124  10:30am 180

## 2021-12-16 RX ORDER — LISINOPRIL 5 MG/1
TABLET ORAL
Qty: 30 TABLET | Refills: 2 | Status: SHIPPED | OUTPATIENT
Start: 2021-12-16 | End: 2022-02-24

## 2021-12-24 DIAGNOSIS — F41.9 ANXIETY: ICD-10-CM

## 2021-12-27 RX ORDER — ESCITALOPRAM OXALATE 20 MG/1
TABLET ORAL
Qty: 30 TABLET | Refills: 2 | Status: SHIPPED | OUTPATIENT
Start: 2021-12-27

## 2021-12-27 NOTE — TELEPHONE ENCOUNTER
Medication:   Requested Prescriptions     Pending Prescriptions Disp Refills    escitalopram (LEXAPRO) 20 MG tablet [Pharmacy Med Name: ESCITALOPRAM 20 MG TABLET] 30 tablet 2     Sig: TAKE 1 TABLET BY MOUTH EVERY DAY     Last Filled:  12/02/21    Last appt: 12/2/2021   Next appt: Visit date not found    Last OARRS: No flowsheet data found.

## 2022-01-30 ENCOUNTER — OFFICE VISIT (OUTPATIENT)
Dept: URGENT CARE | Age: 42
End: 2022-01-30
Payer: COMMERCIAL

## 2022-01-30 VITALS
HEIGHT: 64 IN | HEART RATE: 81 BPM | SYSTOLIC BLOOD PRESSURE: 137 MMHG | DIASTOLIC BLOOD PRESSURE: 91 MMHG | BODY MASS INDEX: 35.85 KG/M2 | WEIGHT: 210 LBS | OXYGEN SATURATION: 98 %

## 2022-01-30 DIAGNOSIS — S60.412A ABRASION OF RIGHT MIDDLE FINGER, INITIAL ENCOUNTER: Primary | ICD-10-CM

## 2022-01-30 PROCEDURE — 90714 TD VACC NO PRESV 7 YRS+ IM: CPT

## 2022-01-30 PROCEDURE — 99202 OFFICE O/P NEW SF 15 MIN: CPT

## 2022-01-30 PROCEDURE — 90471 IMMUNIZATION ADMIN: CPT

## 2022-01-30 PROCEDURE — 1036F TOBACCO NON-USER: CPT

## 2022-01-30 PROCEDURE — G8484 FLU IMMUNIZE NO ADMIN: HCPCS

## 2022-01-30 PROCEDURE — G8428 CUR MEDS NOT DOCUMENT: HCPCS

## 2022-01-30 PROCEDURE — G8417 CALC BMI ABV UP PARAM F/U: HCPCS

## 2022-01-30 ASSESSMENT — ENCOUNTER SYMPTOMS
RESPIRATORY NEGATIVE: 1
GASTROINTESTINAL NEGATIVE: 1

## 2022-01-30 NOTE — PROGRESS NOTES
Nelia Oliver (: 1980) is a 42 y.o. female here for evaluation of the following chief complaint(s):  No chief complaint on file. SUBJECTIVE:  HPI  Pt presents today with c/o a laceration sustained about 15 minutes ago to her right third digit. She notes she was using a mandolin to cut vegetables and sliced off the tip of her finger. She notes she immediately applied pressure with a paper towel and had her  drive her here. Review of Systems   Constitutional: Negative. HENT: Negative. Respiratory: Negative. Cardiovascular: Negative. Gastrointestinal: Negative. Musculoskeletal: Negative. Skin: Positive for wound. Allergic/Immunologic: Positive for immunocompromised state. Neurological: Negative. OBJECTIVE:  There were no vitals taken for this visit. Physical Exam  Vitals reviewed. Constitutional:       Appearance: Normal appearance. She is normal weight. HENT:      Head: Normocephalic. Right Ear: Tympanic membrane normal.      Left Ear: Tympanic membrane normal.   Cardiovascular:      Rate and Rhythm: Normal rate and regular rhythm. Pulses: Normal pulses. Heart sounds: Normal heart sounds. Pulmonary:      Effort: Pulmonary effort is normal.   Abdominal:      General: Abdomen is flat. Bowel sounds are normal.      Palpations: Abdomen is soft. Musculoskeletal:         General: Normal range of motion. Hands:       Cervical back: Normal range of motion and neck supple. Comments: Right third digit with 1 cm portion of skin missing at finger tip. Wound is actively bleeding and very tender to palpation. Skin:     General: Skin is warm and dry. Capillary Refill: Capillary refill takes less than 2 seconds. Neurological:      General: No focal deficit present. Mental Status: She is alert and oriented to person, place, and time.    Psychiatric:         Mood and Affect: Mood normal.         Behavior: Behavior normal. Thought Content: Thought content normal.         Judgment: Judgment normal.         ASSESSMENT:  1. Abrasion of right middle finger, initial encounter  -     Td (adult), 2 Lf Tetanus Toxoid, PF (Td, absorbed)    Wound cleansed with Hibiclens  Polysporin applied and wound dressed with Telfa dressing and Coban  Tetanus prophylaxis given    Upon discharge wound no longer actively draining            PLAN:  Clean finger once daily with antibacterial soap and water  Apply polysporin and bandage to clean dry wound  Monitor for worsening signs or symptoms. Monitor for signs or symptoms of secondary infection. Do not scratch the areas. Keep nails trimmed short. Wash hands often with soap and water. Discussed precautions and indications for returning to clinic. Discussed precautions and indications for seeking ED care. Return if symptoms worsen or fail to improve.      Electronically signed by GERARD Reynoso CNP on 1/30/2022 at 1:19 PM.

## 2022-01-30 NOTE — PATIENT INSTRUCTIONS
Clean finger once daily with antibacterial soap and water  Apply polysporin and bandage to clean dry wound  Monitor for worsening signs or symptoms. Monitor for signs or symptoms of secondary infection. Do not scratch the areas. Keep nails trimmed short. Wash hands often with soap and water. Discussed precautions and indications for returning to clinic. Discussed precautions and indications for seeking ED care. Patient Education        Scrapes (Abrasions): Care Instructions  Overview  Scrapes (abrasions) are wounds where your skin has been rubbed or torn off. Most scrapes do not go deep into the skin, but some may remove several layers of skin. Scrapes usually don't bleed much, but they may ooze pinkish fluid. Scrapes on the head or face may appear worse than they are. They may bleed a lot because of the good blood supply to this area. Most scrapes heal well and may not need a bandage. They usually heal within 3 to 7 days. A large, deep scrape may take 1 to 2 weeks or longer to heal. A scab may form on some scrapes. Follow-up care is a key part of your treatment and safety. Be sure to make and go to all appointments, and call your doctor if you are having problems. It's also a good idea to know your test results and keep a list of the medicines you take. How can you care for yourself at home? · If your doctor told you how to care for your wound, follow your doctor's instructions. If you did not get instructions, follow this general advice:  ? Wash the scrape with clean water 2 times a day. Don't use hydrogen peroxide or alcohol, which can slow healing. ? You may cover the scrape with a thin layer of petroleum jelly, such as Vaseline, and a nonstick bandage. ? Apply more petroleum jelly and replace the bandage as needed. · Prop up the injured area on a pillow anytime you sit or lie down during the next 3 days. Try to keep it above the level of your heart. This will help reduce swelling.   · Be safe with medicines. Take pain medicines exactly as directed. ? If the doctor gave you a prescription medicine for pain, take it as prescribed. ? If you are not taking a prescription pain medicine, ask your doctor if you can take an over-the-counter medicine. When should you call for help? Call your doctor now or seek immediate medical care if:    · You have signs of infection, such as:  ? Increased pain, swelling, warmth, or redness around the scrape. ? Red streaks leading from the scrape. ? Pus draining from the scrape. ? A fever.     · The scrape starts to bleed, and blood soaks through the bandage. Oozing small amounts of blood is normal.   Watch closely for changes in your health, and be sure to contact your doctor if the scrape is not getting better each day. Where can you learn more? Go to https://[employer]40billion.com. org and sign in to your [employer] account. Enter A374 in the General Specific box to learn more about \"Scrapes (Abrasions): Care Instructions. \"     If you do not have an account, please click on the \"Sign Up Now\" link. Current as of: July 1, 2021               Content Version: 13.1  © 2006-2021 [employer]wise[employer] Inc[employer]. Care instructions adapted under license by [facility] ([facility]). If you have questions about a medical condition or this instruction, always ask your healthcare professional. [place] [address] any warranty or liability for your use of this information.

## 2022-02-02 DIAGNOSIS — F41.9 ANXIETY: ICD-10-CM

## 2022-02-02 RX ORDER — ESCITALOPRAM OXALATE 10 MG/1
TABLET ORAL
Qty: 135 TABLET | Refills: 1 | Status: SHIPPED | OUTPATIENT
Start: 2022-02-02 | End: 2022-02-17

## 2022-02-02 NOTE — TELEPHONE ENCOUNTER
Medication:   Requested Prescriptions     Pending Prescriptions Disp Refills    escitalopram (LEXAPRO) 10 MG tablet [Pharmacy Med Name: ESCITALOPRAM 10 MG TABLET] 135 tablet 1     Sig: TAKE 1.5 TABLETS BY MOUTH EVERY DAY       Last appt: 12/2/2021   Next appt: Visit date not found    Last OARRS: No flowsheet data found.

## 2022-02-08 DIAGNOSIS — L40.9 PSORIASIS: ICD-10-CM

## 2022-02-08 DIAGNOSIS — M19.90 CHRONIC INFLAMMATORY ARTHRITIS: ICD-10-CM

## 2022-02-08 RX ORDER — METHOTREXATE 2.5 MG/1
TABLET ORAL
Qty: 96 TABLET | Refills: 0 | OUTPATIENT
Start: 2022-02-08

## 2022-02-09 ENCOUNTER — PATIENT MESSAGE (OUTPATIENT)
Dept: PRIMARY CARE CLINIC | Age: 42
End: 2022-02-09

## 2022-02-10 NOTE — TELEPHONE ENCOUNTER
From: Nick Bashir  To: Dr. Pardeep Mathews: 2/9/2022 7:23 PM EST  Subject: Post hospital visit? Hi Dr. Jennifer Monge,  I was inpatient last week for my PG. do I need to make an appointment with you? I see the surgeon on 2/23 and the dermatologist on 2/28.

## 2022-02-17 ENCOUNTER — OFFICE VISIT (OUTPATIENT)
Dept: PRIMARY CARE CLINIC | Age: 42
End: 2022-02-17
Payer: COMMERCIAL

## 2022-02-17 VITALS
TEMPERATURE: 97.4 F | OXYGEN SATURATION: 98 % | HEIGHT: 64 IN | RESPIRATION RATE: 14 BRPM | BODY MASS INDEX: 37.76 KG/M2 | SYSTOLIC BLOOD PRESSURE: 132 MMHG | WEIGHT: 221.2 LBS | HEART RATE: 86 BPM | DIASTOLIC BLOOD PRESSURE: 86 MMHG

## 2022-02-17 DIAGNOSIS — F41.8 ANXIETY WITH DEPRESSION: ICD-10-CM

## 2022-02-17 DIAGNOSIS — L40.9 PSORIASIS: ICD-10-CM

## 2022-02-17 DIAGNOSIS — E11.9 TYPE 2 DIABETES MELLITUS WITHOUT COMPLICATION, WITHOUT LONG-TERM CURRENT USE OF INSULIN (HCC): Primary | ICD-10-CM

## 2022-02-17 DIAGNOSIS — L88 PYODERMIC GANGRENOSUM: ICD-10-CM

## 2022-02-17 DIAGNOSIS — K50.919 CROHN'S DISEASE WITH COMPLICATION, UNSPECIFIED GASTROINTESTINAL TRACT LOCATION (HCC): ICD-10-CM

## 2022-02-17 PROCEDURE — 2022F DILAT RTA XM EVC RTNOPTHY: CPT | Performed by: FAMILY MEDICINE

## 2022-02-17 PROCEDURE — 1036F TOBACCO NON-USER: CPT | Performed by: FAMILY MEDICINE

## 2022-02-17 PROCEDURE — G8417 CALC BMI ABV UP PARAM F/U: HCPCS | Performed by: FAMILY MEDICINE

## 2022-02-17 PROCEDURE — 99214 OFFICE O/P EST MOD 30 MIN: CPT | Performed by: FAMILY MEDICINE

## 2022-02-17 PROCEDURE — G8482 FLU IMMUNIZE ORDER/ADMIN: HCPCS | Performed by: FAMILY MEDICINE

## 2022-02-17 PROCEDURE — G8428 CUR MEDS NOT DOCUMENT: HCPCS | Performed by: FAMILY MEDICINE

## 2022-02-17 PROCEDURE — 3046F HEMOGLOBIN A1C LEVEL >9.0%: CPT | Performed by: FAMILY MEDICINE

## 2022-02-17 RX ORDER — ZINC GLUCONATE 50 MG
TABLET ORAL
COMMUNITY
Start: 2022-01-31

## 2022-02-17 RX ORDER — MESALAMINE 1000 MG/1
1000 SUPPOSITORY RECTAL NIGHTLY
COMMUNITY
Start: 2022-01-31

## 2022-02-17 RX ORDER — SULFAMETHOXAZOLE AND TRIMETHOPRIM 800; 160 MG/1; MG/1
1 TABLET ORAL
COMMUNITY
Start: 2022-01-24 | End: 2022-04-24

## 2022-02-17 RX ORDER — CYCLOSPORINE 100 MG/1
100 CAPSULE, GELATIN COATED ORAL 3 TIMES DAILY
COMMUNITY
Start: 2022-01-24 | End: 2022-06-01 | Stop reason: ALTCHOICE

## 2022-02-17 ASSESSMENT — ENCOUNTER SYMPTOMS
RESPIRATORY NEGATIVE: 1
EYES NEGATIVE: 1
GASTROINTESTINAL NEGATIVE: 1

## 2022-02-17 NOTE — PROGRESS NOTES
SUBJECTIVE:  Patient ID: Aurea Hayes is a 42 y.o. y.o. female     HPI   Is here with multiple concerns  She has a new event last month of surgery for her crown followed by skin condition by you to make growing the same was diagnosed at [facility] they had to go through different treatment now seems doing better the site of her surgery is healing okay  She was put on high-dose oral prednisone the new she was admitted to the hospital was put on high-dose IV currently off the prednisone  She has been feeling anxious nervous depressed crying but seems to get better she is on Lexapro she is starting therapy tomorrow  She has been getting spells of she have some occasional tremors she had that on and off seems spaced out according to the patient now she had an EMG study of lower extremities at the hospital was told it was normal  Patient with diabetes according to her her numbers has been improved lately 100s fasting not higher than 180 postprandial she is not fasting today she will be back to do blood work  She was on methotrexate that was stopped no recent CBC was done as I reviewed in her chart  She feels tired not much energy she recently started to walk a little bit more than she used to after recent hospitalization respiratory: She works from home to try to manage every hour or so to get up and walk  The tremor mainly when she is sitting she denies any family history of Parkinson does not last long she has been experiencing tingling numbness around her mouth for no good reason is she says  Is doing okay currently with Crohn disease, symptoms under decent control  Past Medical History:   Diagnosis Date    Crohn's disease ([facility] [address] 2006    [facility]    IBD (inflammatory bowel disease)     PCOS (polycystic ovarian syndrome)     Psoriasis     Psoriatic arthritis [facility])     sees Rheumatology       Past Surgical History:   Procedure Laterality Date     SECTION     [address] [facility], CLASSIC   Family History   Problem Relation Age of Onset    Diabetes Mother         age 68    Cancer Mother     Stroke Father          age 80      Social History     Socioeconomic History    Marital status:      Spouse name: None    Number of children: None    Years of education: None    Highest education level: None   Occupational History    None   Tobacco Use    Smoking status: Former Smoker     Packs/day: 1.00     Years: 10.00     Pack years: 10.00     Quit date: 2005     Years since quittin.4    Smokeless tobacco: Never Used   Substance and Sexual Activity    Alcohol use: Yes     Comment: ocassionally    Drug use: No    Sexual activity: Yes     Partners: Male     Birth control/protection: I.U.D. 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    Children 14 & 11     She has half sister x 3 one     [de-identified] brother x 1     Social Determinants of Health     Financial Resource Strain: Low Risk     Difficulty of Paying Living Expenses: Not hard at all   Food Insecurity: No Food Insecurity    Worried About Running Out of Food in the Last Year: Never true    [address] in the Last Year: Never true   Transportation Needs:     Lack of Transportation (Medical): Not on file    Lack of Transportation (Non-Medical):  Not on file   Physical Activity:     Days of Exercise per Week: Not on file    Minutes of Exercise per Session: Not on file   Stress:     Feeling of Stress : Not on file   Social Connections:     Frequency of Communication with Friends and Family: Not on file    Frequency of Social Gatherings with Friends and Family: Not on file    Attends Anabaptist Services: Not on file    Active Member of Clubs or Organizations: Not on file    Attends Club or Organization Meetings: Not on file    Marital Status: Not on file   Intimate Partner Violence:     Fear of Current or Ex-Partner: Not on file   [employer] Abused: Not on file    Physically Abused: Not on file    Sexually Abused: Not on file   Housing Stability:     Unable to Pay for Housing in the Last Year: Not on file    Number of Places Lived in the Last Year: Not on file    Unstable Housing in the Last Year: Not on file     Current Outpatient Medications   Medication Sig Dispense Refill    mesalamine (CANASA) 1000 MG suppository Place 1,000 mg rectally nightly      sulfamethoxazole-trimethoprim (BACTRIM DS;SEPTRA DS) 800-160 MG per tablet Take 1 tablet by mouth three times a week      cycloSPORINE (SANDIMMUNE) 100 MG capsule Take 100 mg by mouth 3 times daily      sodium chloride 0.9 % SOLN with inFLIXimab 100 MG SOLR Infuse intravenously      Ascorbic Acid  MG CPCR Take by mouth      zinc 50 MG TABS tablet TAKE 1 TABLET BY MOUTH EVERY DAY      escitalopram (LEXAPRO) 10 MG tablet TAKE 1.5 TABLETS BY MOUTH EVERY  tablet 1    escitalopram (LEXAPRO) 20 MG tablet TAKE 1 TABLET BY MOUTH EVERY DAY 30 tablet 2    lisinopril (PRINIVIL;ZESTRIL) 5 MG tablet TAKE 1 TABLET BY MOUTH EVERY DAY 30 tablet 2    omeprazole (PRILOSEC) 40 MG delayed release capsule TAKE 1 CAPSULE BY MOUTH EVERY DAY 90 capsule 1    glimepiride (AMARYL) 2 MG tablet Take 1 tablet by mouth every morning 30 tablet 2    THALOMID 100 MG capsule       predniSONE (DELTASONE) 10 MG tablet 60 mg      budesonide (ENTOCORT EC) 3 MG extended release capsule Break capsule and apply powder every day to the wounds with each ostomy change      folic acid (FOLVITE) 1 MG tablet Take 1 tablet by mouth daily 90 tablet 0    vitamin D (ERGOCALCIFEROL) 1.25 MG (06365 UT) CAPS capsule TAKE 1 CAPSULE BY MOUTH ONE TIME PER WEEK 12 capsule 1    acetaminophen (TYLENOL) 325 MG tablet Take 650 mg by mouth every 4 hours      Ferrous Sulfate (IRON PO) Take by mouth      Cyanocobalamin (VITAMIN B 12 PO) Take by mouth      Vedolizumab (ENTYVIO IV) Infuse intravenously Every 4 weeks      empagliflozin (JARDIANCE) 25 MG tablet Take 25 mg by mouth daily 30 tablet 3    ONETOUCH ULTRA strip USE TO TEST ONCE DAILY 100 strip 2    ondansetron (ZOFRAN-ODT) 4 MG disintegrating tablet Take 4 mg by mouth every 8 hours as needed for Nausea or Vomiting      Continuous Blood Gluc  (FREESTYLE CESAR READER) ERNESTO 1 Device by In Vitro route 2 times daily 1 Device 0    Continuous Blood Gluc Sensor (FREESTYLE CESAR 14 DAY SENSOR) MISC Check once a day 100 each 1    ONETOUCH DELICA LANCETS FINE MISC USE ONE DAILY 100 each 1    Blood Glucose Monitoring Suppl (ONE TOUCH ULTRA MINI) w/Device KIT 1 kit by Does not apply route daily as needed (fasting) 1 kit 0    diphenoxylate-atropine (LOMOTIL) 2.5-0.025 MG per tablet diphenoxylate-atropine 2.5 mg-0.025 mg tablet       No current facility-administered medications for this visit. No Known Allergies    Review of Systems   Constitutional: Positive for fatigue. HENT: Negative. Eyes: Negative. Respiratory: Negative. Cardiovascular: Negative. Gastrointestinal: Negative. Neurological: Positive for dizziness, tremors and weakness. Psychiatric/Behavioral: Positive for agitation and decreased concentration. The patient is nervous/anxious. All other systems reviewed and are negative. OBJECTIVE:  /86 (Site: Right Upper Arm, Position: Sitting, Cuff Size: Large Adult)   Pulse 86   Temp 97.4 °F (36.3 °C) (Temporal)   Resp 14   Ht 5' 4\" (1.626 m)   Wt 221 lb 3.2 oz (100.3 kg)   SpO2 98%   Breastfeeding No   BMI 37.97 kg/m²     Physical Exam  Vitals reviewed. HENT:      Head: Normocephalic and atraumatic. Eyes:      General: No scleral icterus. Conjunctiva/sclera: Conjunctivae normal.   Neck:      Thyroid: No thyromegaly. Vascular: No JVD. Cardiovascular:      Rate and Rhythm: Normal rate and regular rhythm. Heart sounds: Normal heart sounds. No murmur heard. No friction rub. No gallop.     Pulmonary: Effort: Pulmonary effort is normal.      Breath sounds: Normal breath sounds. No wheezing or rales. Abdominal:      General: Bowel sounds are normal. There is no distension. Palpations: Abdomen is soft. There is no mass. Tenderness: There is abdominal tenderness. Hernia: No hernia is present. Lymphadenopathy:      Cervical: No cervical adenopathy. Skin:     Findings: No rash. Neurological:      Mental Status: She is alert and oriented to person, place, and time. ASSESSMENT:     Diagnosis Orders   1. Type 2 diabetes mellitus without complication, without long-term current use of insulin (HCC)  Basic Metabolic Panel    Hepatic Function Panel    Vitamin B12 & Folate    TSH    CBC with Auto Differential    Iron and TIBC    Hemoglobin A1C   2. Crohn's disease with complication, unspecified gastrointestinal tract location ([facility] [address]     3. Pyodermic gangrenosum     4. Psoriasis     5.  Anxiety with depression         PLAN:    See orders  Had long discussion with the patient up regarding the symptoms she experiencing  We will start with a blood work  May need neurology evaluation  Could be due to change of medication which had happened in the month of January beginning of February stop when he started new ones  Start counseling may need to change her medication but I think walking a lot time for now  Discussed healthy lifestyle increase physical activity very gradual  Patient will call with any concern

## 2022-02-23 NOTE — TELEPHONE ENCOUNTER
Medication:   Requested Prescriptions     Pending Prescriptions Disp Refills    vitamin D (ERGOCALCIFEROL) 1.25 MG (45985 UT) CAPS capsule [Pharmacy Med Name: VITAMIN D2 1.25MG(50,000 UNIT)] 12 capsule 1     Sig: TAKE 1 CAPSULE BY MOUTH ONE TIME PER WEEK    lisinopril (PRINIVIL;ZESTRIL) 5 MG tablet [Pharmacy Med Name: LISINOPRIL 5 MG TABLET] 90 tablet      Sig: TAKE 1 TABLET BY MOUTH EVERY DAY       Last Filled:      Patient Phone Number: [phone] (home)     Last appt: 2/17/2022   Next appt: Visit date not found    Last BMP:   Lab Results   Component Value Date     02/17/2022    K 4.1 02/17/2022    K 5.0 04/04/2020    CL 99 02/17/2022    CO2 21 02/17/2022    ANIONGAP 13 02/17/2022    GLUCOSE 190 02/17/2022    BUN 17 02/17/2022    CREATININE 0.6 02/17/2022    LABGLOM >60 02/17/2022    GFRAA >60 02/17/2022    CALCIUM 8.9 02/17/2022      Last CMP:   Lab Results   Component Value Date     02/17/2022    K 4.1 02/17/2022    K 5.0 04/04/2020    CL 99 02/17/2022    CO2 21 02/17/2022    ANIONGAP 13 02/17/2022    GLUCOSE 190 02/17/2022    BUN 17 02/17/2022    CREATININE 0.6 02/17/2022    LABGLOM >60 02/17/2022    GFRAA >60 02/17/2022    PROT 7.6 02/17/2022    LABALBU 4.3 02/17/2022    AGRATIO 1.3 02/17/2022    BILITOT 0.4 02/17/2022    ALKPHOS 97 02/17/2022    ALT 19 02/17/2022    AST 12 02/17/2022     Last Renal Function:   Lab Results   Component Value Date     02/17/2022    K 4.1 02/17/2022    K 5.0 04/04/2020    CL 99 02/17/2022    CO2 21 02/17/2022    GLUCOSE 190 02/17/2022    BUN 17 02/17/2022    CREATININE 0.6 02/17/2022    LABALBU 4.3 02/17/2022    CALCIUM 8.9 02/17/2022    GFR 92 04/28/2018     04/28/2018    GFRAA >60 02/17/2022       Last OARRS: No flowsheet data found. Preferred Pharmacy:   [facility] [address] [facility] [address] [address] [address] [place] [address]  Phone: [phone] Fax: [phone]

## 2022-02-24 RX ORDER — ERGOCALCIFEROL 1.25 MG/1
CAPSULE ORAL
Qty: 12 CAPSULE | Refills: 1 | Status: SHIPPED | OUTPATIENT
Start: 2022-02-24 | End: 2022-08-11

## 2022-02-24 RX ORDER — LISINOPRIL 5 MG/1
TABLET ORAL
Qty: 90 TABLET | Refills: 1 | Status: SHIPPED | OUTPATIENT
Start: 2022-02-24 | End: 2022-06-27

## 2022-03-08 DIAGNOSIS — E11.9 TYPE 2 DIABETES MELLITUS WITHOUT COMPLICATION, WITHOUT LONG-TERM CURRENT USE OF INSULIN (HCC): ICD-10-CM

## 2022-03-08 LAB
ALBUMIN SERPL-MCNC: 4.3 G/DL (ref 3.4–5)
ALP BLD-CCNC: 96 U/L (ref 40–129)
ALT SERPL-CCNC: 24 U/L (ref 10–40)
ANION GAP SERPL CALCULATED.3IONS-SCNC: 17 MMOL/L (ref 3–16)
AST SERPL-CCNC: 14 U/L (ref 15–37)
BASOPHILS ABSOLUTE: 0.2 K/UL (ref 0–0.2)
BASOPHILS RELATIVE PERCENT: 2.9 %
BILIRUB SERPL-MCNC: 0.5 MG/DL (ref 0–1)
BILIRUBIN DIRECT: <0.2 MG/DL (ref 0–0.3)
BILIRUBIN, INDIRECT: ABNORMAL MG/DL (ref 0–1)
BUN BLDV-MCNC: 17 MG/DL (ref 7–20)
CALCIUM SERPL-MCNC: 9.1 MG/DL (ref 8.3–10.6)
CHLORIDE BLD-SCNC: 102 MMOL/L (ref 99–110)
CO2: 19 MMOL/L (ref 21–32)
CREAT SERPL-MCNC: 0.7 MG/DL (ref 0.6–1.1)
EOSINOPHILS ABSOLUTE: 0.7 K/UL (ref 0–0.6)
EOSINOPHILS RELATIVE PERCENT: 12.8 %
FOLATE: >20 NG/ML (ref 4.78–24.2)
GFR AFRICAN AMERICAN: >60
GFR NON-AFRICAN AMERICAN: >60
GLUCOSE BLD-MCNC: 156 MG/DL (ref 70–99)
HCT VFR BLD CALC: 42.3 % (ref 36–48)
HEMOGLOBIN: 13.8 G/DL (ref 12–16)
IRON SATURATION: 25 % (ref 15–50)
IRON: 70 UG/DL (ref 37–145)
LYMPHOCYTES ABSOLUTE: 1.1 K/UL (ref 1–5.1)
LYMPHOCYTES RELATIVE PERCENT: 20.4 %
MCH RBC QN AUTO: 30.9 PG (ref 26–34)
MCHC RBC AUTO-ENTMCNC: 32.8 G/DL (ref 31–36)
MCV RBC AUTO: 94.4 FL (ref 80–100)
MONOCYTES ABSOLUTE: 0.7 K/UL (ref 0–1.3)
MONOCYTES RELATIVE PERCENT: 12.5 %
NEUTROPHILS ABSOLUTE: 2.9 K/UL (ref 1.7–7.7)
NEUTROPHILS RELATIVE PERCENT: 51.4 %
PDW BLD-RTO: 15.4 % (ref 12.4–15.4)
PLATELET # BLD: 245 K/UL (ref 135–450)
PMV BLD AUTO: 7.8 FL (ref 5–10.5)
POTASSIUM SERPL-SCNC: 4.6 MMOL/L (ref 3.5–5.1)
RBC # BLD: 4.48 M/UL (ref 4–5.2)
SODIUM BLD-SCNC: 138 MMOL/L (ref 136–145)
TOTAL IRON BINDING CAPACITY: 284 UG/DL (ref 260–445)
TOTAL PROTEIN: 8 G/DL (ref 6.4–8.2)
TSH SERPL DL<=0.05 MIU/L-ACNC: 2.09 UIU/ML (ref 0.27–4.2)
VITAMIN B-12: 881 PG/ML (ref 211–911)
WBC # BLD: 5.6 K/UL (ref 4–11)

## 2022-03-09 LAB
ESTIMATED AVERAGE GLUCOSE: 159.9 MG/DL
HBA1C MFR BLD: 7.2 %

## 2022-03-14 RX ORDER — EMPAGLIFLOZIN 25 MG/1
TABLET, FILM COATED ORAL
Qty: 30 TABLET | Refills: 3 | Status: SHIPPED | OUTPATIENT
Start: 2022-03-14 | End: 2022-07-25

## 2022-03-14 NOTE — TELEPHONE ENCOUNTER
Medication:   Requested Prescriptions     Pending Prescriptions Disp Refills    JARDIANCE 25 MG tablet [Pharmacy Med Name: Flores Bran 25 MG TABLET] 30 tablet 3     Sig: TAKE 1 TABLET BY MOUTH EVERY DAY     Last Filled:  5/4/21    Last appt: 2/17/2022   Next appt: Visit date not found    Last Labs DM:   Lab Results   Component Value Date    LABA1C 7.2 03/08/2022

## 2022-04-09 NOTE — PROGRESS NOTES
Reji Mensah MD  [facility]) Physicians - Rheumatology    [x] [facility]:  [place] [facility]  [address] [facility] [] [facility]:  [address]  [facility], [address]   Office: [phone]  Fax: [phone]     RHEUMATOLOGY PROGRESS NOTE    ASSESSMENT/PLAN:  Denver Bien is a 36 y.o. female w/ prior Hx of chronic inflammatory arthritis (peripheral joints, denies axial involvement), severe Crohn's disease (Dx in 2006 refractory to medical therapy s/p open total abdominal colectomy with end ileostomy on 7/16/2021, follows w/ [facility] GI), peristomal pyoderma gangrenosum (requiring hospitalization in 2/2022 at [address] [place] [address] for high dose steroids per Dermatology) and plaque PsO most recently followed by Dr. Eun Stewart    PMHx pertinent for HTN, T2DM, HLD and DONTRELL (followed by Hematology, receives IV iron infusions).     Current rheum meds:  Cyclosporine and Thalidomide: per Dermatology  Infliximab: Q4wk per GI, previously took Remicade (first biologic tried in 2011, Q6-8 wk, did not respond)  MTX 8 tablets/wk: stopped during hospitalization for peristomal pyoderma gangrenosum in 2/2022  Vitamin D 50,000 IU wkly w/ 2000 IU daily: per PCP     Prior rheum meds:  Humira w/ MTX: pt states she took Humira for several yrs, initially worked but stopped working from [id number], did not develop antibodies  Stelara: tried in 2018  Gabapentin    1. Arthropathy associated with inflammatory bowel disease  Assessment & Plan:  - previously well controlled on MTX. She developed worsening peripheral inflammatory arthritis and tenosynovitis after stopping MTX during her hospitalization for pyoderma gangrenosum. She had active synovitis and tenosynovitis on exam today. - she is currently taking Cyclosporine and Thalidomide prescribed by two different dermatologists. She told me her dermatologist plans to stop Cyclosporine this Friday. Pt states she plans to come off Thalidomide in the near future as well.   - start Pred taper starting w/ 30 mg daily now. - resume MTX 20 mg/wk after stopping Cyclosporine. - cont Infliximab 10 mg/kg Q4wk per GI. Orders:  -     predniSONE (DELTASONE) 5 MG tablet; Take 6 tablets by mouth every morning for 4 days, THEN 4 tablets every morning for 10 days, THEN 2 tablets every morning for 10 days, THEN 1 tablet every morning for 10 days. , Disp-94 tablet, R-0, DAWNormal  -     folic acid (FOLVITE) 1 MG tablet; Take 1 tablet by mouth daily, Disp-90 tablet, R-0Normal  -     methotrexate (RHEUMATREX) 2.5 MG chemo tablet; Take 8 tablets by mouth once a week, Disp-96 tablet, R-0, DAWNormal  -     C-Reactive Protein; Future  -     Vitamin D 25 Hydroxy; Future  2. Psoriasis  Assessment & Plan:  - previously well controlled on MTX. She had a few spots on exam today. Resume MTX as above #1. Orders:  -     methotrexate (RHEUMATREX) 2.5 MG chemo tablet; Take 8 tablets by mouth once a week, Disp-96 tablet, R-0, DAWNormal  3. Pyoderma gangrenosum  Assessment & Plan:  - followed by Dr. Lizzie Mcnulty in [place]. - peristomal pyoderma gangrenosum required hospitalization at [facility] in 2/2022 for high dose steroids. MTX was d/c'ed and she was started on Cyclosporine. Pt stated her wound has improved and plan is to stop Cyclosporine this wk. - resume MTX after stopping Cyclosporine for her active inflammatory arthritis as above #1. Orders:  -     folic acid (FOLVITE) 1 MG tablet; Take 1 tablet by mouth daily, Disp-90 tablet, R-0Normal  -     methotrexate (RHEUMATREX) 2.5 MG chemo tablet; Take 8 tablets by mouth once a week, Disp-96 tablet, R-0, DAWNormal  4. Crohn's disease with complication, unspecified gastrointestinal tract location [facility])  Assessment & Plan:  - Dx in 2006 refractory to medical therapy, s/p open total abdominal colectomy with end ileostomy on 7/16/2021, c/b peristomal pyoderma gangrenosum.   - follows w/ [facility].  - currently well controlled on Infliximab 10 mg/kg Q4wk per GI.  Orders:  -     C-Reactive Protein; Future  -     Vitamin D 25 Hydroxy; Future  5. High risk medication use  Assessment & Plan:  - safety labs 4 wks after resuming MTX.  - discussed her immunocompromised state on MTX and Infliximab and indications to hold these medications. Orders:  -     Hepatic Function Panel; Future  -     CBC with Auto Differential; Future  -     Creatinine; Future     Return in about 2 months (around 6/12/2022) for lab result discussion and treatment plan, medication monitoring. High complexity case. TIME SPENT TODAY:  I spent over 40 minutes of face-to-face time with the pt (including taking interval history and performing physical exam, review of medical records, independently interpreting results and communicating results to the pt/family/caregiver, counseling and educating the pt/family/caregiver, implementation of treatment plan, coordination of care, documenting clinical information in the EMR) during today visit. At least 50% of this time was spent in counseling, explanation of diagnosis, planning of further management, and coordination of care. The risks and benefits of my recommendations, as well as other treatment options, benefits and side effects were discussed with the patient today. Questions were answered. NOTE: This report is transcribed by using voice recognition software dragon. Every effort is made to ensure accuracy; however, inadvertent computerized  transcription errors may be present. SUBJECTIVE:  Past medical/surgical history, medications and allergies are reviewed and updated as appropriate. Interval Hx:   Pt was last seen in November. She states she contracted COVID-19 in January. She was admitted to [facility] in 2/2022 for high dose steroids for peristomal pyoderma gangrenosum. Pt states she is currently taking Cyclosporine and Thalidomide for her PG which she tells me has significantly improved.      She reports significant worsening joint pain and swelling after MTX was discontinued during her hospitalization in February \"because they felt I was taking too many immunosuppressants\". Specifically she reports pain and swelling in her flexor tendons of the hands, swelling in the dorsum of her forearms, polyarthralgias in her b/l MCP joints, shoulders and plantar fasciitis. Pt states she has not required any oral steroids recently. She reports well controlled IBD Sx on Infliximab 10 mg/kg Q4wk. She tells me her dermatologist plans to stop Cyclosporine this Friday. ROS:  Constitutional: denies fatigue, fever/chills, night sweats, unintentional weight loss  Integumentary: denies photosensitivity, rash, +diffuse hair thinning since being on MTX, or Sx of Raynaud's phenomenon  Eyes: denies dry eyes, redness or pain, visual disturbance, or floaters  Nose: denies nasal ulcers or recurrent sinusitis  Oral cavity: denies dry mouth, +recurrent oral ulcers  Cardiovascular: denies CP, palpitations, Hx of pericardial effusion or pericarditis  Respiratory: denies SOB, cough, hemoptysis, or pleurisy  Gastrointestinal: refer to above HPI    No Known Allergies    Past Medical History:        Diagnosis Date    Crohn's disease ([facility] 2006    GI [facility]    IBD (inflammatory bowel disease)     PCOS (polycystic ovarian syndrome)     Psoriasis     Psoriatic arthritis ([facility]     sees Rheumatology        Past Surgical History:        Procedure Laterality Date     SECTION       SECTION, CLASSIC  2009       Medications:    Current Outpatient Medications   Medication Sig Dispense Refill    Cholecalciferol (VITAMIN D3) 50 MCG (2000) CAPS Take by mouth      predniSONE (DELTASONE) 5 MG tablet Take 6 tablets by mouth every morning for 4 days, THEN 4 tablets every morning for 10 days, THEN 2 tablets every morning for 10 days, THEN 1 tablet every morning for 10 days.  94 tablet 0    folic acid (FOLVITE) 1 MG tablet Take 1 tablet by mouth daily 90 tablet 0    methotrexate (RHEUMATREX) 2.5 MG chemo tablet Take 8 tablets by mouth once a week 96 tablet 0    JARDIANCE 25 MG tablet TAKE 1 TABLET BY MOUTH EVERY DAY 30 tablet 3    vitamin D (ERGOCALCIFEROL) 1.25 MG (62255 UT) CAPS capsule TAKE 1 CAPSULE BY MOUTH ONE TIME PER WEEK 12 capsule 1    lisinopril (PRINIVIL;ZESTRIL) 5 MG tablet TAKE 1 TABLET BY MOUTH EVERY DAY 90 tablet 1    mesalamine (CANASA) 1000 MG suppository Place 1,000 mg rectally nightly      sulfamethoxazole-trimethoprim (BACTRIM DS;SEPTRA DS) 800-160 MG per tablet Take 1 tablet by mouth three times a week      cycloSPORINE (SANDIMMUNE) 100 MG capsule Take 100 mg by mouth 3 times daily      sodium chloride 0.9 % SOLN with inFLIXimab 100 MG SOLR Infuse intravenously      Ascorbic Acid  MG CPCR Take by mouth      zinc 50 MG TABS tablet TAKE 1 TABLET BY MOUTH EVERY DAY      escitalopram (LEXAPRO) 20 MG tablet TAKE 1 TABLET BY MOUTH EVERY DAY 30 tablet 2    omeprazole (PRILOSEC) 40 MG delayed release capsule TAKE 1 CAPSULE BY MOUTH EVERY DAY 90 capsule 1    glimepiride (AMARYL) 2 MG tablet Take 1 tablet by mouth every morning 30 tablet 2    THALOMID 100 MG capsule       acetaminophen (TYLENOL) 325 MG tablet Take 650 mg by mouth every 4 hours      Ferrous Sulfate (IRON PO) Take by mouth      Cyanocobalamin (VITAMIN B 12 PO) Take by mouth      ONETOUCH ULTRA strip USE TO TEST ONCE DAILY 100 strip 2    ondansetron (ZOFRAN-ODT) 4 MG disintegrating tablet Take 4 mg by mouth every 8 hours as needed for Nausea or Vomiting      Continuous Blood Gluc  (FREESTYLE CESAR READER) ERNESTO 1 Device by In Vitro route 2 times daily 1 Device 0    Continuous Blood Gluc Sensor (FREESTYLE CESAR 14 DAY SENSOR) MISC Check once a day 100 each 1    ONETOUCH DELICA LANCETS FINE MISC USE ONE DAILY 100 each 1    Blood Glucose Monitoring Suppl (ONE TOUCH ULTRA MINI) w/Device KIT 1 kit by Does not apply route daily as needed (fasting) 1 kit 0    diphenoxylate-atropine (LOMOTIL) 2.5-0.025 MG per tablet diphenoxylate-atropine 2.5 mg-0.025 mg tablet      budesonide (ENTOCORT EC) 3 MG extended release capsule Break capsule and apply powder every day to the wounds with each ostomy change (Patient not taking: Reported on 4/12/2022)      Vedolizumab (ENTYVIO IV) Infuse intravenously Every 4 weeks (Patient not taking: Reported on 4/12/2022)       No current facility-administered medications for this visit. OBJECTIVE:  Physical Exam:  /84   Pulse 88   Ht 5' 4\" (1.626 m)   Wt 222 lb (100.7 kg)   SpO2 99%   BMI 38.11 kg/m²     GEN: AAOx3, in NAD, well-appearing  HEAD: normocephalic, atraumatic  EYES: no injection or icterus  CVS: RRR  LUNGS: in no acute respiratory distress, CTAB  MSK:  Upper extremities:              Hands: b/l MCP joints w/ swelling TTP, b/l PIP and DIP joints w/o swelling NTTP, there is mild flexor tenosynovitis of the fingers              Wrist: no synovitis in the wrist joints b/l, FROM              Elbow: no synovitis or bursitis, FROM  Lower extremities:              Knees: no warmth or effusion present, FROM              Ankles: no synovitis, FROM, Achilles tendons w/o swelling or warmth NTTP              Feet: no toe swelling or pain or warmth on palpation w/ FROM, negative MTP squeeze test  INTEGUMENT: +scattered papules on forearms, no rash or psoriatic lesions, no petechiae, bruises, or palpable purpura, no patchy alopecia, no nail pitting or periungual changes, no clubbing or digital ulcers    DATA:  Labs:   I personally reviewed interval labs and discussed w/ the pt in detail which showed:    Lab Results   Component Value Date    WBC 5.6 03/08/2022    HGB 13.8 03/08/2022    HCT 42.3 03/08/2022    MCV 94.4 03/08/2022     03/08/2022    LYMPHOPCT 20.4 03/08/2022    RBC 4.48 03/08/2022    MCH 30.9 03/08/2022    MCHC 32.8 03/08/2022    RDW 15.4 03/08/2022     Lab Results   Component Value Date     03/08/2022 K 4.6 03/08/2022     03/08/2022    CO2 19 (L) 03/08/2022    BUN 17 03/08/2022    CREATININE 0.7 03/08/2022    GLUCOSE 156 (H) 03/08/2022    CALCIUM 9.1 03/08/2022    PROT 8.0 03/08/2022    LABALBU 4.3 03/08/2022    BILITOT 0.5 03/08/2022    ALKPHOS 96 03/08/2022    AST 14 (L) 03/08/2022    ALT 24 03/08/2022    LABGLOM >60 03/08/2022    GFRAA >60 03/08/2022    AGRATIO 1.3 02/17/2022     Lab Results   Component Value Date    VITD25 27.8 (L) 05/04/2021     No results found for: C3     No results found for: C4     No results found for: ANTIDSDNAIGG     No results found for: LABURIC     CRP 25.62 mg/L (8/13/21)  Lab Results   Component Value Date    .7 (H) 04/03/2020     ESR 48 (8/13/21)  Lab Results   Component Value Date    SEDRATE 63 (H) 04/03/2020     No results found for: CKTOTAL    Negative HLA B27 (6/16/21)  RF 17.0, negative CCP (6/16/21)  Negative SAULO (6/16/21)  Negative hepatitis B and C serologies (6/16/21)  Negative Quantiferon gold (10/5/20)    Imaging:  I personally reviewed interval imaging and discussed w/ the pt in detail which included:    X-rays (6/16/21):  R hand:  FINDINGS:   3 views of the right hand show no soft tissue abnormality. Mineralization is normal.   No fracture or acute bony pathology is seen.   Alignment is anatomic.   There is joint space narrowing of the proximal and distal interphalangeal joints with small marginal osteophytes. No erosive changes are visualized.    IMPRESSION:  Mild degenerative changes distally     L hand:  FINDINGS:   3 views of the left hand were performed.   No soft tissue abnormality is seen.   Mineralization is normal.   No fracture or acute bony pathology is visualized.   Alignment is anatomic.   There is joint space narrowing of the proximal and distal interphalangeal joints with small marginal osteophytes.   No erosive changes are visualized.      IMPRESSION:  Mild degenerative changes     DEXA study (11/13/20):  Normal T-scores in the L-spine and hips.     Procedures:  C-scope (3/24/21):  - Crohn's disease, with ileitis and colitis. - Simple Endoscopic Score for Crohn's Disease: 26, mucosal inflammatory changes secondary to Crohn's disease, with ileitis and colitis. Biopsied.   - Pseudopolyps at the colonic anastomosis. - Mucosal ulceration. Above results were discussed w/ the pt in detail during today's visit.

## 2022-04-12 ENCOUNTER — OFFICE VISIT (OUTPATIENT)
Dept: RHEUMATOLOGY | Age: 42
End: 2022-04-12
Payer: COMMERCIAL

## 2022-04-12 VITALS
HEART RATE: 88 BPM | BODY MASS INDEX: 37.9 KG/M2 | DIASTOLIC BLOOD PRESSURE: 84 MMHG | SYSTOLIC BLOOD PRESSURE: 112 MMHG | OXYGEN SATURATION: 99 % | WEIGHT: 222 LBS | HEIGHT: 64 IN

## 2022-04-12 DIAGNOSIS — Z79.899 HIGH RISK MEDICATION USE: ICD-10-CM

## 2022-04-12 DIAGNOSIS — K52.9 ARTHROPATHY ASSOCIATED WITH INFLAMMATORY BOWEL DISEASE: Primary | ICD-10-CM

## 2022-04-12 DIAGNOSIS — K50.919 CROHN'S DISEASE WITH COMPLICATION, UNSPECIFIED GASTROINTESTINAL TRACT LOCATION (HCC): ICD-10-CM

## 2022-04-12 DIAGNOSIS — M12.9 ARTHROPATHY ASSOCIATED WITH INFLAMMATORY BOWEL DISEASE: Primary | ICD-10-CM

## 2022-04-12 DIAGNOSIS — L40.9 PSORIASIS: ICD-10-CM

## 2022-04-12 DIAGNOSIS — L88 PYODERMA GANGRENOSUM: ICD-10-CM

## 2022-04-12 PROCEDURE — 99215 OFFICE O/P EST HI 40 MIN: CPT | Performed by: INTERNAL MEDICINE

## 2022-04-12 PROCEDURE — G8417 CALC BMI ABV UP PARAM F/U: HCPCS | Performed by: INTERNAL MEDICINE

## 2022-04-12 PROCEDURE — 1036F TOBACCO NON-USER: CPT | Performed by: INTERNAL MEDICINE

## 2022-04-12 PROCEDURE — G8427 DOCREV CUR MEDS BY ELIG CLIN: HCPCS | Performed by: INTERNAL MEDICINE

## 2022-04-12 RX ORDER — FOLIC ACID 1 MG/1
1 TABLET ORAL DAILY
Qty: 90 TABLET | Refills: 0 | Status: SHIPPED | OUTPATIENT
Start: 2022-04-12 | End: 2022-06-01 | Stop reason: SDUPTHER

## 2022-04-12 RX ORDER — PREDNISONE 1 MG/1
TABLET ORAL
Qty: 94 TABLET | Refills: 0 | Status: SHIPPED | OUTPATIENT
Start: 2022-04-12 | End: 2022-05-04

## 2022-04-12 RX ORDER — ACETAMINOPHEN 160 MG
TABLET,DISINTEGRATING ORAL
COMMUNITY

## 2022-04-12 NOTE — ASSESSMENT & PLAN NOTE
- Dx in 2006 refractory to medical therapy, s/p open total abdominal colectomy with end ileostomy on 7/16/2021, c/b peristomal pyoderma gangrenosum. - follows w/ [facility].  - currently well controlled on Infliximab 10 mg/kg Q4wk per GI.

## 2022-04-12 NOTE — ASSESSMENT & PLAN NOTE
- previously well controlled on MTX. She developed worsening peripheral inflammatory arthritis and tenosynovitis after stopping MTX during her hospitalization for pyoderma gangrenosum. She had active synovitis and tenosynovitis on exam today. - she is currently taking Cyclosporine and Thalidomide prescribed by two different dermatologists. She told me her dermatologist plans to stop Cyclosporine this Friday. Pt states she plans to come off Thalidomide in the near future as well. - start Pred taper starting w/ 30 mg daily now. - resume MTX 20 mg/wk after stopping Cyclosporine. - cont Infliximab 10 mg/kg Q4wk per GI.

## 2022-04-12 NOTE — ASSESSMENT & PLAN NOTE
- followed by Dr. Collette Fogo in [place]. - peristomal pyoderma gangrenosum required hospitalization at [facility] in 2/2022 for high dose steroids. MTX was d/c'ed and she was started on Cyclosporine. Pt stated her wound has improved and plan is to stop Cyclosporine this wk. - resume MTX after stopping Cyclosporine for her active inflammatory arthritis as above #1.

## 2022-04-14 ENCOUNTER — OFFICE VISIT (OUTPATIENT)
Dept: PRIMARY CARE CLINIC | Age: 42
End: 2022-04-14
Payer: COMMERCIAL

## 2022-04-14 VITALS
HEART RATE: 84 BPM | WEIGHT: 221.8 LBS | SYSTOLIC BLOOD PRESSURE: 126 MMHG | BODY MASS INDEX: 38.07 KG/M2 | DIASTOLIC BLOOD PRESSURE: 72 MMHG

## 2022-04-14 DIAGNOSIS — R63.5 WEIGHT GAIN: ICD-10-CM

## 2022-04-14 DIAGNOSIS — E11.9 TYPE 2 DIABETES MELLITUS WITHOUT COMPLICATION, WITHOUT LONG-TERM CURRENT USE OF INSULIN (HCC): Primary | ICD-10-CM

## 2022-04-14 DIAGNOSIS — M12.9 ARTHROPATHY ASSOCIATED WITH INFLAMMATORY BOWEL DISEASE: ICD-10-CM

## 2022-04-14 DIAGNOSIS — I10 ESSENTIAL HYPERTENSION: ICD-10-CM

## 2022-04-14 DIAGNOSIS — K50.919 CROHN'S DISEASE WITH COMPLICATION, UNSPECIFIED GASTROINTESTINAL TRACT LOCATION (HCC): ICD-10-CM

## 2022-04-14 DIAGNOSIS — K52.9 ARTHROPATHY ASSOCIATED WITH INFLAMMATORY BOWEL DISEASE: ICD-10-CM

## 2022-04-14 PROCEDURE — 2022F DILAT RTA XM EVC RTNOPTHY: CPT | Performed by: FAMILY MEDICINE

## 2022-04-14 PROCEDURE — G8427 DOCREV CUR MEDS BY ELIG CLIN: HCPCS | Performed by: FAMILY MEDICINE

## 2022-04-14 PROCEDURE — 3051F HG A1C>EQUAL 7.0%<8.0%: CPT | Performed by: FAMILY MEDICINE

## 2022-04-14 PROCEDURE — 1036F TOBACCO NON-USER: CPT | Performed by: FAMILY MEDICINE

## 2022-04-14 PROCEDURE — G8417 CALC BMI ABV UP PARAM F/U: HCPCS | Performed by: FAMILY MEDICINE

## 2022-04-14 PROCEDURE — 99214 OFFICE O/P EST MOD 30 MIN: CPT | Performed by: FAMILY MEDICINE

## 2022-04-14 NOTE — PROGRESS NOTES
Subjective:      Patient ID: Maged Wagner is a 36 y.o. female. HPI  Patient is here for follow-up   Patient with Crohn disease she has been followed closely by [facility], she is ready status post surgery, she is status post colectomy with end ileostomy,  Patient with arthropathy related to inflammatory bowel disease recently saw Dr. Destiney Ceballos on started her on steroid her sugar been going up and down she is on a tapered dose   was diagnosed with pyoderma gangrenosum of the stoma, seen by derm at [address] [place] [address] seems stable now. Diabetes Mellitus Type II, Follow-up: Patient here for follow-up of Type 2 diabetes mellitus. Current symptoms/problems include none and have been stable. Symptoms have been present for a few years.     Known diabetic complications: none  Cardiovascular risk factors: diabetes mellitus, hypertension and obesity (BMI >= 30 kg/m2)  Current diabetic medications include see med's list .      Eye exam current (within one year): yes  Weight trend: stable  Prior visit with dietician: no  Current diet: in general, a \"healthy\" diet    Current exercise: none  He has been experiencing low blood sugar so advised the patient stop the glimepiride  Current monitoring regimen: home blood tests - weekly  Home blood sugar records: fasting range: She has not been checking her sugar lately  Any episodes of hypoglycemia? no  Hypertension: Patient is here follow up on  elevated blood pressures. Age at onset of elevated blood pressure:  Few years. Cardiac symptoms none. Patient denies chest pain, chest pressure/discomfort, dyspnea, exertional chest pressure/discomfort, lower extremity edema, near-syncope, orthopnea and palpitations. Cardiovascular risk factors: dyslipidemia, hypertension, obesity (BMI >= 30 kg/m2) and sedentary lifestyle. Use of agents associated with hypertension: steroids. History of target organ damage: none. Review of Systems   Constitutional: Negative. Negative for chills and fever.    HENT: Living Expenses: Not hard at all   Food Insecurity: No Food Insecurity    Worried About [address] [place] [address] in the Last Year: Never true    Ran Out of Food in the Last Year: Never true   Transportation Needs:     Lack of Transportation (Medical): Not on file    Lack of Transportation (Non-Medical): Not on file   Physical Activity:     Days of Exercise per Week: Not on file    Minutes of Exercise per Session: Not on file   Stress:     Feeling of Stress : Not on file   Social Connections:     Frequency of Communication with Friends and Family: Not on file    Frequency of Social Gatherings with Friends and Family: Not on file    Attends Latter-day Services: Not on file    Active Member of [address] [place] [address] or Organizations: Not on file    Attends Club or Organization Meetings: Not on file    Marital Status: Not on file   Intimate Partner Violence:     Fear of Current or Ex-Partner: Not on file    Emotionally Abused: Not on file    Physically Abused: Not on file    Sexually Abused: Not on file   Housing Stability:     Unable to Pay for Housing in the Last Year: Not on file    Number of [place] in the Last Year: Not on file    Unstable Housing in the Last Year: Not on file     Current Outpatient Medications   Medication Sig Dispense Refill    Cholecalciferol (VITAMIN D3) 50 MCG (2000 UT) CAPS Take by mouth      predniSONE (DELTASONE) 5 MG tablet Take 6 tablets by mouth every morning for 4 days, THEN 4 tablets every morning for 10 days, THEN 2 tablets every morning for 10 days, THEN 1 tablet every morning for 10 days.  94 tablet 0    folic acid (FOLVITE) 1 MG tablet Take 1 tablet by mouth daily 90 tablet 0    JARDIANCE 25 MG tablet TAKE 1 TABLET BY MOUTH EVERY DAY 30 tablet 3    vitamin D (ERGOCALCIFEROL) 1.25 MG (81200 UT) CAPS capsule TAKE 1 CAPSULE BY MOUTH ONE TIME PER WEEK 12 capsule 1    lisinopril (PRINIVIL;ZESTRIL) 5 MG tablet TAKE 1 TABLET BY MOUTH EVERY DAY 90 tablet 1    mesalamine (CANASA) 1000 MG suppository Place 1,000 mg rectally nightly      sulfamethoxazole-trimethoprim (BACTRIM DS;SEPTRA DS) 800-160 MG per tablet Take 1 tablet by mouth three times a week      cycloSPORINE (SANDIMMUNE) 100 MG capsule Take 100 mg by mouth 3 times daily      sodium chloride 0.9 % SOLN with inFLIXimab 100 MG SOLR Infuse intravenously      Ascorbic Acid  MG CPCR Take by mouth      zinc 50 MG TABS tablet TAKE 1 TABLET BY MOUTH EVERY DAY      escitalopram (LEXAPRO) 20 MG tablet TAKE 1 TABLET BY MOUTH EVERY DAY 30 tablet 2    omeprazole (PRILOSEC) 40 MG delayed release capsule TAKE 1 CAPSULE BY MOUTH EVERY DAY 90 capsule 1    glimepiride (AMARYL) 2 MG tablet Take 1 tablet by mouth every morning 30 tablet 2    THALOMID 100 MG capsule       acetaminophen (TYLENOL) 325 MG tablet Take 650 mg by mouth every 4 hours      Ferrous Sulfate (IRON PO) Take by mouth      Cyanocobalamin (VITAMIN B 12 PO) Take by mouth      Vedolizumab (ENTYVIO IV) Infuse intravenously Every 4 weeks       ONETOUCH ULTRA strip USE TO TEST ONCE DAILY 100 strip 2    ondansetron (ZOFRAN-ODT) 4 MG disintegrating tablet Take 4 mg by mouth every 8 hours as needed for Nausea or Vomiting      Continuous Blood Gluc  (FREESTYLE CESAR READER) ERNESTO 1 Device by In Vitro route 2 times daily 1 Device 0    Continuous Blood Gluc Sensor (FREESTYLE CESAR 14 DAY SENSOR) MISC Check once a day 100 each 1    ONETOUCH DELICA LANCETS FINE MISC USE ONE DAILY 100 each 1    Blood Glucose Monitoring Suppl (ONE TOUCH ULTRA MINI) w/Device KIT 1 kit by Does not apply route daily as needed (fasting) 1 kit 0    diphenoxylate-atropine (LOMOTIL) 2.5-0.025 MG per tablet diphenoxylate-atropine 2.5 mg-0.025 mg tablet      methotrexate (RHEUMATREX) 2.5 MG chemo tablet Take 8 tablets by mouth once a week (Patient not taking: Reported on 4/14/2022) 96 tablet 0    budesonide (ENTOCORT EC) 3 MG extended release capsule Break capsule and apply powder every day to the wounds with each ostomy change (Patient not taking: Reported on 4/12/2022)       No current facility-administered medications for this visit. No Known Allergies      Objective:   Physical Exam   Constitutional: She appears well-developed and well-nourished. No distress. HENT:   Head: Normocephalic and atraumatic. Right Ear: External ear normal.   Left Ear: External ear normal.   Nose: Nose normal.   Mouth/Throat: Oropharynx is clear and moist. No oropharyngeal exudate. Eyes: Conjunctivae and EOM are normal. Pupils are equal, round, and reactive to light. Right eye exhibits no discharge. Left eye exhibits no discharge. No scleral icterus. Neck: Normal range of motion. Neck supple. No JVD present. No tracheal deviation present. No thyromegaly present. Cardiovascular: Normal rate, regular rhythm, normal heart sounds and intact distal pulses. Pulmonary/Chest: Effort normal and breath sounds normal. No stridor. No respiratory distress. She has no wheezes. She has no rales. She exhibits no tenderness. Abdominal: Soft. Bowel sounds are normal. She exhibits no distension and no mass. There is no tenderness. There is no rebound and no guarding. Musculoskeletal: Normal range of motion. She exhibits no edema or tenderness. Lymphadenopathy:     She has no cervical adenopathy. Skin: Skin is warm and dry. No rash noted. She is not diaphoretic. No erythema. No pallor. Mild irritation anterior above the ankle now right foot mild blanching no other abnormality full range of motion of the ankle both left and right  microfilaments test exam was negative   Vitals reviewed. Assessment:      Diagnosis Orders   1. Type 2 diabetes mellitus without complication, without long-term current use of insulin ([address]     2. Crohn's disease with complication, unspecified gastrointestinal tract location ([address]     3. Arthropathy associated with inflammatory bowel disease     4. Essential hypertension     5.  Weight gain             Plan:      See orders,   .   To monitor blood glucose call if persistent high monitor diet  She is concerned about not losing weight even though she is low calorie diet and trying to increase her activity I had a long discussion with the patient about healthier eating habits and being persistent  Recommend the patient to continue check her blood glucose, to call with her numbers

## 2022-04-25 RX ORDER — GLIMEPIRIDE 2 MG/1
TABLET ORAL
Qty: 90 TABLET | Refills: 0 | Status: SHIPPED | OUTPATIENT
Start: 2022-04-25 | End: 2022-07-25

## 2022-04-25 NOTE — TELEPHONE ENCOUNTER
Medication:   Requested Prescriptions     Pending Prescriptions Disp Refills    glimepiride (AMARYL) 2 MG tablet [Pharmacy Med Name: GLIMEPIRIDE 2 MG TABLET] 90 tablet      Sig: TAKE 1 TABLET BY MOUTH EVERY DAY IN THE MORNING     Last Filled:  12/2/21    Last appt: 4/14/2022   Next appt: 6/3/2022

## 2022-05-03 DIAGNOSIS — M12.9 ARTHROPATHY ASSOCIATED WITH INFLAMMATORY BOWEL DISEASE: ICD-10-CM

## 2022-05-03 DIAGNOSIS — K52.9 ARTHROPATHY ASSOCIATED WITH INFLAMMATORY BOWEL DISEASE: ICD-10-CM

## 2022-05-04 RX ORDER — PREDNISONE 1 MG/1
TABLET ORAL
Qty: 84 TABLET | Refills: 1 | Status: SHIPPED | OUTPATIENT
Start: 2022-05-04 | End: 2022-05-05

## 2022-05-05 DIAGNOSIS — M12.9 ARTHROPATHY ASSOCIATED WITH INFLAMMATORY BOWEL DISEASE: ICD-10-CM

## 2022-05-05 DIAGNOSIS — K52.9 ARTHROPATHY ASSOCIATED WITH INFLAMMATORY BOWEL DISEASE: ICD-10-CM

## 2022-05-05 RX ORDER — PREDNISONE 1 MG/1
TABLET ORAL
Qty: 84 TABLET | Refills: 1 | Status: SHIPPED | OUTPATIENT
Start: 2022-05-05 | End: 2022-06-01 | Stop reason: ALTCHOICE

## 2022-05-24 DIAGNOSIS — Z79.899 HIGH RISK MEDICATION USE: ICD-10-CM

## 2022-05-24 DIAGNOSIS — D72.819 LEUKOPENIA, UNSPECIFIED TYPE: Primary | ICD-10-CM

## 2022-05-24 DIAGNOSIS — D50.9 IRON DEFICIENCY ANEMIA, UNSPECIFIED IRON DEFICIENCY ANEMIA TYPE: ICD-10-CM

## 2022-05-24 DIAGNOSIS — E78.2 MIXED HYPERLIPIDEMIA: ICD-10-CM

## 2022-05-24 DIAGNOSIS — E11.9 TYPE 2 DIABETES MELLITUS WITHOUT COMPLICATION, WITHOUT LONG-TERM CURRENT USE OF INSULIN (HCC): Primary | ICD-10-CM

## 2022-05-24 DIAGNOSIS — K50.919 CROHN'S DISEASE WITH COMPLICATION, UNSPECIFIED GASTROINTESTINAL TRACT LOCATION (HCC): ICD-10-CM

## 2022-05-24 DIAGNOSIS — K52.9 ARTHROPATHY ASSOCIATED WITH INFLAMMATORY BOWEL DISEASE: ICD-10-CM

## 2022-05-24 DIAGNOSIS — M12.9 ARTHROPATHY ASSOCIATED WITH INFLAMMATORY BOWEL DISEASE: ICD-10-CM

## 2022-05-24 LAB
ALBUMIN SERPL-MCNC: 4.2 G/DL (ref 3.4–5)
ALP BLD-CCNC: 81 U/L (ref 40–129)
ALT SERPL-CCNC: 49 U/L (ref 10–40)
ANION GAP SERPL CALCULATED.3IONS-SCNC: 14 MMOL/L (ref 3–16)
AST SERPL-CCNC: 26 U/L (ref 15–37)
BASOPHILS ABSOLUTE: 0.2 K/UL (ref 0–0.2)
BASOPHILS RELATIVE PERCENT: 6.3 %
BILIRUB SERPL-MCNC: 0.4 MG/DL (ref 0–1)
BILIRUBIN DIRECT: <0.2 MG/DL (ref 0–0.3)
BILIRUBIN, INDIRECT: ABNORMAL MG/DL (ref 0–1)
BUN BLDV-MCNC: 12 MG/DL (ref 7–20)
C-REACTIVE PROTEIN: 11.2 MG/L (ref 0–5.1)
CALCIUM SERPL-MCNC: 8.9 MG/DL (ref 8.3–10.6)
CHLORIDE BLD-SCNC: 100 MMOL/L (ref 99–110)
CO2: 21 MMOL/L (ref 21–32)
CREAT SERPL-MCNC: 0.6 MG/DL (ref 0.6–1.1)
CREAT SERPL-MCNC: 0.7 MG/DL (ref 0.6–1.1)
EOSINOPHILS ABSOLUTE: 0.2 K/UL (ref 0–0.6)
EOSINOPHILS RELATIVE PERCENT: 5.6 %
GFR AFRICAN AMERICAN: >60
GFR AFRICAN AMERICAN: >60
GFR NON-AFRICAN AMERICAN: >60
GFR NON-AFRICAN AMERICAN: >60
GLUCOSE BLD-MCNC: 139 MG/DL (ref 70–99)
HCT VFR BLD CALC: 41.8 % (ref 36–48)
HEMOGLOBIN: 13.9 G/DL (ref 12–16)
LYMPHOCYTES ABSOLUTE: 0.6 K/UL (ref 1–5.1)
LYMPHOCYTES RELATIVE PERCENT: 22.1 %
MCH RBC QN AUTO: 31.7 PG (ref 26–34)
MCHC RBC AUTO-ENTMCNC: 33.3 G/DL (ref 31–36)
MCV RBC AUTO: 95.3 FL (ref 80–100)
MONOCYTES ABSOLUTE: 0.5 K/UL (ref 0–1.3)
MONOCYTES RELATIVE PERCENT: 16.3 %
NEUTROPHILS ABSOLUTE: 1.4 K/UL (ref 1.7–7.7)
NEUTROPHILS RELATIVE PERCENT: 49.7 %
PDW BLD-RTO: 15.4 % (ref 12.4–15.4)
PLATELET # BLD: 263 K/UL (ref 135–450)
PMV BLD AUTO: 7.7 FL (ref 5–10.5)
POTASSIUM SERPL-SCNC: 4.6 MMOL/L (ref 3.5–5.1)
RBC # BLD: 4.39 M/UL (ref 4–5.2)
SODIUM BLD-SCNC: 135 MMOL/L (ref 136–145)
TOTAL PROTEIN: 8.5 G/DL (ref 6.4–8.2)
VITAMIN D 25-HYDROXY: 34.8 NG/ML
WBC # BLD: 2.8 K/UL (ref 4–11)

## 2022-05-24 NOTE — RESULT ENCOUNTER NOTE
WBC is very low, could be caused by medication or recent viral infection. Very low WBC can predispose her to increased infections. Did she stop both Cyclosporine and Thalidomide? How long ago did she stop? I want her to reduce MTX dose from 8 tabs to 4 tabs/wk then repeat CBC in 2 wks time, placed standing lab order.

## 2022-05-25 LAB
ESTIMATED AVERAGE GLUCOSE: 139.9 MG/DL
HBA1C MFR BLD: 6.5 %

## 2022-05-27 NOTE — PROGRESS NOTES
Enma Martin MD  [facility] Physicians - Rheumatology    [x] [facility]:  [facility]  [address] [facility] [] [address]:  [address]  [facility], [address]   Office: [phone]  Fax: [phone]     RHEUMATOLOGY PROGRESS NOTE    ASSESSMENT/PLAN:  Yayo Acosta is a 36 y.o. female w/ prior Hx of chronic inflammatory arthritis (peripheral joints), severe Crohn's disease (Dx in 2006 refractory to medical therapy s/p open total abdominal colectomy with end ileostomy on 7/16/2021, peristomal pyoderma gangrenosum requiring hospitalization in 2/2022 at [facility] for high dose steroids per Dermatology) and plaque PsO most recently followed by Dr. Ananya Mills    PMHx pertinent for HTN, T2DM, HLD and DONTRELL (followed by Hematology, receives IV iron infusions).     Current rheum meds:  MTX 10 mg PO wkly: stopped during hospitalization for peristomal pyoderma gangrenosum in 2/2022, resumed 20 mg PO wkly for active inflammatory arthritis on 4/12/22 reduced  Thalidomide: per Dermatology for PG dose to 10 mg PO wkly d/t worsening leukopenia  Infliximab: Q4wk per GI, previously took Remicade (first biologic tried in 2011, Q6-8 wk, did not respond)  Bactrim M/W/F: per pt ordered by Dermatology who is treating her for PG  Vitamin D 50,000 IU wkly w/ 2000 IU daily: per PCP     Prior rheum meds:  Adalimumab w/ MTX: pt states she took Adalimumab for several yrs, initially worked but stopped working from [id number], did not develop antibodies  Ustekinumab: tried in 2018  Cyclosporine: prescribed by Dermatology for PG  Gabapentin    1. Arthropathy associated with inflammatory bowel disease  Assessment & Plan:  - significantly improved since resuming MTX. Unfortunately she developed worsening leukopenia w/ lymphopenia and mild neutropenia on MTX 20 mg PO wkly and Thalidomide (prescribed by Dermatology for [facility] [place]). She remains asymptomatic from leukopenia.    - she was instructed to lower her MTX dose from 20 mg to 10 mg PO wkly last wk, she will remain on this dose until she repeats her CBC w/ diff in a wk's time - if stable/improved WBC will plan on increasing MTX dose to 12.5 mg PO wkly for her inflammatory arthritis. Otherwise we may have to hold her MTX until she comes off Thalidomide, discussed taking Prednisone tapers to control her inflammatory arthritis while we're titrating her MTX dose. - cont Infliximab 10 mg/kg Q4wk per GI. Orders:  -     methotrexate (RHEUMATREX) 2.5 MG chemo tablet; Take 4 tablets by mouth once a week, Disp-48 tablet, [id number]  -     folic acid (FOLVITE) 1 MG tablet; Take 1 tablet by mouth daily, Disp-90 tablet, R-0Normal  -     predniSONE (DELTASONE) 5 MG tablet; Take 6 tablets by mouth every morning for 4 days, THEN 4 tablets every morning for 4 days, THEN 2 tablets every morning for 4 days, THEN 1 tablet every morning for 4 days. , Disp-52 tablet, R-0, DAWNormal  2. Crohn's disease with complication, unspecified gastrointestinal tract location [facility])  Assessment & Plan:  - Dx in 2006 refractory to medical therapy, s/p open total abdominal colectomy with end ileostomy on 7/16/2021, c/b peristomal pyoderma gangrenosum. - follows w/ [facility].  - currently well controlled on Infliximab 10 mg/kg Q4wk per GI. Orders:  -     predniSONE (DELTASONE) 5 MG tablet; Take 6 tablets by mouth every morning for 4 days, THEN 4 tablets every morning for 4 days, THEN 2 tablets every morning for 4 days, THEN 1 tablet every morning for 4 days. , Disp-52 tablet, R-0, DAWNormal  3. Pyoderma gangrenosum  Assessment & Plan:  - followed by Dr. Kendrick Hansen in [place]. - peristomal pyoderma gangrenosum required hospitalization at [facility] in 2/2022 for high dose steroids. MTX was d/c'ed and she was started on Cyclosporine. She has been taken off Cyclosporine and remains on reduced dose Thalidomide by Dermatology. Orders:  -     folic acid (FOLVITE) 1 MG tablet;  Take 1 tablet by mouth daily, Disp-90 tablet, R-0Normal  4. Psoriasis  Assessment & Plan:  - currently well controlled on MTX. Orders:  -     methotrexate (RHEUMATREX) 2.5 MG chemo tablet; Take 4 tablets by mouth once a week, Disp-48 tablet, R-0Normal  5. High risk medication use  Assessment & Plan:  - discussed her highly immunosuppressed state. She remains asymptomatic form leukopenia. She will repeat her CBC w/ diff in a wk's time. Hopefully she can come off Thalidomide in July. - pt stated her Dermatologist placed her on prophylactic Bactrim DS M/W/F a couple of months ago. Will have to closely monitor her renal function as she remains on MTX.  - safety labs for MTX Q3mo. Orders:  -     CBC with Auto Differential; Future     Return in about 2 months (around 6/12/2022) for lab result discussion and treatment plan, medication monitoring. High complexity case. TIME SPENT TODAY:  I spent over 40 minutes of face-to-face time with the pt (including taking interval history and performing physical exam, review of medical records, independently interpreting results and communicating results to the pt/family/caregiver, counseling and educating the pt/family/caregiver, implementation of treatment plan, coordination of care, documenting clinical information in the EMR) during today visit. At least 50% of this time was spent in counseling, explanation of diagnosis, planning of further management, and coordination of care. The risks and benefits of my recommendations, as well as other treatment options, benefits and side effects were discussed with the patient today. Questions were answered. NOTE: This report is transcribed by using voice recognition software dragon. Every effort is made to ensure accuracy; however, inadvertent computerized  transcription errors may be present. SUBJECTIVE:  Past medical/surgical history, medications and allergies are reviewed and updated as appropriate.     Interval Hx:   Pt reports significant improvement in her inflammatory arthritis since resuming MTX. Prednisone taper (took in April) also significantly improved her arthralgias. Specifically she reports improvement in arthralgias and swelling of her fingers. Pt states she started MTX 20 mg PO wkly on 22. She tells me she stopped Cyclosporine on 4/15/22. She tells me she has not been able to come off Thalidomide although she is taking a lower dose, she hopes to come off Thalidomide in July. She was instructed her MTX dose from 20 mg to 10 mg PO wkly last wk d/t worsening leukopenia. She denies any fevers, infections or new skin lesions. She tells me her PG cont to heal, pt provided photos of her wound on her cell phone during today's visit.     She reports well controlled IBD Sx on Infliximab prescribed by GI.    ROS:  Constitutional: denies fatigue, fever/chills, night sweats, unintentional weight loss  Integumentary: denies photosensitivity, rash, +diffuse hair thinning since being on MTX, or Sx of Raynaud's phenomenon  Eyes: denies dry eyes, redness or pain, visual disturbance, or floaters  Nose: denies nasal ulcers or recurrent sinusitis  Oral cavity: denies dry mouth, +recurrent oral ulcers  Cardiovascular: denies CP, palpitations, Hx of pericardial effusion or pericarditis  Respiratory: denies SOB, cough, hemoptysis, or pleurisy  Gastrointestinal: refer to above HPI    No Known Allergies    Past Medical History:        Diagnosis Date    Crohn's disease ([facility] [address] 2006    GI @[facility]    IBD (inflammatory bowel disease)     PCOS (polycystic ovarian syndrome)     Psoriasis     Psoriatic arthritis ([facility] [address]     sees Rheumatology        Past Surgical History:        Procedure Laterality Date     SECTION  2006     SECTION, CLASSIC  2009       Medications:    Current Outpatient Medications   Medication Sig Dispense Refill    cetirizine (ZYRTEC) 10 MG tablet TAKE 1 TABLET BY MOUTH TWICE A DAY      sulfamethoxazole-trimethoprim (BACTRIM DS;SEPTRA DS) 800-160 MG per tablet       methotrexate (RHEUMATREX) 2.5 MG chemo tablet Take 4 tablets by mouth once a week 48 tablet 0    folic acid (FOLVITE) 1 MG tablet Take 1 tablet by mouth daily 90 tablet 0    predniSONE (DELTASONE) 5 MG tablet Take 6 tablets by mouth every morning for 4 days, THEN 4 tablets every morning for 4 days, THEN 2 tablets every morning for 4 days, THEN 1 tablet every morning for 4 days.  52 tablet 0    omeprazole (PRILOSEC) 40 MG delayed release capsule TAKE 1 CAPSULE BY MOUTH EVERY DAY 90 capsule 1    glimepiride (AMARYL) 2 MG tablet TAKE 1 TABLET BY MOUTH EVERY DAY IN THE MORNING 90 tablet 0    Cholecalciferol (VITAMIN D3) 50 MCG (2000 UT) CAPS Take by mouth      JARDIANCE 25 MG tablet TAKE 1 TABLET BY MOUTH EVERY DAY 30 tablet 3    vitamin D (ERGOCALCIFEROL) 1.25 MG (42691 UT) CAPS capsule TAKE 1 CAPSULE BY MOUTH ONE TIME PER WEEK 12 capsule 1    lisinopril (PRINIVIL;ZESTRIL) 5 MG tablet TAKE 1 TABLET BY MOUTH EVERY DAY 90 tablet 1    mesalamine (CANASA) 1000 MG suppository Place 1,000 mg rectally nightly      sodium chloride 0.9 % SOLN with inFLIXimab 100 MG SOLR Infuse intravenously      zinc 50 MG TABS tablet TAKE 1 TABLET BY MOUTH EVERY DAY      escitalopram (LEXAPRO) 20 MG tablet TAKE 1 TABLET BY MOUTH EVERY DAY 30 tablet 2    THALOMID 100 MG capsule Take 100 mg by mouth Every other day      acetaminophen (TYLENOL) 325 MG tablet Take 650 mg by mouth every 4 hours      Ferrous Sulfate (IRON PO) Take by mouth      Cyanocobalamin (VITAMIN B 12 PO) Take by mouth      ONETOUCH ULTRA strip USE TO TEST ONCE DAILY 100 strip 2    ondansetron (ZOFRAN-ODT) 4 MG disintegrating tablet Take 4 mg by mouth every 8 hours as needed for Nausea or Vomiting      Continuous Blood Gluc  (FREESTYLE CESAR READER) ERNESTO 1 Device by In Vitro route 2 times daily 1 Device 0    Continuous Blood Gluc Sensor (FREESTYLE CESAR 14 DAY SENSOR) MISC Check once a day 100 each [address] LANCETS FINE MISC USE ONE DAILY 100 each 1    Blood Glucose Monitoring Suppl (ONE TOUCH ULTRA MINI) w/Device KIT 1 kit by Does not apply route daily as needed (fasting) 1 kit 0    diphenoxylate-atropine (LOMOTIL) 2.5-0.025 MG per tablet diphenoxylate-atropine 2.5 mg-0.025 mg tablet       No current facility-administered medications for this visit. OBJECTIVE:  Physical Exam:  /78   Pulse 80   Ht 5' 4\" (1.626 m)   Wt 227 lb (103 kg)   SpO2 98%   BMI 38.96 kg/m²     GEN: AAOx3, in NAD, well-appearing  HEAD: normocephalic, atraumatic  EYES: no injection or icterus  CVS: RRR  LUNGS: in no acute respiratory distress, CTAB  MSK:  Upper extremities:              Hands: b/l MCP joints, PIP and DIP joints w/o swelling NTTP, prior flexor tenosynovitis of the fingers resolved, full fist formation w/ good  strength              Wrist: no synovitis in the wrist joints b/l, FROM              Elbow: no synovitis or bursitis, FROM  Lower extremities:              Knees: no warmth or effusion present, FROM              Ankles: no synovitis, FROM, Achilles tendons w/o swelling or warmth NTTP              Feet: no toe swelling or pain or warmth on palpation w/ FROM, negative MTP squeeze test  INTEGUMENT: no rash or psoriatic lesions, no petechiae, bruises, or palpable purpura, no patchy alopecia, no nail pitting or periungual changes, no clubbing or digital ulcers    DATA:  Labs:   I personally reviewed interval labs and discussed w/ the pt in detail which showed:    Lab Results   Component Value Date    WBC 2.8 (L) 05/24/2022    HGB 13.9 05/24/2022    HCT 41.8 05/24/2022    MCV 95.3 05/24/2022     05/24/2022    LYMPHOPCT 22.1 05/24/2022    RBC 4.39 05/24/2022    MCH 31.7 05/24/2022    MCHC 33.3 05/24/2022    RDW 15.4 05/24/2022     Lab Results   Component Value Date     (L) 05/24/2022    K 4.6 05/24/2022     05/24/2022    CO2 21 05/24/2022    BUN 12 05/24/2022    CREATININE 0.6 05/24/2022 GLUCOSE 139 (H) 05/24/2022    CALCIUM 8.9 05/24/2022    PROT 8.5 (H) 05/24/2022    LABALBU 4.2 05/24/2022    BILITOT 0.4 05/24/2022    ALKPHOS 81 05/24/2022    AST 26 05/24/2022    ALT 49 (H) 05/24/2022    LABGLOM >60 05/24/2022    GFRAA >60 05/24/2022    AGRATIO 1.3 02/17/2022     Lab Results   Component Value Date    VITD25 34.8 05/24/2022     No results found for: C3     No results found for: C4     No results found for: ANTIDSDNAIGG     No results found for: LABURIC     CRP 25.62 mg/L (8/13/21)  Lab Results   Component Value Date    CRP 11.2 (H) 05/24/2022    .7 (H) 04/03/2020     ESR 48 (8/13/21)  Lab Results   Component Value Date    SEDRATE 63 (H) 04/03/2020     No results found for: CKTOTAL    Negative HLA B27 (6/16/21)  RF 17.0, negative CCP (6/16/21)  Negative SAULO (6/16/21)  Negative hepatitis B and C serologies (6/16/21)  Negative Quantiferon gold (10/5/20)    Imaging:  I personally reviewed interval imaging and discussed w/ the pt in detail which included:    X-rays (6/16/21):  R hand:  FINDINGS:     3 views of the right hand show no soft tissue abnormality. Mineralization is normal.   No fracture or acute bony pathology is seen.   Alignment is anatomic.   There is joint space narrowing of the proximal and distal interphalangeal joints with small marginal osteophytes. No erosive changes are visualized.    IMPRESSION:  Mild degenerative changes distally     L hand:  FINDINGS:     3 views of the left hand were performed.   No soft tissue abnormality is seen.   Mineralization is normal.   No fracture or acute bony pathology is visualized.   Alignment is anatomic.   There is joint space narrowing of the proximal and distal interphalangeal joints with small marginal osteophytes.   No erosive changes are visualized.      IMPRESSION:  Mild degenerative changes     DEXA study (11/13/20):  Normal T-scores in the L-spine and hips.     Procedures:  C-scope (3/24/21):  - Crohn's disease, with ileitis and colitis. - Simple Endoscopic Score for Crohn's Disease: 26, mucosal inflammatory changes secondary to Crohn's disease, with ileitis and colitis. Biopsied.   - Pseudopolyps at the colonic anastomosis. - Mucosal ulceration. Above results were discussed w/ the pt in detail during today's visit.

## 2022-05-30 DIAGNOSIS — K21.9 GASTRO-ESOPHAGEAL REFLUX DISEASE WITHOUT ESOPHAGITIS: ICD-10-CM

## 2022-05-31 RX ORDER — OMEPRAZOLE 40 MG/1
CAPSULE, DELAYED RELEASE ORAL
Qty: 90 CAPSULE | Refills: 1 | Status: SHIPPED | OUTPATIENT
Start: 2022-05-31

## 2022-05-31 NOTE — TELEPHONE ENCOUNTER
Medication:   Requested Prescriptions     Pending Prescriptions Disp Refills    omeprazole (PRILOSEC) 40 MG delayed release capsule [Pharmacy Med Name: OMEPRAZOLE DR 40 MG CAPSULE] 90 capsule 1     Sig: TAKE 1 CAPSULE BY MOUTH EVERY DAY     Last Filled:  12/6/21    Last appt: 4/14/2022   Next appt: 6/3/2022

## 2022-06-01 ENCOUNTER — OFFICE VISIT (OUTPATIENT)
Dept: RHEUMATOLOGY | Age: 42
End: 2022-06-01
Payer: COMMERCIAL

## 2022-06-01 VITALS
WEIGHT: 227 LBS | HEIGHT: 64 IN | OXYGEN SATURATION: 98 % | DIASTOLIC BLOOD PRESSURE: 78 MMHG | SYSTOLIC BLOOD PRESSURE: 120 MMHG | HEART RATE: 80 BPM | BODY MASS INDEX: 38.76 KG/M2

## 2022-06-01 DIAGNOSIS — L88 PYODERMA GANGRENOSUM: ICD-10-CM

## 2022-06-01 DIAGNOSIS — Z79.899 HIGH RISK MEDICATION USE: ICD-10-CM

## 2022-06-01 DIAGNOSIS — M12.9 ARTHROPATHY ASSOCIATED WITH INFLAMMATORY BOWEL DISEASE: Primary | ICD-10-CM

## 2022-06-01 DIAGNOSIS — K52.9 ARTHROPATHY ASSOCIATED WITH INFLAMMATORY BOWEL DISEASE: Primary | ICD-10-CM

## 2022-06-01 DIAGNOSIS — L40.9 PSORIASIS: ICD-10-CM

## 2022-06-01 DIAGNOSIS — K50.919 CROHN'S DISEASE WITH COMPLICATION, UNSPECIFIED GASTROINTESTINAL TRACT LOCATION (HCC): ICD-10-CM

## 2022-06-01 PROBLEM — M19.90 CHRONIC INFLAMMATORY ARTHRITIS: Status: RESOLVED | Noted: 2021-11-17 | Resolved: 2022-06-01

## 2022-06-01 PROCEDURE — 1036F TOBACCO NON-USER: CPT | Performed by: INTERNAL MEDICINE

## 2022-06-01 PROCEDURE — 99215 OFFICE O/P EST HI 40 MIN: CPT | Performed by: INTERNAL MEDICINE

## 2022-06-01 PROCEDURE — G8427 DOCREV CUR MEDS BY ELIG CLIN: HCPCS | Performed by: INTERNAL MEDICINE

## 2022-06-01 PROCEDURE — G8417 CALC BMI ABV UP PARAM F/U: HCPCS | Performed by: INTERNAL MEDICINE

## 2022-06-01 RX ORDER — PREDNISONE 1 MG/1
TABLET ORAL
Qty: 52 TABLET | Refills: 0 | Status: SHIPPED | OUTPATIENT
Start: 2022-06-01 | End: 2022-06-17

## 2022-06-01 RX ORDER — FOLIC ACID 1 MG/1
1 TABLET ORAL DAILY
Qty: 90 TABLET | Refills: 0 | Status: SHIPPED | OUTPATIENT
Start: 2022-06-01 | End: 2022-08-03 | Stop reason: SDUPTHER

## 2022-06-01 RX ORDER — SULFAMETHOXAZOLE AND TRIMETHOPRIM 800; 160 MG/1; MG/1
TABLET ORAL
COMMUNITY
Start: 2022-05-26 | End: 2022-08-03 | Stop reason: ALTCHOICE

## 2022-06-01 RX ORDER — CETIRIZINE HYDROCHLORIDE 10 MG/1
TABLET ORAL
COMMUNITY
Start: 2022-05-13 | End: 2022-08-03 | Stop reason: ALTCHOICE

## 2022-06-01 NOTE — ASSESSMENT & PLAN NOTE
- significantly improved since resuming MTX. Unfortunately she developed worsening leukopenia w/ lymphopenia and mild neutropenia on MTX 20 mg PO wkly and Thalidomide (prescribed by Dermatology for [facility] [place]). She remains asymptomatic from leukopenia. - she was instructed to lower her MTX dose from 20 mg to 10 mg PO wkly last wk, she will remain on this dose until she repeats her CBC w/ diff in a wk's time - if stable/improved WBC will plan on increasing MTX dose to 12.5 mg PO wkly for her inflammatory arthritis. Otherwise we may have to hold her MTX until she comes off Thalidomide, discussed taking Prednisone tapers to control her inflammatory arthritis while we're titrating her MTX dose. - cont Infliximab 10 mg/kg Q4wk per GI.

## 2022-06-01 NOTE — ASSESSMENT & PLAN NOTE
- discussed her highly immunosuppressed state. She remains asymptomatic form leukopenia. She will repeat her CBC w/ diff in a wk's time. Hopefully she can come off Thalidomide in July. - pt stated her Dermatologist placed her on prophylactic Bactrim DS M/W/F a couple of months ago. Will have to closely monitor her renal function as she remains on MTX.  - safety labs for MTX Q3mo.

## 2022-06-03 ENCOUNTER — OFFICE VISIT (OUTPATIENT)
Dept: PRIMARY CARE CLINIC | Age: 42
End: 2022-06-03
Payer: COMMERCIAL

## 2022-06-03 VITALS
WEIGHT: 228.2 LBS | BODY MASS INDEX: 38.96 KG/M2 | OXYGEN SATURATION: 97 % | HEART RATE: 119 BPM | HEIGHT: 64 IN | DIASTOLIC BLOOD PRESSURE: 82 MMHG | SYSTOLIC BLOOD PRESSURE: 128 MMHG | TEMPERATURE: 97.2 F

## 2022-06-03 DIAGNOSIS — E78.2 MIXED HYPERLIPIDEMIA: ICD-10-CM

## 2022-06-03 DIAGNOSIS — K52.9 ARTHROPATHY ASSOCIATED WITH INFLAMMATORY BOWEL DISEASE: ICD-10-CM

## 2022-06-03 DIAGNOSIS — M12.9 ARTHROPATHY ASSOCIATED WITH INFLAMMATORY BOWEL DISEASE: ICD-10-CM

## 2022-06-03 DIAGNOSIS — E11.9 TYPE 2 DIABETES MELLITUS WITHOUT COMPLICATION, WITHOUT LONG-TERM CURRENT USE OF INSULIN (HCC): Primary | ICD-10-CM

## 2022-06-03 DIAGNOSIS — I10 ESSENTIAL HYPERTENSION: ICD-10-CM

## 2022-06-03 DIAGNOSIS — K50.919 CROHN'S DISEASE WITH COMPLICATION, UNSPECIFIED GASTROINTESTINAL TRACT LOCATION (HCC): ICD-10-CM

## 2022-06-03 PROCEDURE — 99214 OFFICE O/P EST MOD 30 MIN: CPT | Performed by: FAMILY MEDICINE

## 2022-06-03 PROCEDURE — G8427 DOCREV CUR MEDS BY ELIG CLIN: HCPCS | Performed by: FAMILY MEDICINE

## 2022-06-03 PROCEDURE — 3044F HG A1C LEVEL LT 7.0%: CPT | Performed by: FAMILY MEDICINE

## 2022-06-03 PROCEDURE — G8417 CALC BMI ABV UP PARAM F/U: HCPCS | Performed by: FAMILY MEDICINE

## 2022-06-03 PROCEDURE — 1036F TOBACCO NON-USER: CPT | Performed by: FAMILY MEDICINE

## 2022-06-03 PROCEDURE — 2022F DILAT RTA XM EVC RTNOPTHY: CPT | Performed by: FAMILY MEDICINE

## 2022-06-03 ASSESSMENT — PATIENT HEALTH QUESTIONNAIRE - PHQ9
9. THOUGHTS THAT YOU WOULD BE BETTER OFF DEAD, OR OF HURTING YOURSELF: 0
SUM OF ALL RESPONSES TO PHQ QUESTIONS 1-9: 8
4. FEELING TIRED OR HAVING LITTLE ENERGY: 3
SUM OF ALL RESPONSES TO PHQ QUESTIONS 1-9: 8
SUM OF ALL RESPONSES TO PHQ QUESTIONS 1-9: 8
7. TROUBLE CONCENTRATING ON THINGS, SUCH AS READING THE NEWSPAPER OR WATCHING TELEVISION: 0
6. FEELING BAD ABOUT YOURSELF - OR THAT YOU ARE A FAILURE OR HAVE LET YOURSELF OR YOUR FAMILY DOWN: 1
10. IF YOU CHECKED OFF ANY PROBLEMS, HOW DIFFICULT HAVE THESE PROBLEMS MADE IT FOR YOU TO DO YOUR WORK, TAKE CARE OF THINGS AT HOME, OR GET ALONG WITH OTHER PEOPLE: 0
8. MOVING OR SPEAKING SO SLOWLY THAT OTHER PEOPLE COULD HAVE NOTICED. OR THE OPPOSITE, BEING SO FIGETY OR RESTLESS THAT YOU HAVE BEEN MOVING AROUND A LOT MORE THAN USUAL: 0
SUM OF ALL RESPONSES TO PHQ9 QUESTIONS 1 & 2: 2
1. LITTLE INTEREST OR PLEASURE IN DOING THINGS: 1
2. FEELING DOWN, DEPRESSED OR HOPELESS: 1
3. TROUBLE FALLING OR STAYING ASLEEP: 1
5. POOR APPETITE OR OVEREATING: 1
SUM OF ALL RESPONSES TO PHQ QUESTIONS 1-9: 8

## 2022-06-03 NOTE — PROGRESS NOTES
Subjective:      Patient ID: Maged Wagner is a 36 y.o. female. 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 Destiney Ceballos, she is  on my methotrexate her white count was slightly low she will adjusted her dose might need to change the medication, she will have blood work next week. was diagnosed with pyoderma gangrenosum of the stoma, seen by derm at [facility] seems stable now. Diabetes Mellitus Type II, Follow-up: Patient here for follow-up of Type 2 diabetes mellitus. Current symptoms/problems include none and have been stable. Symptoms have been present for a few years.     Known diabetic complications: none  Cardiovascular risk factors: diabetes mellitus, hypertension and obesity (BMI >= 30 kg/m2)  Current diabetic medications include see med's list .      Eye exam current (within one year): yes  Weight trend: stable  Prior visit with dietician: no  Current diet: in general, a \"healthy\" diet    Current exercise: none  He has been experiencing low blood sugar so advised the patient stop the glimepiride  Current monitoring regimen: home blood tests - weekly  Home blood sugar records: fasting range: She has not been checking her sugar lately  Any episodes of hypoglycemia? no  Hypertension: Patient is here follow up on  elevated blood pressures. Age at onset of elevated blood pressure:  Few years. Cardiac symptoms none. Patient denies chest pain, chest pressure/discomfort, dyspnea, exertional chest pressure/discomfort, lower extremity edema, near-syncope, orthopnea and palpitations. Cardiovascular risk factors: dyslipidemia, hypertension, obesity (BMI >= 30 kg/m2) and sedentary lifestyle. Use of agents associated with hypertension: steroids.  History of target organ damage: none.  Patient with hyperlipidemia stable on current medication      Review of Systems   Constitutional: Negative. Negative for chills and fever. HENT:  Negative for postnasal drip, rhinorrhea, sinus pressure, tinnitus and trouble swallowing. Eyes: Negative. Respiratory: Negative. Cardiovascular: Negative. Gastrointestinal: bleeding   Endocrine: Negative. Genitourinary: Negative. Musculoskeletal: . Negative for back pain, gait problem, joint swelling, neck pain and neck stiffness. Skin:. Negative for color change and pallor. Allergic/Immunologic: Negative. Neurological: Negative. Psychiatric/Behavioral: Very anxious nervous says she was in tears  All other systems reviewed and are negative. Past Medical History:   Diagnosis Date    Crohn's disease ([facility] 2006    GI @[facility]    IBD (inflammatory bowel disease)     PCOS (polycystic ovarian syndrome)     Psoriasis     Psoriatic arthritis ([facility]     sees Rheumatology       Past Surgical History:   Procedure Laterality Date     SECTION  2006     SECTION, CLASSIC  2009     Family History   Problem Relation Age of Onset    Diabetes Mother         age 68    Cancer Mother     Stroke Father          age 80      Social History     Socioeconomic History    Marital status:      Spouse name: None    Number of children: None    Years of education: None    Highest education level: None   Occupational History    None   Tobacco Use    Smoking status: Former Smoker     Packs/day: 1.00     Years: 10.00     Pack years: 10.00     Quit date: 2005     Years since quittin.7    Smokeless tobacco: Never Used   Substance and Sexual Activity    Alcohol use: Yes     Comment: ocassionally    Drug use: No    Sexual activity: Yes     Partners: Male     Birth control/protection: I.U.D.    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    Children 14 & 11 She has half sister x 3 one     [de-identified] brother x 1     Social Determinants of Health     Financial Resource Strain: Low Risk     Difficulty of Paying Living Expenses: Not hard at all   Food Insecurity: No Food Insecurity    Worried About Running Out of Food in the Last Year: Never true    [address] in the Last Year: Never true   Transportation Needs:     Lack of Transportation (Medical): Not on file    Lack of Transportation (Non-Medical):  Not on file   Physical Activity:     Days of Exercise per Week: Not on file    Minutes of Exercise per Session: Not on file   Stress:     Feeling of Stress : Not on file   Social Connections:     Frequency of Communication with Friends and Family: Not on file    Frequency of Social Gatherings with Friends and Family: Not on file    Attends Sabianist Services: Not on file    Active Member of [address] [place] [address] or Organizations: Not on file    Attends Club or Organization Meetings: Not on file    Marital Status: Not on file   Intimate Partner Violence:     Fear of Current or Ex-Partner: Not on file    Emotionally Abused: Not on file    Physically Abused: Not on file    Sexually Abused: Not on file   Housing Stability:     Unable to Pay for Housing in the Last Year: Not on file    [facility]: Not on file    Unstable Housing in the Last Year: Not on file     Current Outpatient Medications   Medication Sig Dispense Refill    cetirizine (ZYRTEC) 10 MG tablet TAKE 1 TABLET BY MOUTH TWICE A DAY      sulfamethoxazole-trimethoprim (BACTRIM DS;SEPTRA DS) 800-160 MG per tablet       methotrexate (RHEUMATREX) 2.5 MG chemo tablet Take 4 tablets by mouth once a week 48 tablet 0    folic acid (FOLVITE) 1 MG tablet Take 1 tablet by mouth daily 90 tablet 0    predniSONE (DELTASONE) 5 MG tablet Take 6 tablets by mouth every morning for 4 days, THEN 4 tablets every morning for 4 days, THEN 2 tablets every morning for 4 days, THEN 1 tablet every morning for 4 days. 52 tablet 0    omeprazole (PRILOSEC) 40 MG delayed release capsule TAKE 1 CAPSULE BY MOUTH EVERY DAY 90 capsule 1    glimepiride (AMARYL) 2 MG tablet TAKE 1 TABLET BY MOUTH EVERY DAY IN THE MORNING 90 tablet 0    Cholecalciferol (VITAMIN D3) 50 MCG (2000 UT) CAPS Take by mouth      JARDIANCE 25 MG tablet TAKE 1 TABLET BY MOUTH EVERY DAY 30 tablet 3    vitamin D (ERGOCALCIFEROL) 1.25 MG (96534 UT) CAPS capsule TAKE 1 CAPSULE BY MOUTH ONE TIME PER WEEK 12 capsule 1    lisinopril (PRINIVIL;ZESTRIL) 5 MG tablet TAKE 1 TABLET BY MOUTH EVERY DAY 90 tablet 1    mesalamine (CANASA) 1000 MG suppository Place 1,000 mg rectally nightly      sodium chloride 0.9 % SOLN with inFLIXimab 100 MG SOLR Infuse intravenously      zinc 50 MG TABS tablet TAKE 1 TABLET BY MOUTH EVERY DAY      escitalopram (LEXAPRO) 20 MG tablet TAKE 1 TABLET BY MOUTH EVERY DAY 30 tablet 2    THALOMID 100 MG capsule Take 100 mg by mouth Every other day      acetaminophen (TYLENOL) 325 MG tablet Take 650 mg by mouth every 4 hours      Ferrous Sulfate (IRON PO) Take by mouth      Cyanocobalamin (VITAMIN B 12 PO) Take by mouth      ONETOUCH ULTRA strip USE TO TEST ONCE DAILY 100 strip 2    ondansetron (ZOFRAN-ODT) 4 MG disintegrating tablet Take 4 mg by mouth every 8 hours as needed for Nausea or Vomiting      Continuous Blood Gluc  (FREESTYLE CESAR READER) ERNESTO 1 Device by In Vitro route 2 times daily 1 Device 0    Continuous Blood Gluc Sensor (FREESTYLE CESAR 14 DAY SENSOR) MISC Check once a day 100 each 1    ONETOUCH DELICA LANCETS FINE MISC USE ONE DAILY 100 each 1    Blood Glucose Monitoring Suppl (ONE TOUCH ULTRA MINI) w/Device KIT 1 kit by Does not apply route daily as needed (fasting) 1 kit 0    diphenoxylate-atropine (LOMOTIL) 2.5-0.025 MG per tablet diphenoxylate-atropine 2.5 mg-0.025 mg tablet       No current facility-administered medications for this visit.      No Known Allergies      Objective: Physical Exam   Constitutional: She appears well-developed and well-nourished. No distress. HENT:   Head: Normocephalic and atraumatic. Right Ear: External ear normal.   Left Ear: External ear normal.   Nose: Nose normal.   Mouth/Throat: Oropharynx is clear and moist. No oropharyngeal exudate. Eyes: Conjunctivae and EOM are normal. Pupils are equal, round, and reactive to light. Right eye exhibits no discharge. Left eye exhibits no discharge. No scleral icterus. Neck: Normal range of motion. Neck supple. No JVD present. No tracheal deviation present. No thyromegaly present. Cardiovascular: Normal rate, regular rhythm, normal heart sounds and intact distal pulses. Pulmonary/Chest: Effort normal and breath sounds normal. No stridor. No respiratory distress. She has no wheezes. She has no rales. She exhibits no tenderness. Abdominal: Soft. Bowel sounds are normal. She exhibits no distension and no mass. There is no tenderness. There is no rebound and no guarding. Musculoskeletal: Normal range of motion. She exhibits no edema or tenderness. Lymphadenopathy:     She has no cervical adenopathy. Skin: Skin is warm and dry. No rash noted. She is not diaphoretic. No erythema. No pallor. Mild irritation anterior above the ankle now right foot mild blanching no other abnormality full range of motion of the ankle both left and right  microfilaments test exam was negative   Vitals reviewed. Assessment:      Diagnosis Orders   1. Type 2 diabetes mellitus without complication, without long-term current use of insulin ([facility])   DIABETES FOOT EXAM     DIABETES EYE EXAM    Microalbumin / Creatinine Urine Ratio   2. Crohn's disease with complication, unspecified gastrointestinal tract location ([facility]     3. Mixed hyperlipidemia     4. Essential hypertension     5.  Arthropathy associated with inflammatory bowel disease             Plan:      See orders,   Reviewed recent blood work  Improved  blood glucose control  Continue the same  Normal white count she will be followed by her rheumatologist

## 2022-06-14 ENCOUNTER — PATIENT MESSAGE (OUTPATIENT)
Dept: RHEUMATOLOGY | Age: 42
End: 2022-06-14

## 2022-06-14 RX ORDER — PREDNISONE 1 MG/1
TABLET ORAL
Qty: 94 TABLET | Refills: 0 | Status: SHIPPED | OUTPATIENT
Start: 2022-06-14 | End: 2022-07-18

## 2022-06-14 NOTE — TELEPHONE ENCOUNTER
From: Rian Flight  To: Dr. Rodrigue Rossi: 6/14/2022 8:42 AM EDT  Subject: Prednisone    Hi Dr. Yakov Fang,  On the 10mg of Methotrexate my forearms and hands are painful balloons. I think I will need the prednisone until Im off the thalidomide. I go for blood work tomorrow morning. I can handle the stiffness in my legs but I use my hands all day. How do you want me to taper? And do you want me to continue 10mg methotrexate 1 x week or discontinue?

## 2022-06-14 NOTE — TELEPHONE ENCOUNTER
Sent Pred taper to [facility] pharmacy, please read tapering instructions to pt. Please have her repeat CBC now. Cont MTX 10 mg PO wkly.

## 2022-06-15 DIAGNOSIS — Z79.899 HIGH RISK MEDICATION USE: ICD-10-CM

## 2022-06-15 DIAGNOSIS — E11.9 TYPE 2 DIABETES MELLITUS WITHOUT COMPLICATION, WITHOUT LONG-TERM CURRENT USE OF INSULIN (HCC): ICD-10-CM

## 2022-06-15 LAB
BASOPHILS ABSOLUTE: 0 K/UL (ref 0–0.2)
BASOPHILS RELATIVE PERCENT: 0.6 %
CREATININE URINE: 113.7 MG/DL (ref 28–259)
EOSINOPHILS ABSOLUTE: 0.2 K/UL (ref 0–0.6)
EOSINOPHILS RELATIVE PERCENT: 2.4 %
HCT VFR BLD CALC: 41.4 % (ref 36–48)
HEMOGLOBIN: 13.8 G/DL (ref 12–16)
LYMPHOCYTES ABSOLUTE: 1.1 K/UL (ref 1–5.1)
LYMPHOCYTES RELATIVE PERCENT: 16.2 %
MCH RBC QN AUTO: 31.6 PG (ref 26–34)
MCHC RBC AUTO-ENTMCNC: 33.3 G/DL (ref 31–36)
MCV RBC AUTO: 94.8 FL (ref 80–100)
MICROALBUMIN UR-MCNC: 4.8 MG/DL
MICROALBUMIN/CREAT UR-RTO: 42.2 MG/G (ref 0–30)
MONOCYTES ABSOLUTE: 0.4 K/UL (ref 0–1.3)
MONOCYTES RELATIVE PERCENT: 6.4 %
NEUTROPHILS ABSOLUTE: 5 K/UL (ref 1.7–7.7)
NEUTROPHILS RELATIVE PERCENT: 74.4 %
PDW BLD-RTO: 16.1 % (ref 12.4–15.4)
PLATELET # BLD: 310 K/UL (ref 135–450)
PMV BLD AUTO: 8 FL (ref 5–10.5)
RBC # BLD: 4.37 M/UL (ref 4–5.2)
WBC # BLD: 6.7 K/UL (ref 4–11)

## 2022-06-26 DIAGNOSIS — F41.9 ANXIETY: ICD-10-CM

## 2022-06-27 RX ORDER — ESCITALOPRAM OXALATE 10 MG/1
TABLET ORAL
Qty: 135 TABLET | Refills: 1 | Status: SHIPPED | OUTPATIENT
Start: 2022-06-27 | End: 2022-08-03

## 2022-06-27 RX ORDER — LISINOPRIL 5 MG/1
TABLET ORAL
Qty: 90 TABLET | Refills: 1 | Status: SHIPPED | OUTPATIENT
Start: 2022-06-27

## 2022-06-27 NOTE — TELEPHONE ENCOUNTER
Medication:   Requested Prescriptions     Pending Prescriptions Disp Refills    lisinopril (PRINIVIL;ZESTRIL) 5 MG tablet [Pharmacy Med Name: LISINOPRIL 5 MG TABLET] 90 tablet 1     Sig: TAKE 1 TABLET BY MOUTH EVERY DAY    escitalopram (LEXAPRO) 10 MG tablet [Pharmacy Med Name: ESCITALOPRAM 10 MG TABLET] 135 tablet 1     Sig: TAKE 1 AND 1/2 TABLETS BY MOUTH EVERY DAY     Last Filled:  12/27 & 2/24/22    Last appt: 6/3/2022   Next appt: Visit date not found    Last Labs DM:   Lab Results   Component Value Date    LABA1C 6.5 05/24/2022     Last Lipid:   Lab Results   Component Value Date    CHOL 205 02/17/2022    TRIG 220 02/17/2022    HDL 54 02/17/2022    LDLCALC 107 02/17/2022     Last Thyroid:   Lab Results   Component Value Date    TSH 2.09 03/08/2022

## 2022-07-03 DIAGNOSIS — L40.9 PSORIASIS: ICD-10-CM

## 2022-07-03 DIAGNOSIS — K52.9 ARTHROPATHY ASSOCIATED WITH INFLAMMATORY BOWEL DISEASE: ICD-10-CM

## 2022-07-03 DIAGNOSIS — M12.9 ARTHROPATHY ASSOCIATED WITH INFLAMMATORY BOWEL DISEASE: ICD-10-CM

## 2022-07-05 RX ORDER — METHOTREXATE 2.5 MG/1
TABLET ORAL
Qty: 96 TABLET | OUTPATIENT
Start: 2022-07-05

## 2022-07-25 RX ORDER — GLIMEPIRIDE 2 MG/1
TABLET ORAL
Qty: 90 TABLET | Refills: 0 | Status: SHIPPED | OUTPATIENT
Start: 2022-07-25 | End: 2022-10-28

## 2022-07-25 RX ORDER — EMPAGLIFLOZIN 25 MG/1
TABLET, FILM COATED ORAL
Qty: 30 TABLET | Refills: 1 | Status: SHIPPED | OUTPATIENT
Start: 2022-07-25 | End: 2022-10-04

## 2022-07-25 NOTE — TELEPHONE ENCOUNTER
Medication:   Requested Prescriptions     Pending Prescriptions Disp Refills    glimepiride (AMARYL) 2 MG tablet [Pharmacy Med Name: GLIMEPIRIDE 2 MG TABLET] 90 tablet 0     Sig: TAKE 1 TABLET BY MOUTH EVERY DAY IN THE MORNING     Last Filled:  4/25/22    Last appt: 6/3/2022   Next appt: Visit date not found    Last Labs DM:   Lab Results   Component Value Date/Time    LABA1C 6.5 05/24/2022 07:56 AM

## 2022-07-25 NOTE — TELEPHONE ENCOUNTER
Medication:   Requested Prescriptions     Pending Prescriptions Disp Refills    JARDIANCE 25 MG tablet [Pharmacy Med Name: Roosevelt Richardson 25 MG TABLET] 30 tablet 3     Sig: TAKE 1 TABLET BY MOUTH EVERY DAY     Last Filled:  3/14/22    Last appt: 6/3/2022   Next appt: 7/24/2022    Last Labs DM:   Lab Results   Component Value Date/Time    LABA1C 6.5 05/24/2022 07:56 AM

## 2022-07-26 DIAGNOSIS — Z79.899 HIGH RISK MEDICATION USE: ICD-10-CM

## 2022-07-26 LAB
BASOPHILS ABSOLUTE: 0 K/UL (ref 0–0.2)
BASOPHILS RELATIVE PERCENT: 0.5 %
EOSINOPHILS ABSOLUTE: 0.4 K/UL (ref 0–0.6)
EOSINOPHILS RELATIVE PERCENT: 6.3 %
HCT VFR BLD CALC: 44.4 % (ref 36–48)
HEMOGLOBIN: 14.7 G/DL (ref 12–16)
LYMPHOCYTES ABSOLUTE: 1.4 K/UL (ref 1–5.1)
LYMPHOCYTES RELATIVE PERCENT: 22.8 %
MCH RBC QN AUTO: 31.4 PG (ref 26–34)
MCHC RBC AUTO-ENTMCNC: 33.1 G/DL (ref 31–36)
MCV RBC AUTO: 94.8 FL (ref 80–100)
MONOCYTES ABSOLUTE: 0.4 K/UL (ref 0–1.3)
MONOCYTES RELATIVE PERCENT: 6.2 %
NEUTROPHILS ABSOLUTE: 4.1 K/UL (ref 1.7–7.7)
NEUTROPHILS RELATIVE PERCENT: 64.2 %
PDW BLD-RTO: 14.9 % (ref 12.4–15.4)
PLATELET # BLD: 327 K/UL (ref 135–450)
PMV BLD AUTO: 8 FL (ref 5–10.5)
RBC # BLD: 4.68 M/UL (ref 4–5.2)
WBC # BLD: 6.3 K/UL (ref 4–11)

## 2022-07-27 LAB
ALT SERPL-CCNC: 32 U/L (ref 10–40)
AST SERPL-CCNC: 25 U/L (ref 15–37)

## 2022-07-29 NOTE — PROGRESS NOTES
Ryder Lambert MD  [facility] ([facility]) Physicians - Rheumatology    [x] [facility]:  [facility] Emergency Department Dr. Chatterjee [address] [facility] [] [facility]:  [address] [facility], [address] [place] [address]   Office: [phone]  Fax: [phone]     RHEUMATOLOGY PROGRESS NOTE    ASSESSMENT/PLAN:  Vic Blackwell is a 36 y.o. female w/ prior Hx of chronic inflammatory arthritis (peripheral joints), severe Crohn's disease (Dx in 2006 refractory to medical therapy s/p open total abdominal colectomy with end ileostomy on 7/16/2021, peristomal pyoderma gangrenosum requiring hospitalization in 2/2022 at [address] [place] [address] for high dose steroids per Dermatology) and plaque PsO most recently followed by Dr. Kenyetta Villa. PMHx pertinent for HTN, T2DM, HLD and DONTRELL (followed by Hematology, receives IV iron infusions). Current rheum meds:  MTX 15 mg PO wkly: stopped during hospitalization for peristomal pyoderma gangrenosum in 2/2022, resumed 20 mg PO wkly for active inflammatory arthritis on 4/12/22   Inflectra: 10 mg/kg Q4wk per GI, previously took Remicade (first biologic tried in 2011, Q6-8 wk, did not respond)  Vitamin D 50,000 IU wkly w/ 2000 IU daily: per PCP     Prior rheum meds:  Adalimumab w/ MTX: pt states she took Adalimumab for several yrs, initially worked but stopped working from [id number], did not develop antibodies  Ustekinumab: tried in 2018  Cyclosporine: prescribed by Dermatology for PG  Thalidomide: per Dermatology for PG  Gabapentin    1. Arthropathy associated with inflammatory bowel disease  Assessment & Plan:  - worsening inflammatory arthritis after reducing MTX d/t leukopenia previously on Thalidomide (prescribed by Dermatology for [facility] AT [place]). Leukopenia has resolved after stopping Thalidomide and reducing MTX dose. - increase MTX dose from 15 mg to 20 mg PO wkly.   - she is taking the maximum dose of Inflectra 10 mg/kg Q4wk per GI.  - pt states she recently developed a ?allergic rash to Prednisone, dermatologist reportedly had her take Certirizine while she was taking Prednisone. As such we will have her switch to a Medrol dose pack for her current arthritis flare.  - provided pt handout on SSZ, will plan on adding this at next f/u visit in 6 wks if she cont to have active inflammatory arthritis. Orders:  -     folic acid (FOLVITE) 1 MG tablet; Take 1 tablet by mouth in the morning., Disp-90 tablet, R-0Normal  -     methotrexate (RHEUMATREX) 2.5 MG chemo tablet; Take 8 tablets by mouth once a week, Disp-96 tablet, R-0Normal  -     C-Reactive Protein; Future  -     methylPREDNISolone (MEDROL, DORA,) 4 MG tablet; Take as directed, Disp-1 kit, R-0Normal  2. Psoriasis  Assessment & Plan:  - remains well controlled on MTX and Inflectra. Orders:  -     methotrexate (RHEUMATREX) 2.5 MG chemo tablet; Take 8 tablets by mouth once a week, Disp-96 tablet, R-0Normal  -     C-Reactive Protein; Future  3. Crohn's disease with complication, unspecified gastrointestinal tract location [facility])  Assessment & Plan:  - Dx in 2006 refractory to medical therapy, s/p open total abdominal colectomy with end ileostomy on 7/16/2021, c/b peristomal pyoderma gangrenosum. - follows w/ [facility].  - currently well controlled on [id number] mg/kg Q4wk per GI. Orders:  -     C-Reactive Protein; Future  4. Pyoderma gangrenosum  Assessment & Plan:  - followed by Dr. Yassine Souza in [place]. - peristomal pyoderma gangrenosum required hospitalization at [address] [place] [address] in 2/2022 for high dose steroids. She was treated w/ Cyclosporine and Thalidomide by Dermatology, now off both medications. - pt stated this is currently well controlled on MTX and Inflectra. Orders:  -     folic acid (FOLVITE) 1 MG tablet; Take 1 tablet by mouth in the morning., Disp-90 tablet, R-0Normal  -     methotrexate (RHEUMATREX) 2.5 MG chemo tablet; Take 8 tablets by mouth once a week, Disp-96 tablet, R-0Normal  5.  High risk medication use  Assessment & Plan:  - discussed her highly immunosuppressed state and indications to hold MTX.  - she will repeat safety labs 4 wks after increasing MTX dose. Orders:  -     Hepatic Function Panel; Future  -     CBC with Auto Differential; Future  -     Creatinine; Future     Return in about 6-8 wks for lab result discussion and treatment plan, medication monitoring. High complexity case. TIME SPENT TODAY:  I spent over 40 minutes of face-to-face time with the pt (including taking interval history and performing physical exam, review of medical records, independently interpreting results and communicating results to the pt/family/caregiver, counseling and educating the pt/family/caregiver, implementation of treatment plan, coordination of care, documenting clinical information in the EMR) during today visit. At least 50% of this time was spent in counseling, explanation of diagnosis, planning of further management, and coordination of care. The risks and benefits of my recommendations, as well as other treatment options, benefits and side effects were discussed with the patient today. Questions were answered. NOTE: This report is transcribed by using voice recognition software dragon. Every effort is made to ensure accuracy; however, inadvertent computerized  transcription errors may be present. SUBJECTIVE:  Past medical/surgical history, medications and allergies are reviewed and updated as appropriate. Interval Hx:   Pt states she most recently increased her MTX dose from 10 mg to 15 mg PO wkly approximately 3 wks ago. She remains on the same dose of Inflectra per GI. She reports worsening arthralgias in her hands, wrists, elbows, shoulders, knees and she recently developed pain in her ankles. Pain is constant. She's noticed swelling in her hands and forearms. Morning stiffness lasts more than an hr. She tapered off Prednisone on 7/22/22.  She would like to increase her MTX dose as she felt her joint Sx were well controlled on MTX 20 mg CLAY denis in April. She reports significant improvement in her pyoderma gangrenosum. She is off Thalidomide. She tells me she is now seeing Dermatology on PRN basis. She reports no change in her IBD Sx since her surgery. ROS:  Constitutional: denies fatigue, fever/chills, night sweats, unintentional weight loss  Integumentary: denies photosensitivity, rash, +diffuse hair thinning since being on MTX, or Sx of Raynaud's phenomenon  Eyes: denies dry eyes, redness or pain, visual disturbance, or floaters  Nose: denies nasal ulcers or recurrent sinusitis  Oral cavity: denies dry mouth, +recurrent oral ulcers  Cardiovascular: denies CP, palpitations, Hx of pericardial effusion or pericarditis  Respiratory: denies SOB, cough, hemoptysis, or pleurisy  Gastrointestinal: refer to above HPI    No Known Allergies    Past Medical History:        Diagnosis Date    Crohn's disease ([facility] 2006    GI @[facility]    IBD (inflammatory bowel disease)     PCOS (polycystic ovarian syndrome)     Psoriasis     Psoriatic arthritis ([facility]     sees Rheumatology        Past Surgical History:        Procedure Laterality Date     SECTION  2006     SECTION, CLASSIC  2009       Medications:    Current Outpatient Medications   Medication Sig Dispense Refill    folic acid (FOLVITE) 1 MG tablet Take 1 tablet by mouth in the morning.  90 tablet 0    methotrexate (RHEUMATREX) 2.5 MG chemo tablet Take 8 tablets by mouth once a week 96 tablet 0    methylPREDNISolone (MEDROL, DORA,) 4 MG tablet Take as directed 1 kit 0    JARDIANCE 25 MG tablet TAKE 1 TABLET BY MOUTH EVERY DAY 30 tablet 1    glimepiride (AMARYL) 2 MG tablet TAKE 1 TABLET BY MOUTH EVERY DAY IN THE MORNING 90 tablet 0    lisinopril (PRINIVIL;ZESTRIL) 5 MG tablet TAKE 1 TABLET BY MOUTH EVERY DAY 90 tablet 1    omeprazole (PRILOSEC) 40 MG delayed release capsule TAKE 1 CAPSULE BY MOUTH EVERY DAY 90 capsule 1    Cholecalciferol (VITAMIN D3) 50 MCG (2000) CAPS Take by mouth      vitamin D (ERGOCALCIFEROL) 1.25 MG (32079 UT) CAPS capsule TAKE 1 CAPSULE BY MOUTH ONE TIME PER WEEK 12 capsule 1    mesalamine (CANASA) 1000 MG suppository Place 1,000 mg rectally nightly      sodium chloride 0.9 % SOLN with inFLIXimab 100 MG SOLR Infuse intravenously      zinc 50 MG TABS tablet TAKE 1 TABLET BY MOUTH EVERY DAY      escitalopram (LEXAPRO) 20 MG tablet TAKE 1 TABLET BY MOUTH EVERY DAY 30 tablet 2    acetaminophen (TYLENOL) 325 MG tablet Take 650 mg by mouth every 4 hours      Ferrous Sulfate (IRON PO) Take by mouth      Cyanocobalamin (VITAMIN B 12 PO) Take by mouth      ONETOUCH ULTRA strip USE TO TEST ONCE DAILY 100 strip 2    ondansetron (ZOFRAN-ODT) 4 MG disintegrating tablet Take 4 mg by mouth every 8 hours as needed for Nausea or Vomiting      Continuous Blood Gluc  (FREESTYLE CESAR READER) ERNESTO 1 Device by In Vitro route 2 times daily 1 Device 0    Continuous Blood Gluc Sensor (FREESTYLE CESAR 14 DAY SENSOR) MISC Check once a day 100 each 1    ONETOUCH DELICA LANCETS FINE MISC USE ONE DAILY 100 each 1    Blood Glucose Monitoring Suppl (ONE TOUCH ULTRA MINI) w/Device KIT 1 kit by Does not apply route daily as needed (fasting) 1 kit 0    diphenoxylate-atropine (LOMOTIL) 2.5-0.025 MG per tablet diphenoxylate-atropine 2.5 mg-0.025 mg tablet       No current facility-administered medications for this visit.         OBJECTIVE:  Physical Exam:  /82   Pulse 97   Temp 97.6 °F (36.4 °C) (Temporal)   Ht 5' 4\" (1.626 m)   Wt 234 lb (106.1 kg)   SpO2 98%   BMI 40.17 kg/m²     GEN: AAOx3, in NAD, well-appearing  HEAD: normocephalic, atraumatic  EYES: no injection or icterus  CVS: RRR  LUNGS: in no acute respiratory distress, CTAB  MSK:  Upper extremities:              Hands: there is mild tenosynovitis of the fingers, b/l MCP joints, PIP and DIP joints w/o swelling NTTP, full fist formation w/ good  strength              Wrist: no synovitis in the wrist joints lateral joint lines very TTP b/l, FROM              Elbow: no synovitis or bursitis, joint lines TTP b/l, FROM  Lower extremities:              Knees: no warmth or effusion present, FROM              Ankles: no synovitis, FROM, Achilles tendons w/o swelling or warmth NTTP              Feet: no toe swelling or pain or warmth on palpation w/ FROM, negative MTP squeeze test  INTEGUMENT: no rash or psoriatic lesions, no petechiae, bruises, or palpable purpura, no patchy alopecia, no nail pitting or periungual changes, no clubbing or digital ulcers    DATA:  Labs:   I personally reviewed interval labs and discussed w/ the pt in detail which showed:    Lab Results   Component Value Date    WBC 6.3 07/26/2022    HGB 14.7 07/26/2022    HCT 44.4 07/26/2022    MCV 94.8 07/26/2022     07/26/2022    LYMPHOPCT 22.8 07/26/2022    RBC 4.68 07/26/2022    MCH 31.4 07/26/2022    MCHC 33.1 07/26/2022    RDW 14.9 07/26/2022     Lab Results   Component Value Date     (L) 05/24/2022    K 4.6 05/24/2022     05/24/2022    CO2 21 05/24/2022    BUN 12 05/24/2022    CREATININE 0.6 05/24/2022    GLUCOSE 139 (H) 05/24/2022    CALCIUM 8.9 05/24/2022    PROT 8.5 (H) 05/24/2022    LABALBU 4.2 05/24/2022    BILITOT 0.4 05/24/2022    ALKPHOS 81 05/24/2022    AST 25 07/26/2022    ALT 32 07/26/2022    LABGLOM >60 05/24/2022    GFRAA >60 05/24/2022    AGRATIO 1.3 02/17/2022     Lab Results   Component Value Date    VITD25 34.8 05/24/2022     No results found for: C3     No results found for: C4     No results found for: ANTIDSDNAIGG     No results found for: LABURIC     CRP 25.62 mg/L (8/13/21)  Lab Results   Component Value Date    CRP 11.2 (H) 05/24/2022    .7 (H) 04/03/2020     ESR 48 (8/13/21)  Lab Results   Component Value Date    SEDRATE 63 (H) 04/03/2020     No results found for: CKTOTAL    Negative HLA B27 (6/16/21)  RF 17.0, negative CCP (6/16/21)  Negative SAULO (6/16/21)  Negative hepatitis B and C serologies (6/16/21)  Negative Quantiferon gold (10/5/20)    Imaging:  I personally reviewed interval imaging and discussed w/ the pt in detail which included:    X-rays (6/16/21):  R hand:  FINDINGS:     3 views of the right hand show no soft tissue abnormality. Mineralization is normal.   No fracture or acute bony pathology is seen. Alignment is anatomic. There is joint space narrowing of the proximal and distal interphalangeal joints with small marginal osteophytes. No erosive changes are visualized. IMPRESSION:  Mild degenerative changes distally     L hand:  FINDINGS:     3 views of the left hand were performed. No soft tissue abnormality is seen. Mineralization is normal.   No fracture or acute bony pathology is visualized. Alignment is anatomic. There is joint space narrowing of the proximal and distal interphalangeal joints with small marginal osteophytes. No erosive changes are visualized. IMPRESSION:  Mild degenerative changes     DEXA study (11/13/20):  Normal T-scores in the L-spine and hips. Procedures:  C-scope (3/24/21):  - Crohn's disease, with ileitis and colitis. - Simple Endoscopic Score for Crohn's Disease: 26, mucosal inflammatory changes secondary to Crohn's disease, with ileitis and colitis. Biopsied.   - Pseudopolyps at the colonic anastomosis. - Mucosal ulceration. Above results were discussed w/ the pt in detail during today's visit.

## 2022-08-03 ENCOUNTER — OFFICE VISIT (OUTPATIENT)
Dept: RHEUMATOLOGY | Age: 42
End: 2022-08-03
Payer: COMMERCIAL

## 2022-08-03 VITALS
TEMPERATURE: 97.6 F | HEIGHT: 64 IN | OXYGEN SATURATION: 98 % | SYSTOLIC BLOOD PRESSURE: 124 MMHG | BODY MASS INDEX: 39.95 KG/M2 | WEIGHT: 234 LBS | HEART RATE: 97 BPM | DIASTOLIC BLOOD PRESSURE: 82 MMHG

## 2022-08-03 DIAGNOSIS — K50.919 CROHN'S DISEASE WITH COMPLICATION, UNSPECIFIED GASTROINTESTINAL TRACT LOCATION (HCC): ICD-10-CM

## 2022-08-03 DIAGNOSIS — M12.9 ARTHROPATHY ASSOCIATED WITH INFLAMMATORY BOWEL DISEASE: Primary | ICD-10-CM

## 2022-08-03 DIAGNOSIS — L40.9 PSORIASIS: ICD-10-CM

## 2022-08-03 DIAGNOSIS — L88 PYODERMA GANGRENOSUM: ICD-10-CM

## 2022-08-03 DIAGNOSIS — Z79.899 HIGH RISK MEDICATION USE: ICD-10-CM

## 2022-08-03 DIAGNOSIS — K52.9 ARTHROPATHY ASSOCIATED WITH INFLAMMATORY BOWEL DISEASE: Primary | ICD-10-CM

## 2022-08-03 PROCEDURE — 1036F TOBACCO NON-USER: CPT | Performed by: INTERNAL MEDICINE

## 2022-08-03 PROCEDURE — 99215 OFFICE O/P EST HI 40 MIN: CPT | Performed by: INTERNAL MEDICINE

## 2022-08-03 PROCEDURE — G8427 DOCREV CUR MEDS BY ELIG CLIN: HCPCS | Performed by: INTERNAL MEDICINE

## 2022-08-03 PROCEDURE — G8417 CALC BMI ABV UP PARAM F/U: HCPCS | Performed by: INTERNAL MEDICINE

## 2022-08-03 RX ORDER — FOLIC ACID 1 MG/1
1 TABLET ORAL DAILY
Qty: 90 TABLET | Refills: 0 | Status: SHIPPED | OUTPATIENT
Start: 2022-08-03 | End: 2022-10-12 | Stop reason: SDUPTHER

## 2022-08-03 RX ORDER — METHYLPREDNISOLONE 4 MG/1
TABLET ORAL
Qty: 1 KIT | Refills: 0 | Status: SHIPPED | OUTPATIENT
Start: 2022-08-03 | End: 2022-08-09

## 2022-08-03 NOTE — ASSESSMENT & PLAN NOTE
- discussed her highly immunosuppressed state and indications to hold MTX.  - she will repeat safety labs 4 wks after increasing MTX dose.

## 2022-08-03 NOTE — ASSESSMENT & PLAN NOTE
- Dx in 2006 refractory to medical therapy, s/p open total abdominal colectomy with end ileostomy on 7/16/2021, c/b peristomal pyoderma gangrenosum. - follows w/ [facility].  - currently well controlled on [id number] mg/kg Q4wk per GI.

## 2022-08-03 NOTE — PATIENT INSTRUCTIONS
Instructions for starting Sulfasalazine:    Start taking 1 tablet twice daily for the first week. If well-tolerated, then take 2 tablets twice daily. Have your labs checked 4 weeks after starting sulfasalazine. Please call with any questions or concerns. Sulfasalazine (Azulfidine)     Fast Facts     Sulfasalazine is a drug used in the treatment of rheumatoid arthritis and some other autoimmune conditions. It helps with pain and swelling and also slows the progression of arthritis over time. It should be held if there are signs or symptoms of an infection. Although sulfasalazine appears to be safe to take during pregnancy, it should not be taken while breastfeeding as it increases the risk for a type of jaundice in the  (kernicterus) that can cause brain problems in infants younger than two years old. In men, sulfasalazine may lower sperm count, although this should improve after stopping the medication. Sulfasalazine is a sulfa drug and can cause serious problems in patients who are allergic to sulfa medications. Sulfasalazine (Azulfidine) belongs to a class of drugs called sulfa drugs and is used to treat pain and swelling in arthritis. It is a combination of salicylate (the main ingredient in aspirin) and a sulfa antibiotic. Sulfasalazine is also known as a disease modifying antirheumatic drug (DMARD), because it not only decreases the pain and swelling of arthritis, but also may prevent damage to joints. In addition, it may reduce the risk of long term loss of function. f    Uses     Sulfasalazine was first used over 70 years ago to treat rheumatoid arthritis. At one time, rheumatoid arthritis was thought to be caused by a bacterial infection, and Sulfasalazine was prescribed, because it is contains a combination of an aspirin-like anti-inflammatory medicine with a sulfa containing antibiotic.  Though we now know rheumatoid arthritis is not caused by a bacterial infection, sulfasalazine continues to be useful for treating for mild to moderate symptoms, or may be given along with other drugs for more severe symptoms of rheumatoid arthritis. It is also used for other conditions, including juvenile idiopathic arthritis (also called juvenile rheumatoid arthritis), ankylosing spondylitis, psoriatic arthritis, reactive arthritis, and ulcerative colitis. How it works Sulfasalazine is a DMARD. DMARDs work to decrease pain and inflammation, reduce/prevent joint damage, and preserve joint mobility. So, Sulfasalazine treats swelling, pain and stiffness in arthritis. Dosing Sulfasalazine comes in a 500 milligram tablet and should be taken with food and a full glass of water to avoid an upset stomach. The medication is often started at low doses when treating rheumatoid arthritis to prevent side effects, typically 1 to 2 tablets a day. After the first week, the dose may be slowly increased to the usual dosage of 2 tablets (1 gram) twice a day. This dose can be increased to up to 6 pills (3 grams) a day in some situations. An Enteric-coated (or stomach-coated) preparation is available that may lessen some of the side effects associated with sulfasalazine, particularly stomach upset. This form of sulfasalazine should not be crushed or chewed. Time to effect It usually takes between 1 and 3 months to notice any improvement in rheumatoid arthritis symptoms after starting sulfasalazine    Time to effect It usually takes between 1 and 3 months to notice any improvement in rheumatoid arthritis symptoms after starting sulfasalazine     Drug interactions Sulfasalazine may interfere with warfarin (Coumadin), cyclosporine or digoxin, so dose adjustments may be needed if these medications are taken together.  Sulfasalazine increases the risk for liver injury if given with the drug isoniazid (INH) for tuberculosis and may increase the risk for low blood sugar in patients taking certain diabetes drugs - sulfonylureas such as glimepiride (Amaryl), glyburide (Diabeta, Micronase, Glynase) and glipizide (Glucotrol). Tell your doctor if you have ever had any unusual or allergic reaction to any other sulfa medicines as well as medicines that are chemically related to sulfa drugs. Your doctor will then determine whether you should take sulfasalazine. Sulfa drugs and those related to them include: Some antibiotics (often used to treat urinary or upper respiratory infections): trimethoprim-sulfamethoxazole (Bactrim, Septra) sulfadiazine, sulfisoxizole (Gantrisin), and dapsone Fluid and blood pressure pills such as furosemide (Lasix) and thiazide diuretics (hydrochlorothiazide or HCTZ) Some diabetes medications Some glaucoma medications such as acetazolamide (Diamox), dichlorphenamide (Daranide) and methazolamide (Neptazane) Salicylates such as aspirin and the Farias-2 inhibitor celecoxib (Celebrex)     - See more at: [email or web address]

## 2022-08-03 NOTE — ASSESSMENT & PLAN NOTE
- worsening inflammatory arthritis after reducing MTX d/t leukopenia previously on Thalidomide (prescribed by Dermatology for [facility] AT [place]). Leukopenia has resolved after stopping Thalidomide and reducing MTX dose. - increase MTX dose from 15 mg to 20 mg PO wkly. - she is taking the maximum dose of Inflectra 10 mg/kg Q4wk per GI.  - pt states she recently developed a ?allergic rash to Prednisone, dermatologist reportedly had her take Certirizine while she was taking Prednisone. As such we will have her switch to a Medrol dose pack for her current arthritis flare.  - provided pt handout on SSZ, will plan on adding this at next f/u visit in 6-8 wks if she cont to have active inflammatory arthritis.

## 2022-08-03 NOTE — ASSESSMENT & PLAN NOTE
- followed by Dr. Luis Miguel Cardenas in [facility]. - peristomal pyoderma gangrenosum required hospitalization at [address] [place] [address] in 2/2022 for high dose steroids. She was treated w/ Cyclosporine and Thalidomide by Dermatology, now off both medications. - pt stated this is currently well controlled on MTX and Inflectra.

## 2022-08-10 NOTE — TELEPHONE ENCOUNTER
Medication:   Requested Prescriptions     Pending Prescriptions Disp Refills    vitamin D (ERGOCALCIFEROL) 1.25 MG (33745 UT) CAPS capsule [Pharmacy Med Name: VITAMIN D2 1.25MG(50,000 UNIT)] 12 capsule 1     Sig: TAKE 1 CAPSULE BY MOUTH ONE TIME PER WEEK     Last Filled:  2/24/22    Last appt: 6/3/2022   Next appt: Visit date not found

## 2022-08-11 RX ORDER — ERGOCALCIFEROL 1.25 MG/1
CAPSULE ORAL
Qty: 12 CAPSULE | Refills: 1 | Status: SHIPPED | OUTPATIENT
Start: 2022-08-11

## 2022-08-16 RX ORDER — SULFASALAZINE 500 MG/1
500 TABLET ORAL 2 TIMES DAILY
Qty: 180 TABLET | Refills: 0 | Status: SHIPPED | OUTPATIENT
Start: 2022-08-16 | End: 2022-10-12 | Stop reason: SDUPTHER

## 2022-09-20 DIAGNOSIS — L40.9 PSORIASIS: ICD-10-CM

## 2022-09-20 DIAGNOSIS — K52.9 ARTHROPATHY ASSOCIATED WITH INFLAMMATORY BOWEL DISEASE: ICD-10-CM

## 2022-09-20 DIAGNOSIS — M12.9 ARTHROPATHY ASSOCIATED WITH INFLAMMATORY BOWEL DISEASE: ICD-10-CM

## 2022-09-20 DIAGNOSIS — K50.919 CROHN'S DISEASE WITH COMPLICATION, UNSPECIFIED GASTROINTESTINAL TRACT LOCATION (HCC): ICD-10-CM

## 2022-09-20 DIAGNOSIS — Z79.899 HIGH RISK MEDICATION USE: ICD-10-CM

## 2022-09-20 LAB
ALBUMIN SERPL-MCNC: 4.4 G/DL (ref 3.4–5)
ALP BLD-CCNC: 95 U/L (ref 40–129)
ALT SERPL-CCNC: 22 U/L (ref 10–40)
AST SERPL-CCNC: 17 U/L (ref 15–37)
BASOPHILS ABSOLUTE: 0 K/UL (ref 0–0.2)
BASOPHILS RELATIVE PERCENT: 0.4 %
BILIRUB SERPL-MCNC: 0.7 MG/DL (ref 0–1)
BILIRUBIN DIRECT: <0.2 MG/DL (ref 0–0.3)
BILIRUBIN, INDIRECT: ABNORMAL MG/DL (ref 0–1)
C-REACTIVE PROTEIN: 11.4 MG/L (ref 0–5.1)
CREAT SERPL-MCNC: 0.6 MG/DL (ref 0.6–1.1)
EOSINOPHILS ABSOLUTE: 0.1 K/UL (ref 0–0.6)
EOSINOPHILS RELATIVE PERCENT: 1.8 %
GFR AFRICAN AMERICAN: >60
GFR NON-AFRICAN AMERICAN: >60
HCT VFR BLD CALC: 45.7 % (ref 36–48)
HEMOGLOBIN: 15.1 G/DL (ref 12–16)
LYMPHOCYTES ABSOLUTE: 1.8 K/UL (ref 1–5.1)
LYMPHOCYTES RELATIVE PERCENT: 22.1 %
MCH RBC QN AUTO: 31.8 PG (ref 26–34)
MCHC RBC AUTO-ENTMCNC: 33.2 G/DL (ref 31–36)
MCV RBC AUTO: 95.9 FL (ref 80–100)
MONOCYTES ABSOLUTE: 0.5 K/UL (ref 0–1.3)
MONOCYTES RELATIVE PERCENT: 6.5 %
NEUTROPHILS ABSOLUTE: 5.7 K/UL (ref 1.7–7.7)
NEUTROPHILS RELATIVE PERCENT: 69.2 %
PDW BLD-RTO: 15 % (ref 12.4–15.4)
PLATELET # BLD: 314 K/UL (ref 135–450)
PMV BLD AUTO: 8.3 FL (ref 5–10.5)
RBC # BLD: 4.76 M/UL (ref 4–5.2)
TOTAL PROTEIN: 9 G/DL (ref 6.4–8.2)
WBC # BLD: 8.3 K/UL (ref 4–11)

## 2022-10-04 RX ORDER — EMPAGLIFLOZIN 25 MG/1
TABLET, FILM COATED ORAL
Qty: 30 TABLET | Refills: 1 | Status: SHIPPED | OUTPATIENT
Start: 2022-10-04

## 2022-10-04 NOTE — TELEPHONE ENCOUNTER
Medication:   Requested Prescriptions     Pending Prescriptions Disp Refills    JARDIANCE 25 MG tablet [Pharmacy Med Name: Jered Parker 25 MG TABLET] 30 tablet 1     Sig: TAKE 1 TABLET BY MOUTH EVERY DAY     Last Filled:  7/25/2022    Last appt: 6/3/2022   Next appt: Visit date not found    Last Labs DM:   Lab Results   Component Value Date/Time    LABA1C 6.5 05/24/2022 07:56 AM

## 2022-10-12 ENCOUNTER — OFFICE VISIT (OUTPATIENT)
Dept: RHEUMATOLOGY | Age: 42
End: 2022-10-12
Payer: COMMERCIAL

## 2022-10-12 ENCOUNTER — HOSPITAL ENCOUNTER (OUTPATIENT)
Dept: GENERAL RADIOLOGY | Age: 42
Discharge: HOME | End: 2022-10-12
Payer: COMMERCIAL

## 2022-10-12 VITALS
BODY MASS INDEX: 39.82 KG/M2 | DIASTOLIC BLOOD PRESSURE: 80 MMHG | WEIGHT: 232 LBS | SYSTOLIC BLOOD PRESSURE: 126 MMHG | HEART RATE: 120 BPM

## 2022-10-12 DIAGNOSIS — L40.9 PSORIASIS: ICD-10-CM

## 2022-10-12 DIAGNOSIS — M12.9 ARTHROPATHY ASSOCIATED WITH INFLAMMATORY BOWEL DISEASE: ICD-10-CM

## 2022-10-12 DIAGNOSIS — K52.9 ARTHROPATHY ASSOCIATED WITH INFLAMMATORY BOWEL DISEASE: ICD-10-CM

## 2022-10-12 DIAGNOSIS — K50.919 CROHN'S DISEASE WITH COMPLICATION, UNSPECIFIED GASTROINTESTINAL TRACT LOCATION (HCC): ICD-10-CM

## 2022-10-12 DIAGNOSIS — M12.9 ARTHROPATHY ASSOCIATED WITH INFLAMMATORY BOWEL DISEASE: Primary | ICD-10-CM

## 2022-10-12 DIAGNOSIS — Z79.899 HIGH RISK MEDICATION USE: ICD-10-CM

## 2022-10-12 DIAGNOSIS — L88 PYODERMA GANGRENOSUM: ICD-10-CM

## 2022-10-12 DIAGNOSIS — K52.9 ARTHROPATHY ASSOCIATED WITH INFLAMMATORY BOWEL DISEASE: Primary | ICD-10-CM

## 2022-10-12 PROCEDURE — G8427 DOCREV CUR MEDS BY ELIG CLIN: HCPCS | Performed by: INTERNAL MEDICINE

## 2022-10-12 PROCEDURE — G8417 CALC BMI ABV UP PARAM F/U: HCPCS | Performed by: INTERNAL MEDICINE

## 2022-10-12 PROCEDURE — 1036F TOBACCO NON-USER: CPT | Performed by: INTERNAL MEDICINE

## 2022-10-12 PROCEDURE — G8484 FLU IMMUNIZE NO ADMIN: HCPCS | Performed by: INTERNAL MEDICINE

## 2022-10-12 PROCEDURE — 72202 X-RAY EXAM SI JOINTS 3/> VWS: CPT

## 2022-10-12 PROCEDURE — 99215 OFFICE O/P EST HI 40 MIN: CPT | Performed by: INTERNAL MEDICINE

## 2022-10-12 RX ORDER — SULFASALAZINE 500 MG/1
1000 TABLET ORAL 2 TIMES DAILY
Qty: 360 TABLET | Refills: 0 | Status: SHIPPED | OUTPATIENT
Start: 2022-10-12 | End: 2022-11-03 | Stop reason: SDUPTHER

## 2022-10-12 RX ORDER — FOLIC ACID 1 MG/1
1 TABLET ORAL DAILY
Qty: 90 TABLET | Refills: 0 | Status: CANCELLED | OUTPATIENT
Start: 2022-10-12 | End: 2023-01-10

## 2022-10-12 RX ORDER — FOLIC ACID 1 MG/1
1 TABLET ORAL DAILY
Qty: 90 TABLET | Refills: 0 | Status: SHIPPED | OUTPATIENT
Start: 2022-10-12 | End: 2022-11-03 | Stop reason: SDUPTHER

## 2022-10-12 RX ORDER — IBUPROFEN 200 MG
1 TABLET ORAL WEEKLY
Qty: 10 EACH | Refills: 2 | Status: SHIPPED | OUTPATIENT
Start: 2022-10-12 | End: 2022-11-03 | Stop reason: SDUPTHER

## 2022-10-12 RX ORDER — METHOTREXATE 25 MG/ML
20 INJECTION, SOLUTION INTRA-ARTERIAL; INTRAMUSCULAR; INTRAVENOUS WEEKLY
Qty: 9.6 ML | Refills: 0 | Status: SHIPPED | OUTPATIENT
Start: 2022-10-12 | End: 2022-11-03 | Stop reason: SDUPTHER

## 2022-10-12 NOTE — PROGRESS NOTES
Jeremy Hugo MD  [facility]) Physicians - Rheumatology    [x] [facility]:  [facility]  [address] [facility] [] [address] [address]:  [address], [address]   Office: [phone]  Fax: [phone]     RHEUMATOLOGY PROGRESS NOTE    ASSESSMENT/PLAN:  Georgiana Gleason is a 36 y.o. female w/ prior Hx of chronic inflammatory arthritis (peripheral joints), severe Crohn's disease (Dx in 2006 refractory to medical therapy s/p open total abdominal colectomy with end ileostomy on 7/16/2021, peristomal pyoderma gangrenosum requiring hospitalization in 2/2022 at [facility] for high dose steroids per Dermatology) and plaque PsO most recently followed by Dr. Sharla Christina. PMHx pertinent for HTN, T2DM, HLD and DONTRELL (followed by Hematology, receives IV iron infusions). Current rheum meds:  MTX 20 mg PO wkly: stopped during hospitalization for peristomal pyoderma gangrenosum in 2/2022, resumed 20 mg PO wkly for active inflammatory arthritis on 4/12/22   SSZ 1000 mg BID: started in 8/2022  Inflectra: 10 mg/kg Q4wk per GI, previously took Remicade (first biologic tried in 2011, Q6-8 wk, did not respond)  Vitamin D 50,000 IU wkly w/ 2000 IU daily: per PCP     Prior rheum meds:  Adalimumab w/ MTX: pt states she took Adalimumab for several yrs, initially worked but stopped working from [id number], did not develop antibodies  Ustekinumab: tried in 2018  Cyclosporine: prescribed by Dermatology for PG  Thalidomide: per Dermatology for PG  Gabapentin    1. Arthropathy associated with inflammatory bowel disease  Assessment & Plan:  - inflammatory arthritis remains active on current immunosuppression regimen requiring frequent steroid tapers. She had active tenosynovitis on exam today. - check MRI pelvis to evaluate for sacroiliitis given recent onset b/l hip pain. - increase SSZ dose from 500 mg to 1000 mg BID now.  Pt feels SSZ is ineffective - we discussed switching SSZ to AZA if she does not see any improvement on higher dose of SSZ at next visit. - cont MTX 20 mg wkly, switch PO to SC form for better bioavailability. - she is taking the maximum dose of Inflectra 10 mg/kg Q4wk per GI. Consider NICK-I if she cont to have uncontrolled inflammatory arthritis. - she has Prednisone taper at home to take for arthritis flares. Orders:  -     sulfaSALAzine (AZULFIDINE) 500 MG tablet; Take 2 tablets by mouth 2 times daily, Disp-360 tablet, [id number]  -     folic acid (FOLVITE) 1 MG tablet; Take 1 tablet by mouth daily, Disp-90 tablet, R-0Normal  -     methotrexate Sodium 50 MG/2ML chemo injection; Inject 0.8 mLs into the skin once a week, Disp-9.6 mL, R-0Normal  -     C-Reactive Protein; Future  -     Sedimentation Rate; Future  -     MRI PELVIS W WO CONTRAST; Future  -     XR SACROILIAC JOINTS (MIN 3 VIEWS); Future  2. Crohn's disease with complication, unspecified gastrointestinal tract location [facility])  Assessment & Plan:  - Dx in 2006 refractory to medical therapy, s/p open total abdominal colectomy with end ileostomy on 7/16/2021, c/b peristomal pyoderma gangrenosum. - follows w/ [facility].  - currently well controlled on [id number] mg/kg Q4wk per GI. Orders:  -     C-Reactive Protein; Future  -     Sedimentation Rate; Future  3. Pyoderma gangrenosum  Assessment & Plan:  - followed by Dr. Yadiel Mcconnell in [place]. - peristomal pyoderma gangrenosum required hospitalization at [facility] in 2/2022 for high dose steroids. She was treated w/ Cyclosporine and Thalidomide by Dermatology, now off both medications. - this is currently well controlled on MTX and Inflectra. Orders:  -     sulfaSALAzine (AZULFIDINE) 500 MG tablet; Take 2 tablets by mouth 2 times daily, Disp-360 tablet, [id number]  -     folic acid (FOLVITE) 1 MG tablet; Take 1 tablet by mouth daily, Disp-90 tablet, R-0Normal  -     methotrexate Sodium 50 MG/2ML chemo injection; Inject 0.8 mLs into the skin once a week, Disp-9.6 mL, R-0Normal  4. Psoriasis  Assessment & Plan:  - remains well controlled on MTX and Inflectra. Orders:  -     sulfaSALAzine (AZULFIDINE) 500 MG tablet; Take 2 tablets by mouth 2 times daily, Disp-360 tablet, R-0Normal  -     methotrexate Sodium 50 MG/2ML chemo injection; Inject 0.8 mLs into the skin once a week, Disp-9.6 mL, R-0Normal  -     C-Reactive Protein; Future  -     Sedimentation Rate; Future  5. High risk medication use  Assessment & Plan:  - she will repeat safety labs 4 wks after increasing MTX dose.  - discussed her highly immunosuppressed state and indications to hold MTX and SSZ.  - discussed vaccines today. - the risk, benefits and side effects of Azathioprine were fully explained in details to my best knowledge, adverse events including but not limited to increased risk of infection, bone marrow suppression, liver toxicity, probable risk of cancers and teratogenicity, and GI side effects. This medication requires periodic blood monitoring. Pt verbalizes the understanding. Written information on medication is also provided to the pt and advised to discuss w/ me with any concerns. Orders:  -     ALT; Future  -     AST; Future  -     CBC with Auto Differential; Future  -     Creatinine; Future  -     Miscellaneous Lab Test #1; Future     Return in about 8 wks for lab result discussion and treatment plan, medication monitoring. High complexity case. TIME SPENT TODAY:  I spent over 40 minutes of face-to-face time with the pt (including taking interval history and performing physical exam, review of medical records, independently interpreting results and communicating results to the pt/family/caregiver, counseling and educating the pt/family/caregiver on their disease status, high risk DMARD use and toxicity monitoring, implementation of treatment plan, coordination of care, documenting clinical information in the EMR) during today visit.  At least 50% of this time was spent in counseling, explanation of diagnosis, planning of further management, and coordination of care. The risks and benefits of my recommendations, as well as other treatment options, benefits and side effects were discussed with the patient today. Questions were answered. NOTE: This report is transcribed by using voice recognition software dragon. Every effort is made to ensure accuracy; however, inadvertent computerized  transcription errors may be present. SUBJECTIVE:  Past medical/surgical history, medications and allergies are reviewed and updated as appropriate. Interval Hx:   Pt states she started SSZ approximately 6 wks ago. She has not noticed any significant pain relief from SSZ. She is currently taking 500 mg BID. She is wondering if she could increase her SSZ dose. She remains on MTX 20 mg PO wkly. She is wondering if she should switch to [place] form for better bioavailability. Pt states she has taken at least three steroid tapers over the past couple of months. She reports good pain relief from day 1 on her Medrol dose pack. She reports worsening arthralgias in most of her joints. Fingers are swollen. She feels stiff all over. She recently developed b/l hip pain and stiffness. Arthralgias are not a/w her IBD Sx. Pt reports well controlled IBD on Inflectra 10 mg/kg Q4wk. Pt states her peristomal pyoderma gangrenosum has closed. Denies active PsO.     ROS:  Constitutional: denies fatigue, fever/chills, night sweats, unintentional weight loss  Integumentary: denies photosensitivity, rash, +diffuse hair thinning since being on MTX, or Sx of Raynaud's phenomenon  Eyes: denies dry eyes, redness or pain, visual disturbance, or floaters  Nose: denies nasal ulcers or recurrent sinusitis  Oral cavity: denies dry mouth, +recurrent oral ulcers  Cardiovascular: denies CP, palpitations, Hx of pericardial effusion or pericarditis  Respiratory: denies SOB, cough, hemoptysis, or pleurisy  Gastrointestinal: refer to above HPI    No Known Allergies    Past Medical History:        Diagnosis Date    Crohn's disease ([facility] [address] 2006    GI @[facility]    IBD (inflammatory bowel disease)     PCOS (polycystic ovarian syndrome)     Psoriasis     Psoriatic arthritis ([facility]     sees Rheumatology        Past Surgical History:        Procedure Laterality Date     SECTION       SECTION, CLASSIC  2009       Medications:    Current Outpatient Medications   Medication Sig Dispense Refill    sulfaSALAzine (AZULFIDINE) 500 MG tablet Take 2 tablets by mouth 2 times daily 649 tablet 0    folic acid (FOLVITE) 1 MG tablet Take 1 tablet by mouth daily 90 tablet 0    INSULIN SYRINGE 1CC/29G (COMFORT ASSIST INSULIN SYRINGE) 29G X 1/2\" 1 ML MISC 1 each by Does not apply route once a week Use one syringe to inject weekly Methotrexate into skin.  10 each 2    methotrexate Sodium 50 MG/2ML chemo injection Inject 0.8 mLs into the skin once a week 9.6 mL 0    JARDIANCE 25 MG tablet TAKE 1 TABLET BY MOUTH EVERY DAY 30 tablet 1    vitamin D (ERGOCALCIFEROL) 1.25 MG (70395 UT) CAPS capsule TAKE 1 CAPSULE BY MOUTH ONE TIME PER WEEK 12 capsule 1    glimepiride (AMARYL) 2 MG tablet TAKE 1 TABLET BY MOUTH EVERY DAY IN THE MORNING 90 tablet 0    lisinopril (PRINIVIL;ZESTRIL) 5 MG tablet TAKE 1 TABLET BY MOUTH EVERY DAY 90 tablet 1    omeprazole (PRILOSEC) 40 MG delayed release capsule TAKE 1 CAPSULE BY MOUTH EVERY DAY 90 capsule 1    Cholecalciferol (VITAMIN D3) 50 MCG (2000 UT) CAPS Take by mouth      mesalamine (CANASA) 1000 MG suppository Place 1,000 mg rectally nightly      sodium chloride 0.9 % SOLN with inFLIXimab 100 MG SOLR Infuse intravenously      zinc 50 MG TABS tablet TAKE 1 TABLET BY MOUTH EVERY DAY      escitalopram (LEXAPRO) 20 MG tablet TAKE 1 TABLET BY MOUTH EVERY DAY 30 tablet 2    acetaminophen (TYLENOL) 325 MG tablet Take 650 mg by mouth every 4 hours      Ferrous Sulfate (IRON PO) Take by mouth      Cyanocobalamin (VITAMIN B 12 PO) Take by mouth ONETOUCH ULTRA strip USE TO TEST ONCE DAILY 100 strip 2    ondansetron (ZOFRAN-ODT) 4 MG disintegrating tablet Take 4 mg by mouth every 8 hours as needed for Nausea or Vomiting      Continuous Blood Gluc  (FREESTYLE CESAR READER) ERNESTO 1 Device by In Vitro route 2 times daily 1 Device 0    Continuous Blood Gluc Sensor (FREESTYLE CESAR 14 DAY SENSOR) MISC Check once a day 100 each 1    ONETOUCH DELICA LANCETS FINE MISC USE ONE DAILY 100 each 1    Blood Glucose Monitoring Suppl (ONE TOUCH ULTRA MINI) w/Device KIT 1 kit by Does not apply route daily as needed (fasting) 1 kit 0    diphenoxylate-atropine (LOMOTIL) 2.5-0.025 MG per tablet diphenoxylate-atropine 2.5 mg-0.025 mg tablet       No current facility-administered medications for this visit. OBJECTIVE:  Physical Exam:  /80   Pulse (!) 120   Wt 232 lb (105.2 kg)   BMI 39.82 kg/m²     GEN: AAOx3, in NAD, well-appearing  HEAD: normocephalic, atraumatic  EYES: no injection or icterus  CVS: RRR  LUNGS: in no acute respiratory distress, CTAB  MSK:  Upper extremities:              Hands: there is mild tenosynovitis of the fingers, b/l MCP joints, PIP and DIP joints w/o swelling TTP, full fist formation w/ good  strength              Wrist: no synovitis in the wrist joints TTP b/l, FROM              Elbow: no synovitis or bursitis, joint lines TTP b/l, FROM  Lower extremities:              Knees: no warmth or effusion present, FROM              Ankles: no synovitis, FROM, Achilles tendons w/o swelling or warmth NTTP              Feet: no toe swelling or pain or warmth on palpation w/ FROM, negative MTP squeeze test  INTEGUMENT: no rash or psoriatic lesions, no petechiae, bruises, or palpable purpura, no patchy alopecia, no nail pitting or periungual changes, no clubbing or digital ulcers    DATA:  Labs:   I personally reviewed interval labs and discussed w/ the pt in detail which showed:    Lab Results   Component Value Date    WBC 8.3 09/20/2022 HGB 15.1 09/20/2022    HCT 45.7 09/20/2022    MCV 95.9 09/20/2022     09/20/2022    LYMPHOPCT 22.1 09/20/2022    RBC 4.76 09/20/2022    MCH 31.8 09/20/2022    MCHC 33.2 09/20/2022    RDW 15.0 09/20/2022     Lab Results   Component Value Date     (L) 05/24/2022    K 4.6 05/24/2022     05/24/2022    CO2 21 05/24/2022    BUN 12 05/24/2022    CREATININE 0.6 09/20/2022    GLUCOSE 139 (H) 05/24/2022    CALCIUM 8.9 05/24/2022    PROT 9.0 (H) 09/20/2022    LABALBU 4.4 09/20/2022    BILITOT 0.7 09/20/2022    ALKPHOS 95 09/20/2022    AST 17 09/20/2022    ALT 22 09/20/2022    LABGLOM >60 09/20/2022    GFRAA >60 09/20/2022    AGRATIO 1.3 02/17/2022     Lab Results   Component Value Date    VITD25 34.8 05/24/2022     No results found for: C3     No results found for: C4     No results found for: ANTIDSDNAIGG     No results found for: LABURIC     CRP 25.62 mg/L (8/13/21)  Lab Results   Component Value Date    CRP 11.4 (H) 09/20/2022    CRP 11.2 (H) 05/24/2022    .7 (H) 04/03/2020     ESR 48 (8/13/21)  Lab Results   Component Value Date    SEDRATE 63 (H) 04/03/2020     No results found for: CKTOTAL    Negative HLA B27 (6/16/21)  RF 17.0, negative CCP (6/16/21)  Negative SAULO (6/16/21)  Negative hepatitis B and C serologies (6/16/21)  Negative Quantiferon gold (10/5/20)    Imaging:  I personally reviewed interval imaging and discussed w/ the pt in detail which included:    X-rays (6/16/21):  R hand:  FINDINGS:     3 views of the right hand show no soft tissue abnormality. Mineralization is normal.   No fracture or acute bony pathology is seen. Alignment is anatomic. There is joint space narrowing of the proximal and distal interphalangeal joints with small marginal osteophytes. No erosive changes are visualized. IMPRESSION:  Mild degenerative changes distally     L hand:  FINDINGS:     3 views of the left hand were performed. No soft tissue abnormality is seen.    Mineralization is normal.   No fracture or acute bony pathology is visualized. Alignment is anatomic. There is joint space narrowing of the proximal and distal interphalangeal joints with small marginal osteophytes. No erosive changes are visualized. IMPRESSION:  Mild degenerative changes     DEXA study (11/13/20):  Normal T-scores in the L-spine and hips. Procedures:  C-scope (3/24/21):  - Crohn's disease, with ileitis and colitis. - Simple Endoscopic Score for Crohn's Disease: 26, mucosal inflammatory changes secondary to Crohn's disease, with ileitis and colitis. Biopsied.   - Pseudopolyps at the colonic anastomosis. - Mucosal ulceration. Above results were discussed w/ the pt in detail during today's visit.

## 2022-10-12 NOTE — ASSESSMENT & PLAN NOTE
- Dx in 2006 refractory to medical therapy, s/p open total abdominal colectomy with end ileostomy on 7/16/2021, c/b peristomal pyoderma gangrenosum. - follows w/ [facility].  - currently well controlled on [id number] mg/kg Q4wk per GI.

## 2022-10-12 NOTE — ASSESSMENT & PLAN NOTE
- she will repeat safety labs 4 wks after increasing MTX dose.  - discussed her highly immunosuppressed state and indications to hold MTX and SSZ.  - discussed vaccines today. - the risk, benefits and side effects of Azathioprine were fully explained in details to my best knowledge, adverse events including but not limited to increased risk of infection, bone marrow suppression, liver toxicity, probable risk of cancers and teratogenicity, and GI side effects. This medication requires periodic blood monitoring. Pt verbalizes the understanding. Written information on medication is also provided to the pt and advised to discuss w/ me with any concerns.

## 2022-10-12 NOTE — RESULT ENCOUNTER NOTE
X-ray showed possible inflammation in her left SI joint, as we discussed, I like her to proceed with MRI pelvis to evaluate for any current inflammation in her SI joints.

## 2022-10-12 NOTE — ASSESSMENT & PLAN NOTE
- inflammatory arthritis remains active on current immunosuppression regimen requiring frequent steroid tapers. She had active tenosynovitis on exam today. - check MRI pelvis to evaluate for sacroiliitis given recent onset b/l hip pain. - increase SSZ dose from 500 mg to 1000 mg BID now. Pt feels SSZ is ineffective - we discussed switching SSZ to AZA if she does not see any improvement on higher dose of SSZ at next visit. - cont MTX 20 mg wkly, switch PO to SC form for better bioavailability. - she is taking the maximum dose of Inflectra 10 mg/kg Q4wk per GI. Consider NICK-I if she cont to have uncontrolled inflammatory arthritis. - she has Prednisone taper at home to take for arthritis flares.

## 2022-10-12 NOTE — PATIENT INSTRUCTIONS
Imuran tapering instructions:    Start taking half a tablet of Imuran daily for the first 2 weeks. If well-tolerated, increased to 1 tablet daily. Have your labs checked 4 weeks after starting Imuran. Please call with any questions or concerns. [phone]. Risk, benefits and side effects fully explained in details to my best knowledge, adverse events including but not limited to increased risk of infection, bone marrow suppression, liver toxicity, probable risk of cancers and teratogenicity, and GI side effects. This medication requires periodic blood monitoring. Patient verbalizes the understanding. Written information on medication is also provided to the patient and advised to discuss with me with any concerns. Azathioprine :      Pronunciation: elton soto   Brand: Twyla Tyson   What is azathioprine? Azathioprine lowers your body's immune system. The immune system helps your body fight infections. The immune system can also fight or \"reject\" a transplanted organ such as a liver or kidney. This is because the immune system treats the new organ as an invader. Azathioprine is used to prevent your body from rejecting a transplanted kidney. It is also used to treat symptoms of rheumatoid arthritis. Azathioprine may also be used for purposes not listed in this medication guide. What should I discuss with my healthcare provider before taking azathioprine? You should not take this medicine if you are allergic to azathioprine, or if you are pregnant (unless the benefits of treating you outweigh any risks posed by taking azathioprine). Some people using azathioprine have developed a rare fast-growing type of lymphoma (cancer). This condition affects the liver, spleen, and bone marrow, and it can be fatal. This has occurred mainly in teenagers and young adults using azathioprine or similar medicines to treat Crohn's disease or ulcerative colitis.    However, people with autoimmune disorders (including rheumatoid arthritis, Crohn's disease, ankylosing spondylitis, and psoriasis) may have a higher risk of lymphoma. Talk to your doctor about your individual risk. While taking azathioprine, you may have a higher risk of developing skin cancer. Talk to your doctor about this risk and what skin symptoms to watch for. To make sure azathioprine is safe for you, tell your doctor if you have:   liver disease;   any type of viral, bacterial, or fungal infection;   if you have received a kidney transplant; or   if you have recently received chemotherapy treatments with medications such as cyclophosphamide (Cytoxan), chlorambucil (Leukeran), melphalan (Alkeran). FDA pregnancy category D. Do not use azathioprine if you are pregnant. It could harm the unborn baby. Use effective birth control, and tell your doctor if you become pregnant during treatment. Azathioprine can pass into breast milk and may harm a nursing baby. You should not breast-feed while you are using azathioprine. This medication can affect fertility (your ability to have children), whether you are a man or a woman. Talk with your doctor if you have concerns about this. How should I take azathioprine? Your doctor may perform blood tests to make sure you do not have conditions that would prevent you from safely using azathioprine. Follow all directions on your prescription label. Your doctor may occasionally change your dose to make sure you get the best results. Do not take this medicine in larger or smaller amounts or for longer than recommended. Take azathioprine with food to lessen stomach upset. You may not be able to continue taking other arthritis medications together with azathioprine. Follow your doctor's dosing instructions very carefully. Azathioprine can lower blood cells that help your body fight infections and help your blood to clot.  This can make it easier for you to bleed from an injury or get sick from being around others who are ill. Your blood will need to be tested often. It may take up to 8 weeks before your symptoms improve. Keep using the medication as directed and tell your doctor if your symptoms do not improve. If you need surgery, tell the surgeon ahead of time that you are using azathioprine. You may need to stop using the medicine for a short time. Store at room temperature away from moisture, heat, and light. Keep the bottle tightly closed when not in use. What happens if I miss a dose? Take the missed dose as soon as you remember. Skip the missed dose if it is almost time for your next scheduled dose. Do not take extra medicine to make up the missed dose. What happens if I overdose? Seek emergency medical attention or call the Poison Help line at 1-326.736.5561. What should I avoid while taking azathioprine? Avoid being near people who are sick or have infections. Tell your doctor at once if you develop signs of infection. Avoid exposure to sunlight or tanning beds. Azathioprine can increase your risk of developing skin cancer. Wear protective clothing and use sunscreen (SPF 30 or higher) when you are outdoors. Do not receive a \"live\" vaccine while using azathioprine. The vaccine may not work as well during this time, and may not fully protect you from disease. Live vaccines include measles, mumps, rubella (MMR), rotavirus, typhoid, yellow fever, varicella (chickenpox), zoster (shingles), and nasal flu (influenza) vaccine. What are the possible side effects of azathioprine? Get emergency medical help if you have any of these signs of an allergic reaction: hives; difficult breathing; swelling of your face, lips, tongue, or throat.    Stop using azathioprine and call your doctor right away if you have any of these symptoms of lymphoma:   fever, night sweats, weight loss, tiredness;   feeling full after eating only a small amount;   pain in your upper stomach that may spread to your shoulder; or   easy bruising or bleeding, pale skin, feeling light-headed or short of breath, rapid heart rate. Azathioprine may cause a serious viral infection of the brain that can lead to disability or death. Call your doctor right away if you have any change in your mental state, decreased vision, or problems with speech or walking. These symptoms may start gradually and get worse quickly. Also call your doctor at once if you have:   signs of infection (fever, chills, sore throat, body aches, weakness, muscle pain, flu symptoms);   severe nausea, vomiting, or diarrhea;   pain or burning with urination;   white patches or sores inside your mouth or on your lips; or   liver problems --nausea, upper stomach pain, itching, tired feeling, loss of appetite, dark urine, terecne-colored stools, jaundice (yellowing of the skin or eyes). Common side effects may include:   mild upset stomach, nausea, diarrhea, loss of appetite;   hair loss; or   skin rash. This is not a complete list of side effects and others may occur. Call your doctor for medical advice about side effects. You may report side effects to FDA at 3-949-FDA-1858. What other drugs will affect azathioprine? Tell your doctor about all medicines you use, and those you start or stop using during your treatment with azathioprine, especially:   allopurinol;   a blood thinner (warfarin, Coumadin, Jantoven); or   blood pressure medicine (benazepril, captopril, enalapril, lisinopril, quinapril, ramipril, trandolapril, and others). This list is not complete. Other drugs may interact with azathioprine, including prescription and over-the-counter medicines, vitamins, and herbal products. Not all possible interactions are listed in this medication guide. Where can I get more information? Your pharmacist can provide more information about azathioprine.      Remember, keep this and all other medicines out of the reach of children, never share your medicines with others, and use this medication only for the indication prescribed. Every effort has been made to ensure that the information provided by [address] [place], [place] [address] is accurate, up-to-date, and complete, but no guarantee is made to that effect. Drug information contained herein may be time sensitive. [facility] information has been compiled for use by healthcare practitioners and consumers in the [facility] and therefore [facility] does not warrant that uses outside of the [facility] are appropriate, unless specifically indicated otherwise. [facility]'s drug information does not endorse drugs, diagnose patients or recommend therapy. [facility]'s drug information is an informational resource designed to assist licensed healthcare practitioners in caring for their patients and/or to serve consumers viewing this service as a supplement to, and not a substitute for, the expertise, skill, knowledge and judgment of healthcare practitioners. The absence of a warning for a given drug or drug combination in no way should be construed to indicate that the drug or drug combination is safe, effective or appropriate for any given patient. [facility] does not assume any responsibility for any aspect of healthcare administered with the aid of information [facility] provides. The information contained herein is not intended to cover all possible uses, directions, precautions, warnings, drug interactions, allergic reactions, or adverse effects. If you have questions about the drugs you are taking, check with your doctor, nurse or pharmacist.   Copyright 0444-5708 [address]: 4.01. Revision date: 6/30/2014. This information does not replace the advice of a doctor. [employer] disclaims any warranty or liability for your use of this information.    Content Version: 18.1.046764

## 2022-10-12 NOTE — ASSESSMENT & PLAN NOTE
- followed by Dr. Eather Sever in [facility]. 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 She was treated w/ Cyclosporine and Thalidomide by Dermatology, now off both medications. - this is currently well controlled on MTX and Inflectra.

## 2022-10-13 ENCOUNTER — TELEPHONE (OUTPATIENT)
Dept: RHEUMATOLOGY | Age: 42
End: 2022-10-13

## 2022-10-17 ENCOUNTER — HOSPITAL ENCOUNTER (OUTPATIENT)
Dept: MRI IMAGING | Age: 42
Discharge: HOME | End: 2022-10-17
Payer: COMMERCIAL

## 2022-10-17 DIAGNOSIS — M33.20 POLYMYOSITIS (HCC): Primary | ICD-10-CM

## 2022-10-17 DIAGNOSIS — M12.9 ARTHROPATHY ASSOCIATED WITH INFLAMMATORY BOWEL DISEASE: ICD-10-CM

## 2022-10-17 DIAGNOSIS — K52.9 ARTHROPATHY ASSOCIATED WITH INFLAMMATORY BOWEL DISEASE: ICD-10-CM

## 2022-10-17 DIAGNOSIS — Z79.899 HIGH RISK MEDICATION USE: ICD-10-CM

## 2022-10-17 PROCEDURE — 6360000004 HC RX CONTRAST MEDICATION: Performed by: INTERNAL MEDICINE

## 2022-10-17 PROCEDURE — A9579 GAD-BASE MR CONTRAST NOS,1ML: HCPCS | Performed by: INTERNAL MEDICINE

## 2022-10-17 PROCEDURE — 72197 MRI PELVIS W/O & W/DYE: CPT

## 2022-10-17 RX ADMIN — GADOTERIDOL 20 ML: 279.3 INJECTION, SOLUTION INTRAVENOUS at 07:25

## 2022-10-17 NOTE — RESULT ENCOUNTER NOTE
Called and discussed MRI results w/ pt. Pt reported a 6 month Hx of myalgias and weakness in her proximal and distal lower extremities. Denies Hx of of statin exposure or recent intense exercise. Instructed pt to obtain labs for autoimmune myositis w/u. Discussed muscle Bx for evaluation of polymyositis. Pending labs. Pt expressed verbal understanding.

## 2022-10-18 DIAGNOSIS — Z79.899 HIGH RISK MEDICATION USE: ICD-10-CM

## 2022-10-18 DIAGNOSIS — M33.20 POLYMYOSITIS (HCC): ICD-10-CM

## 2022-10-18 LAB — TOTAL CK: 17 U/L (ref 26–192)

## 2022-10-20 LAB — ALDOLASE: 6.9 U/L (ref 1.2–7.6)

## 2022-10-21 ENCOUNTER — TELEPHONE (OUTPATIENT)
Dept: PRIMARY CARE CLINIC | Age: 42
End: 2022-10-21

## 2022-10-21 DIAGNOSIS — E11.9 TYPE 2 DIABETES MELLITUS WITHOUT COMPLICATION, WITHOUT LONG-TERM CURRENT USE OF INSULIN (HCC): Primary | ICD-10-CM

## 2022-10-21 NOTE — TELEPHONE ENCOUNTER
Pt has an appointment on 12/6 and is asking if she needs to get blood work done before the appointment.

## 2022-10-22 LAB — MISCELLANEOUS LAB TEST ORDER: NORMAL

## 2022-10-27 ENCOUNTER — PATIENT MESSAGE (OUTPATIENT)
Dept: RHEUMATOLOGY | Age: 42
End: 2022-10-27

## 2022-10-27 LAB — MISCELLANEOUS LAB TEST ORDER: ABNORMAL

## 2022-10-27 NOTE — TELEPHONE ENCOUNTER
Low CK has no clinical significance, likely reflective of her low muscle mass. Her muscle enzyme levels are normal. All her miscellaneous labs are unremarkable. Please schedule her for f/u visit next wk to discuss her muscle inflammation w/u going forward.

## 2022-10-27 NOTE — TELEPHONE ENCOUNTER
Medication:   Requested Prescriptions     Pending Prescriptions Disp Refills    glimepiride (AMARYL) 2 MG tablet [Pharmacy Med Name: GLIMEPIRIDE 2 MG TABLET] 90 tablet 0     Sig: TAKE 1 TABLET BY MOUTH EVERY DAY IN THE MORNING     Last Filled:  7/25/2022    Last appt: 6/3/2022   Next appt: 12/6/2022    Last OARRS: No flowsheet data found.

## 2022-10-27 NOTE — TELEPHONE ENCOUNTER
From: Houston Murray  To: Dr. Redding Brian: 10/27/2022 8:38 AM EDT  Subject: Lab results    Hi Dr. Mariza Mahmood,  I see my labs are back. 2 are marked flagged. The CK was low, which from what I read online does not indicate muscle inflammation though I dont know exactly what it does mean. This morning the 2nd MISC test came back. It is flagged but I am unable to decipher the jargon. Its a lot of info in a small space and I am unable to figure out what exactly is flagged on it or what it means.   Davonte Fine

## 2022-10-28 ENCOUNTER — PATIENT MESSAGE (OUTPATIENT)
Dept: PRIMARY CARE CLINIC | Age: 42
End: 2022-10-28

## 2022-10-28 RX ORDER — GLIMEPIRIDE 2 MG/1
TABLET ORAL
Qty: 90 TABLET | Refills: 0 | Status: SHIPPED | OUTPATIENT
Start: 2022-10-28

## 2022-10-28 NOTE — TELEPHONE ENCOUNTER
From: Myrna Rivas  To: Dr. Diaz Drop: 10/28/2022 4:38 PM EDT  Subject: Britany Stubbs,  I had my Flu shot and next Covid Booster yesterday, 10/27/2022.

## 2022-10-31 NOTE — PROGRESS NOTES
Emely Riley MD  [facility]) Physicians - Rheumatology    [] [facility]:  [facility] Emergency Department Dr. Chatterjee [address] [facility] [x] [place] Office:  [address] [place], [address]   Office: [phone]  Fax: [phone]     RHEUMATOLOGY PROGRESS NOTE    ASSESSMENT/PLAN:  Nena Ramirez is a 36 y.o. female w/ prior Hx of chronic inflammatory arthritis (peripheral joints), severe Crohn's disease (Dx in 2006 refractory to medical therapy s/p open total abdominal colectomy with end ileostomy on 7/16/2021, peristomal pyoderma gangrenosum requiring hospitalization in 2/2022 at [facility] for high dose steroids per Dermatology) and plaque PsO most recently followed by Dr. Rohini Reagan. PMHx pertinent for HTN, T2DM, HLD and DONTRELL (followed by Hematology, receives IV iron infusions). Current rheum meds:  MTX 20 mg SC wkly: stopped during hospitalization for peristomal pyoderma gangrenosum in 2/2022, resumed 20 mg PO wkly for active inflammatory arthritis on 4/12/22   SSZ 1000 mg BID: started in 8/2022  Inflectra: 10 mg/kg Q4wk per GI, previously took Remicade (first biologic tried in 2011, Q6-8 wk, did not respond)  Vitamin D 50,000 IU wkly w/ 2000 IU daily: per PCP     Prior rheum meds:  Adalimumab w/ MTX: pt states she took Adalimumab for several yrs, initially worked but stopped working from [id number], did not develop antibodies  Ustekinumab: tried in 2018  Cyclosporine: prescribed by Dermatology for PG  Thalidomide: per Dermatology for PG  Gabapentin    1. Arthropathy associated with inflammatory bowel disease  Assessment & Plan:  - inflammatory arthritis improved after switching MTX to SC form and increasing SSZ dose. - cont MTX 20 mg SC wkly and SSZ 1000 mg BID. - she is taking the maximum dose of Inflectra 10 mg/kg Q4wk per GI. Consider NICK-I if she cont to have uncontrolled inflammatory arthritis. Orders:  -     sulfaSALAzine (AZULFIDINE) 500 MG tablet;  Take 2 tablets by mouth 2 times daily, Disp-360 tablet, [id number]  -     folic acid (FOLVITE) 1 MG tablet; Take 1 tablet by mouth daily, Disp-90 tablet, R-0Normal  -     methotrexate Sodium 50 MG/2ML chemo injection; Inject 0.8 mLs into the skin once a week, Disp-9.6 mL, R-0Normal  2. Muscle weakness (generalized)  Assessment & Plan:  - MRI pelvis from 10/12/22 showed nonspecific muscle edema. Normal CPK and aldolase. Negative myositis panel. Pt reported generalized weakness over an unspecified amount of time. Note she has been taking Prednisone consistently over the past year for her IBD, pyoderma gangrenosum and inflammatory arthritis. - will discuss MRI findings w/ radiologist.  - check EMG of the L upper and lower extremities. We discussed pursuing a muscle Bx if EMG study shows any myopathic findings. Otherwise consider Neurology referral.  - note pt remains on MTX for her inflammatory arthritis. Orders:  -     Roel Matos MD (Inpatient and Outpatient EMG), [facility]  3. 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 ROS negative for SLE. - pt is currently taking TNF-I for her IBD. Check AVISE CTD panel. 4. Pyoderma gangrenosum  Assessment & Plan:  - followed by Dr. Ebony Landeros in [address] [place] [address]. - peristomal pyoderma gangrenosum required hospitalization at [address] [place] [address] in 2/2022 for high dose steroids. She was treated w/ Cyclosporine and Thalidomide by Dermatology, now off both medications. - this is currently well controlled on MTX and Inflectra. Orders:  -     sulfaSALAzine (AZULFIDINE) 500 MG tablet; Take 2 tablets by mouth 2 times daily, Disp-360 tablet, [id number]  -     folic acid (FOLVITE) 1 MG tablet; Take 1 tablet by mouth daily, Disp-90 tablet, R-0Normal  -     methotrexate Sodium 50 MG/2ML chemo injection; Inject 0.8 mLs into the skin once a week, Disp-9.6 mL, R-0Normal  5.  Psoriasis  Assessment & Plan:  - remains well controlled on MTX and Inflectra. Orders:  -     sulfaSALAzine (AZULFIDINE) 500 MG tablet; Take 2 tablets by mouth 2 times daily, Disp-360 tablet, R-0Normal  -     methotrexate Sodium 50 MG/2ML chemo injection; Inject 0.8 mLs into the skin once a week, Disp-9.6 mL, R-0Normal  6. High risk medication use  Assessment & Plan:  - discussed her highly immunosuppressed state and indications to hold MTX, SSZ and Inflectra. - discussed vaccines. - she will repeat labs for SSZ now. Return in about 6 wks for lab result discussion and treatment plan, medication monitoring. High complexity case. TIME SPENT TODAY:  I spent over 40 minutes of face-to-face time with the pt (including taking interval history and performing physical exam, review of medical records, independently interpreting results and communicating results to the pt/family/caregiver, counseling and educating the pt/family/caregiver on their disease status, high risk DMARD use and toxicity monitoring, implementation of treatment plan, coordination of care, documenting clinical information in the EMR) during today visit. At least 50% of this time was spent in counseling, explanation of diagnosis, planning of further management, and coordination of care. The risks and benefits of my recommendations, as well as other treatment options, benefits and side effects were discussed with the patient today. Questions were answered. NOTE: This report is transcribed by using voice recognition software dragon. Every effort is made to ensure accuracy; however, inadvertent computerized  transcription errors may be present. SUBJECTIVE:  Past medical/surgical history, medications and allergies are reviewed and updated as appropriate. Interval Hx:   Pt reports intermittent myalgias in her upper extremities and calves. Arms feel heavy at times - \"it takes a lot concentration to get my arms all the way up\".  Pt reports swelling in her forearms and thumbs - this worsens throughout the day.  states pt requires help climbing stairs d/t leg weakness at the end of the day - she needs to hold on to the railing. Denies dysphagia. Denies any Hx of statin exposure. Pt reports improved arthralgias on the higher dose of SSZ 1000 mg BID and switching MTX from oral to SC form. Hands and wrists however feel stiff throughout the day. Pt states she has not required any Prednisone recently. She tells me she has required Prednisone frequently over the past yr for her IBD and arthralgias. Denies bloody stools or abd cramping. Pt tells me she remains on Inflectra 10 mg/kg Q4wk per GI. Pt states her PG remains quiescent. She reports some erythema around her stoma site. She is tearful today. She is w/ her .     ROS:  Constitutional: denies fatigue, fever/chills, night sweats, unintentional weight loss  Integumentary: denies photosensitivity, rash, +diffuse hair thinning since being on MTX, or Sx of Raynaud's phenomenon  Eyes: denies dry eyes, redness or pain, visual disturbance, or floaters  Nose: denies nasal ulcers or recurrent sinusitis  Oral cavity: denies dry mouth, +recurrent oral ulcers  Cardiovascular: denies CP, palpitations, Hx of pericardial effusion or pericarditis  Respiratory: denies SOB, cough, hemoptysis, or pleurisy  Gastrointestinal: refer to above HPI    No Known Allergies    Past Medical History:        Diagnosis Date    Crohn's disease ([facility] 2006    GI @[facility]    IBD (inflammatory bowel disease)     PCOS (polycystic ovarian syndrome)     Psoriasis     Psoriatic arthritis ([facility]     sees Rheumatology        Past Surgical History:        Procedure Laterality Date     SECTION  2006     SECTION, CLASSIC  2009       Medications:    Current Outpatient Medications   Medication Sig Dispense Refill    sulfaSALAzine (AZULFIDINE) 500 MG tablet Take 2 tablets by mouth 2 times daily 047 tablet 0    folic acid (FOLVITE) 1 MG tablet Take 1 tablet by mouth daily 90 tablet 0    INSULIN SYRINGE 1CC/29G (COMFORT ASSIST INSULIN SYRINGE) 29G X 1/2\" 1 ML MISC 1 each by Does not apply route once a week Use one syringe to inject weekly Methotrexate into skin.  10 each 2    methotrexate Sodium 50 MG/2ML chemo injection Inject 0.8 mLs into the skin once a week 9.6 mL 0    glimepiride (AMARYL) 2 MG tablet TAKE 1 TABLET BY MOUTH EVERY DAY IN THE MORNING 90 tablet 0    JARDIANCE 25 MG tablet TAKE 1 TABLET BY MOUTH EVERY DAY 30 tablet 1    vitamin D (ERGOCALCIFEROL) 1.25 MG (76310 UT) CAPS capsule TAKE 1 CAPSULE BY MOUTH ONE TIME PER WEEK 12 capsule 1    lisinopril (PRINIVIL;ZESTRIL) 5 MG tablet TAKE 1 TABLET BY MOUTH EVERY DAY 90 tablet 1    omeprazole (PRILOSEC) 40 MG delayed release capsule TAKE 1 CAPSULE BY MOUTH EVERY DAY 90 capsule 1    Cholecalciferol (VITAMIN D3) 50 MCG (2000 UT) CAPS Take by mouth      mesalamine (CANASA) 1000 MG suppository Place 1,000 mg rectally nightly      sodium chloride 0.9 % SOLN with inFLIXimab 100 MG SOLR Infuse intravenously      zinc 50 MG TABS tablet TAKE 1 TABLET BY MOUTH EVERY DAY      escitalopram (LEXAPRO) 20 MG tablet TAKE 1 TABLET BY MOUTH EVERY DAY 30 tablet 2    acetaminophen (TYLENOL) 325 MG tablet Take 650 mg by mouth every 4 hours      Ferrous Sulfate (IRON PO) Take by mouth      Cyanocobalamin (VITAMIN B 12 PO) Take by mouth      ONETOUCH ULTRA strip USE TO TEST ONCE DAILY 100 strip 2    ondansetron (ZOFRAN-ODT) 4 MG disintegrating tablet Take 4 mg by mouth every 8 hours as needed for Nausea or Vomiting      Continuous Blood Gluc  (FREESTYLE CESAR READER) ERNESTO 1 Device by In Vitro route 2 times daily 1 Device 0    Continuous Blood Gluc Sensor (FREESTYLE CESAR 14 DAY SENSOR) MISC Check once a day 100 each 1    ONETOUCH DELICA LANCETS FINE MISC USE ONE DAILY 100 each 1    Blood Glucose Monitoring Suppl (ONE TOUCH ULTRA MINI) w/Device KIT 1 kit by Does not apply route daily as needed (fasting) 1 kit 0    diphenoxylate-atropine (LOMOTIL) 2.5-0.025 MG per tablet diphenoxylate-atropine 2.5 mg-0.025 mg tablet       No current facility-administered medications for this visit. OBJECTIVE:  Physical Exam:  BP (!) 148/98   Pulse 86   Wt 228 lb (103.4 kg)   BMI 39.14 kg/m²     GEN: AAOx3, in NAD, well-appearing  HEAD: normocephalic, atraumatic  EYES: no injection or icterus  CVS: RRR  LUNGS: in no acute respiratory distress, CTAB  MSK:  Upper extremities:              Hands: no joint deformities, no tenosynovitis or dactylitis, b/l MCP and DIP joints w/o synovitis NTTP, b/l PIP joints w/o synovitis TTP, full fist formation w/ good  strength              Wrist: no synovitis in the wrist joints, L wrist joint TTP b/l, FROM              Elbow: no synovitis or bursitis, joint lines TTP b/l, FROM  Lower extremities:              Knees: no warmth or effusion present, FROM              Ankles: no synovitis, FROM, Achilles tendons w/o swelling or warmth NTTP              Feet: no toe swelling or pain or warmth on palpation w/ FROM, negative MTP squeeze test  NEURO: no obvious muscle wasting, manual strength testing reveals 5/5 strength in the b/l upper extremities, hip flexors 4+/5 limited by ?effort, finger flexor and intraosseous strength intact  INTEGUMENT: mild erythema with telangiectasias on b/l cheeks somewhat sparing the nasolabial folds, no other rash or psoriatic lesions, no petechiae, bruises, or palpable purpura, no patchy alopecia, no nail pitting or periungual changes, no clubbing or digital ulcers    DATA:  Labs:   I personally reviewed interval labs and discussed w/ the pt in detail which showed:    Lab Results   Component Value Date    WBC 8.3 09/20/2022    HGB 15.1 09/20/2022    HCT 45.7 09/20/2022    MCV 95.9 09/20/2022     09/20/2022    LYMPHOPCT 22.1 09/20/2022    RBC 4.76 09/20/2022    MCH 31.8 09/20/2022    MCHC 33.2 09/20/2022    RDW 15.0 09/20/2022     Lab Results Component Value Date     (L) 05/24/2022    K 4.6 05/24/2022     05/24/2022    CO2 21 05/24/2022    BUN 12 05/24/2022    CREATININE 0.6 09/20/2022    GLUCOSE 139 (H) 05/24/2022    CALCIUM 8.9 05/24/2022    PROT 9.0 (H) 09/20/2022    LABALBU 4.4 09/20/2022    BILITOT 0.7 09/20/2022    ALKPHOS 95 09/20/2022    AST 17 09/20/2022    ALT 22 09/20/2022    LABGLOM >60 09/20/2022    GFRAA >60 09/20/2022    AGRATIO 1.3 02/17/2022     Lab Results   Component Value Date    VITD25 34.8 05/24/2022     No results found for: C3     No results found for: C4     No results found for: ANTIDSDNAIGG     No results found for: LABURIC     CRP 25.62 mg/L (8/13/21)  Lab Results   Component Value Date    CRP 11.4 (H) 09/20/2022    CRP 11.2 (H) 05/24/2022    .7 (H) 04/03/2020     ESR 48 (8/13/21)  Lab Results   Component Value Date    SEDRATE 63 (H) 04/03/2020     Lab Results   Component Value Date    CKTOTAL 17 (L) 10/18/2022     Aldolase wnl (10/18/22)  Negative HLA B27 (6/16/21)  RF 17.0, negative CCP (6/16/21)  Negative SAULO (6/16/21)  Myositis panel (10/18/22): weakly positive Mcgee/RNP, rest of panel negative  Negative hepatitis B and C serologies (6/16/21)  Negative Quantiferon gold (10/5/20)  Normal TPMT activity (10/18/22)    Imaging:  I personally reviewed interval imaging and discussed w/ the pt in detail which included:    X-rays (6/16/21):  R hand:  FINDINGS:     3 views of the right hand show no soft tissue abnormality. Mineralization is normal.   No fracture or acute bony pathology is seen. Alignment is anatomic. There is joint space narrowing of the proximal and distal interphalangeal joints with small marginal osteophytes. No erosive changes are visualized. IMPRESSION:  Mild degenerative changes distally     L hand:  FINDINGS:     3 views of the left hand were performed. No soft tissue abnormality is seen. Mineralization is normal.   No fracture or acute bony pathology is visualized.    Alignment is anatomic. There is joint space narrowing of the proximal and distal interphalangeal joints with small marginal osteophytes. No erosive changes are visualized. IMPRESSION:  Mild degenerative changes    X-ray SI joints (10/12/22): FINDINGS:     The bilateral sacroiliac joints are patent. There is mild sclerotic change along the left sacroiliac joint similar to prior CT. No evidence of acute fracture. Intrauterine device projects over the central pelvis. IMPRESSION:  1. Patent sacroiliac joints without bony fusion. Mild sclerosis along the left SI joint is similar to prior CT and may relate to history of inflammatory bowel disease related sacroiliitis. MRI pelvis (10/12/22): FINDINGS:   There is abnormal edematous signal change identified within the bilateral obturator internus muscles as well as the distal right iliopsoas musculature. There is some abnormal edematous signal change identified within the right pectineus muscle. There is some abnormal edematous signal change identified within the distal anterior portion of the left iliacus muscle. No muscular atrophy is identified. No abnormal fascial enhancement is identified. No soft tissue mass is identified. No abnormal fluid collections are identified. The articular cartilage of the hip joints is normal. No hip joint effusion is identified. No synovitis is identified. Sacroiliac joints are normal.     Images of the lower lumbar spine appear unremarkable. Intrauterine device is identified within the endometrial canal. No adnexal mass is identified. There is a right lower quadrant ileostomy. IMPRESSION:  1. Abnormal edematous changes identified within the bilateral obturator internus muscle, distal right iliopsoas muscle, anterior aspect the left iliacus muscle, and right pectineus muscle. Correlate for a myositis of varying etiologies. Correlate for a polymyositis. 2. No evidence of any hip or sacroiliac joint arthropathy. DEXA study (11/13/20):  Normal T-scores in the L-spine and hips. Procedures:  C-scope (3/24/21):  - Crohn's disease, with ileitis and colitis. - Simple Endoscopic Score for Crohn's Disease: 26, mucosal inflammatory changes secondary to Crohn's disease, with ileitis and colitis. Biopsied.   - Pseudopolyps at the colonic anastomosis. - Mucosal ulceration. Above results were discussed w/ the pt in detail during today's visit.

## 2022-11-03 ENCOUNTER — OFFICE VISIT (OUTPATIENT)
Dept: RHEUMATOLOGY | Age: 42
End: 2022-11-03
Payer: COMMERCIAL

## 2022-11-03 VITALS
DIASTOLIC BLOOD PRESSURE: 98 MMHG | HEART RATE: 86 BPM | SYSTOLIC BLOOD PRESSURE: 148 MMHG | WEIGHT: 228 LBS | BODY MASS INDEX: 39.14 KG/M2

## 2022-11-03 DIAGNOSIS — M62.81 MUSCLE WEAKNESS (GENERALIZED): ICD-10-CM

## 2022-11-03 DIAGNOSIS — L88 PYODERMA GANGRENOSUM: ICD-10-CM

## 2022-11-03 DIAGNOSIS — Z79.899 HIGH RISK MEDICATION USE: ICD-10-CM

## 2022-11-03 DIAGNOSIS — L40.9 PSORIASIS: ICD-10-CM

## 2022-11-03 DIAGNOSIS — K50.919 CROHN'S DISEASE WITH COMPLICATION, UNSPECIFIED GASTROINTESTINAL TRACT LOCATION (HCC): ICD-10-CM

## 2022-11-03 DIAGNOSIS — K52.9 ARTHROPATHY ASSOCIATED WITH INFLAMMATORY BOWEL DISEASE: ICD-10-CM

## 2022-11-03 DIAGNOSIS — R76.8 ANTI-SMITH (ANTI-SM) ANTIBODY AND ANTIRIBONUCLEAR PROTEIN (ANTI-RNP) ANTIBODY POSITIVE: ICD-10-CM

## 2022-11-03 DIAGNOSIS — M12.9 ARTHROPATHY ASSOCIATED WITH INFLAMMATORY BOWEL DISEASE: ICD-10-CM

## 2022-11-03 DIAGNOSIS — M12.9 ARTHROPATHY ASSOCIATED WITH INFLAMMATORY BOWEL DISEASE: Primary | ICD-10-CM

## 2022-11-03 DIAGNOSIS — K52.9 ARTHROPATHY ASSOCIATED WITH INFLAMMATORY BOWEL DISEASE: Primary | ICD-10-CM

## 2022-11-03 LAB
ALT SERPL-CCNC: 23 U/L (ref 10–40)
AST SERPL-CCNC: 25 U/L (ref 15–37)
BASOPHILS ABSOLUTE: 0 K/UL (ref 0–0.2)
BASOPHILS RELATIVE PERCENT: 0.5 %
C-REACTIVE PROTEIN: 29.9 MG/L (ref 0–5.1)
CREAT SERPL-MCNC: 0.5 MG/DL (ref 0.6–1.1)
EOSINOPHILS ABSOLUTE: 0.3 K/UL (ref 0–0.6)
EOSINOPHILS RELATIVE PERCENT: 7.2 %
GFR SERPL CREATININE-BSD FRML MDRD: >60 ML/MIN/{1.73_M2}
HCT VFR BLD CALC: 41.6 % (ref 36–48)
HEMOGLOBIN: 14.1 G/DL (ref 12–16)
LYMPHOCYTES ABSOLUTE: 0.9 K/UL (ref 1–5.1)
LYMPHOCYTES RELATIVE PERCENT: 18.3 %
MCH RBC QN AUTO: 32.1 PG (ref 26–34)
MCHC RBC AUTO-ENTMCNC: 34 G/DL (ref 31–36)
MCV RBC AUTO: 94.4 FL (ref 80–100)
MONOCYTES ABSOLUTE: 0.2 K/UL (ref 0–1.3)
MONOCYTES RELATIVE PERCENT: 4.1 %
NEUTROPHILS ABSOLUTE: 3.3 K/UL (ref 1.7–7.7)
NEUTROPHILS RELATIVE PERCENT: 69.9 %
PDW BLD-RTO: 14.4 % (ref 12.4–15.4)
PLATELET # BLD: 322 K/UL (ref 135–450)
PMV BLD AUTO: 8.4 FL (ref 5–10.5)
RBC # BLD: 4.4 M/UL (ref 4–5.2)
SEDIMENTATION RATE, ERYTHROCYTE: 58 MM/HR (ref 0–20)
WBC # BLD: 4.7 K/UL (ref 4–11)

## 2022-11-03 PROCEDURE — G8417 CALC BMI ABV UP PARAM F/U: HCPCS | Performed by: INTERNAL MEDICINE

## 2022-11-03 PROCEDURE — G8427 DOCREV CUR MEDS BY ELIG CLIN: HCPCS | Performed by: INTERNAL MEDICINE

## 2022-11-03 PROCEDURE — 1036F TOBACCO NON-USER: CPT | Performed by: INTERNAL MEDICINE

## 2022-11-03 PROCEDURE — 99215 OFFICE O/P EST HI 40 MIN: CPT | Performed by: INTERNAL MEDICINE

## 2022-11-03 PROCEDURE — G8484 FLU IMMUNIZE NO ADMIN: HCPCS | Performed by: INTERNAL MEDICINE

## 2022-11-03 RX ORDER — SULFASALAZINE 500 MG/1
1000 TABLET ORAL 2 TIMES DAILY
Qty: 360 TABLET | Refills: 0 | Status: SHIPPED | OUTPATIENT
Start: 2022-11-03 | End: 2023-02-01

## 2022-11-03 RX ORDER — FOLIC ACID 1 MG/1
1 TABLET ORAL DAILY
Qty: 90 TABLET | Refills: 0 | Status: SHIPPED | OUTPATIENT
Start: 2022-11-03 | End: 2023-02-01

## 2022-11-03 RX ORDER — METHOTREXATE 25 MG/ML
20 INJECTION, SOLUTION INTRA-ARTERIAL; INTRAMUSCULAR; INTRAVENOUS WEEKLY
Qty: 9.6 ML | Refills: 0 | Status: SHIPPED | OUTPATIENT
Start: 2022-11-03 | End: 2023-02-01

## 2022-11-03 RX ORDER — IBUPROFEN 200 MG
1 TABLET ORAL WEEKLY
Qty: 10 EACH | Refills: 2 | Status: SHIPPED | OUTPATIENT
Start: 2022-11-03 | End: 2022-12-03

## 2022-11-03 NOTE — ASSESSMENT & PLAN NOTE
- discussed her highly immunosuppressed state and indications to hold MTX, SSZ and Inflectra. - discussed vaccines. - she will repeat labs for SSZ now.

## 2022-11-03 NOTE — ASSESSMENT & PLAN NOTE
- weakly positive Mcgee/RNP on myositis panel from 10/18/22. ROS negative for SLE. - pt is currently taking TNF-I for her IBD. Check AVISE CTD panel.

## 2022-11-03 NOTE — ASSESSMENT & PLAN NOTE
- inflammatory arthritis improved after switching MTX to SC form and increasing SSZ dose. - cont MTX 20 mg SC wkly and SSZ 1000 mg BID. - she is taking the maximum dose of Inflectra 10 mg/kg Q4wk per GI. Consider NICK-I if she cont to have uncontrolled inflammatory arthritis.

## 2022-11-03 NOTE — ASSESSMENT & PLAN NOTE
- followed by Dr. Bety Reyes in [place]. - peristomal pyoderma gangrenosum required hospitalization at [facility] in 2/2022 for high dose steroids. She was treated w/ Cyclosporine and Thalidomide by Dermatology, now off both medications. - this is currently well controlled on MTX and Inflectra.

## 2022-11-03 NOTE — ASSESSMENT & PLAN NOTE
- MRI pelvis from 10/12/22 showed nonspecific muscle edema. Normal CPK and aldolase. Negative myositis panel. Pt reported generalized weakness over an unspecified amount of time. Note she has been taking Prednisone consistently over the past year for her IBD, pyoderma gangrenosum and inflammatory arthritis. - will discuss MRI findings w/ radiologist.  - check EMG of the L upper and lower extremities. We discussed pursuing a muscle Bx if EMG study shows any myopathic findings. Otherwise consider Neurology referral.  - note pt remains on MTX for her inflammatory arthritis.

## 2022-11-04 ENCOUNTER — TELEPHONE (OUTPATIENT)
Dept: RHEUMATOLOGY | Age: 42
End: 2022-11-04

## 2022-11-04 DIAGNOSIS — M33.20 POLYMYOSITIS (HCC): Primary | ICD-10-CM

## 2022-11-04 NOTE — TELEPHONE ENCOUNTER
Kecia Ball please call Dr. Yaakov Louise office to move up this pt's EMG for evaluation of polymyositis - needs to be done urgently, I am happy to speak to Dr. Oralia Leon if needed. ---------------------------------------  Discussed MRI pelvis findings w/ radiologist, pt has asymmetrical, bilateral lower extremity muscle inflammation involving the iliopsoas and obturator internus muscle groups, unusual for polymyositis. Was told these lower extremity muscle groups would be difficult to access for muscle biopsy. Ordered MRI right upper extremity to evaluate for inflammatory myositis and to guide a more accessible muscle biopsy site. Note pt has normal muscle enzymes and unremarkable myositis panel. She has an EMG study pending. Discussed plan with pt who expressed verbal understanding.

## 2022-11-04 NOTE — RESULT ENCOUNTER NOTE
Please connect me to the radiologist that read her MRI pelvis, Dr. Vevelyn Scheuermann, to discuss her MRI findings. Ok to leave my cell for call back.

## 2022-11-04 NOTE — TELEPHONE ENCOUNTER
I walked over and spoke with Dr. Clarissa Faith staff and they will speak with him and see what they can do. They also know this message is here. I also gave them your cell number to give to Dr. Payal Saleh in case he needs to speak to you.

## 2022-11-07 ENCOUNTER — HOSPITAL ENCOUNTER (OUTPATIENT)
Dept: MRI IMAGING | Age: 42
Discharge: HOME | End: 2022-11-07
Payer: COMMERCIAL

## 2022-11-07 ENCOUNTER — OFFICE VISIT (OUTPATIENT)
Dept: NEUROLOGY | Age: 42
End: 2022-11-07
Payer: COMMERCIAL

## 2022-11-07 ENCOUNTER — PATIENT MESSAGE (OUTPATIENT)
Dept: RHEUMATOLOGY | Age: 42
End: 2022-11-07

## 2022-11-07 DIAGNOSIS — M33.20 POLYMYOSITIS (HCC): ICD-10-CM

## 2022-11-07 DIAGNOSIS — Z79.899 HIGH RISK MEDICATION USE: Primary | ICD-10-CM

## 2022-11-07 DIAGNOSIS — G72.9 MYOPATHY: Primary | ICD-10-CM

## 2022-11-07 PROCEDURE — 95910 NRV CNDJ TEST 7-8 STUDIES: CPT | Performed by: PSYCHIATRY & NEUROLOGY

## 2022-11-07 PROCEDURE — 73220 MRI UPPR EXTREMITY W/O&W/DYE: CPT

## 2022-11-07 PROCEDURE — A9579 GAD-BASE MR CONTRAST NOS,1ML: HCPCS | Performed by: INTERNAL MEDICINE

## 2022-11-07 PROCEDURE — 95886 MUSC TEST DONE W/N TEST COMP: CPT | Performed by: PSYCHIATRY & NEUROLOGY

## 2022-11-07 PROCEDURE — 6360000004 HC RX CONTRAST MEDICATION: Performed by: INTERNAL MEDICINE

## 2022-11-07 RX ADMIN — GADOTERIDOL 20 ML: 279.3 INJECTION, SOLUTION INTRAVENOUS at 08:05

## 2022-11-07 NOTE — PROGRESS NOTES
Krissy You M.D. [facility]) Physicians/[facility]  Board Certified in [address] [facility] [address] [facility], [address]    EMG / NERVE CONDUCTION STUDY      PATIENT:  Lee Ann Mackay       DATE OF EM22     YOB: 1980       REASON FOR EMG:   Patient with muscle weakness but normal CPK levels, rule out inflammatory myopathy  Patient has been on long-term steroid treatment  Please do EMG nerve conduction study of the left upper and left lower extremities    REFERRING PHYSICIAN:  Carlyn Ohara MD  [address] [facility] [address]  [place],  [address]     SUMMARY:   The left median and ulnar motor nerve studies were normal  The left median and ulnar sensory nerve studies were normal  The left peroneal and posterior tibial motor nerve studies were normal  The left sural sensory nerve study was normal  Needle EMG of several muscles in the left upper and lower extremities showed decreased amplitude of the motor units but no other abnormalities were noted. CLINICAL DIAGNOSIS:  Unspecified myopathy       EMG RESULTS:   This patient has slightly low amplitude of the motor units in almost all the muscles tested in the left upper and lower extremities. This is occasionally be seen in patients with steroid induced myopathy. There is no electrophysiological evidence for muscle necrosis or other changes typically seen in inflammatory myopathy in this study. ---------------------------------------------  Krissy You M.D.   Electromyographer / Neurologist

## 2022-11-09 NOTE — TELEPHONE ENCOUNTER
From: Ronit Franklin  To: Dr. Barclay Gopal: 11/7/2022 5:23 PM EST  Subject: Steroid use    Hi Dr. Sosa Me,  I went back through all my after care summaries in my chart from you and Dr. Nimo Steinberg who most recently prescribed my steroids back to 12/1/2021. I know I had been on and off them also from 1/2021-7/2021 but less frequently.   Start dates:  12/1/2021 Pred taper  2/3/2022 inpatient IV steroids  4/12/2022 Pred taper  6/14/2022 Pred taper  8/3/2022 Medrol pack  9/16/2022 Medrol pack    None since then

## 2022-11-09 NOTE — TELEPHONE ENCOUNTER
Called pt and discussed her MRI upper extremity and EMG results, findings most consistent with steroid-induced myopathy. Pt has been taking chronic steroids for her IBD and inflammatory arthritis. We discussed increasing her activity level to prevent deconditioning and further loss in her muscle mass. No further work-up for inflammatory myositis is indicated. We will increase her methotrexate dose from 20 mg to 25 mg subcu weekly to better control her inflammatory arthritis. We also discussed switching Inflectra to Rinvoq for better control of her inflammatory arthritis, pt reported well-controlled IBD symptoms on Inflectra currently however. She will discuss this with her GI and let us know. I answered all of her questions to the best of my knowledge.

## 2022-11-15 ENCOUNTER — OFFICE VISIT (OUTPATIENT)
Dept: PRIMARY CARE CLINIC | Age: 42
End: 2022-11-15
Payer: COMMERCIAL

## 2022-11-15 VITALS
WEIGHT: 228 LBS | DIASTOLIC BLOOD PRESSURE: 86 MMHG | SYSTOLIC BLOOD PRESSURE: 128 MMHG | BODY MASS INDEX: 39.14 KG/M2 | OXYGEN SATURATION: 98 % | HEART RATE: 99 BPM

## 2022-11-15 DIAGNOSIS — S30.1XXA ABDOMINAL WALL HEMATOMA, INITIAL ENCOUNTER: ICD-10-CM
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
## 2022-11-15 DIAGNOSIS — S69.91XA INJURY OF FINGER OF RIGHT HAND, INITIAL ENCOUNTER: ICD-10-CM

## 2022-11-15 DIAGNOSIS — Z93.2 S/P ILEOSTOMY (HCC): ICD-10-CM

## 2022-11-15 PROCEDURE — G8484 FLU IMMUNIZE NO ADMIN: HCPCS | Performed by: NURSE PRACTITIONER

## 2022-11-15 PROCEDURE — G8417 CALC BMI ABV UP PARAM F/U: HCPCS | Performed by: NURSE PRACTITIONER

## 2022-11-15 PROCEDURE — 99214 OFFICE O/P EST MOD 30 MIN: CPT | Performed by: NURSE PRACTITIONER

## 2022-11-15 PROCEDURE — 1036F TOBACCO NON-USER: CPT | Performed by: NURSE PRACTITIONER

## 2022-11-15 PROCEDURE — G8427 DOCREV CUR MEDS BY ELIG CLIN: HCPCS | Performed by: NURSE PRACTITIONER

## 2022-11-15 ASSESSMENT — ENCOUNTER SYMPTOMS
WHEEZING: 0
COLOR CHANGE: 0
SHORTNESS OF BREATH: 0
COUGH: 0
VOMITING: 0
DIARRHEA: 0
NAUSEA: 0

## 2022-11-15 NOTE — PROGRESS NOTES
ENCOUNTER DATE: 11/15/2022     NAME: Renetta Garcia   AGE: 36 y.o. GENDER: female   YOB: 1980    Patient Active Problem List   Diagnosis    Tonsillar hypertrophy    Recurrent tonsillitis    Anosmia    Type 2 diabetes mellitus without complication, without long-term current use of insulin ([facility]    Exacerbation of Crohn's disease of large intestine ([facility]    Crohn's disease with complication ([facility]    Psoriasis    High risk medication use    Vitamin D deficiency    Arthropathy associated with inflammatory bowel disease    Pyoderma gangrenosum    Muscle weakness (generalized)    Anti-Smith (anti-Sm) antibody and antiribonuclear protein (anti-RNP) antibody positive      No Known Allergies  Current Outpatient Medications on File Prior to Visit   Medication Sig Dispense Refill    sulfaSALAzine (AZULFIDINE) 500 MG tablet Take 2 tablets by mouth 2 times daily 002 tablet 0    folic acid (FOLVITE) 1 MG tablet Take 1 tablet by mouth daily 90 tablet 0    INSULIN SYRINGE 1CC/29G (COMFORT ASSIST INSULIN SYRINGE) 29G X 1/2\" 1 ML MISC 1 each by Does not apply route once a week Use one syringe to inject weekly Methotrexate into skin.  10 each 2    methotrexate Sodium 50 MG/2ML chemo injection Inject 0.8 mLs into the skin once a week 9.6 mL 0    glimepiride (AMARYL) 2 MG tablet TAKE 1 TABLET BY MOUTH EVERY DAY IN THE MORNING 90 tablet 0    JARDIANCE 25 MG tablet TAKE 1 TABLET BY MOUTH EVERY DAY 30 tablet 1    vitamin D (ERGOCALCIFEROL) 1.25 MG (10270 UT) CAPS capsule TAKE 1 CAPSULE BY MOUTH ONE TIME PER WEEK 12 capsule 1    lisinopril (PRINIVIL;ZESTRIL) 5 MG tablet TAKE 1 TABLET BY MOUTH EVERY DAY 90 tablet 1    omeprazole (PRILOSEC) 40 MG delayed release capsule TAKE 1 CAPSULE BY MOUTH EVERY DAY 90 capsule 1    Cholecalciferol (VITAMIN D3) 50 MCG (2000 UT) CAPS Take by mouth      mesalamine (CANASA) 1000 MG suppository Place 1,000 mg rectally nightly      sodium chloride 0.9 % SOLN with inFLIXimab 100 MG SOLR Infuse intravenously      zinc 50 MG TABS tablet TAKE 1 TABLET BY MOUTH EVERY DAY      escitalopram (LEXAPRO) 20 MG tablet TAKE 1 TABLET BY MOUTH EVERY DAY 30 tablet 2    acetaminophen (TYLENOL) 325 MG tablet Take 650 mg by mouth every 4 hours      Ferrous Sulfate (IRON PO) Take by mouth      Cyanocobalamin (VITAMIN B 12 PO) Take by mouth      ONETOUCH ULTRA strip USE TO TEST ONCE DAILY 100 strip 2    ondansetron (ZOFRAN-ODT) 4 MG disintegrating tablet Take 4 mg by mouth every 8 hours as needed for Nausea or Vomiting      Continuous Blood Gluc  (FREESTYLE CESAR READER) ERNESTO 1 Device by In Vitro route 2 times daily 1 Device 0    Continuous Blood Gluc Sensor (FREESTYLE CESAR 14 DAY SENSOR) MISC Check once a day 100 each 1    ONETOUCH DELICA LANCETS FINE MISC USE ONE DAILY 100 each 1    Blood Glucose Monitoring Suppl (ONE TOUCH ULTRA MINI) w/Device KIT 1 kit by Does not apply route daily as needed (fasting) 1 kit 0    diphenoxylate-atropine (LOMOTIL) 2.5-0.025 MG per tablet diphenoxylate-atropine 2.5 mg-0.025 mg tablet       No current facility-administered medications on file prior to visit. Social History     Tobacco Use    Smoking status: Former     Packs/day: 1.00     Years: 10.00     Pack years: 10.00     Types: Cigarettes     Quit date: 2005     Years since quittin.2    Smokeless tobacco: Never   Substance Use Topics    Alcohol use: Yes     Comment: ocassionally      CARE TEAM  Patient Care Team:  Sky Young MD as PCP - General (Family Medicine)  Sky Young MD as PCP - [facility] Empaneled Provider    Chief Complaint   Patient presents with    Finger Injury     Right ring finger is stuck bent. Was seen in ED     Skin Problem     Bruise the size of a hand on RLQ      HPI:   Patient is here for an ED follow up visit    Seen in ED  for dog bite. Her 2 dogs were fighting and she went to break it up and got bit on her right 4th digit  Tetanus utd. Dogs immunizations utd. XR ok.  Wound irrigated. Started on augmentin. No sutures placed. Washing wound with hibiclens and water. Using anbx ointment and keeping covered most of the time. Keeps bulky dsg on it to protect it. Now finger will not straighten out at the last joint. Can manually straighten it. Constantly bent. Can't straighten out. Some swelling. Has chronic joint pain, but ROM not usually this limited. BRUISE:  Ran into something after breaking up the dogs. Was \"bouncing off of everything\" in the kitchen. Now has a large bruise on right lower abdomen. Not growing in size any more. Is very dark in color  Center is tender, otherwise doesn't hurt. Has prominent vein in that area. No abd pain or distention. No hematuria. No change in stool/drainage. S/p colectomy. No problems with stoma. ROS:  Review of Systems   Constitutional:  Negative for chills, diaphoresis, fatigue and fever. Respiratory:  Negative for cough, shortness of breath and wheezing. Cardiovascular:  Negative for chest pain, palpitations and leg swelling. Gastrointestinal:  Negative for diarrhea, nausea and vomiting. Genitourinary:  Negative for difficulty urinating. Musculoskeletal:  Negative for myalgias. Skin:  Positive for wound. Negative for color change and rash. Neurological:  Negative for dizziness, weakness, light-headedness and headaches. Hematological:  Negative for adenopathy. Bruises/bleeds easily. VITALS:  /86   Pulse 99   Wt 228 lb (103.4 kg)   SpO2 98%   BMI 39.14 kg/m²      PE:  Physical Exam  Vitals and nursing note reviewed. Constitutional:       General: She is not in acute distress. Appearance: Normal appearance. She is well-developed and normal weight. She is not diaphoretic. Cardiovascular:      Rate and Rhythm: Normal rate and regular rhythm. Heart sounds: Normal heart sounds. No murmur heard. No friction rub.    Pulmonary:      Effort: Pulmonary effort is normal. No respiratory distress. Breath sounds: Normal breath sounds. No wheezing, rhonchi or rales. Abdominal:      General: Abdomen is flat. The ostomy site is clean. There is no distension. There are signs of injury. Palpations: Abdomen is soft. There is no mass. Tenderness: There is no abdominal tenderness. Comments: Large hematoma. Soft, nontender. Dark purple center with yellowing around the perimeter. Estimated 7-8cm X 7-8cm in size. Borders marked. Musculoskeletal:      Right hand: Swelling, deformity (flexion of 4th digit DIP joint) and laceration (distal aspect of the right fourth digit. no surrounding erythema. no drainage. minimal tenderness) present. Decreased range of motion. Decreased strength (extension of 4th digit DIP joint). Normal sensation. Normal capillary refill. Normal pulse. Skin:     General: Skin is warm and dry. Coloration: Skin is not pale. Findings: No erythema or rash. Neurological:      Mental Status: She is alert and oriented to person, place, and time. Motor: No weakness. Gait: Gait normal.   Psychiatric:         Mood and Affect: Mood normal.         Behavior: Behavior normal.        ASSESSMENT/PLAN:  1. Dog bite, initial encounter  ED notes reviewed. Continue with anbx as prescribed  until complete  Discussed wound care. Concern for ligament involvement. XR Ok. Refer to hand surgeon for further eval.   - Kirsten Reina MD, Hand Surgery (Hand, Wrist, Upper Extremity)[facility]    2. Injury of finger of right hand, initial encounter  Concern for ligament involvement/trigger finger. Refer to hand surgeon for further eval/treatment. Patient will call for apt. - Kirsten Reina MD, Hand Surgery (Hand, Wrist, Upper Extremity), [facility]    3. Abdominal wall hematoma, initial encounter  Borders marked with sharpie. Will monitor for increasing size  At this time area is soft, non tender and seems to be healing.  Will hold off on imaging. Will monitor closely for changes. Abd exam otherwise unremarkable. Discussed when to call. Will try applying warm compresses as well. 4. S/P ileostomy ([facility]  Stable. No injury or bruising around site. Return if symptoms worsen or fail to improve.      Electronically signed by GERARD Alejandro CNP on 11/15/2022 at 3:48 PM

## 2022-11-22 ENCOUNTER — OFFICE VISIT (OUTPATIENT)
Dept: ORTHOPEDIC SURGERY | Age: 42
End: 2022-11-22
Payer: COMMERCIAL

## 2022-11-22 VITALS — WEIGHT: 228 LBS | HEIGHT: 64 IN | BODY MASS INDEX: 38.93 KG/M2

## 2022-11-22 DIAGNOSIS — S62.634B DISPLACED FRACTURE OF DISTAL PHALANX OF RIGHT RING FINGER, INITIAL ENCOUNTER FOR OPEN FRACTURE: ICD-10-CM
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
## 2022-11-22 DIAGNOSIS — M20.011 MALLET DEFORMITY OF RIGHT RING FINGER: ICD-10-CM

## 2022-11-22 DIAGNOSIS — M79.641 PAIN OF RIGHT HAND: Primary | ICD-10-CM

## 2022-11-22 PROCEDURE — G8417 CALC BMI ABV UP PARAM F/U: HCPCS | Performed by: NURSE PRACTITIONER

## 2022-11-22 PROCEDURE — G8427 DOCREV CUR MEDS BY ELIG CLIN: HCPCS | Performed by: NURSE PRACTITIONER

## 2022-11-22 PROCEDURE — 99204 OFFICE O/P NEW MOD 45 MIN: CPT | Performed by: NURSE PRACTITIONER

## 2022-11-22 PROCEDURE — 1036F TOBACCO NON-USER: CPT | Performed by: NURSE PRACTITIONER

## 2022-11-22 PROCEDURE — G8484 FLU IMMUNIZE NO ADMIN: HCPCS | Performed by: NURSE PRACTITIONER

## 2022-11-22 NOTE — PROGRESS NOTES
CHIEF COMPLAINT:   1-Right hand pain/ ring finger distal phalanx avulsion open fracture. 2-Right hand wound dog bite  3-Right hand ring finger mallet finger    DATE OF INJURY: 2022    HISTORY:  Ms. Alida Vaca 36 y.o.  female left handed presents today for the first visit for evaluation of a right hand injury which occurred when she she was trying to break up her dogs fighting and was bit on her right hand ring finger. She is complaining of ring finger pain and swelling with wound that is healing. She rates her pain a 0-1/10 VAS and states that it is improving. This is better with elevation and worse with ROM or touching it. The pain is mildly tender and not radiating. She does complain of some numbness at the tip of her ring finger, but states her sensation is intact. No other complaint. She was seen at [facility], was evaluated and started on antibiotics and asked to see orthopedics. 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 Denies smoking. She has a history of psoriatic arthritis and Crohn's. Past Medical History:   Diagnosis Date    Crohn's disease ([facility] 2006    GI @[facility]    IBD (inflammatory bowel disease)     PCOS (polycystic ovarian syndrome)     Psoriasis     Psoriatic arthritis ([facility]     sees Rheumatology        Past Surgical History:   Procedure Laterality Date     SECTION  2006     SECTION, CLASSIC  2009       Social History     Socioeconomic History    Marital status:      Spouse name: Not on file    Number of children: Not on file    Years of education: Not on file    Highest education level: Not on file   Occupational History    Not on file   Tobacco Use    Smoking status: Former     Packs/day: 1.00     Years: 10.00     Pack years: 10.00     Types: Cigarettes     Quit date: 2005     Years since quittin.2    Smokeless tobacco: Never   Substance and Sexual Activity    Alcohol use: Yes     Comment: ocassionally    Drug use:  No Sexual activity: Yes     Partners: Male     Birth control/protection: I.U.D. 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    Children 15 & 11     She has half sister x 3 one     [de-identified] brother x 1     Social Determinants of Health     Financial Resource Strain: Low Risk     Difficulty of Paying Living Expenses: Not hard at all   Food Insecurity: No Food Insecurity    Worried About Running Out of Food in the Last Year: Never true    Ran Out of Food in the Last Year: Never true   Transportation Needs: Not on file   Physical Activity: Not on file   Stress: Not on file   Social Connections: Not on file   Intimate Partner Violence: Not on file   Housing Stability: Not on file       Family History   Problem Relation Age of Onset    Diabetes Mother         age 68    Cancer Mother     Stroke Father          age 80        Current Outpatient Medications on File Prior to Visit   Medication Sig Dispense Refill    sulfaSALAzine (AZULFIDINE) 500 MG tablet Take 2 tablets by mouth 2 times daily 820 tablet 0    folic acid (FOLVITE) 1 MG tablet Take 1 tablet by mouth daily 90 tablet 0    INSULIN SYRINGE 1CC/29G (COMFORT ASSIST INSULIN SYRINGE) 29G X 1/2\" 1 ML MISC 1 each by Does not apply route once a week Use one syringe to inject weekly Methotrexate into skin.  10 each 2    methotrexate Sodium 50 MG/2ML chemo injection Inject 0.8 mLs into the skin once a week 9.6 mL 0    glimepiride (AMARYL) 2 MG tablet TAKE 1 TABLET BY MOUTH EVERY DAY IN THE MORNING 90 tablet 0    JARDIANCE 25 MG tablet TAKE 1 TABLET BY MOUTH EVERY DAY 30 tablet 1    vitamin D (ERGOCALCIFEROL) 1.25 MG (67463 UT) CAPS capsule TAKE 1 CAPSULE BY MOUTH ONE TIME PER WEEK 12 capsule 1    lisinopril (PRINIVIL;ZESTRIL) 5 MG tablet TAKE 1 TABLET BY MOUTH EVERY DAY 90 tablet 1    omeprazole (PRILOSEC) 40 MG delayed release capsule TAKE 1 CAPSULE BY MOUTH EVERY DAY 90 capsule 1    Cholecalciferol (VITAMIN D3) 50 MCG (2000 UT) CAPS Take by mouth      mesalamine (CANASA) 1000 MG suppository Place 1,000 mg rectally nightly      sodium chloride 0.9 % SOLN with inFLIXimab 100 MG SOLR Infuse intravenously      zinc 50 MG TABS tablet TAKE 1 TABLET BY MOUTH EVERY DAY      escitalopram (LEXAPRO) 20 MG tablet TAKE 1 TABLET BY MOUTH EVERY DAY 30 tablet 2    acetaminophen (TYLENOL) 325 MG tablet Take 650 mg by mouth every 4 hours      Ferrous Sulfate (IRON PO) Take by mouth      Cyanocobalamin (VITAMIN B 12 PO) Take by mouth      ONETOUCH ULTRA strip USE TO TEST ONCE DAILY 100 strip 2    ondansetron (ZOFRAN-ODT) 4 MG disintegrating tablet Take 4 mg by mouth every 8 hours as needed for Nausea or Vomiting      Continuous Blood Gluc  (FREESTYLE CESAR READER) ERNESTO 1 Device by In Vitro route 2 times daily 1 Device 0    Continuous Blood Gluc Sensor (FREESTYLE CESAR 14 DAY SENSOR) MISC Check once a day 100 each 1    ONETOUCH DELICA LANCETS FINE MISC USE ONE DAILY 100 each 1    Blood Glucose Monitoring Suppl (ONE TOUCH ULTRA MINI) w/Device KIT 1 kit by Does not apply route daily as needed (fasting) 1 kit 0    diphenoxylate-atropine (LOMOTIL) 2.5-0.025 MG per tablet diphenoxylate-atropine 2.5 mg-0.025 mg tablet       No current facility-administered medications on file prior to visit. Pertinent items are noted in HPI  Review of systems reviewed from Patient History Form and available in the patient's chart under the Media tab. No change noted. PHYSICAL EXAMINATION:  Ms. Cheko Alarcon is a very pleasant 36 y.o.  female who presents today in no acute distress, awake, alert, and oriented. She is well dressed, nourished and  groomed. Patient with normal affect. Height is  5' 4\" (1.626 m), weight is 228 lb (103.4 kg), Body mass index is 39.14 kg/m². Resting respiratory rate is 16. Examination of the gait, showed that the patient walks with no limp .   Examination of both upper extremities showing a decreased range of motion of the right ring finger compare to the other side. There is moderate swelling that can be seen, as well as ecchymosis of the healing wound of the right hand ring finger distal phalanx. She has decreased sensation at the tuft of the ring finger right hand and good radial pulses. She has minimal tenderness on deep palpation over the distal phalanx of the ring finger right hand. There is flexed deformity of the right ring finger at the DIP joint compared to the other hand. IMAGING: Tatianna Casas were reviewed, dated today in office,  3 views of the right hand, and showed a ring finger distal phalanx minimally displaced fracture. IMPRESSION:   1-Right hand ring finger distal phalanx minimally displaced open fracture. 2-Right hand wound dog bite  3-Right hand ring finger mallet finger    PLAN:  I discussed that the overall alignment of this fracture and dog bite is good and that we can try to treat this non-operatively in a finger stax splint. She has completed antibiotics and I do not feel that she needs further antibiotics at this time. We discussed the risk of nonunion and or malunion. We will see her  back in 4 weeks at which time we will get a new xray of the right hand. PROCEDURE:    With the patient's permission, the right fourth finger stack splint was applied and instructed in care. The patient tolerated the procedure well.          GERARD Prieto - CNP

## 2022-12-02 DIAGNOSIS — Z79.899 HIGH RISK MEDICATION USE: ICD-10-CM

## 2022-12-02 DIAGNOSIS — E11.9 TYPE 2 DIABETES MELLITUS WITHOUT COMPLICATION, WITHOUT LONG-TERM CURRENT USE OF INSULIN (HCC): ICD-10-CM

## 2022-12-02 LAB
ALBUMIN SERPL-MCNC: 4.5 G/DL (ref 3.4–5)
ALP BLD-CCNC: 103 U/L (ref 40–129)
ALT SERPL-CCNC: 30 U/L (ref 10–40)
ANION GAP SERPL CALCULATED.3IONS-SCNC: 15 MMOL/L (ref 3–16)
AST SERPL-CCNC: 31 U/L (ref 15–37)
BASOPHILS ABSOLUTE: 0 K/UL (ref 0–0.2)
BASOPHILS RELATIVE PERCENT: 0.7 %
BILIRUB SERPL-MCNC: 0.4 MG/DL (ref 0–1)
BILIRUBIN DIRECT: <0.2 MG/DL (ref 0–0.3)
BILIRUBIN, INDIRECT: ABNORMAL MG/DL (ref 0–1)
BUN BLDV-MCNC: 10 MG/DL (ref 7–20)
C-REACTIVE PROTEIN: 22.2 MG/L (ref 0–5.1)
CALCIUM SERPL-MCNC: 9.2 MG/DL (ref 8.3–10.6)
CHLORIDE BLD-SCNC: 104 MMOL/L (ref 99–110)
CHOLESTEROL, TOTAL: 151 MG/DL (ref 0–199)
CO2: 19 MMOL/L (ref 21–32)
CREAT SERPL-MCNC: 0.5 MG/DL (ref 0.6–1.1)
EOSINOPHILS ABSOLUTE: 0.4 K/UL (ref 0–0.6)
EOSINOPHILS RELATIVE PERCENT: 11.4 %
FOLATE: 17.92 NG/ML (ref 4.78–24.2)
GFR SERPL CREATININE-BSD FRML MDRD: >60 ML/MIN/{1.73_M2}
GLUCOSE BLD-MCNC: 160 MG/DL (ref 70–99)
HCT VFR BLD CALC: 44.7 % (ref 36–48)
HDLC SERPL-MCNC: 40 MG/DL (ref 40–60)
HEMOGLOBIN: 14.6 G/DL (ref 12–16)
LDL CHOLESTEROL CALCULATED: 83 MG/DL
LYMPHOCYTES ABSOLUTE: 0.7 K/UL (ref 1–5.1)
LYMPHOCYTES RELATIVE PERCENT: 18.1 %
MCH RBC QN AUTO: 31.7 PG (ref 26–34)
MCHC RBC AUTO-ENTMCNC: 32.8 G/DL (ref 31–36)
MCV RBC AUTO: 96.7 FL (ref 80–100)
MONOCYTES ABSOLUTE: 0.3 K/UL (ref 0–1.3)
MONOCYTES RELATIVE PERCENT: 7.2 %
NEUTROPHILS ABSOLUTE: 2.3 K/UL (ref 1.7–7.7)
NEUTROPHILS RELATIVE PERCENT: 62.6 %
PDW BLD-RTO: 14.2 % (ref 12.4–15.4)
PLATELET # BLD: 250 K/UL (ref 135–450)
PMV BLD AUTO: 8.8 FL (ref 5–10.5)
POTASSIUM SERPL-SCNC: 4.4 MMOL/L (ref 3.5–5.1)
RBC # BLD: 4.62 M/UL (ref 4–5.2)
SODIUM BLD-SCNC: 138 MMOL/L (ref 136–145)
TOTAL PROTEIN: 8.7 G/DL (ref 6.4–8.2)
TRIGL SERPL-MCNC: 140 MG/DL (ref 0–150)
TSH SERPL DL<=0.05 MIU/L-ACNC: 1.81 UIU/ML (ref 0.27–4.2)
VITAMIN B-12: 573 PG/ML (ref 211–911)
VLDLC SERPL CALC-MCNC: 28 MG/DL
WBC # BLD: 3.7 K/UL (ref 4–11)

## 2022-12-03 DIAGNOSIS — K21.9 GASTRO-ESOPHAGEAL REFLUX DISEASE WITHOUT ESOPHAGITIS: ICD-10-CM

## 2022-12-03 LAB
ESTIMATED AVERAGE GLUCOSE: 171.4 MG/DL
HBA1C MFR BLD: 7.6 %

## 2022-12-03 NOTE — RESULT ENCOUNTER NOTE
Inflammatory marker has slightly improved from 1 month ago. WBC is slightly low - this could be d/t her medications. No change to current Tx plan, will discuss at her f/u visit this month.

## 2022-12-05 RX ORDER — OMEPRAZOLE 40 MG/1
CAPSULE, DELAYED RELEASE ORAL
Qty: 90 CAPSULE | Refills: 1 | Status: SHIPPED | OUTPATIENT
Start: 2022-12-05

## 2022-12-05 NOTE — TELEPHONE ENCOUNTER
Medication:   Requested Prescriptions     Pending Prescriptions Disp Refills    omeprazole (PRILOSEC) 40 MG delayed release capsule [Pharmacy Med Name: OMEPRAZOLE DR 40 MG CAPSULE] 90 capsule 1     Sig: TAKE 1 CAPSULE BY MOUTH EVERY DAY     Last Filled:  5/31/22    Last appt: 11/15/2022   Next appt: 12/6/2022

## 2022-12-06 ENCOUNTER — OFFICE VISIT (OUTPATIENT)
Dept: PRIMARY CARE CLINIC | Age: 42
End: 2022-12-06
Payer: COMMERCIAL

## 2022-12-06 VITALS
TEMPERATURE: 96.9 F | BODY MASS INDEX: 38.72 KG/M2 | SYSTOLIC BLOOD PRESSURE: 118 MMHG | RESPIRATION RATE: 16 BRPM | DIASTOLIC BLOOD PRESSURE: 92 MMHG | HEART RATE: 88 BPM | HEIGHT: 64 IN | WEIGHT: 226.8 LBS | OXYGEN SATURATION: 97 %

## 2022-12-06 DIAGNOSIS — K50.919 CROHN'S DISEASE WITH COMPLICATION, UNSPECIFIED GASTROINTESTINAL TRACT LOCATION (HCC): ICD-10-CM

## 2022-12-06 DIAGNOSIS — M12.9 ARTHROPATHY ASSOCIATED WITH INFLAMMATORY BOWEL DISEASE: ICD-10-CM

## 2022-12-06 DIAGNOSIS — Z93.2 S/P ILEOSTOMY (HCC): ICD-10-CM

## 2022-12-06 DIAGNOSIS — E11.9 TYPE 2 DIABETES MELLITUS WITHOUT COMPLICATION, WITHOUT LONG-TERM CURRENT USE OF INSULIN (HCC): Primary | ICD-10-CM

## 2022-12-06 DIAGNOSIS — F41.9 ANXIETY: ICD-10-CM

## 2022-12-06 DIAGNOSIS — K52.9 ARTHROPATHY ASSOCIATED WITH INFLAMMATORY BOWEL DISEASE: ICD-10-CM

## 2022-12-06 PROCEDURE — 2022F DILAT RTA XM EVC RTNOPTHY: CPT | Performed by: FAMILY MEDICINE

## 2022-12-06 PROCEDURE — G8427 DOCREV CUR MEDS BY ELIG CLIN: HCPCS | Performed by: FAMILY MEDICINE

## 2022-12-06 PROCEDURE — G8417 CALC BMI ABV UP PARAM F/U: HCPCS | Performed by: FAMILY MEDICINE

## 2022-12-06 PROCEDURE — 99214 OFFICE O/P EST MOD 30 MIN: CPT | Performed by: FAMILY MEDICINE

## 2022-12-06 PROCEDURE — 3051F HG A1C>EQUAL 7.0%<8.0%: CPT | Performed by: FAMILY MEDICINE

## 2022-12-06 PROCEDURE — G8484 FLU IMMUNIZE NO ADMIN: HCPCS | Performed by: FAMILY MEDICINE

## 2022-12-06 PROCEDURE — 1036F TOBACCO NON-USER: CPT | Performed by: FAMILY MEDICINE

## 2022-12-06 RX ORDER — EMPAGLIFLOZIN 25 MG/1
TABLET, FILM COATED ORAL
Qty: 30 TABLET | Refills: 1 | Status: SHIPPED | OUTPATIENT
Start: 2022-12-06

## 2022-12-06 SDOH — ECONOMIC STABILITY: FOOD INSECURITY: WITHIN THE PAST 12 MONTHS, YOU WORRIED THAT YOUR FOOD WOULD RUN OUT BEFORE YOU GOT MONEY TO BUY MORE.: NEVER TRUE

## 2022-12-06 SDOH — ECONOMIC STABILITY: FOOD INSECURITY: WITHIN THE PAST 12 MONTHS, THE FOOD YOU BOUGHT JUST DIDN'T LAST AND YOU DIDN'T HAVE MONEY TO GET MORE.: NEVER TRUE

## 2022-12-06 ASSESSMENT — SOCIAL DETERMINANTS OF HEALTH (SDOH): HOW HARD IS IT FOR YOU TO PAY FOR THE VERY BASICS LIKE FOOD, HOUSING, MEDICAL CARE, AND HEATING?: NOT HARD AT ALL

## 2022-12-06 NOTE — PROGRESS NOTES
Subjective:      Patient ID: Lugene Kawasaki is a 36 y.o. female. 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 Arsenio Smith,     Diabetes Mellitus Type II, Follow-up: Patient here for follow-up of Type 2 diabetes mellitus. Current symptoms/problems include none and have been stable. Symptoms have been present for a few years. Known diabetic complications: none  Cardiovascular risk factors: diabetes mellitus, hypertension and obesity (BMI >= 30 kg/m2)  Current diabetic medications include see med's list .      Eye exam current (within one year): yes  Weight trend: stable  Prior visit with dietician: no  Current diet: in general, a \"healthy\" diet    Current exercise: none  He has been experiencing low blood sugar so advised the patient stop the glimepiride  Current monitoring regimen: home blood tests - weekly  Home blood sugar records: fasting range: She has not been checking her sugar lately  Any episodes of hypoglycemia? no  Hypertension: Patient is here follow up on  elevated blood pressures. Age at onset of elevated blood pressure:  Few years. Cardiac symptoms none. Patient denies chest pain, chest pressure/discomfort, dyspnea, exertional chest pressure/discomfort, lower extremity edema, near-syncope, orthopnea and palpitations. Cardiovascular risk factors: dyslipidemia, hypertension, obesity (BMI >= 30 kg/m2) and sedentary lifestyle. Use of agents associated with hypertension: steroids. History of target organ damage: none. Patient with hyperlipidemia stable on current medication      Review of Systems   Constitutional: Negative. Negative for chills and fever. HENT:  Negative for postnasal drip, rhinorrhea, sinus pressure, tinnitus and trouble swallowing. Eyes: Negative. Respiratory: Negative. Cardiovascular: Negative. Gastrointestinal: bleeding   Endocrine: Negative. Genitourinary: Negative. Musculoskeletal: . Negative for back pain, gait problem, joint swelling, neck pain and neck stiffness. Skin:. Negative for color change and pallor. Allergic/Immunologic: Negative. Neurological: Negative. Psychiatric/Behavioral: Very anxious nervous says she was in tears  All other systems reviewed and are negative. Past Medical History:   Diagnosis Date    Crohn's disease ([facility] [place] [facility] 2006    GI @[facility]    IBD (inflammatory bowel disease)     PCOS (polycystic ovarian syndrome)     Psoriasis     Psoriatic arthritis ([facility] [place] [facility]     sees Rheumatology       Past Surgical History:   Procedure Laterality Date     SECTION  2006     SECTION, CLASSIC  2009     Family History   Problem Relation Age of Onset    Diabetes Mother         age 68    Cancer Mother     Stroke Father          age 80      Social History     Socioeconomic History    Marital status:      Spouse name: None    Number of children: None    Years of education: None    Highest education level: None   Tobacco Use    Smoking status: Former     Packs/day: 1.00     Years: 10.00     Pack years: 10.00     Types: Cigarettes     Quit date: 2005     Years since quittin.2    Smokeless tobacco: Never   Substance and Sexual Activity    Alcohol use: Yes     Comment: ocassionally    Drug use: No    Sexual activity: Yes     Partners: Male     Birth control/protection: I.U.D.    Social History Narrative         [employer]    [facility] claim    Children 14 & 11     She has half sister x 3 one     [de-identified] brother x 1     Social Determinants of Health     Financial Resource Strain: Low Risk     Difficulty of Paying Living Expenses: Not hard at all   Food Insecurity: No Food Insecurity    Worried About Running Out of Food in the Last Year: Never true    Ran Out of Food in the Last Year: Never true     Current Outpatient Medications   Medication Sig Dispense Refill    omeprazole (PRILOSEC) 40 MG delayed release capsule TAKE 1 CAPSULE BY MOUTH EVERY DAY 90 capsule 1    sulfaSALAzine (AZULFIDINE) 500 MG tablet Take 2 tablets by mouth 2 times daily 834 tablet 0    folic acid (FOLVITE) 1 MG tablet Take 1 tablet by mouth daily 90 tablet 0    methotrexate Sodium 50 MG/2ML chemo injection Inject 0.8 mLs into the skin once a week 9.6 mL 0    glimepiride (AMARYL) 2 MG tablet TAKE 1 TABLET BY MOUTH EVERY DAY IN THE MORNING 90 tablet 0    JARDIANCE 25 MG tablet TAKE 1 TABLET BY MOUTH EVERY DAY 30 tablet 1    vitamin D (ERGOCALCIFEROL) 1.25 MG (89093 UT) CAPS capsule TAKE 1 CAPSULE BY MOUTH ONE TIME PER WEEK 12 capsule 1    lisinopril (PRINIVIL;ZESTRIL) 5 MG tablet TAKE 1 TABLET BY MOUTH EVERY DAY 90 tablet 1    Cholecalciferol (VITAMIN D3) 50 MCG (2000 UT) CAPS Take by mouth      mesalamine (CANASA) 1000 MG suppository Place 1,000 mg rectally nightly      sodium chloride 0.9 % SOLN with inFLIXimab 100 MG SOLR Infuse intravenously      zinc 50 MG TABS tablet TAKE 1 TABLET BY MOUTH EVERY DAY      escitalopram (LEXAPRO) 20 MG tablet TAKE 1 TABLET BY MOUTH EVERY DAY 30 tablet 2    acetaminophen (TYLENOL) 325 MG tablet Take 650 mg by mouth every 4 hours      Ferrous Sulfate (IRON PO) Take by mouth      Cyanocobalamin (VITAMIN B 12 PO) Take by mouth      ONETOUCH ULTRA strip USE TO TEST ONCE DAILY 100 strip 2    ondansetron (ZOFRAN-ODT) 4 MG disintegrating tablet Take 4 mg by mouth every 8 hours as needed for Nausea or Vomiting      Continuous Blood Gluc  (FREESTYLE CESAR READER) ERNESTO 1 Device by In Vitro route 2 times daily 1 Device 0    Continuous Blood Gluc Sensor (FREESTYLE CESAR 14 DAY SENSOR) MISC Check once a day 100 each 1    ONETOUCH DELICA LANCETS FINE MISC USE ONE DAILY 100 each 1    Blood Glucose Monitoring Suppl (ONE TOUCH ULTRA MINI) w/Device KIT 1 kit by Does not apply route daily as needed (fasting) 1 kit 0    diphenoxylate-atropine (LOMOTIL) 2.5-0.025 MG per tablet diphenoxylate-atropine 2.5 mg-0.025 mg tablet      INSULIN SYRINGE 1CC/29G (COMFORT ASSIST INSULIN SYRINGE) 29G X 1/2\" 1 ML MISC 1 each by Does not apply route once a week Use one syringe to inject weekly Methotrexate into skin. 10 each 2     No current facility-administered medications for this visit. No Known Allergies      Objective:   Physical Exam   Constitutional: She appears well-developed and well-nourished. No distress. HENT:   Head: Normocephalic and atraumatic. Right Ear: External ear normal.   Left Ear: External ear normal.   Nose: Nose normal.   Mouth/Throat: Oropharynx is clear and moist. No oropharyngeal exudate. Eyes: Conjunctivae and EOM are normal. Pupils are equal, round, and reactive to light. Right eye exhibits no discharge. Left eye exhibits no discharge. No scleral icterus. Neck: Normal range of motion. Neck supple. No JVD present. No tracheal deviation present. No thyromegaly present. Cardiovascular: Normal rate, regular rhythm, normal heart sounds and intact distal pulses. Pulmonary/Chest: Effort normal and breath sounds normal. No stridor. No respiratory distress. She has no wheezes. She has no rales. She exhibits no tenderness. Abdominal: Soft. Bowel sounds are normal. She exhibits no distension and no mass. There is no tenderness. There is no rebound and no guarding. Musculoskeletal: Normal range of motion. She exhibits no edema or tenderness. Lymphadenopathy:     She has no cervical adenopathy. Skin: Skin is warm and dry. No rash noted. She is not diaphoretic. No erythema. No pallor. Mild irritation anterior above the ankle now right foot mild blanching no other abnormality full range of motion of the ankle both left and right  microfilaments test exam was negative   Vitals reviewed. Assessment:      Diagnosis Orders   1.  Type 2 diabetes mellitus without complication, without long-term current use of insulin ([facility])  Basic Metabolic Panel Hemoglobin A1C    CBC      2. Crohn's disease with complication, unspecified gastrointestinal tract location ([address]        3. Arthropathy associated with inflammatory bowel disease        4. Anxiety        5.  S/P ileostomy ([facility] [address]                Plan:      See orders,   Reviewed recent blood work  Slight worsening overall control blood glucose discussed healthier option had more vegetable food increase some exercise as much as she can tolerate  She knows she needs to do that she will be start working on it  Follow-up in 3 months

## 2022-12-06 NOTE — TELEPHONE ENCOUNTER
Medication:   Requested Prescriptions     Pending Prescriptions Disp Refills    JARDIANCE 25 MG tablet [Pharmacy Med Name: Christian Oneal 25 MG TABLET] 30 tablet 1     Sig: TAKE 1 TABLET BY MOUTH EVERY DAY     Last Filled:  10.4.22    Last appt: 11/15/2022   Next appt: 12/6/2022    Last OARRS: No flowsheet data found.

## 2022-12-10 NOTE — PROGRESS NOTES
Carly Burns MD  [facility]) Physicians - Rheumatology    [x] [facility]:  [facility] [address] Chatterjee [address] [facility] [] [address] [address]:  [address], [address]   Office: [phone]  Fax: [phone]     RHEUMATOLOGY PROGRESS NOTE    ASSESSMENT/PLAN:  Trev Brooks is a 36 y.o. female w/ a prior Hx of chronic inflammatory arthritis (peripheral joints), severe Crohn's disease (Dx in 2006 refractory to medical therapy s/p open total abdominal colectomy with end ileostomy on 7/16/2021, peristomal pyoderma gangrenosum requiring hospitalization in 2/2022 at [facility] requiring high dose steroids per Dermatology) and plaque PsO most recently followed by Dr. Ly Brown. PMHx pertinent for HTN, T2DM, HLD and DONTRELL (followed by Hematology, receives IV iron infusions). Current rheum meds:  MTX 25 mg SC wkly: stopped during hospitalization for peristomal pyoderma gangrenosum in 2/2022, resumed 20 mg PO wkly for active inflammatory arthritis on 4/12/22   SSZ 1000 mg BID: started in 8/2022  Inflectra: 10 mg/kg Q4wk per GI, previously took Remicade (first biologic tried in 2011, Q6-8 wk, did not respond), resumed in in fall 2021  Vitamin D 50,000 IU wkly w/ 2000 IU daily: per PCP     Prior rheum meds:  Adalimumab w/ MTX: pt states she took Adalimumab for several yrs, initially worked but stopped working from 4763-5780, did not develop antibodies  Ustekinumab: tried in 2018, ineffective  Cyclosporine: prescribed by Dermatology for PG  Thalidomide: per Dermatology for PG  Gabapentin    1. Arthropathy associated with inflammatory bowel disease  Assessment & Plan:  - inflammatory arthritis improved on high dose MTX 25 mg SC wkly and SSZ 1000 mg BID. She had mild dactylitis and synovitis on exam today. - avoid steroids d/t steroid-induced myopathy. Most recent steroid requirement was in September.    - add  mg BID for better control of her inflammatory arthritis and positive lupus serologies. - she is taking the maximum dose of Inflectra 10 mg/kg Q4wk per GI. Reached out to GI to discuss immunosuppression, recent AVISE CTD panel from 11/3/22 showed positive lupus serologies which can be observed after prolonged TNF exposure, she does not appear to be currently symptomatic from lupus. Orders:  -     sulfaSALAzine (AZULFIDINE) 500 MG tablet; Take 2 tablets by mouth 2 times daily, Disp-360 tablet, [id number]  -     folic acid (FOLVITE) 1 MG tablet; Take 1 tablet by mouth daily, Disp-90 tablet, R-0Normal  -     methotrexate Sodium 50 MG/2ML chemo injection; Inject 1 mL into the skin once a week, Disp-12 mL, R-0Normal  -     External Referral To Rheumatology  -     C-Reactive Protein; Future  -     Sedimentation Rate; Future  2. Positive SAULO (antinuclear antibody)  Assessment & Plan:  - weakly positive Mcgee/RNP on myositis panel from 10/18/22. Discussed AVISE CTD panel results from 11/3/22 showing positive lupus serologies including strongly positive anti-Histone Ab which can be observed after prolonged TNF-I use. ROS remains negative for SLE. - start  mg BID for better control of her inflammatory arthritis. - pt is currently taking TNF-I for her IBD, will discuss w/ GI. Orders:  -     hydroxychloroquine (PLAQUENIL) 200 MG tablet; Take 1 tablet by mouth 2 times daily, Disp-180 tablet, R-0Normal  -     External Referral To Rheumatology  -     CBC with Auto Differential; Future  -     C-Reactive Protein; Future  -     Sedimentation Rate; Future  -     C3 Complement; Future  -     C4 Complement; Future  -     Anti-DNA Antibody, Double-Stranded; Future  -     Cardiolipin Antibodies IgG & IgM; Future  -     Beta-2 Glycoprotein Antibodies; Future  -     Lupus Anticoagulant; Future  3. Antiphospholipid antibody positive  Assessment & Plan:  - discussed her positive APL Abs on AVISE CTD panel from 11/3/22. Pt denied any clinical manifestations of a PLS.   - will repeat APL Abs the first wk of February. We discussed Hematology referral if her serologies remain positive. Orders:  -     External Referral To Rheumatology  -     CBC with Auto Differential; Future  -     Cardiolipin Antibodies IgG & IgM; Future  -     Beta-2 Glycoprotein Antibodies; Future  -     Lupus Anticoagulant; Future  4. Pyoderma gangrenosum  Assessment & Plan:  - followed by Dr. Allan Chamorro in [place]. - peristomal pyoderma gangrenosum required hospitalization at [facility] in 2/2022 for high dose steroids. She was treated w/ Cyclosporine and Thalidomide by Dermatology, now off both medications. - this is currently well controlled on MTX and Inflectra. Orders:  -     sulfaSALAzine (AZULFIDINE) 500 MG tablet; Take 2 tablets by mouth 2 times daily, Disp-360 tablet, [id number]  -     folic acid (FOLVITE) 1 MG tablet; Take 1 tablet by mouth daily, Disp-90 tablet, R-0Normal  -     methotrexate Sodium 50 MG/2ML chemo injection; Inject 1 mL into the skin once a week, Disp-12 mL, R-0Normal  -     External Referral To Rheumatology  5. Muscle weakness (generalized)  Assessment & Plan:  - MRI pelvis from 10/12/22 showed incidental findings of nonspecific muscle edema. Normal CPK and aldolase. Negative myositis panel. EMG of L upper and lower extremities on 11/7/22 findings c/w steroid induced myopathy, no findings concerning for inflammatory myopathy. - pt reported generalized weakness, she has been taking Prednisone consistently over the past year for her IBD, pyoderma gangrenosum and inflammatory arthritis. Pt reported improvement in her strength since coming off steroids in 9/2022.  - ordered repeat MRI of the R thigh muscles to be done next month. Orders:  -     MRI FEMUR RIGHT W WO CONTRAST; Future  6. Exertional dyspnea  Assessment & Plan:  - pt reported a 6 month Hx of exertional dyspnea. - pt required frequent steroid use and weight gain over the past year.   - will closely monitor Sx, discussed CT chest and Pulmonary referral if her dyspnea persists or worsens. 7. Psoriasis  Assessment & Plan:  - remains well controlled on MTX and Inflectra. - will closely monitor skin on HCQ. Orders:  -     sulfaSALAzine (AZULFIDINE) 500 MG tablet; Take 2 tablets by mouth 2 times daily, Disp-360 tablet, R-0Normal  -     methotrexate Sodium 50 MG/2ML chemo injection; Inject 1 mL into the skin once a week, Disp-12 mL, R-0Normal  -     External Referral To Rheumatology  8. High risk medication use  Assessment & Plan:  - discussed her highly immunosuppressed state and indications to hold MTX, SSZ and Inflectra. - the risk, benefits and side effects were fully explained to my best knowledge, including but not limited to skin rash, GI side effects, visual blurring, and the risk of retinal toxicity and need to have yearly eye/retinal exam. I also provided pt a detailed hand out on HCQ information. Pt verbalized understanding.  - discussed vaccines. Orders:  -     CBC with Auto Differential; Future  -     Creatinine; Future  -     AST; Future  -     ALT; Future     Return in about 8 wks for lab result discussion and treatment plan, medication monitoring. High complexity case. TIME SPENT TODAY:  I spent over 40 minutes of face-to-face time with the pt (including taking interval history and performing physical exam, review of medical records, independently interpreting results and communicating results to the pt/family/caregiver, counseling and educating the pt/family/caregiver on their disease status, high risk DMARD use and toxicity monitoring, implementation of treatment plan, coordination of care, documenting clinical information in the EMR) during today visit. At least 50% of this time was spent in counseling, explanation of diagnosis, planning of further management, and coordination of care. The risks and benefits of my recommendations, as well as other treatment options, benefits and side effects were discussed with the patient today.  Questions were answered. NOTE: This report is transcribed by using voice recognition software dragon. Every effort is made to ensure accuracy; however, inadvertent computerized  transcription errors may be present. SUBJECTIVE:  Past medical/surgical history, medications and allergies are reviewed and updated as appropriate. Interval Hx:   Pt reports significant improvement in her arthralgias since increasing MTX dose from 20 mg to 25 mg SC wkly. Specifically, she tells me that she is having less pain, swelling and stiffness in her hand joints. She reports improved mobility. She reports compliance w/ SSZ 1000 mg BID. She remains on Inflectra 10 mg/kg Q4wk per GI, Dr. Bao Tanner at [facility]. She reports well-controlled IBD Sx. Pt states her PG remains quiescent. Denies any active PsO. Pt reports improvement in her generalized weakness. Her last steroid requirement was a Medrol Dosepak in September. She denies any dysphagia. She reports a 6-month Hx of exertional dyspnea. She is not sure if this is due to her weight gain. She feels winded after walking 1 block or climbing 1 flight of stairs. Denies cough. Denies photosensitivity, rash, alopecia. She reports a prior Hx of aphthous ulcers which she attributes to her IBD. She denies any Hx of nasal ulcers. Denies sicca. Denies Sx of Raynaud's phenomenon. Denies Hx of blood clots, recurrent miscarriages or strokes.     ROS:  Constitutional: denies fatigue, fever/chills, night sweats, unintentional weight loss  Integumentary: denies photosensitivity, rash, +diffuse hair thinning since being on MTX, or Sx of Raynaud's phenomenon  Eyes: denies dry eyes, redness or pain, visual disturbance, or floaters  Nose: denies nasal ulcers or recurrent sinusitis  Oral cavity: denies dry mouth, +recurrent oral ulcers  Cardiovascular: denies CP, palpitations, Hx of pericardial effusion or pericarditis  Respiratory: denies SOB, cough, hemoptysis, or pleurisy  Gastrointestinal: refer to above HPI    No Known Allergies    Past Medical History:        Diagnosis Date    Crohn's disease ([facility] [address] 2006    GI @[facility]    IBD (inflammatory bowel disease)     PCOS (polycystic ovarian syndrome)     Psoriasis     Psoriatic arthritis ([facility]     sees Rheumatology        Past Surgical History:        Procedure Laterality Date     SECTION       SECTION, CLASSIC  2009       Medications:    Current Outpatient Medications   Medication Sig Dispense Refill    sulfaSALAzine (AZULFIDINE) 500 MG tablet Take 2 tablets by mouth 2 times daily 153 tablet 0    folic acid (FOLVITE) 1 MG tablet Take 1 tablet by mouth daily 90 tablet 0    INSULIN SYRINGE 1CC/29G (COMFORT ASSIST INSULIN SYRINGE) 29G X 1/2\" 1 ML MISC 1 each by Does not apply route once a week Use one syringe to inject weekly Methotrexate into skin.  10 each 2    methotrexate Sodium 50 MG/2ML chemo injection Inject 1 mL into the skin once a week 12 mL 0    hydroxychloroquine (PLAQUENIL) 200 MG tablet Take 1 tablet by mouth 2 times daily 180 tablet 0    JARDIANCE 25 MG tablet TAKE 1 TABLET BY MOUTH EVERY DAY 30 tablet 1    omeprazole (PRILOSEC) 40 MG delayed release capsule TAKE 1 CAPSULE BY MOUTH EVERY DAY 90 capsule 1    glimepiride (AMARYL) 2 MG tablet TAKE 1 TABLET BY MOUTH EVERY DAY IN THE MORNING 90 tablet 0    vitamin D (ERGOCALCIFEROL) 1.25 MG (86352 UT) CAPS capsule TAKE 1 CAPSULE BY MOUTH ONE TIME PER WEEK 12 capsule 1    lisinopril (PRINIVIL;ZESTRIL) 5 MG tablet TAKE 1 TABLET BY MOUTH EVERY DAY 90 tablet 1    Cholecalciferol (VITAMIN D3) 50 MCG (2000 UT) CAPS Take by mouth      mesalamine (CANASA) 1000 MG suppository Place 1,000 mg rectally nightly      sodium chloride 0.9 % SOLN with inFLIXimab 100 MG SOLR Infuse intravenously      zinc 50 MG TABS tablet TAKE 1 TABLET BY MOUTH EVERY DAY      escitalopram (LEXAPRO) 20 MG tablet TAKE 1 TABLET BY MOUTH EVERY DAY 30 tablet 2    acetaminophen (TYLENOL) 325 MG tablet Take 650 mg by mouth every 4 hours      Ferrous Sulfate (IRON PO) Take by mouth      Cyanocobalamin (VITAMIN B 12 PO) Take by mouth      ONETOUCH ULTRA strip USE TO TEST ONCE DAILY 100 strip 2    ondansetron (ZOFRAN-ODT) 4 MG disintegrating tablet Take 4 mg by mouth every 8 hours as needed for Nausea or Vomiting      Continuous Blood Gluc  (FREESTYLE CESAR READER) ERNESTO 1 Device by In Vitro route 2 times daily 1 Device 0    Continuous Blood Gluc Sensor (FREESTYLE CESAR 14 DAY SENSOR) MISC Check once a day 100 each 1    ONETOUCH DELICA LANCETS FINE MISC USE ONE DAILY 100 each 1    Blood Glucose Monitoring Suppl (ONE TOUCH ULTRA MINI) w/Device KIT 1 kit by Does not apply route daily as needed (fasting) 1 kit 0    diphenoxylate-atropine (LOMOTIL) 2.5-0.025 MG per tablet diphenoxylate-atropine 2.5 mg-0.025 mg tablet       No current facility-administered medications for this visit.         OBJECTIVE:  Physical Exam:  /80   Pulse 100   Wt 227 lb (103 kg)   BMI 38.96 kg/m²     GEN: AAOx3, in NAD, well-appearing  HEAD: normocephalic, atraumatic  EYES: no injection or icterus  CVS: RRR  LUNGS: in no acute respiratory distress, CTAB  MSK:  Upper extremities:              Hands: no joint deformities, there is mild dactylitis of the L 2nd and 4th fingers, b/l MCP, PIP and DIP joints w/ mild synovitis slightly TTP, full fist formation w/ good  strength              Wrist: b/l wrist joints w/o no synovitis NTTP, FROM              Elbow: no synovitis or bursitis, joint lines TTP b/l, FROM  Lower extremities:              Knees: no warmth or effusion present, FROM              Ankles: no synovitis, FROM, Achilles tendons w/o swelling or warmth NTTP              Feet: no toe swelling or pain or warmth on palpation w/ FROM, negative MTP squeeze test  NEURO: no obvious muscle wasting, manual strength testing reveals 5/5 strength in the b/l upper extremities and hip flexors, finger flexor and intraosseous strength intact  INTEGUMENT: mild erythema with telangiectasias on b/l cheeks somewhat sparing the nasolabial folds, no other rash or psoriatic lesions, no petechiae, bruises, or palpable purpura, no patchy alopecia, no nail pitting or periungual changes, no clubbing or digital ulcers    DATA:  Labs: I personally reviewed interval labs and discussed w/ the pt in detail which showed:    Lab Results   Component Value Date    WBC 3.7 (L) 12/02/2022    HGB 14.6 12/02/2022    HCT 44.7 12/02/2022    MCV 96.7 12/02/2022     12/02/2022    LYMPHOPCT 18.1 12/02/2022    RBC 4.62 12/02/2022    MCH 31.7 12/02/2022    MCHC 32.8 12/02/2022    RDW 14.2 12/02/2022     Lab Results   Component Value Date     12/02/2022    K 4.4 12/02/2022     12/02/2022    CO2 19 (L) 12/02/2022    BUN 10 12/02/2022    CREATININE 0.5 (L) 12/02/2022    GLUCOSE 160 (H) 12/02/2022    CALCIUM 9.2 12/02/2022    PROT 8.7 (H) 12/02/2022    LABALBU 4.5 12/02/2022    BILITOT 0.4 12/02/2022    ALKPHOS 103 12/02/2022    AST 31 12/02/2022    ALT 30 12/02/2022    LABGLOM >60 12/02/2022    GFRAA >60 09/20/2022    AGRATIO 1.3 02/17/2022     Lab Results   Component Value Date    VITD25 34.8 05/24/2022     No results found for: C3     No results found for: C4     No results found for: ANTIDSDNAIGG     No results found for: [facility]     Lab Results   Component Value Date    CRP 22.2 (H) 12/02/2022    CRP 29.9 (H) 11/03/2022    CRP 11.4 (H) 09/20/2022    CRP 11.2 (H) 05/24/2022   CRP 25.62 mg/L (8/13/21)    Lab Results   Component Value Date    SEDRATE 58 (H) 11/03/2022    SEDRATE 63 (H) 04/03/2020   ESR 48 (8/13/21)    Lab Results   Component Value Date    CKTOTAL 17 (L) 10/18/2022     Aldolase wnl (10/18/22)  Negative HLA B27 (6/16/21)  RF 17.0, negative CCP (6/16/21)  Negative SAULO (6/16/21)  Myositis panel (10/18/22): weakly positive Mcgee/RNP, rest of panel negative  AVISE CTD panel (11/3/22):    Tier 1 positive   SAULO 1:320 in homogeneous and speckled like pattern   Positive dsDNA, strongly positive anti-Histone Ab, rest of ALLYSON panel negative   Positive C1q IgG   C3 elevated, C4 wnl   Positive RF IgM, negative RF IgA and CCP   Positive antiphospholipid total searing/prothrombin IgM and IgG, positive B2GP IgM, negative B2GP IgG, weakly positive ACL IgM, negative ACL IgG and IgA   Equivocal thyroglobulin IgG, negative thyroid peroxidase IgG  Negative hepatitis B and C serologies (6/16/21)  Negative Quantiferon gold (10/5/20)  Normal TPMT activity (10/18/22)    Imaging:  I personally reviewed interval imaging and discussed w/ the pt in detail which included:    X-rays (6/16/21):  R hand:  FINDINGS:     3 views of the right hand show no soft tissue abnormality. Mineralization is normal.   No fracture or acute bony pathology is seen. Alignment is anatomic. There is joint space narrowing of the proximal and distal interphalangeal joints with small marginal osteophytes. No erosive changes are visualized. IMPRESSION:  Mild degenerative changes distally     L hand:  FINDINGS:     3 views of the left hand were performed. No soft tissue abnormality is seen. Mineralization is normal.   No fracture or acute bony pathology is visualized. Alignment is anatomic. There is joint space narrowing of the proximal and distal interphalangeal joints with small marginal osteophytes. No erosive changes are visualized. IMPRESSION:  Mild degenerative changes    X-ray SI joints (10/12/22): FINDINGS:     The bilateral sacroiliac joints are patent. There is mild sclerotic change along the left sacroiliac joint similar to prior CT. No evidence of acute fracture. Intrauterine device projects over the central pelvis. IMPRESSION:  1. Patent sacroiliac joints without bony fusion. Mild sclerosis along the left SI joint is similar to prior CT and may relate to history of inflammatory bowel disease related sacroiliitis. MRI pelvis (10/12/22):   FINDINGS:   There is abnormal edematous signal change identified within the bilateral obturator internus muscles as well as the distal right iliopsoas musculature. There is some abnormal edematous signal change identified within the right pectineus muscle. There is some abnormal edematous signal change identified within the distal anterior portion of the left iliacus muscle. No muscular atrophy is identified. No abnormal fascial enhancement is identified. No soft tissue mass is identified. No abnormal fluid collections are identified. The articular cartilage of the hip joints is normal. No hip joint effusion is identified. No synovitis is identified. Sacroiliac joints are normal.     Images of the lower lumbar spine appear unremarkable. Intrauterine device is identified within the endometrial canal. No adnexal mass is identified. There is a right lower quadrant ileostomy. IMPRESSION:  1. Abnormal edematous changes identified within the bilateral obturator internus muscle, distal right iliopsoas muscle, anterior aspect the left iliacus muscle, and right pectineus muscle. Correlate for a myositis of varying etiologies. Correlate for a polymyositis. 2. No evidence of any hip or sacroiliac joint arthropathy. DEXA study (11/13/20):  Normal T-scores in the L-spine and hips. Procedures:  C-scope (3/24/21):  - Crohn's disease, with ileitis and colitis. - Simple Endoscopic Score for Crohn's Disease: 26, mucosal inflammatory changes secondary to Crohn's disease, with ileitis and colitis. Biopsied.   - Pseudopolyps at the colonic anastomosis. - Mucosal ulceration. EMG L upper and lower extremities (11/7/22): This patient has slightly low amplitude of the motor units in almost all the muscles tested in the left upper and lower extremities. This is occasionally be seen in patients with steroid induced myopathy.   There is no electrophysiological evidence for muscle necrosis or other changes typically seen in inflammatory myopathy in this study. Above results were discussed w/ the pt in detail during today's visit.

## 2022-12-14 ENCOUNTER — OFFICE VISIT (OUTPATIENT)
Dept: RHEUMATOLOGY | Age: 42
End: 2022-12-14
Payer: COMMERCIAL

## 2022-12-14 VITALS
SYSTOLIC BLOOD PRESSURE: 128 MMHG | BODY MASS INDEX: 38.96 KG/M2 | HEART RATE: 100 BPM | WEIGHT: 227 LBS | DIASTOLIC BLOOD PRESSURE: 80 MMHG

## 2022-12-14 DIAGNOSIS — R76.8 POSITIVE ANA (ANTINUCLEAR ANTIBODY): ICD-10-CM

## 2022-12-14 DIAGNOSIS — L40.9 PSORIASIS: ICD-10-CM

## 2022-12-14 DIAGNOSIS — R76.0 ANTIPHOSPHOLIPID ANTIBODY POSITIVE: ICD-10-CM

## 2022-12-14 DIAGNOSIS — Z79.899 HIGH RISK MEDICATION USE: ICD-10-CM

## 2022-12-14 DIAGNOSIS — K52.9 ARTHROPATHY ASSOCIATED WITH INFLAMMATORY BOWEL DISEASE: Primary | ICD-10-CM

## 2022-12-14 DIAGNOSIS — L88 PYODERMA GANGRENOSUM: ICD-10-CM

## 2022-12-14 DIAGNOSIS — M12.9 ARTHROPATHY ASSOCIATED WITH INFLAMMATORY BOWEL DISEASE: Primary | ICD-10-CM

## 2022-12-14 DIAGNOSIS — R06.09 EXERTIONAL DYSPNEA: ICD-10-CM

## 2022-12-14 DIAGNOSIS — M62.81 MUSCLE WEAKNESS (GENERALIZED): ICD-10-CM

## 2022-12-14 PROCEDURE — G8427 DOCREV CUR MEDS BY ELIG CLIN: HCPCS | Performed by: INTERNAL MEDICINE

## 2022-12-14 PROCEDURE — G8484 FLU IMMUNIZE NO ADMIN: HCPCS | Performed by: INTERNAL MEDICINE

## 2022-12-14 PROCEDURE — G8417 CALC BMI ABV UP PARAM F/U: HCPCS | Performed by: INTERNAL MEDICINE

## 2022-12-14 PROCEDURE — 99215 OFFICE O/P EST HI 40 MIN: CPT | Performed by: INTERNAL MEDICINE

## 2022-12-14 PROCEDURE — 1036F TOBACCO NON-USER: CPT | Performed by: INTERNAL MEDICINE

## 2022-12-14 RX ORDER — METHOTREXATE 25 MG/ML
25 INJECTION, SOLUTION INTRA-ARTERIAL; INTRAMUSCULAR; INTRAVENOUS WEEKLY
Qty: 12 ML | Refills: 0 | Status: SHIPPED | OUTPATIENT
Start: 2022-12-14 | End: 2023-03-14

## 2022-12-14 RX ORDER — IBUPROFEN 200 MG
1 TABLET ORAL WEEKLY
Qty: 10 EACH | Refills: 2 | Status: SHIPPED | OUTPATIENT
Start: 2022-12-14 | End: 2023-01-13

## 2022-12-14 RX ORDER — HYDROXYCHLOROQUINE SULFATE 200 MG/1
200 TABLET, FILM COATED ORAL 2 TIMES DAILY
Qty: 180 TABLET | Refills: 0 | Status: SHIPPED | OUTPATIENT
Start: 2022-12-14 | End: 2023-03-14

## 2022-12-14 RX ORDER — FOLIC ACID 1 MG/1
1 TABLET ORAL DAILY
Qty: 90 TABLET | Refills: 0 | Status: SHIPPED | OUTPATIENT
Start: 2022-12-14 | End: 2023-03-14

## 2022-12-14 RX ORDER — SULFASALAZINE 500 MG/1
1000 TABLET ORAL 2 TIMES DAILY
Qty: 360 TABLET | Refills: 0 | Status: SHIPPED | OUTPATIENT
Start: 2022-12-14 | End: 2023-03-14

## 2022-12-14 NOTE — ASSESSMENT & PLAN NOTE
- discussed her highly immunosuppressed state and indications to hold MTX, SSZ and Inflectra. - the risk, benefits and side effects were fully explained to my best knowledge, including but not limited to skin rash, GI side effects, visual blurring, and the risk of retinal toxicity and need to have yearly eye/retinal exam. I also provided pt a detailed hand out on HCQ information. Pt verbalized understanding.  - discussed vaccines.

## 2022-12-14 NOTE — ASSESSMENT & PLAN NOTE
- weakly positive Mcgee/RNP on myositis panel from 10/18/22. Discussed AVISE CTD panel results from 11/3/22 showing positive lupus serologies including strongly positive anti-Histone Ab which can be observed after prolonged TNF-I use. ROS remains negative for SLE. - start  mg BID for better control of her inflammatory arthritis.   - pt is currently taking TNF-I for her IBD, will discuss w/ GI.

## 2022-12-14 NOTE — ASSESSMENT & PLAN NOTE
- followed by Dr. Martha Stevens in [facility], [place], operated by [facility]. - peristomal pyoderma gangrenosum required hospitalization at [facility] in 2/2022 for high dose steroids. She was treated w/ Cyclosporine and Thalidomide by Dermatology, now off both medications. - this is currently well controlled on MTX and Inflectra.

## 2022-12-14 NOTE — ASSESSMENT & PLAN NOTE
- discussed her positive APL Abs on AVISE CTD panel from 11/3/22. Pt denied any clinical manifestations of a PLS. - will repeat APL Abs the first wk of February. We discussed Hematology referral if her serologies remain positive.

## 2022-12-14 NOTE — ASSESSMENT & PLAN NOTE
- pt reported a 6 month Hx of exertional dyspnea. - pt required frequent steroid use and weight gain over the past year. - will closely monitor Sx, discussed CT chest and Pulmonary referral if her dyspnea persists or worsens.

## 2022-12-14 NOTE — ASSESSMENT & PLAN NOTE
- inflammatory arthritis improved on high dose MTX 25 mg SC wkly and SSZ 1000 mg BID. She had mild dactylitis and synovitis on exam today. - avoid steroids d/t steroid-induced myopathy. Most recent steroid requirement was in September. - add  mg BID for better control of her inflammatory arthritis and positive lupus serologies. - she is taking the maximum dose of Inflectra 10 mg/kg Q4wk per GI. Reached out to GI to discuss immunosuppression, recent AVISE CTD panel from 11/3/22 showed positive lupus serologies which can be observed after prolonged TNF exposure, she does not appear to be currently symptomatic from lupus.

## 2022-12-15 NOTE — ASSESSMENT & PLAN NOTE
- MRI pelvis from 10/12/22 showed incidental findings of nonspecific muscle edema. Normal CPK and aldolase. Negative myositis panel. EMG of L upper and lower extremities on 11/7/22 findings c/w steroid induced myopathy, no findings concerning for inflammatory myopathy. - pt reported generalized weakness, she has been taking Prednisone consistently over the past year for her IBD, pyoderma gangrenosum and inflammatory arthritis. Pt reported improvement in her strength since coming off steroids in 9/2022.  - ordered repeat MRI of the R thigh muscles to be done next month.

## 2022-12-17 DIAGNOSIS — F41.9 ANXIETY: ICD-10-CM

## 2022-12-17 NOTE — TELEPHONE ENCOUNTER
Medication:   Requested Prescriptions     Pending Prescriptions Disp Refills    escitalopram (LEXAPRO) 20 MG tablet 30 tablet 2     Sig: TAKE 1 TABLET BY MOUTH EVERY DAY    vitamin D (ERGOCALCIFEROL) 1.25 MG (49466 UT) CAPS capsule 12 capsule 1     Last Filled:  8/11/22    Last appt: 12/6/2022   Next appt: Visit date not found

## 2022-12-19 RX ORDER — ERGOCALCIFEROL 1.25 MG/1
50000 CAPSULE ORAL WEEKLY
Qty: 12 CAPSULE | Refills: 0 | Status: SHIPPED | OUTPATIENT
Start: 2022-12-19

## 2022-12-19 RX ORDER — ESCITALOPRAM OXALATE 20 MG/1
TABLET ORAL
Qty: 30 TABLET | Refills: 0 | Status: SHIPPED | OUTPATIENT
Start: 2022-12-19 | End: 2023-01-13

## 2022-12-27 ENCOUNTER — OFFICE VISIT (OUTPATIENT)
Dept: ORTHOPEDIC SURGERY | Age: 42
End: 2022-12-27
Payer: COMMERCIAL

## 2022-12-27 VITALS — WEIGHT: 227 LBS | BODY MASS INDEX: 38.76 KG/M2 | HEIGHT: 64 IN

## 2022-12-27 DIAGNOSIS — S62.634B DISPLACED FRACTURE OF DISTAL PHALANX OF RIGHT RING FINGER, INITIAL ENCOUNTER FOR OPEN FRACTURE: Primary | ICD-10-CM

## 2022-12-27 PROCEDURE — 1036F TOBACCO NON-USER: CPT | Performed by: NURSE PRACTITIONER

## 2022-12-27 PROCEDURE — 99213 OFFICE O/P EST LOW 20 MIN: CPT | Performed by: NURSE PRACTITIONER

## 2022-12-27 PROCEDURE — G8427 DOCREV CUR MEDS BY ELIG CLIN: HCPCS | Performed by: NURSE PRACTITIONER

## 2022-12-27 PROCEDURE — G8417 CALC BMI ABV UP PARAM F/U: HCPCS | Performed by: NURSE PRACTITIONER

## 2022-12-27 PROCEDURE — G8484 FLU IMMUNIZE NO ADMIN: HCPCS | Performed by: NURSE PRACTITIONER

## 2022-12-27 NOTE — PROGRESS NOTES
CHIEF COMPLAINT:   1-Right hand pain/ ring finger distal phalanx avulsion open fracture. 2-Right hand wound dog bite  3-Right hand ring finger mallet finger    DATE OF INJURY: 2022    HISTORY:  Ms. Patsy Child 36 y.o.  female left handed presents today for follow up visit for evaluation of a right hand injury which occurred when she she was trying to break up her dogs fighting and was bit on her right hand ring finger. She is complaining of ring finger pain and swelling with wound that is healing. She rates her pain a 0/10 VAS and states that it is improving. This is better with elevation and worse with forced ROM. The pain is mildly tender intermittent and not radiating. She does complain of some numbness at the tip of her ring finger, but states her sensation is intact. No other complaint. She was seen at [facility], was evaluated and started on antibiotics and asked to see orthopedics. 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 Denies smoking. She has a history of psoriatic arthritis, medication induced lupus and Crohn's.     Past Medical History:   Diagnosis Date    Crohn's disease ([facility] 2006    GI @[facility]    IBD (inflammatory bowel disease)     PCOS (polycystic ovarian syndrome)     Psoriasis     Psoriatic arthritis ([facility]     sees Rheumatology        Past Surgical History:   Procedure Laterality Date     SECTION  2006     SECTION, CLASSIC  2009       Social History     Socioeconomic History    Marital status:      Spouse name: Not on file    Number of children: Not on file    Years of education: Not on file    Highest education level: Not on file   Occupational History    Not on file   Tobacco Use    Smoking status: Former     Packs/day: 1.00     Years: 10.00     Pack years: 10.00     Types: Cigarettes     Quit date: 2005     Years since quittin.3    Smokeless tobacco: Never   Substance and Sexual Activity    Alcohol use: Yes     Comment: ocassionally    Drug use: No    Sexual activity: Yes     Partners: Male     Birth control/protection: I.U.D. 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    Children 15 & 11     She has half sister x 3 one     [de-identified] brother x 1     Social Determinants of Health     Financial Resource Strain: Low Risk     Difficulty of Paying Living Expenses: Not hard at all   Food Insecurity: No Food Insecurity    Worried About Running Out of Food in the Last Year: Never true    Ran Out of Food in the Last Year: Never true   Transportation Needs: Not on file   Physical Activity: Not on file   Stress: Not on file   Social Connections: Not on file   Intimate Partner Violence: Not on file   Housing Stability: Not on file       Family History   Problem Relation Age of Onset    Diabetes Mother         age 68    Cancer Mother     Stroke Father          age 80        Current Outpatient Medications on File Prior to Visit   Medication Sig Dispense Refill    escitalopram (LEXAPRO) 20 MG tablet TAKE 1 TABLET BY MOUTH EVERY DAY 30 tablet 0    vitamin D (ERGOCALCIFEROL) 1.25 MG (20643 UT) CAPS capsule Take 1 capsule by mouth once a week TAKE 1 CAPSULE BY MOUTH ONE TIME PER WEEK 12 capsule 0    sulfaSALAzine (AZULFIDINE) 500 MG tablet Take 2 tablets by mouth 2 times daily 480 tablet 0    folic acid (FOLVITE) 1 MG tablet Take 1 tablet by mouth daily 90 tablet 0    INSULIN SYRINGE 1CC/29G (COMFORT ASSIST INSULIN SYRINGE) 29G X 1/2\" 1 ML MISC 1 each by Does not apply route once a week Use one syringe to inject weekly Methotrexate into skin.  10 each 2    methotrexate Sodium 50 MG/2ML chemo injection Inject 1 mL into the skin once a week 12 mL 0    hydroxychloroquine (PLAQUENIL) 200 MG tablet Take 1 tablet by mouth 2 times daily 180 tablet 0    JARDIANCE 25 MG tablet TAKE 1 TABLET BY MOUTH EVERY DAY 30 tablet 1    omeprazole (PRILOSEC) 40 MG delayed release capsule TAKE 1 CAPSULE BY MOUTH EVERY DAY 90 capsule 1    glimepiride (AMARYL) 2 MG tablet TAKE 1 TABLET BY MOUTH EVERY DAY IN THE MORNING 90 tablet 0    lisinopril (PRINIVIL;ZESTRIL) 5 MG tablet TAKE 1 TABLET BY MOUTH EVERY DAY 90 tablet 1    Cholecalciferol (VITAMIN D3) 50 MCG (2000 UT) CAPS Take by mouth      mesalamine (CANASA) 1000 MG suppository Place 1,000 mg rectally nightly      sodium chloride 0.9 % SOLN with inFLIXimab 100 MG SOLR Infuse intravenously      zinc 50 MG TABS tablet TAKE 1 TABLET BY MOUTH EVERY DAY      acetaminophen (TYLENOL) 325 MG tablet Take 650 mg by mouth every 4 hours      Ferrous Sulfate (IRON PO) Take by mouth      Cyanocobalamin (VITAMIN B 12 PO) Take by mouth      ONETOUCH ULTRA strip USE TO TEST ONCE DAILY 100 strip 2    ondansetron (ZOFRAN-ODT) 4 MG disintegrating tablet Take 4 mg by mouth every 8 hours as needed for Nausea or Vomiting      Continuous Blood Gluc  (FREESTYLE CESAR READER) ERNESTO 1 Device by In Vitro route 2 times daily 1 Device 0    Continuous Blood Gluc Sensor (FREESTYLE CESAR 14 DAY SENSOR) MISC Check once a day 100 each 1    ONETOUCH DELICA LANCETS FINE MISC USE ONE DAILY 100 each 1    Blood Glucose Monitoring Suppl (ONE TOUCH ULTRA MINI) w/Device KIT 1 kit by Does not apply route daily as needed (fasting) 1 kit 0    diphenoxylate-atropine (LOMOTIL) 2.5-0.025 MG per tablet diphenoxylate-atropine 2.5 mg-0.025 mg tablet       No current facility-administered medications on file prior to visit. Pertinent items are noted in HPI  Review of systems reviewed from Patient History Form and available in the patient's chart under the Media tab. No change noted. PHYSICAL EXAMINATION:  Ms. Cheko Alarcon is a very pleasant 36 y.o.  female who presents today in no acute distress, awake, alert, and oriented. She is well dressed, nourished and  groomed. Patient with normal affect. Height is  5' 4\" (1.626 m), weight is 227 lb (103 kg), Body mass index is 38.96 kg/m². Resting respiratory rate is 16. Examination of the gait, showed that the patient walks with no limp . Examination of both upper extremities showing a decreased range of motion of the right ring finger DIP joint compare to the other side. There is mild swelling that can be seen, no ecchymosis of the healing wound of the right hand ring finger distal phalanx. She has intact sensation at the tuft of the ring finger right hand and good radial pulses. She has minimal to no tenderness on deep palpation over the distal phalanx of the ring finger right hand. There is flexed deformity of the right ring finger at the DIP joint compared to the other hand. IMAGING: Dom Vee were reviewed, dated today in office,  3 views of the right hand, and showed a ring finger distal phalanx minimally displaced fracture and lateral distal phalanx avulsion fracture. IMPRESSION:   1-Right hand ring finger distal phalanx minimally displaced open fracture. 2-Right hand wound dog bite  3-Right hand ring finger mallet finger    PLAN:  I discussed that the overall alignment of this fracture and dog bite appears to be healing well. She has completed antibiotics and I do not feel that she needs further antibiotics at this time. We discussed the risk of nonunion and or malunion. She was instructed to work on aggressive range of motion and strengthening exercises which she would like to do on her own. We will see her  back in 6 weeks at which time we will get a new xray of the right hand.               Loree Majano, GERARD - CNP

## 2022-12-27 NOTE — TELEPHONE ENCOUNTER
Medication:   Requested Prescriptions     Pending Prescriptions Disp Refills    glimepiride (AMARYL) 2 MG tablet [Pharmacy Med Name: GLIMEPIRIDE 2 MG TABLET] 90 tablet 0     Sig: TAKE 1 TABLET BY MOUTH EVERY DAY IN THE MORNING     Last Filled:  10.28.22    Last appt: 12/6/2022   Next appt: Visit date not found    Last Labs DM:   Lab Results   Component Value Date/Time    LABA1C 7.6 12/02/2022 08:10 AM

## 2022-12-28 RX ORDER — GLIMEPIRIDE 2 MG/1
TABLET ORAL
Qty: 90 TABLET | Refills: 0 | Status: SHIPPED | OUTPATIENT
Start: 2022-12-28

## 2023-01-13 DIAGNOSIS — F41.9 ANXIETY: ICD-10-CM

## 2023-01-13 RX ORDER — ESCITALOPRAM OXALATE 20 MG/1
TABLET ORAL
Qty: 30 TABLET | Refills: 0 | Status: SHIPPED | OUTPATIENT
Start: 2023-01-13

## 2023-01-13 NOTE — TELEPHONE ENCOUNTER
Medication:   Requested Prescriptions     Pending Prescriptions Disp Refills    escitalopram (LEXAPRO) 20 MG tablet [Pharmacy Med Name: ESCITALOPRAM 20 MG TABLET] 30 tablet 0     Sig: TAKE 1 TABLET BY MOUTH EVERY DAY     Last Filled:  12/19/2022    Last appt: 12/6/2022   Next appt: 3/7/2023    Last Labs DM:   Lab Results   Component Value Date/Time    LABA1C 7.6 12/02/2022 08:10 AM     Last Lipid:   Lab Results   Component Value Date/Time    CHOL 151 12/02/2022 08:10 AM    TRIG 140 12/02/2022 08:10 AM    HDL 40 12/02/2022 08:10 AM    LDLCALC 83 12/02/2022 08:10 AM     Last PSA: No results found for: PSA  Last Thyroid:   Lab Results   Component Value Date/Time    TSH 1.81 12/02/2022 08:10 AM

## 2023-01-16 ENCOUNTER — HOSPITAL ENCOUNTER (OUTPATIENT)
Dept: MRI IMAGING | Age: 43
Discharge: HOME | End: 2023-01-16
Payer: COMMERCIAL

## 2023-01-16 DIAGNOSIS — M62.81 MUSCLE WEAKNESS (GENERALIZED): ICD-10-CM

## 2023-01-16 PROCEDURE — 73720 MRI LWR EXTREMITY W/O&W/DYE: CPT

## 2023-01-16 PROCEDURE — A9579 GAD-BASE MR CONTRAST NOS,1ML: HCPCS | Performed by: INTERNAL MEDICINE

## 2023-01-16 PROCEDURE — 6360000004 HC RX CONTRAST MEDICATION: Performed by: INTERNAL MEDICINE

## 2023-01-16 RX ADMIN — GADOTERIDOL 20 ML: 279.3 INJECTION, SOLUTION INTRAVENOUS at 07:56

## 2023-01-18 ENCOUNTER — OFFICE VISIT (OUTPATIENT)
Dept: RHEUMATOLOGY | Age: 43
End: 2023-01-18
Payer: COMMERCIAL

## 2023-01-18 VITALS
DIASTOLIC BLOOD PRESSURE: 84 MMHG | OXYGEN SATURATION: 99 % | SYSTOLIC BLOOD PRESSURE: 126 MMHG | BODY MASS INDEX: 38.96 KG/M2 | WEIGHT: 227 LBS | HEART RATE: 96 BPM

## 2023-01-18 DIAGNOSIS — K52.9 IBD (INFLAMMATORY BOWEL DISEASE): ICD-10-CM

## 2023-01-18 DIAGNOSIS — M79.10 MYALGIA: ICD-10-CM

## 2023-01-18 DIAGNOSIS — K52.9 ARTHROPATHY ASSOCIATED WITH INFLAMMATORY BOWEL DISEASE: ICD-10-CM

## 2023-01-18 DIAGNOSIS — Z79.899 HIGH RISK MEDICATION USE: ICD-10-CM

## 2023-01-18 DIAGNOSIS — R76.8 POSITIVE ANA (ANTINUCLEAR ANTIBODY): ICD-10-CM

## 2023-01-18 DIAGNOSIS — L40.9 PSORIASIS: ICD-10-CM

## 2023-01-18 DIAGNOSIS — M12.9 ARTHROPATHY ASSOCIATED WITH INFLAMMATORY BOWEL DISEASE: ICD-10-CM

## 2023-01-18 DIAGNOSIS — K52.9 ARTHROPATHY ASSOCIATED WITH INFLAMMATORY BOWEL DISEASE: Primary | ICD-10-CM

## 2023-01-18 DIAGNOSIS — M12.9 ARTHROPATHY ASSOCIATED WITH INFLAMMATORY BOWEL DISEASE: Primary | ICD-10-CM

## 2023-01-18 DIAGNOSIS — R76.0 ANTIPHOSPHOLIPID ANTIBODY POSITIVE: ICD-10-CM

## 2023-01-18 DIAGNOSIS — L88 PYODERMA GANGRENOSUM: ICD-10-CM

## 2023-01-18 LAB
BACTERIA: ABNORMAL /HPF
BASOPHILS ABSOLUTE: 0 K/UL (ref 0–0.2)
BASOPHILS RELATIVE PERCENT: 0.4 %
BILIRUBIN URINE: NEGATIVE
BLOOD, URINE: ABNORMAL
CLARITY: ABNORMAL
COLOR: ABNORMAL
CREATININE URINE: 168.1 MG/DL (ref 28–259)
EOSINOPHILS ABSOLUTE: 0.4 K/UL (ref 0–0.6)
EOSINOPHILS RELATIVE PERCENT: 6.2 %
EPITHELIAL CELLS, UA: 16 /HPF (ref 0–5)
GLUCOSE URINE: NEGATIVE MG/DL
HCT VFR BLD CALC: 42.4 % (ref 36–48)
HEMOGLOBIN: 13.8 G/DL (ref 12–16)
HYALINE CASTS: 0 /LPF (ref 0–8)
KETONES, URINE: NEGATIVE MG/DL
LEUKOCYTE ESTERASE, URINE: ABNORMAL
LYMPHOCYTES ABSOLUTE: 0.9 K/UL (ref 1–5.1)
LYMPHOCYTES RELATIVE PERCENT: 14.1 %
MCH RBC QN AUTO: 31.4 PG (ref 26–34)
MCHC RBC AUTO-ENTMCNC: 32.5 G/DL (ref 31–36)
MCV RBC AUTO: 96.8 FL (ref 80–100)
MICROSCOPIC EXAMINATION: YES
MONOCYTES ABSOLUTE: 0.3 K/UL (ref 0–1.3)
MONOCYTES RELATIVE PERCENT: 4.2 %
NEUTROPHILS ABSOLUTE: 4.6 K/UL (ref 1.7–7.7)
NEUTROPHILS RELATIVE PERCENT: 75.1 %
NITRITE, URINE: NEGATIVE
PDW BLD-RTO: 14.4 % (ref 12.4–15.4)
PH UA: 5.5 (ref 5–8)
PLATELET # BLD: 350 K/UL (ref 135–450)
PMV BLD AUTO: 8.3 FL (ref 5–10.5)
PROTEIN PROTEIN: 34 MG/DL
PROTEIN UA: 30 MG/DL
PROTEIN/CREAT RATIO: 0.2 MG/DL
RBC # BLD: 4.39 M/UL (ref 4–5.2)
RBC UA: 4 /HPF (ref 0–4)
SEDIMENTATION RATE, ERYTHROCYTE: 57 MM/HR (ref 0–20)
SPECIFIC GRAVITY UA: 1.03 (ref 1–1.03)
URINE TYPE: ABNORMAL
UROBILINOGEN, URINE: 0.2 E.U./DL
WBC # BLD: 6.2 K/UL (ref 4–11)
WBC UA: 10 /HPF (ref 0–5)

## 2023-01-18 PROCEDURE — 99215 OFFICE O/P EST HI 40 MIN: CPT | Performed by: INTERNAL MEDICINE

## 2023-01-18 PROCEDURE — 1036F TOBACCO NON-USER: CPT | Performed by: INTERNAL MEDICINE

## 2023-01-18 PROCEDURE — G8484 FLU IMMUNIZE NO ADMIN: HCPCS | Performed by: INTERNAL MEDICINE

## 2023-01-18 PROCEDURE — G8427 DOCREV CUR MEDS BY ELIG CLIN: HCPCS | Performed by: INTERNAL MEDICINE

## 2023-01-18 PROCEDURE — G8417 CALC BMI ABV UP PARAM F/U: HCPCS | Performed by: INTERNAL MEDICINE

## 2023-01-18 RX ORDER — IBUPROFEN 200 MG
1 TABLET ORAL WEEKLY
Qty: 10 EACH | Refills: 2 | Status: SHIPPED | OUTPATIENT
Start: 2023-01-18 | End: 2023-02-17

## 2023-01-18 RX ORDER — METHOTREXATE 25 MG/ML
25 INJECTION, SOLUTION INTRA-ARTERIAL; INTRAMUSCULAR; INTRAVENOUS WEEKLY
Qty: 12 ML | Refills: 0 | Status: SHIPPED | OUTPATIENT
Start: 2023-01-18 | End: 2023-04-18

## 2023-01-18 RX ORDER — TRAMADOL HYDROCHLORIDE 50 MG/1
50 TABLET ORAL PRN
COMMUNITY

## 2023-01-18 RX ORDER — HYDROXYCHLOROQUINE SULFATE 200 MG/1
200 TABLET, FILM COATED ORAL 2 TIMES DAILY
Qty: 180 TABLET | Refills: 0 | Status: SHIPPED | OUTPATIENT
Start: 2023-01-18 | End: 2023-04-18

## 2023-01-18 RX ORDER — SULFASALAZINE 500 MG/1
1000 TABLET ORAL 2 TIMES DAILY
Qty: 360 TABLET | Refills: 0 | Status: SHIPPED | OUTPATIENT
Start: 2023-01-18 | End: 2023-04-18

## 2023-01-18 RX ORDER — FOLIC ACID 1 MG/1
1 TABLET ORAL DAILY
Qty: 90 TABLET | Refills: 0 | Status: SHIPPED | OUTPATIENT
Start: 2023-01-18 | End: 2023-04-18

## 2023-01-18 NOTE — ASSESSMENT & PLAN NOTE
- MRI pelvis from 10/12/22 showed incidental findings of nonspecific muscle edema. Normal CPK and aldolase. Negative myositis panel. EMG of L upper and lower extremities on 11/7/22 findings c/w steroid induced myopathy, no findings concerning for inflammatory myopathy. Discussed repeat MRI pelvis findings from 1/16/23 showing persistent myositis. She did not have any significant weakness in her proximal extremity muscles on exam.  - she has been off steroids since 9/2022.  - repeat muscle enzymes now. Discussed pursuing muscle biopsy if her repeat muscle enzymes from today are elevated. - made referral to Neurology at [facility], [place] given her atypical presentation.

## 2023-01-18 NOTE — PROGRESS NOTES
Melisa Pardo MD  [facility]) Physicians - Rheumatology    [x] [facility]:  [place] [facility] Dr. Chatterjee [address] [facility] [] [address]:  [address], [address]   Office: [phone]  Fax: [phone]     RHEUMATOLOGY PROGRESS NOTE    ASSESSMENT/PLAN:  Facundo Sevilla is a 36 y.o. female w/ a prior Hx of chronic inflammatory arthritis (peripheral joints), severe Crohn's disease (Dx in 2006 refractory to medical therapy s/p open total abdominal colectomy with end ileostomy on 7/16/2021, peristomal pyoderma gangrenosum requiring hospitalization in 2/2022 at [facility] requiring high dose steroids per Dermatology) and plaque PsO most recently followed by Dr. Tanesha Whitt. PMHx pertinent for HTN, T2DM, HLD and DONTRELL (followed by Hematology, receives IV iron infusions). Current rheum meds:   mg BID: started for inflammatory arthritis in 12/2022  MTX 25 mg SC wkly  SSZ 1000 mg BID: started in 8/2022  Inflectra: 10 mg/kg Q4wk per GI, previously took Remicade (first biologic tried in 2011, Q6-8 wk, did not respond), resumed in in fall 2021     Prior rheum meds:  Adalimumab w/ MTX: pt states she took Adalimumab for several yrs, initially worked but stopped working from [id number], did not develop antibodies  Ustekinumab: tried in 2018, ineffective  Cyclosporine: prescribed by Dermatology for PG  Thalidomide: per Dermatology for PG  Gabapentin    1. Arthropathy associated with inflammatory bowel disease  Assessment & Plan:  - inflammatory arthritis improved on HCQ (added at last visit for active inflammatory arthritis and positive lupus serologies), MTX 25 mg SC wkly and SSZ 1000 mg BID. She had mild synovitis on exam today. 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 Pt felt Prednisone stopped improving her arthralgias. - she is taking the maximum dose of Inflectra 10 mg/kg Q4wk per GI.  Reached out to GI to discuss immunosuppression, her AVISE CTD panel from 11/3/22 showed positive lupus serologies which can be observed after prolonged TNF-i exposure, she does not appear to be currently symptomatic from lupus. We have not been able to reach her GI. She did not respond to Ustekinumab in the past.  Orders:  -     sulfaSALAzine (AZULFIDINE) 500 MG tablet; Take 2 tablets by mouth 2 times daily, Disp-360 tablet, [id number]  -     folic acid (FOLVITE) 1 MG tablet; Take 1 tablet by mouth daily, Disp-90 tablet, R-0Normal  -     methotrexate Sodium 50 MG/2ML chemo injection; Inject 1 mL into the skin once a week, Disp-12 mL, R-0Normal  -     hydroxychloroquine (PLAQUENIL) 200 MG tablet; Take 1 tablet by mouth 2 times daily, Disp-180 tablet, R-0Normal  -     C-Reactive Protein; Future  -     Sedimentation Rate; Future  2. Myalgia  Assessment & Plan:  - MRI pelvis from 10/12/22 showed incidental findings of nonspecific muscle edema. Normal CPK and aldolase. Negative myositis panel. EMG of L upper and lower extremities on 11/7/22 findings c/w steroid induced myopathy, no findings concerning for inflammatory myopathy. Discussed repeat MRI pelvis findings from 1/16/23 showing persistent myositis. She did not have any significant weakness in her proximal extremity muscles on exam.  - she has been off steroids since 9/2022.  - repeat muscle enzymes now. Discussed pursuing muscle biopsy if her repeat muscle enzymes from today are elevated. - made referral to Neurology at [facility] given her atypical presentation. Orders:  -     C-Reactive Protein; Future  -     Sedimentation Rate; Future  -     Aldolase; Future  -     CK; Future  -     Ambulatory referral to Neurology  3. IBD (inflammatory bowel disease)  Assessment & Plan:  - Dx in 2006 refractory to medical therapy, s/p open total abdominal colectomy with end ileostomy on 7/16/2021, c/b peristomal pyoderma gangrenosum.   - follows w/ [facility].  - currently well controlled on [id number] mg/kg Q4wk per GI. Reached out to GI to discuss her AVISE results. Orders:  -     C-Reactive Protein; Future  -     Sedimentation Rate; Future  4. Pyoderma gangrenosum  -     sulfaSALAzine (AZULFIDINE) 500 MG tablet; Take 2 tablets by mouth 2 times daily, Disp-360 tablet, [id number]  -     folic acid (FOLVITE) 1 MG tablet; Take 1 tablet by mouth daily, Disp-90 tablet, R-0Normal  -     methotrexate Sodium 50 MG/2ML chemo injection; Inject 1 mL into the skin once a week, Disp-12 mL, R-0Normal  5. Psoriasis  Assessment & Plan:  - remains well controlled on MTX and Inflectra. - will closely monitor skin on HCQ. - following w/ outside Dermatology. Orders:  -     sulfaSALAzine (AZULFIDINE) 500 MG tablet; Take 2 tablets by mouth 2 times daily, Disp-360 tablet, R-0Normal  -     methotrexate Sodium 50 MG/2ML chemo injection; Inject 1 mL into the skin once a week, Disp-12 mL, R-0Normal  6. Positive SAULO (antinuclear antibody)  -     hydroxychloroquine (PLAQUENIL) 200 MG tablet; Take 1 tablet by mouth 2 times daily, Disp-180 tablet, R-0Normal  -     CBC with Auto Differential; Future  -     C-Reactive Protein; Future  -     Sedimentation Rate; Future  -     C4 Complement; Future  -     C3 Complement; Future  -     Anti-DNA Antibody, Double-Stranded; Future  -     Urinalysis with Microscopic; Future  -     Protein / creatinine ratio, urine; Future  7. Antiphospholipid antibody positive  Assessment & Plan:  - discussed her positive APL Abs on AVISE CTD panel from 11/3/22. Pt denied any clinical manifestations of a PLS. - repeat APL Abs now. We discussed Hematology referral if her serologies remain positive. Orders:  -     CBC with Auto Differential; Future  -     Beta-2 Glycoprotein Antibodies; Future  -     Cardiolipin Antibodies IgG & IgM; Future  -     Lupus Anticoagulant; Future  8. High risk medication use  Assessment & Plan:  - she will require labs Q3mo to monitor for MTX and SSZ toxicity.   - annual eye exam for HCQ toxicity monitoring.  - discussed her immunocompromised state on MTX, SSZ and TNF-I and indications to hold these medications. Orders:  -     CBC with Auto Differential; Future     Pt is scheduled to establish care with [facility] next month as our office will be closing. High complexity case. TIME SPENT TODAY:  I spent over 40 minutes of face-to-face time with the pt (including taking interval history and performing physical exam, review of medical records, independently interpreting results and communicating results to the pt/family/caregiver, counseling and educating the pt/family/caregiver on their disease status, high risk DMARD use and toxicity monitoring, implementation of treatment plan, coordination of care, documenting clinical information in the EMR) during today visit. At least 50% of this time was spent in counseling, explanation of diagnosis, planning of further management, and coordination of care. The risks and benefits of my recommendations, as well as other treatment options, benefits and side effects were discussed with the patient today. Questions were answered. NOTE: This report is transcribed by using voice recognition software dragon. Every effort is made to ensure accuracy; however, inadvertent computerized  transcription errors may be present. SUBJECTIVE:  Past medical/surgical history, medications and allergies are reviewed and updated as appropriate. Interval Hx:   Pt reports persistent arthralgias and swelling in her hands. She started HCQ after her last visit in December for her inflammatory arthritis. She is not sure if this has improved her arthralgias. She reports compliance w/ MTX 25 mg SC wkly and SSZ 1000 mg BID. She remains on Inflectra 10 mg/kg Q4wk per GI, Dr. Forest Councilman at [facility] IBD clinic. She reports worsening arthralgias after her Inflectra infusion has been delayed for the past 2 wks d/t insurance issues.  She tells me her stools are \"looser than I would like\". Pt states she has not seen her GI since 2022, she tells me her f/u visit in March was recently cancelled d/t her physician being out of town. She feels frustrated. She denies subjective muscle weakness, dysphagia, dyspnea or cough. She reports generalized myalgias. She noted pain in her gluteal muscles when she was trying to exercise the other day. Arthralgias and myalgias are worse at the end of the day. Denies Hx of statin exposure. She started taking some left over Tramadol from her prior hospitalization. Pt states her PG remains quiescent. She thinks she is developing PsO around her stoma site. She has a spot on her distal L shin. Otherwise no new rash or photosensitivity. Denies Sx of Raynaud's phenomenon. Denies oral or nasal ulcers. Denies sicca.      ROS:  Constitutional: denies fatigue, fever/chills, night sweats, unintentional weight loss  Integumentary: denies photosensitivity, rash, +diffuse hair thinning since being on MTX, or Sx of Raynaud's phenomenon  Eyes: denies dry eyes, redness or pain, visual disturbance, or floaters  Nose: denies nasal ulcers or recurrent sinusitis  Oral cavity: denies dry mouth, +recurrent oral ulcers  Cardiovascular: denies CP, palpitations, Hx of pericardial effusion or pericarditis  Respiratory: denies SOB, cough, hemoptysis, or pleurisy  Gastrointestinal: refer to above HPI    No Known Allergies    Past Medical History:        Diagnosis Date    Crohn's disease ([facility] [address] 2006    GI @[facility]    IBD (inflammatory bowel disease)     PCOS (polycystic ovarian syndrome)     Psoriasis     Psoriatic arthritis ([facility] [address]     sees Rheumatology        Past Surgical History:        Procedure Laterality Date     SECTION  2006     SECTION, CLASSIC  2009       Medications:    Current Outpatient Medications   Medication Sig Dispense Refill    sulfaSALAzine (AZULFIDINE) 500 MG tablet Take 2 tablets by mouth 2 times daily 675 tablet 0    folic acid (Lynnda Riches) 1 MG tablet Take 1 tablet by mouth daily 90 tablet 0    INSULIN SYRINGE 1CC/29G (COMFORT ASSIST INSULIN SYRINGE) 29G X 1/2\" 1 ML MISC 1 each by Does not apply route once a week Use one syringe to inject weekly Methotrexate into skin.  10 each 2    methotrexate Sodium 50 MG/2ML chemo injection Inject 1 mL into the skin once a week 12 mL 0    hydroxychloroquine (PLAQUENIL) 200 MG tablet Take 1 tablet by mouth 2 times daily 180 tablet 0    traMADol (ULTRAM) 50 MG tablet Take 50 mg by mouth as needed for Pain.      escitalopram (LEXAPRO) 20 MG tablet TAKE 1 TABLET BY MOUTH EVERY DAY 30 tablet 0    glimepiride (AMARYL) 2 MG tablet TAKE 1 TABLET BY MOUTH EVERY DAY IN THE MORNING 90 tablet 0    vitamin D (ERGOCALCIFEROL) 1.25 MG (91345 UT) CAPS capsule Take 1 capsule by mouth once a week TAKE 1 CAPSULE BY MOUTH ONE TIME PER WEEK 12 capsule 0    JARDIANCE 25 MG tablet TAKE 1 TABLET BY MOUTH EVERY DAY 30 tablet 1    omeprazole (PRILOSEC) 40 MG delayed release capsule TAKE 1 CAPSULE BY MOUTH EVERY DAY 90 capsule 1    lisinopril (PRINIVIL;ZESTRIL) 5 MG tablet TAKE 1 TABLET BY MOUTH EVERY DAY 90 tablet 1    Cholecalciferol (VITAMIN D3) 50 MCG (2000 UT) CAPS Take by mouth      mesalamine (CANASA) 1000 MG suppository Place 1,000 mg rectally nightly      sodium chloride 0.9 % SOLN with inFLIXimab 100 MG SOLR Infuse intravenously      zinc 50 MG TABS tablet TAKE 1 TABLET BY MOUTH EVERY DAY      acetaminophen (TYLENOL) 325 MG tablet Take 650 mg by mouth every 4 hours      Ferrous Sulfate (IRON PO) Take by mouth daily      Cyanocobalamin (VITAMIN B 12 PO) Take by mouth      ONETOUCH ULTRA strip USE TO TEST ONCE DAILY 100 strip 2    ondansetron (ZOFRAN-ODT) 4 MG disintegrating tablet Take 4 mg by mouth every 8 hours as needed for Nausea or Vomiting      Continuous Blood Gluc  (FREESTYLE CESAR READER) ERNESTO 1 Device by In Vitro route 2 times daily 1 Device 0    Continuous Blood Gluc Sensor (FREESTYLE CESAR 14 DAY SENSOR) [facility] Check once a day 100 each 1    ONETOUCH DELICA LANCETS FINE MISC USE ONE DAILY 100 each 1    Blood Glucose Monitoring Suppl (ONE TOUCH ULTRA MINI) w/Device KIT 1 kit by Does not apply route daily as needed (fasting) 1 kit 0    diphenoxylate-atropine (LOMOTIL) 2.5-0.025 MG per tablet diphenoxylate-atropine 2.5 mg-0.025 mg tablet       No current facility-administered medications for this visit. OBJECTIVE:  Physical Exam:  /84 (Site: Left Upper Arm, Position: Sitting, Cuff Size: Large Adult)   Pulse 96   Wt 227 lb (103 kg)   SpO2 99%   BMI 38.96 kg/m²     GEN: AAOx3, in NAD, well-appearing  HEAD: normocephalic, atraumatic  EYES: no injection or icterus  CVS: RRR  LUNGS: in no acute respiratory distress  MSK:  Upper extremities:              Hands: no joint deformities, there is swelling of the fingers, b/l MCP, PIP and DIP joints w/ improved synovitis all joints TTP, full fist formation w/ good  strength              Wrist: b/l wrist joints w/o no synovitis NTTP, FROM              Elbow: no synovitis or bursitis, joint lines TTP b/l, FROM  Lower extremities:              Knees: no warmth or effusion present, FROM              Ankles: no synovitis, FROM, Achilles tendons w/o swelling or warmth NTTP              Feet: no toe swelling or pain or warmth on palpation w/ FROM, negative MTP squeeze test  NEURO: no obvious muscle wasting, manual strength testing reveals 5/5 strength in the b/l upper extremities and L hip flexor, 4/5 in R hip flexor, finger flexor and intraosseous strength intact  INTEGUMENT: mild erythema with telangiectasias on b/l cheeks somewhat sparing the nasolabial folds, small erythematous macule on left anterior distal shin, no other rash or psoriatic lesions, no petechiae, bruises, or palpable purpura, no patchy alopecia, no nail pitting or periungual changes, no clubbing or digital ulcers    DATA:  Labs:   I personally reviewed interval labs and discussed w/ the pt in detail which showed:    Lab Results   Component Value Date    WBC 3.7 (L) 12/02/2022    HGB 14.6 12/02/2022    HCT 44.7 12/02/2022    MCV 96.7 12/02/2022     12/02/2022    LYMPHOPCT 18.1 12/02/2022    RBC 4.62 12/02/2022    MCH 31.7 12/02/2022    MCHC 32.8 12/02/2022    RDW 14.2 12/02/2022     Lab Results   Component Value Date     12/02/2022    K 4.4 12/02/2022     12/02/2022    CO2 19 (L) 12/02/2022    BUN 10 12/02/2022    CREATININE 0.5 (L) 12/02/2022    GLUCOSE 160 (H) 12/02/2022    CALCIUM 9.2 12/02/2022    PROT 8.7 (H) 12/02/2022    LABALBU 4.5 12/02/2022    BILITOT 0.4 12/02/2022    ALKPHOS 103 12/02/2022    AST 31 12/02/2022    ALT 30 12/02/2022    LABGLOM >60 12/02/2022    GFRAA >60 09/20/2022    AGRATIO 1.3 02/17/2022     Lab Results   Component Value Date    VITD25 34.8 05/24/2022     No results found for: C3     No results found for: C4     No results found for: ANTIDSDNAIGG     No results found for: [facility]     Lab Results   Component Value Date    CRP 22.2 (H) 12/02/2022    CRP 29.9 (H) 11/03/2022    CRP 11.4 (H) 09/20/2022    CRP 11.2 (H) 05/24/2022   CRP 25.62 mg/L (8/13/21)    Lab Results   Component Value Date    SEDRATE 58 (H) 11/03/2022    SEDRATE 63 (H) 04/03/2020   ESR 48 (8/13/21)    Lab Results   Component Value Date    CKTOTAL 17 (L) 10/18/2022     Aldolase wnl (10/18/22)  Negative HLA B27 (6/16/21)  RF 17.0, negative CCP (6/16/21)  Negative SAULO (6/16/21)  Myositis panel (10/18/22): weakly positive Mcgee/RNP, rest of panel negative  AVISE CTD panel (11/3/22):    Tier 1 positive   SAULO 1:320 in homogeneous and speckled like pattern   Positive dsDNA, strongly positive anti-Histone Ab, rest of ALLYSON panel negative   Positive C1q IgG   C3 elevated, C4 wnl   Positive RF IgM, negative RF IgA and CCP   Positive antiphospholipid total searing/prothrombin IgM and IgG, positive B2GP IgM, negative B2GP IgG, weakly positive ACL IgM, negative ACL IgG and IgA   Equivocal thyroglobulin IgG, negative thyroid peroxidase IgG  Negative hepatitis B and C serologies (6/16/21)  Negative Quantiferon gold (10/5/20)  Normal TPMT activity (10/18/22)    Imaging:  I personally reviewed interval imaging and discussed w/ the pt in detail which included:    X-rays (6/16/21):  R hand:  FINDINGS:     3 views of the right hand show no soft tissue abnormality. Mineralization is normal.   No fracture or acute bony pathology is seen. Alignment is anatomic. There is joint space narrowing of the proximal and distal interphalangeal joints with small marginal osteophytes. No erosive changes are visualized. IMPRESSION:  Mild degenerative changes distally     L hand:  FINDINGS:     3 views of the left hand were performed. No soft tissue abnormality is seen. Mineralization is normal.   No fracture or acute bony pathology is visualized. Alignment is anatomic. There is joint space narrowing of the proximal and distal interphalangeal joints with small marginal osteophytes. No erosive changes are visualized. IMPRESSION:  Mild degenerative changes    X-ray SI joints (10/12/22): FINDINGS:     The bilateral sacroiliac joints are patent. There is mild sclerotic change along the left sacroiliac joint similar to prior CT. No evidence of acute fracture. Intrauterine device projects over the central pelvis. IMPRESSION:  1. Patent sacroiliac joints without bony fusion. Mild sclerosis along the left SI joint is similar to prior CT and may relate to history of inflammatory bowel disease related sacroiliitis. MRI pelvis (10/12/22): FINDINGS:   There is abnormal edematous signal change identified within the bilateral obturator internus muscles as well as the distal right iliopsoas musculature. There is some abnormal edematous signal change identified within the right pectineus muscle. There is some abnormal edematous signal change identified within the distal anterior portion of the left iliacus muscle.   No muscular atrophy is identified. No abnormal fascial enhancement is identified. No soft tissue mass is identified. No abnormal fluid collections are identified. The articular cartilage of the hip joints is normal. No hip joint effusion is identified. No synovitis is identified. Sacroiliac joints are normal.     Images of the lower lumbar spine appear unremarkable. Intrauterine device is identified within the endometrial canal. No adnexal mass is identified. There is a right lower quadrant ileostomy. IMPRESSION:  1. Abnormal edematous changes identified within the bilateral obturator internus muscle, distal right iliopsoas muscle, anterior aspect the left iliacus muscle, and right pectineus muscle. Correlate for a myositis of varying etiologies. Correlate for a polymyositis. 2. No evidence of any hip or sacroiliac joint arthropathy. MRI pelvis (1/16/23): FINDINGS:   Pectineus intramuscular edema signal on prior study has resolved. Persistent mild intramuscular edema signal within the iliopsoas muscle appears decreased. Distal gluteus medius intramuscular and myofascial edema signal appears slightly increased. Proximal hamstring, specifically semitendinosus, intramuscular and myofascial edema signal appears increased. Persistent obturator internus and externus intramuscular myofascial edema appears similar. Postcontrast enhancement in the regions of described edema signal. No muscle atrophy. No fluid collection. No mass. No regional lymphadenopathy. Marrow signal in the right femur and about the included hip is normal. Edema signal adjacent to the sciatic nerve likely extension of the described vascular findings. No intrinsic signal abnormality or enhancement within the nerve. IMPRESSION:  Multifocal intramuscular myofascial edema signal about the right proximal thigh, as detailed. There are variably increased and decreased changes when compared to the prior MRI.  Findings are compatible a clinical diagnosis of myositis. DEXA study (11/13/20):  Normal T-scores in the L-spine and hips. Procedures:  C-scope (3/24/21):  - Crohn's disease, with ileitis and colitis. - Simple Endoscopic Score for Crohn's Disease: 26, mucosal inflammatory changes secondary to Crohn's disease, with ileitis and colitis. Biopsied.   - Pseudopolyps at the colonic anastomosis. - Mucosal ulceration. EMG L upper and lower extremities (11/7/22): This patient has slightly low amplitude of the motor units in almost all the muscles tested in the left upper and lower extremities. This is occasionally be seen in patients with steroid induced myopathy. There is no electrophysiological evidence for muscle necrosis or other changes typically seen in inflammatory myopathy in this study. Above results were discussed w/ the pt in detail during today's visit.

## 2023-01-18 NOTE — ASSESSMENT & PLAN NOTE
- Dx in 2006 refractory to medical therapy, s/p open total abdominal colectomy with end ileostomy on 7/16/2021, c/b peristomal pyoderma gangrenosum. - follows w/ [facility].  - currently well controlled on [id number] mg/kg Q4wk per GI. Reached out to GI to discuss her AVISE results.

## 2023-01-18 NOTE — ASSESSMENT & PLAN NOTE
- discussed her positive APL Abs on AVISE CTD panel from 11/3/22. Pt denied any clinical manifestations of a PLS. - repeat APL Abs now. We discussed Hematology referral if her serologies remain positive.

## 2023-01-18 NOTE — ASSESSMENT & PLAN NOTE
- she will require labs Q3mo to monitor for MTX and SSZ toxicity. - annual eye exam for HCQ toxicity monitoring.  - discussed her immunocompromised state on MTX, SSZ and TNF-I and indications to hold these medications.

## 2023-01-18 NOTE — ASSESSMENT & PLAN NOTE
- inflammatory arthritis improved on HCQ (added at last visit for active inflammatory arthritis and positive lupus serologies), MTX 25 mg SC wkly and SSZ 1000 mg BID. She had mild synovitis on exam today. 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 Pt felt Prednisone stopped improving her arthralgias. - she is taking the maximum dose of Inflectra 10 mg/kg Q4wk per GI. Reached out to GI to discuss immunosuppression, her AVISE CTD panel from 11/3/22 showed positive lupus serologies which can be observed after prolonged TNF-i exposure, she does not appear to be currently symptomatic from lupus. We have not been able to reach her GI.  She did not respond to Ustekinumab in the past.

## 2023-01-18 NOTE — ASSESSMENT & PLAN NOTE
- remains well controlled on MTX and Inflectra.  - will closely monitor skin on HCQ.  - following w/ outside Dermatology.

## 2023-01-19 LAB
C-REACTIVE PROTEIN: 32.8 MG/L (ref 0–5.1)
C3 COMPLEMENT: 168.9 MG/DL (ref 90–180)
C4 COMPLEMENT: 10.8 MG/DL (ref 10–40)
TOTAL CK: 25 U/L (ref 26–192)

## 2023-01-20 DIAGNOSIS — R82.90 ABNORMAL URINALYSIS: Primary | ICD-10-CM

## 2023-01-20 LAB
ALDOLASE: 7.7 U/L (ref 1.2–7.6)
ANTI-DSDNA IGG: 1 IU/ML (ref 0–9)
ANTICARDIOLIPIN IGG ANTIBODY: 19 GPL
BETA-2 GLYCOPROTEIN 1 IGG ANTIBODY: <10 SGU
BETA-2 GLYCOPROTEIN 1 IGM ANTIBODY: 89 SMU
CARDIOLIPIN AB IGM: 36 MPL

## 2023-01-20 NOTE — RESULT ENCOUNTER NOTE
UA results look contaminated. Please call pt to find out if she has any Sx for an UTI (burning sensation or pain with urination, increased urgency, frequency, hesitancy, foul odor). If she does then she will need a UCx. May have a UTI and need treatment. Placed standing lab order for UCx. Waiting for the rest of her rheum labs to result.

## 2023-01-25 LAB
DRVVT CONFIRMATION TEST: ABNORMAL RATIO
DRVVT SCREEN: 31 SEC (ref 33–44)
DRVVT,DIL: ABNORMAL SEC (ref 33–44)
HEXAGONAL PHOSPHOLIPID NEUTRALIZAT TEST: ABNORMAL
LUPUS ANTICOAG INTERP: ABNORMAL
PLT NEUTA: ABNORMAL
PT D: 12.8 SEC (ref 12–15.5)
PTT D: 45 SEC (ref 32–48)
PTT-D CORR REFLEX: ABNORMAL SEC (ref 32–48)
PTT-HEPARIN NEUTRALIZED: ABNORMAL SEC (ref 32–48)
REPTILASE TIME: ABNORMAL SEC
THROMBIN TIME: ABNORMAL SEC (ref 14.7–19.5)

## 2023-01-25 NOTE — RESULT ENCOUNTER NOTE
I called and discussed lab results w/ the patient in detail. I discussed her normal CPK and mildly elevated aldolase. Repeat MRI R femur on 1/16/23 showed persistent multifocal intramuscular edema in the R proximal thigh. She had an EMG study of the L  upper and lower extremities done on 11/7/22 which showed possible findings suggestive of steroid-induced myopathy, otherwise no evidence of inflammatory myopathy. Given her unusual presentation, I strongly recommended pursuing a muscle biopsy - I offered to arrange for a muscle biopsy to be done at [facility] however patient declined stating she would like to hold off until she sees her new rheumatologist at [facility] next month as our Department is closing. She has also been referred to [facility] Neurology, [facility].    Discussed her repeat APL Abs which were still positive. It does not appear that she has any clinical manifestations of APLS. Advised pt to follow-up with her hematologist at [facility].

## 2023-01-27 RX ORDER — LISINOPRIL 5 MG/1
TABLET ORAL
Qty: 90 TABLET | Refills: 1 | Status: SHIPPED | OUTPATIENT
Start: 2023-01-27

## 2023-01-27 NOTE — TELEPHONE ENCOUNTER
Medication:   Requested Prescriptions     Pending Prescriptions Disp Refills    lisinopril (PRINIVIL;ZESTRIL) 5 MG tablet [Pharmacy Med Name: LISINOPRIL 5 MG TABLET] 90 tablet 1     Sig: TAKE 1 TABLET BY MOUTH EVERY DAY     Last Filled:  6/27/22    Last appt: 12/6/2022   Next appt: 3/7/2023

## 2023-02-10 DIAGNOSIS — F41.9 ANXIETY: ICD-10-CM

## 2023-02-10 RX ORDER — ESCITALOPRAM OXALATE 20 MG/1
TABLET ORAL
Qty: 30 TABLET | Refills: 0 | Status: SHIPPED | OUTPATIENT
Start: 2023-02-10

## 2023-02-10 NOTE — TELEPHONE ENCOUNTER
Medication:   Requested Prescriptions     Pending Prescriptions Disp Refills    escitalopram (LEXAPRO) 20 MG tablet [Pharmacy Med Name: ESCITALOPRAM 20 MG TABLET] 30 tablet 0     Sig: TAKE 1 TABLET BY MOUTH EVERY DAY     Last Filled:  01/13/2023    Last appt: 12/6/2022   Next appt: 3/7/2023    Last OARRS: No flowsheet data found.

## 2023-03-02 DIAGNOSIS — E11.9 TYPE 2 DIABETES MELLITUS WITHOUT COMPLICATION, WITHOUT LONG-TERM CURRENT USE OF INSULIN (HCC): ICD-10-CM

## 2023-03-02 LAB
ANION GAP SERPL CALCULATED.3IONS-SCNC: 11 MMOL/L (ref 3–16)
BUN BLDV-MCNC: 8 MG/DL (ref 7–20)
CALCIUM SERPL-MCNC: 8.9 MG/DL (ref 8.3–10.6)
CHLORIDE BLD-SCNC: 101 MMOL/L (ref 99–110)
CO2: 23 MMOL/L (ref 21–32)
CREAT SERPL-MCNC: 0.6 MG/DL (ref 0.6–1.1)
ESTIMATED AVERAGE GLUCOSE: 180 MG/DL
GFR SERPL CREATININE-BSD FRML MDRD: >60 ML/MIN/{1.73_M2}
GLUCOSE BLD-MCNC: 171 MG/DL (ref 70–99)
HBA1C MFR BLD: 7.9 %
HCT VFR BLD CALC: 39 % (ref 36–48)
HEMOGLOBIN: 13.1 G/DL (ref 12–16)
MCH RBC QN AUTO: 31.4 PG (ref 26–34)
MCHC RBC AUTO-ENTMCNC: 33.5 G/DL (ref 31–36)
MCV RBC AUTO: 93.7 FL (ref 80–100)
PDW BLD-RTO: 13.7 % (ref 12.4–15.4)
PLATELET # BLD: 347 K/UL (ref 135–450)
PMV BLD AUTO: 8.2 FL (ref 5–10.5)
POTASSIUM SERPL-SCNC: 4.8 MMOL/L (ref 3.5–5.1)
RBC # BLD: 4.16 M/UL (ref 4–5.2)
SODIUM BLD-SCNC: 135 MMOL/L (ref 136–145)
WBC # BLD: 5 K/UL (ref 4–11)

## 2023-03-07 ENCOUNTER — OFFICE VISIT (OUTPATIENT)
Dept: PRIMARY CARE CLINIC | Age: 43
End: 2023-03-07
Payer: COMMERCIAL

## 2023-03-07 VITALS
TEMPERATURE: 97.9 F | BODY MASS INDEX: 38.76 KG/M2 | WEIGHT: 227 LBS | RESPIRATION RATE: 12 BRPM | SYSTOLIC BLOOD PRESSURE: 128 MMHG | DIASTOLIC BLOOD PRESSURE: 82 MMHG | OXYGEN SATURATION: 99 % | HEIGHT: 64 IN | HEART RATE: 86 BPM

## 2023-03-07 DIAGNOSIS — F41.9 ANXIETY: ICD-10-CM

## 2023-03-07 DIAGNOSIS — Z93.2 S/P ILEOSTOMY (HCC): ICD-10-CM

## 2023-03-07 DIAGNOSIS — E78.2 MIXED HYPERLIPIDEMIA: ICD-10-CM

## 2023-03-07 DIAGNOSIS — K50.919 CROHN'S DISEASE WITH COMPLICATION, UNSPECIFIED GASTROINTESTINAL TRACT LOCATION (HCC): ICD-10-CM

## 2023-03-07 DIAGNOSIS — E11.9 TYPE 2 DIABETES MELLITUS WITHOUT COMPLICATION, WITHOUT LONG-TERM CURRENT USE OF INSULIN (HCC): Primary | ICD-10-CM

## 2023-03-07 DIAGNOSIS — I10 ESSENTIAL HYPERTENSION: ICD-10-CM

## 2023-03-07 PROCEDURE — 3079F DIAST BP 80-89 MM HG: CPT | Performed by: FAMILY MEDICINE

## 2023-03-07 PROCEDURE — G8484 FLU IMMUNIZE NO ADMIN: HCPCS | Performed by: FAMILY MEDICINE

## 2023-03-07 PROCEDURE — G8417 CALC BMI ABV UP PARAM F/U: HCPCS | Performed by: FAMILY MEDICINE

## 2023-03-07 PROCEDURE — 3051F HG A1C>EQUAL 7.0%<8.0%: CPT | Performed by: FAMILY MEDICINE

## 2023-03-07 PROCEDURE — 99214 OFFICE O/P EST MOD 30 MIN: CPT | Performed by: FAMILY MEDICINE

## 2023-03-07 PROCEDURE — 3074F SYST BP LT 130 MM HG: CPT | Performed by: FAMILY MEDICINE

## 2023-03-07 PROCEDURE — 1036F TOBACCO NON-USER: CPT | Performed by: FAMILY MEDICINE

## 2023-03-07 PROCEDURE — 2022F DILAT RTA XM EVC RTNOPTHY: CPT | Performed by: FAMILY MEDICINE

## 2023-03-07 PROCEDURE — G8427 DOCREV CUR MEDS BY ELIG CLIN: HCPCS | Performed by: FAMILY MEDICINE

## 2023-03-07 RX ORDER — ORAL SEMAGLUTIDE 3 MG/1
3 TABLET ORAL DAILY
Qty: 30 TABLET | Refills: 2 | Status: SHIPPED | OUTPATIENT
Start: 2023-03-07

## 2023-03-07 SDOH — ECONOMIC STABILITY: INCOME INSECURITY: HOW HARD IS IT FOR YOU TO PAY FOR THE VERY BASICS LIKE FOOD, HOUSING, MEDICAL CARE, AND HEATING?: NOT HARD AT ALL

## 2023-03-07 SDOH — ECONOMIC STABILITY: HOUSING INSECURITY
IN THE LAST 12 MONTHS, WAS THERE A TIME WHEN YOU DID NOT HAVE A STEADY PLACE TO SLEEP OR SLEPT IN A SHELTER (INCLUDING NOW)?: NO

## 2023-03-07 SDOH — ECONOMIC STABILITY: FOOD INSECURITY: WITHIN THE PAST 12 MONTHS, YOU WORRIED THAT YOUR FOOD WOULD RUN OUT BEFORE YOU GOT MONEY TO BUY MORE.: NEVER TRUE

## 2023-03-07 SDOH — ECONOMIC STABILITY: FOOD INSECURITY: WITHIN THE PAST 12 MONTHS, THE FOOD YOU BOUGHT JUST DIDN'T LAST AND YOU DIDN'T HAVE MONEY TO GET MORE.: NEVER TRUE

## 2023-03-07 ASSESSMENT — PATIENT HEALTH QUESTIONNAIRE - PHQ9
9. THOUGHTS THAT YOU WOULD BE BETTER OFF DEAD, OR OF HURTING YOURSELF: 1
4. FEELING TIRED OR HAVING LITTLE ENERGY: 2
8. MOVING OR SPEAKING SO SLOWLY THAT OTHER PEOPLE COULD HAVE NOTICED. OR THE OPPOSITE, BEING SO FIGETY OR RESTLESS THAT YOU HAVE BEEN MOVING AROUND A LOT MORE THAN USUAL: 1
SUM OF ALL RESPONSES TO PHQ9 QUESTIONS 1 & 2: 2
6. FEELING BAD ABOUT YOURSELF - OR THAT YOU ARE A FAILURE OR HAVE LET YOURSELF OR YOUR FAMILY DOWN: 1
1. LITTLE INTEREST OR PLEASURE IN DOING THINGS: 1
3. TROUBLE FALLING OR STAYING ASLEEP: 1
SUM OF ALL RESPONSES TO PHQ QUESTIONS 1-9: 10
SUM OF ALL RESPONSES TO PHQ QUESTIONS 1-9: 9
SUM OF ALL RESPONSES TO PHQ QUESTIONS 1-9: 10
2. FEELING DOWN, DEPRESSED OR HOPELESS: 1
10. IF YOU CHECKED OFF ANY PROBLEMS, HOW DIFFICULT HAVE THESE PROBLEMS MADE IT FOR YOU TO DO YOUR WORK, TAKE CARE OF THINGS AT HOME, OR GET ALONG WITH OTHER PEOPLE: 0
5. POOR APPETITE OR OVEREATING: 1
SUM OF ALL RESPONSES TO PHQ QUESTIONS 1-9: 10
7. TROUBLE CONCENTRATING ON THINGS, SUCH AS READING THE NEWSPAPER OR WATCHING TELEVISION: 1

## 2023-03-07 ASSESSMENT — COLUMBIA-SUICIDE SEVERITY RATING SCALE - C-SSRS
2. HAVE YOU ACTUALLY HAD ANY THOUGHTS OF KILLING YOURSELF?: NO
6. HAVE YOU EVER DONE ANYTHING, STARTED TO DO ANYTHING, OR PREPARED TO DO ANYTHING TO END YOUR LIFE?: NO
1. WITHIN THE PAST MONTH, HAVE YOU WISHED YOU WERE DEAD OR WISHED YOU COULD GO TO SLEEP AND NOT WAKE UP?: NO

## 2023-03-07 NOTE — PROGRESS NOTES
Subjective:      Patient ID: Kathy Mortensen is a 39 y.o. female. 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 Katie Reddy,     Diabetes Mellitus Type II, Follow-up: Patient here for follow-up of Type 2 diabetes mellitus. Current symptoms/problems include none and have been stable. Symptoms have been present for a few years. Known diabetic complications: none  Cardiovascular risk factors: diabetes mellitus, hypertension and obesity (BMI >= 30 kg/m2)  Current diabetic medications include see med's list .      Eye exam current (within one year): yes  Weight trend: stable  Prior visit with dietician: no  Current diet: in general, a \"healthy\" diet    Current exercise: none  He has been experiencing low blood sugar so advised the patient stop the glimepiride  Current monitoring regimen: home blood tests - weekly  Home blood sugar records: fasting range: She has not been checking her sugar lately  Any episodes of hypoglycemia? No  Overall control has gotten a little worse she knows she is better she has been struggling with depression and anxiety she is on medication this has discharged her last summer she is interested to see somebody else  She is continuing doing okay on Lexapro 20 mg  Hypertension: Patient is here follow up on  elevated blood pressures. Age at onset of elevated blood pressure:  Few years. Cardiac symptoms none. Patient denies chest pain, chest pressure/discomfort, dyspnea, exertional chest pressure/discomfort, lower extremity edema, near-syncope, orthopnea and palpitations. Cardiovascular risk factors: dyslipidemia, hypertension, obesity (BMI >= 30 kg/m2) and sedentary lifestyle. Use of agents associated with hypertension: steroids. History of target organ damage: none.   Patient with hyperlipidemia stable on current medication      Review of Systems   Constitutional: Negative. Negative for chills and fever. HENT:  Negative for postnasal drip, rhinorrhea, sinus pressure, tinnitus and trouble swallowing. Eyes: Negative. Respiratory: Negative. Cardiovascular: Negative. Gastrointestinal: bleeding   Endocrine: Negative. Genitourinary: Negative. Musculoskeletal: . Negative for back pain, gait problem, joint swelling, neck pain and neck stiffness. Skin:. Negative for color change and pallor. Allergic/Immunologic: Negative. Neurological: Negative. Psychiatric/Behavioral: Very anxious nervous says she was in tears  All other systems reviewed and are negative. Past Medical History:   Diagnosis Date    Crohn's disease ([facility] 2006    GI @[facility]    IBD (inflammatory bowel disease)     PCOS (polycystic ovarian syndrome)     Psoriasis     Psoriatic arthritis ([facility]     sees Rheumatology       Past Surgical History:   Procedure Laterality Date     SECTION  2006     SECTION, CLASSIC  2009     Family History   Problem Relation Age of Onset    Diabetes Mother         age 68    Cancer Mother     Stroke Father          age 80      Social History     Socioeconomic History    Marital status:      Spouse name: None    Number of children: None    Years of education: None    Highest education level: None   Tobacco Use    Smoking status: Former     Packs/day: 1.00     Years: 10.00     Pack years: 10.00     Types: Cigarettes     Quit date: 2005     Years since quittin.5    Smokeless tobacco: Never   Substance and Sexual Activity    Alcohol use: Yes     Comment: ocassionally    Drug use: No    Sexual activity: Yes     Partners: Male     Birth control/protection: I.U.D.    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    Children 14 & 11     She has half sister x 3 one     [de-identified] brother x 1     Social Determinants of Health     Financial Resource Strain: Low Risk     Difficulty of Paying Living Expenses: Not hard at all   Food Insecurity: No Food Insecurity    Worried About [facility] in the Last Year: Never true    [facility] in the Last Year: Never true   Transportation Needs: No Transportation Needs    Lack of Transportation (Medical): No    Lack of Transportation (Non-Medical): No   Housing Stability: Unknown    Unstable Housing in the Last Year: No     Current Outpatient Medications   Medication Sig Dispense Refill    escitalopram (LEXAPRO) 20 MG tablet TAKE 1 TABLET BY MOUTH EVERY DAY 30 tablet 0    lisinopril (PRINIVIL;ZESTRIL) 5 MG tablet TAKE 1 TABLET BY MOUTH EVERY DAY 90 tablet 1    sulfaSALAzine (AZULFIDINE) 500 MG tablet Take 2 tablets by mouth 2 times daily 683 tablet 0    folic acid (FOLVITE) 1 MG tablet Take 1 tablet by mouth daily 90 tablet 0    methotrexate Sodium 50 MG/2ML chemo injection Inject 1 mL into the skin once a week 12 mL 0    hydroxychloroquine (PLAQUENIL) 200 MG tablet Take 1 tablet by mouth 2 times daily 180 tablet 0    traMADol (ULTRAM) 50 MG tablet Take 50 mg by mouth as needed for Pain.      glimepiride (AMARYL) 2 MG tablet TAKE 1 TABLET BY MOUTH EVERY DAY IN THE MORNING 90 tablet 0    vitamin D (ERGOCALCIFEROL) 1.25 MG (00023 UT) CAPS capsule Take 1 capsule by mouth once a week TAKE 1 CAPSULE BY MOUTH ONE TIME PER WEEK 12 capsule 0    JARDIANCE 25 MG tablet TAKE 1 TABLET BY MOUTH EVERY DAY 30 tablet 1    omeprazole (PRILOSEC) 40 MG delayed release capsule TAKE 1 CAPSULE BY MOUTH EVERY DAY 90 capsule 1    Cholecalciferol (VITAMIN D3) 50 MCG (2000 UT) CAPS Take by mouth      mesalamine (CANASA) 1000 MG suppository Place 1,000 mg rectally nightly      sodium chloride 0.9 % SOLN with inFLIXimab 100 MG SOLR Infuse intravenously      zinc 50 MG TABS tablet TAKE 1 TABLET BY MOUTH EVERY DAY      acetaminophen (TYLENOL) 325 MG tablet Take 650 mg by mouth every 4 hours      Ferrous Sulfate (IRON PO) Take by mouth daily      Cyanocobalamin (VITAMIN B 12 PO) Take by mouth      ONETOUCH ULTRA strip USE TO TEST ONCE DAILY 100 strip 2    ondansetron (ZOFRAN-ODT) 4 MG disintegrating tablet Take 4 mg by mouth every 8 hours as needed for Nausea or Vomiting      Continuous Blood Gluc  (FREESTYLE CESAR READER) ERNESTO 1 Device by In Vitro route 2 times daily 1 Device 0    Continuous Blood Gluc Sensor (FREESTYLE CESAR 14 DAY SENSOR) MISC Check once a day 100 each 1    ONETOUCH DELICA LANCETS FINE MISC USE ONE DAILY 100 each 1    Blood Glucose Monitoring Suppl (ONE TOUCH ULTRA MINI) w/Device KIT 1 kit by Does not apply route daily as needed (fasting) 1 kit 0    diphenoxylate-atropine (LOMOTIL) 2.5-0.025 MG per tablet diphenoxylate-atropine 2.5 mg-0.025 mg tablet      INSULIN SYRINGE 1CC/29G (COMFORT ASSIST INSULIN SYRINGE) 29G X 1/2\" 1 ML MISC 1 each by Does not apply route once a week Use one syringe to inject weekly Methotrexate into skin. 10 each 2     No current facility-administered medications for this visit. No Known Allergies      Objective:   Physical Exam   Constitutional: She appears well-developed and well-nourished. No distress. HENT:   Head: Normocephalic and atraumatic. Right Ear: External ear normal.   Left Ear: External ear normal.   Nose: Nose normal.   Mouth/Throat: Oropharynx is clear and moist. No oropharyngeal exudate. Eyes: Conjunctivae and EOM are normal. Pupils are equal, round, and reactive to light. Right eye exhibits no discharge. Left eye exhibits no discharge. No scleral icterus. Neck: Normal range of motion. Neck supple. No JVD present. No tracheal deviation present. No thyromegaly present. Cardiovascular: Normal rate, regular rhythm, normal heart sounds and intact distal pulses. Pulmonary/Chest: Effort normal and breath sounds normal. No stridor. No respiratory distress. She has no wheezes. She has no rales. She exhibits no tenderness. Abdominal: Soft. Bowel sounds are normal. She exhibits no distension and no mass. There is no tenderness. There is no rebound and no guarding. Musculoskeletal: Normal range of motion. She exhibits no edema or tenderness. Lymphadenopathy:     She has no cervical adenopathy. Skin: Skin is warm and dry. No rash noted. She is not diaphoretic. No erythema. No pallor. Mild irritation anterior above the ankle now right foot mild blanching no other abnormality full range of motion of the ankle both left and right  microfilaments test exam was negative   Vitals reviewed. Assessment:      Diagnosis Orders   1. Type 2 diabetes mellitus without complication, without long-term current use of insulin (HCC)  Basic Metabolic Panel    Hemoglobin A1C      2. Crohn's disease with complication, unspecified gastrointestinal tract location ([facility]        3. Anxiety        4. S/P ileostomy ([facility]        5. Essential hypertension        6.  Mixed hyperlipidemia                Plan:      See orders,   Reviewed recent blood work  [employer] with any concern  Close follow-up  Referral to be seen by psychologist she will schedule today

## 2023-03-11 DIAGNOSIS — F41.9 ANXIETY: ICD-10-CM

## 2023-03-13 RX ORDER — ESCITALOPRAM OXALATE 20 MG/1
TABLET ORAL
Qty: 30 TABLET | Refills: 0 | Status: SHIPPED | OUTPATIENT
Start: 2023-03-13

## 2023-03-13 NOTE — TELEPHONE ENCOUNTER
Medication:   Requested Prescriptions     Pending Prescriptions Disp Refills    escitalopram (LEXAPRO) 20 MG tablet [Pharmacy Med Name: ESCITALOPRAM 20 MG TABLET] 30 tablet 0     Sig: TAKE 1 TABLET BY MOUTH EVERY DAY     Last Filled:  02/10/2023    Last appt: 3/7/2023   Next appt: 6/6/2023    Last OARRS: No flowsheet data found.

## 2023-03-17 ENCOUNTER — OFFICE VISIT (OUTPATIENT)
Dept: PSYCHOLOGY | Age: 43
End: 2023-03-17

## 2023-03-17 DIAGNOSIS — F33.1 MODERATE EPISODE OF RECURRENT MAJOR DEPRESSIVE DISORDER (HCC): Primary | ICD-10-CM

## 2023-03-17 DIAGNOSIS — F41.9 ANXIETY: ICD-10-CM

## 2023-03-17 ASSESSMENT — PATIENT HEALTH QUESTIONNAIRE - PHQ9
SUM OF ALL RESPONSES TO PHQ9 QUESTIONS 1 & 2: 5
3. TROUBLE FALLING OR STAYING ASLEEP: 2
6. FEELING BAD ABOUT YOURSELF - OR THAT YOU ARE A FAILURE OR HAVE LET YOURSELF OR YOUR FAMILY DOWN: 3
9. THOUGHTS THAT YOU WOULD BE BETTER OFF DEAD, OR OF HURTING YOURSELF: 0
8. MOVING OR SPEAKING SO SLOWLY THAT OTHER PEOPLE COULD HAVE NOTICED. OR THE OPPOSITE, BEING SO FIGETY OR RESTLESS THAT YOU HAVE BEEN MOVING AROUND A LOT MORE THAN USUAL: 1
1. LITTLE INTEREST OR PLEASURE IN DOING THINGS: 2
SUM OF ALL RESPONSES TO PHQ QUESTIONS 1-9: 19
2. FEELING DOWN, DEPRESSED OR HOPELESS: 3
5. POOR APPETITE OR OVEREATING: 3
SUM OF ALL RESPONSES TO PHQ QUESTIONS 1-9: 19
7. TROUBLE CONCENTRATING ON THINGS, SUCH AS READING THE NEWSPAPER OR WATCHING TELEVISION: 2
4. FEELING TIRED OR HAVING LITTLE ENERGY: 3
SUM OF ALL RESPONSES TO PHQ QUESTIONS 1-9: 19
SUM OF ALL RESPONSES TO PHQ QUESTIONS 1-9: 19

## 2023-03-17 ASSESSMENT — ANXIETY QUESTIONNAIRES
4. TROUBLE RELAXING: 3
2. NOT BEING ABLE TO STOP OR CONTROL WORRYING: 3
3. WORRYING TOO MUCH ABOUT DIFFERENT THINGS: 2
6. BECOMING EASILY ANNOYED OR IRRITABLE: 3
1. FEELING NERVOUS, ANXIOUS, OR ON EDGE: 2
5. BEING SO RESTLESS THAT IT IS HARD TO SIT STILL: 3
GAD7 TOTAL SCORE: 19
7. FEELING AFRAID AS IF SOMETHING AWFUL MIGHT HAPPEN: 3
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
## 2023-03-17 NOTE — PATIENT INSTRUCTIONS
Return to see Dr. Alia Bowser in 2 weeks  Follow up with referral provided for ongoing therapy - I will bridge care until connected  Call PC office if mood significantly worsens     Check [email or web address]  Look for keywords:  Health psychologist, chronic illness, pain, acceptance and commitment therapy

## 2023-03-31 ENCOUNTER — OFFICE VISIT (OUTPATIENT)
Dept: PSYCHOLOGY | Age: 43
End: 2023-03-31
Payer: COMMERCIAL

## 2023-03-31 DIAGNOSIS — F41.9 ANXIETY: ICD-10-CM

## 2023-03-31 DIAGNOSIS — F33.1 MODERATE EPISODE OF RECURRENT MAJOR DEPRESSIVE DISORDER (HCC): Primary | ICD-10-CM

## 2023-03-31 PROCEDURE — 90832 PSYTX W PT 30 MINUTES: CPT

## 2023-03-31 PROCEDURE — 1036F TOBACCO NON-USER: CPT

## 2023-03-31 ASSESSMENT — ANXIETY QUESTIONNAIRES
4. TROUBLE RELAXING: 2
5. BEING SO RESTLESS THAT IT IS HARD TO SIT STILL: 2
2. NOT BEING ABLE TO STOP OR CONTROL WORRYING: 2
IF YOU CHECKED OFF ANY PROBLEMS ON THIS QUESTIONNAIRE, HOW DIFFICULT HAVE THESE PROBLEMS MADE IT FOR YOU TO DO YOUR WORK, TAKE CARE OF THINGS AT HOME, OR GET ALONG WITH OTHER PEOPLE: SOMEWHAT DIFFICULT
3. WORRYING TOO MUCH ABOUT DIFFERENT THINGS: 3
7. FEELING AFRAID AS IF SOMETHING AWFUL MIGHT HAPPEN: 2
6. BECOMING EASILY ANNOYED OR IRRITABLE: 1
1. FEELING NERVOUS, ANXIOUS, OR ON EDGE: 2
GAD7 TOTAL SCORE: 14

## 2023-03-31 ASSESSMENT — PATIENT HEALTH QUESTIONNAIRE - PHQ9
SUM OF ALL RESPONSES TO PHQ9 QUESTIONS 1 & 2: 3
SUM OF ALL RESPONSES TO PHQ QUESTIONS 1-9: 13
SUM OF ALL RESPONSES TO PHQ QUESTIONS 1-9: 7
SUM OF ALL RESPONSES TO PHQ QUESTIONS 1-9: 7
SUM OF ALL RESPONSES TO PHQ QUESTIONS 1-9: 13
6. FEELING BAD ABOUT YOURSELF - OR THAT YOU ARE A FAILURE OR HAVE LET YOURSELF OR YOUR FAMILY DOWN: 3
SUM OF ALL RESPONSES TO PHQ QUESTIONS 1-9: 7
7. TROUBLE CONCENTRATING ON THINGS, SUCH AS READING THE NEWSPAPER OR WATCHING TELEVISION: 2
SUM OF ALL RESPONSES TO PHQ QUESTIONS 1-9: 13
4. FEELING TIRED OR HAVING LITTLE ENERGY: 2
2. FEELING DOWN, DEPRESSED OR HOPELESS: 2
4. FEELING TIRED OR HAVING LITTLE ENERGY: 2
SUM OF ALL RESPONSES TO PHQ QUESTIONS 1-9: 13
1. LITTLE INTEREST OR PLEASURE IN DOING THINGS: 1
SUM OF ALL RESPONSES TO PHQ9 QUESTIONS 1 & 2: 3
3. TROUBLE FALLING OR STAYING ASLEEP: 1
5. POOR APPETITE OR OVEREATING: 1
5. POOR APPETITE OR OVEREATING: 1
2. FEELING DOWN, DEPRESSED OR HOPELESS: 2
9. THOUGHTS THAT YOU WOULD BE BETTER OFF DEAD, OR OF HURTING YOURSELF: 0
3. TROUBLE FALLING OR STAYING ASLEEP: 1
8. MOVING OR SPEAKING SO SLOWLY THAT OTHER PEOPLE COULD HAVE NOTICED. OR THE OPPOSITE, BEING SO FIGETY OR RESTLESS THAT YOU HAVE BEEN MOVING AROUND A LOT MORE THAN USUAL: 1
1. LITTLE INTEREST OR PLEASURE IN DOING THINGS: 1
SUM OF ALL RESPONSES TO PHQ QUESTIONS 1-9: 7

## 2023-03-31 NOTE — PROGRESS NOTES
TABS Take 3 mg by mouth daily 30 tablet 2    lisinopril (PRINIVIL;ZESTRIL) 5 MG tablet TAKE 1 TABLET BY MOUTH EVERY DAY 90 tablet 1    sulfaSALAzine (AZULFIDINE) 500 MG tablet Take 2 tablets by mouth 2 times daily 361 tablet 0    folic acid (FOLVITE) 1 MG tablet Take 1 tablet by mouth daily 90 tablet 0    INSULIN SYRINGE 1CC/29G (COMFORT ASSIST INSULIN SYRINGE) 29G X 1/2\" 1 ML MISC 1 each by Does not apply route once a week Use one syringe to inject weekly Methotrexate into skin.  10 each 2    methotrexate Sodium 50 MG/2ML chemo injection Inject 1 mL into the skin once a week 12 mL 0    hydroxychloroquine (PLAQUENIL) 200 MG tablet Take 1 tablet by mouth 2 times daily 180 tablet 0    traMADol (ULTRAM) 50 MG tablet Take 50 mg by mouth as needed for Pain.      glimepiride (AMARYL) 2 MG tablet TAKE 1 TABLET BY MOUTH EVERY DAY IN THE MORNING 90 tablet 0    vitamin D (ERGOCALCIFEROL) 1.25 MG (22166 UT) CAPS capsule Take 1 capsule by mouth once a week TAKE 1 CAPSULE BY MOUTH ONE TIME PER WEEK 12 capsule 0    JARDIANCE 25 MG tablet TAKE 1 TABLET BY MOUTH EVERY DAY 30 tablet 1    omeprazole (PRILOSEC) 40 MG delayed release capsule TAKE 1 CAPSULE BY MOUTH EVERY DAY 90 capsule 1    Cholecalciferol (VITAMIN D3) 50 MCG (2000 UT) CAPS Take by mouth      mesalamine (CANASA) 1000 MG suppository Place 1,000 mg rectally nightly      sodium chloride 0.9 % SOLN with inFLIXimab 100 MG SOLR Infuse intravenously      zinc 50 MG TABS tablet TAKE 1 TABLET BY MOUTH EVERY DAY      acetaminophen (TYLENOL) 325 MG tablet Take 650 mg by mouth every 4 hours      Ferrous Sulfate (IRON PO) Take by mouth daily      Cyanocobalamin (VITAMIN B 12 PO) Take by mouth      ONETOUCH ULTRA strip USE TO TEST ONCE DAILY 100 strip 2    ondansetron (ZOFRAN-ODT) 4 MG disintegrating tablet Take 4 mg by mouth every 8 hours as needed for Nausea or Vomiting      Continuous Blood Gluc  (FREESTYLE CESAR READER) ERNESTO 1 Device by In Vitro route 2 times daily 1

## 2023-04-03 DIAGNOSIS — L88 PYODERMA GANGRENOSUM: ICD-10-CM

## 2023-04-03 DIAGNOSIS — K52.9 ARTHROPATHY ASSOCIATED WITH INFLAMMATORY BOWEL DISEASE: ICD-10-CM

## 2023-04-03 DIAGNOSIS — L40.9 PSORIASIS: ICD-10-CM

## 2023-04-03 DIAGNOSIS — M12.9 ARTHROPATHY ASSOCIATED WITH INFLAMMATORY BOWEL DISEASE: ICD-10-CM

## 2023-04-03 RX ORDER — METHOTREXATE 25 MG/ML
25 INJECTION, SOLUTION INTRA-ARTERIAL; INTRAMUSCULAR; INTRAVENOUS WEEKLY
Qty: 4 ML | Refills: 2 | OUTPATIENT
Start: 2023-04-03 | End: 2023-07-02

## 2023-04-03 NOTE — PATIENT INSTRUCTIONS
Return to see Dr. Yusra metz  Write down and monitor sx to prepare for doctor's appointment  Continue ongoing therapy with new therapist  Continue to utilize social support and advocating for yourself

## 2023-04-05 DIAGNOSIS — F41.9 ANXIETY: ICD-10-CM

## 2023-04-05 NOTE — TELEPHONE ENCOUNTER
Medication:   Requested Prescriptions     Pending Prescriptions Disp Refills    escitalopram (LEXAPRO) 20 MG tablet [Pharmacy Med Name: ESCITALOPRAM 20 MG TABLET] 30 tablet 0     Sig: TAKE 1 TABLET BY MOUTH EVERY DAY        Last Filled:      Patient Phone Number: [phone] (home)     Last appt: 3/7/2023   Next appt: 6/6/2023    Last OARRS: No flowsheet data found. Preferred Pharmacy:   [facility]/pharmacy [place] [id number], [address] [facility] [address] [address] [address] [facility] [address]  Phone: [phone] Fax: [phone]  Medication:   Requested Prescriptions     Pending Prescriptions Disp Refills    escitalopram (LEXAPRO) 20 MG tablet [Pharmacy Med Name: ESCITALOPRAM 20 MG TABLET] 30 tablet 0     Sig: TAKE 1 TABLET BY MOUTH EVERY DAY        Last Filled:      Patient Phone Number: [phone] (home)     Last appt: 3/7/2023   Next appt: 6/6/2023    Last OARRS: No flowsheet data found. Preferred Pharmacy:   [facility], [address] [place] [address] [address] [address] [address]  [address] [place] [address]  Phone: [phone] Fax: [phone]

## 2023-04-05 NOTE — TELEPHONE ENCOUNTER
Medication:   Requested Prescriptions     Pending Prescriptions Disp Refills    glimepiride (AMARYL) 2 MG tablet [Pharmacy Med Name: GLIMEPIRIDE 2 MG TABLET] 90 tablet 0     Sig: TAKE 1 TABLET BY MOUTH EVERY DAY IN THE MORNING     Last Filled:  12.28.22    Last appt: 3/7/2023   Next appt: 6/6/2023    Last Labs DM:   Lab Results   Component Value Date/Time    LABA1C 7.9 03/02/2023 07:33 AM

## 2023-04-06 RX ORDER — GLIMEPIRIDE 2 MG/1
TABLET ORAL
Qty: 90 TABLET | Refills: 0 | Status: SHIPPED | OUTPATIENT
Start: 2023-04-06

## 2023-04-06 RX ORDER — ESCITALOPRAM OXALATE 20 MG/1
TABLET ORAL
Qty: 30 TABLET | Refills: 0 | Status: SHIPPED | OUTPATIENT
Start: 2023-04-06

## 2023-05-05 DIAGNOSIS — F41.9 ANXIETY: ICD-10-CM

## 2023-05-05 RX ORDER — ESCITALOPRAM OXALATE 20 MG/1
TABLET ORAL
Qty: 30 TABLET | Refills: 0 | Status: SHIPPED | OUTPATIENT
Start: 2023-05-05

## 2023-05-05 NOTE — TELEPHONE ENCOUNTER
Medication:   Requested Prescriptions     Pending Prescriptions Disp Refills    escitalopram (LEXAPRO) 20 MG tablet [Pharmacy Med Name: ESCITALOPRAM 20 MG TABLET] 30 tablet 0     Sig: TAKE 1 TABLET BY MOUTH EVERY DAY     Last Filled:  04/06/2023    Last appt: 3/7/2023   Next appt: 6/6/2023    Last OARRS: No flowsheet data found.

## 2023-05-16 DIAGNOSIS — K21.9 GASTRO-ESOPHAGEAL REFLUX DISEASE WITHOUT ESOPHAGITIS: ICD-10-CM

## 2023-05-16 DIAGNOSIS — F41.9 ANXIETY: ICD-10-CM

## 2023-05-17 RX ORDER — ESCITALOPRAM OXALATE 20 MG/1
TABLET ORAL
Qty: 30 TABLET | Refills: 0 | OUTPATIENT
Start: 2023-05-17

## 2023-05-17 RX ORDER — OMEPRAZOLE 40 MG/1
CAPSULE, DELAYED RELEASE ORAL
Qty: 90 CAPSULE | Refills: 1 | Status: SHIPPED | OUTPATIENT
Start: 2023-05-17

## 2023-05-17 RX ORDER — GLIMEPIRIDE 2 MG/1
TABLET ORAL
Qty: 90 TABLET | Refills: 0 | Status: SHIPPED | OUTPATIENT
Start: 2023-05-17

## 2023-05-17 RX ORDER — LISINOPRIL 5 MG/1
TABLET ORAL
Qty: 90 TABLET | Refills: 1 | Status: SHIPPED | OUTPATIENT
Start: 2023-05-17

## 2023-05-17 RX ORDER — ERGOCALCIFEROL 1.25 MG/1
CAPSULE ORAL
Qty: 4 CAPSULE | Refills: 2 | Status: SHIPPED | OUTPATIENT
Start: 2023-05-17

## 2023-05-17 NOTE — TELEPHONE ENCOUNTER
Medication:   Requested Prescriptions     Pending Prescriptions Disp Refills    vitamin D (ERGOCALCIFEROL) 1.25 MG (45733 UT) CAPS capsule [Pharmacy Med Name: VITAMIN D2 1.25MG(50,000 UNIT)] 4 capsule 2     Sig: TAKE 1 CAPSULE BY MOUTH ONCE WEEKLY     Last Filled:  12/19/2022    Last appt: 3/7/2023   Next appt: 6/1/2023    Last OARRS: No flowsheet data found.

## 2023-05-17 NOTE — TELEPHONE ENCOUNTER
Medication:   Requested Prescriptions     Pending Prescriptions Disp Refills    lisinopril (PRINIVIL;ZESTRIL) 5 MG tablet [Pharmacy Med Name: LISINOPRIL 5 MG TABLET] 90 tablet 1     Sig: TAKE 1 TABLET BY MOUTH EVERY DAY    glimepiride (AMARYL) 2 MG tablet [Pharmacy Med Name: GLIMEPIRIDE 2 MG TABLET] 90 tablet 0     Sig: TAKE 1 TABLET BY MOUTH EVERY DAY IN THE MORNING    omeprazole (PRILOSEC) 40 MG delayed release capsule [Pharmacy Med Name: OMEPRAZOLE DR 40 MG CAPSULE] 90 capsule 1     Sig: TAKE 1 CAPSULE BY MOUTH EVERY DAY     Refused Prescriptions Disp Refills    escitalopram (LEXAPRO) 20 MG tablet [Pharmacy Med Name: ESCITALOPRAM 20 MG TABLET] 30 tablet 0     Sig: TAKE 1 TABLET BY MOUTH EVERY DAY     Last Filled:  1.27.23  4.6.23 12.5.22    Last appt: 3/7/2023   Next appt: 6.1.23    Last Labs DM:   Lab Results   Component Value Date/Time    LABA1C 7.9 03/02/2023 07:33 AM

## 2023-05-17 NOTE — TELEPHONE ENCOUNTER
Medication:   Requested Prescriptions     Pending Prescriptions Disp Refills    escitalopram (LEXAPRO) 20 MG tablet [Pharmacy Med Name: ESCITALOPRAM 20 MG TABLET] 30 tablet 0     Sig: TAKE 1 TABLET BY MOUTH EVERY DAY    lisinopril (PRINIVIL;ZESTRIL) 5 MG tablet [Pharmacy Med Name: LISINOPRIL 5 MG TABLET] 90 tablet 1     Sig: TAKE 1 TABLET BY MOUTH EVERY DAY    glimepiride (AMARYL) 2 MG tablet [Pharmacy Med Name: GLIMEPIRIDE 2 MG TABLET] 90 tablet 0     Sig: TAKE 1 TABLET BY MOUTH EVERY DAY IN THE MORNING    omeprazole (PRILOSEC) 40 MG delayed release capsule [Pharmacy Med Name: OMEPRAZOLE DR 40 MG CAPSULE] 90 capsule 1     Sig: TAKE 1 CAPSULE BY MOUTH EVERY DAY     Last Filled:      Last appt: 3/7/2023   Next appt: 5/16/2023    Last Labs DM:   Lab Results   Component Value Date/Time    LABA1C 7.9 03/02/2023 07:33 AM

## 2023-05-25 DIAGNOSIS — E11.9 TYPE 2 DIABETES MELLITUS WITHOUT COMPLICATION, WITHOUT LONG-TERM CURRENT USE OF INSULIN (HCC): ICD-10-CM

## 2023-05-25 LAB
ANION GAP SERPL CALCULATED.3IONS-SCNC: 9 MMOL/L (ref 3–16)
BUN SERPL-MCNC: 12 MG/DL (ref 7–20)
CALCIUM SERPL-MCNC: 8.9 MG/DL (ref 8.3–10.6)
CHLORIDE SERPL-SCNC: 104 MMOL/L (ref 99–110)
CO2 SERPL-SCNC: 24 MMOL/L (ref 21–32)
CREAT SERPL-MCNC: 0.6 MG/DL (ref 0.6–1.1)
EST. AVERAGE GLUCOSE BLD GHB EST-MCNC: 162.8 MG/DL
GFR SERPLBLD CREATININE-BSD FMLA CKD-EPI: >60 ML/MIN/{1.73_M2}
GLUCOSE SERPL-MCNC: 152 MG/DL (ref 70–99)
HBA1C MFR BLD: 7.3 %
POTASSIUM SERPL-SCNC: 4.5 MMOL/L (ref 3.5–5.1)
SODIUM SERPL-SCNC: 137 MMOL/L (ref 136–145)

## 2023-06-01 ENCOUNTER — OFFICE VISIT (OUTPATIENT)
Dept: PRIMARY CARE CLINIC | Age: 43
End: 2023-06-01
Payer: COMMERCIAL

## 2023-06-01 VITALS
BODY MASS INDEX: 36.98 KG/M2 | TEMPERATURE: 97.4 F | HEART RATE: 85 BPM | OXYGEN SATURATION: 99 % | DIASTOLIC BLOOD PRESSURE: 80 MMHG | SYSTOLIC BLOOD PRESSURE: 130 MMHG | HEIGHT: 64 IN | WEIGHT: 216.6 LBS

## 2023-06-01 DIAGNOSIS — K52.9 ARTHROPATHY ASSOCIATED WITH INFLAMMATORY BOWEL DISEASE: ICD-10-CM

## 2023-06-01 DIAGNOSIS — E11.9 TYPE 2 DIABETES MELLITUS WITHOUT COMPLICATION, WITHOUT LONG-TERM CURRENT USE OF INSULIN (HCC): Primary | ICD-10-CM

## 2023-06-01 DIAGNOSIS — K50.919 CROHN'S DISEASE WITH COMPLICATION, UNSPECIFIED GASTROINTESTINAL TRACT LOCATION (HCC): ICD-10-CM

## 2023-06-01 DIAGNOSIS — I10 ESSENTIAL HYPERTENSION: ICD-10-CM

## 2023-06-01 DIAGNOSIS — M12.9 ARTHROPATHY ASSOCIATED WITH INFLAMMATORY BOWEL DISEASE: ICD-10-CM

## 2023-06-01 PROCEDURE — G8417 CALC BMI ABV UP PARAM F/U: HCPCS | Performed by: FAMILY MEDICINE

## 2023-06-01 PROCEDURE — 3075F SYST BP GE 130 - 139MM HG: CPT | Performed by: FAMILY MEDICINE

## 2023-06-01 PROCEDURE — G8427 DOCREV CUR MEDS BY ELIG CLIN: HCPCS | Performed by: FAMILY MEDICINE

## 2023-06-01 PROCEDURE — 2022F DILAT RTA XM EVC RTNOPTHY: CPT | Performed by: FAMILY MEDICINE

## 2023-06-01 PROCEDURE — 3079F DIAST BP 80-89 MM HG: CPT | Performed by: FAMILY MEDICINE

## 2023-06-01 PROCEDURE — 1036F TOBACCO NON-USER: CPT | Performed by: FAMILY MEDICINE

## 2023-06-01 PROCEDURE — 99214 OFFICE O/P EST MOD 30 MIN: CPT | Performed by: FAMILY MEDICINE

## 2023-06-01 PROCEDURE — 3051F HG A1C>EQUAL 7.0%<8.0%: CPT | Performed by: FAMILY MEDICINE

## 2023-06-01 RX ORDER — ORAL SEMAGLUTIDE 7 MG/1
7 TABLET ORAL DAILY
Qty: 30 TABLET | Refills: 2 | Status: SHIPPED | OUTPATIENT
Start: 2023-06-01

## 2023-06-01 NOTE — PROGRESS NOTES
Subjective:      Patient ID: Kristina Gray is a 39 y.o. female. 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 Chris Jones,     Diabetes Mellitus Type II, Follow-up: Patient here for follow-up of Type 2 diabetes mellitus. Current symptoms/problems include none and have been stable. Symptoms have been present for a few years. Known diabetic complications: none  Cardiovascular risk factors: diabetes mellitus, hypertension and obesity (BMI >= 30 kg/m2)  Current diabetic medications include see med's list .      Eye exam current (within one year): yes  Weight trend: stable  Prior visit with dietician: no  Current diet: in general, a \"healthy\" diet    Current exercise: none  He has been experiencing low blood sugar so advised the patient stop the glimepiride  Current monitoring regimen: home blood tests - weekly  Home blood sugar records: fasting range: She has not been checking her sugar lately  Any episodes of hypoglycemia? No  Fasting and overall control has improved doing very good with the Rybelsus lost more than 10 pound  Hypertension: Patient is here follow up on  elevated blood pressures. Age at onset of elevated blood pressure:  Few years. Cardiac symptoms none. Patient denies chest pain, chest pressure/discomfort, dyspnea, exertional chest pressure/discomfort, lower extremity edema, near-syncope, orthopnea and palpitations. Cardiovascular risk factors: dyslipidemia, hypertension, obesity (BMI >= 30 kg/m2) and sedentary lifestyle. Use of agents associated with hypertension: steroids. History of target organ damage: none.   Patient with hyperlipidemia stable on current medication  Patient sees neurology and rheumatology at [address] [place] [address], further testing in the process to check why she continued to have muscle joint ache and pain  She has abnormal C-reactive

## 2023-06-08 DIAGNOSIS — F41.9 ANXIETY: ICD-10-CM

## 2023-06-08 RX ORDER — ESCITALOPRAM OXALATE 20 MG/1
TABLET ORAL
Qty: 30 TABLET | Refills: 1 | Status: SHIPPED | OUTPATIENT
Start: 2023-06-08 | End: 2023-06-24 | Stop reason: SDUPTHER

## 2023-06-08 NOTE — TELEPHONE ENCOUNTER
Medication:   Requested Prescriptions     Pending Prescriptions Disp Refills    escitalopram (LEXAPRO) 20 MG tablet [Pharmacy Med Name: ESCITALOPRAM 20 MG TABLET] 30 tablet 0     Sig: TAKE 1 TABLET BY MOUTH EVERY DAY     Last Filled:  5/5/2023    Last appt: 6/1/2023   Next appt: Visit date not found

## 2023-06-19 RX ORDER — ORAL SEMAGLUTIDE 3 MG/1
3 TABLET ORAL DAILY
Qty: 30 TABLET | Refills: 2 | Status: SHIPPED | OUTPATIENT
Start: 2023-06-19

## 2023-06-19 NOTE — TELEPHONE ENCOUNTER
Medication:   Requested Prescriptions     Pending Prescriptions Disp Refills    RYBELSUS 3 MG TABS [Pharmacy Med Name: Jamel Medicus 3 MG TABLET] 30 tablet 2     Sig: TAKE 3 MG BY MOUTH DAILY     Last Filled:  6.1.23 was 7mg    Last appt: 6/1/2023   Next appt: Visit date not found    Last OARRS: No flowsheet data found.

## 2023-06-24 DIAGNOSIS — F41.9 ANXIETY: ICD-10-CM

## 2023-06-26 RX ORDER — GLIMEPIRIDE 2 MG/1
TABLET ORAL
Qty: 90 TABLET | Refills: 0 | Status: SHIPPED | OUTPATIENT
Start: 2023-06-26

## 2023-06-26 RX ORDER — ESCITALOPRAM OXALATE 20 MG/1
20 TABLET ORAL DAILY
Qty: 30 TABLET | Refills: 1 | Status: SHIPPED | OUTPATIENT
Start: 2023-06-26

## 2023-07-07 RX ORDER — ORAL SEMAGLUTIDE 7 MG/1
7 TABLET ORAL DAILY
Qty: 30 TABLET | Refills: 2 | Status: SHIPPED | OUTPATIENT
Start: 2023-07-07 | End: 2023-09-01 | Stop reason: ALTCHOICE

## 2023-07-10 DIAGNOSIS — F41.9 ANXIETY: ICD-10-CM

## 2023-07-10 RX ORDER — ESCITALOPRAM OXALATE 20 MG/1
TABLET ORAL
Qty: 30 TABLET | Refills: 1 | OUTPATIENT
Start: 2023-07-10

## 2023-08-09 DIAGNOSIS — F41.9 ANXIETY: ICD-10-CM

## 2023-08-09 NOTE — TELEPHONE ENCOUNTER
Medication:   Requested Prescriptions     Pending Prescriptions Disp Refills    escitalopram (LEXAPRO) 20 MG tablet [Pharmacy Med Name: ESCITALOPRAM 20 MG TABLET] 30 tablet 1     Sig: TAKE 1 TABLET BY MOUTH EVERY DAY        Last Filled:      Patient Phone Number: [phone] (home)     Last appt: 6/1/2023   Next appt: Visit date not found    Last OARRS: No flowsheet data found. Preferred Pharmacy:   [facility], [address] [place] [address] [address] [place] [address],[address]  [facility] [address] [place] [address]  Phone: [phone] Fax: [phone]  Medication:   Requested Prescriptions     Pending Prescriptions Disp Refills    escitalopram (LEXAPRO) 20 MG tablet [Pharmacy Med Name: ESCITALOPRAM 20 MG TABLET] 30 tablet 1     Sig: TAKE 1 TABLET BY MOUTH EVERY DAY        Last Filled:      Patient Phone Number: [phone] (home)     Last appt: 6/1/2023   Next appt: Visit date not found    Last OARRS: No flowsheet data found. Preferred Pharmacy:   [facility], [address] [place] [address] [address] [place] [address],[address]  [address] [place] [address]  Phone: [phone] Fax: [phone]

## 2023-08-10 RX ORDER — ESCITALOPRAM OXALATE 20 MG/1
TABLET ORAL
Qty: 30 TABLET | Refills: 1 | Status: SHIPPED | OUTPATIENT
Start: 2023-08-10

## 2023-08-21 DIAGNOSIS — K21.9 GASTRO-ESOPHAGEAL REFLUX DISEASE WITHOUT ESOPHAGITIS: ICD-10-CM

## 2023-08-22 RX ORDER — ERGOCALCIFEROL 1.25 MG/1
CAPSULE ORAL
Qty: 4 CAPSULE | Refills: 2 | Status: SHIPPED | OUTPATIENT
Start: 2023-08-22

## 2023-08-22 RX ORDER — OMEPRAZOLE 40 MG/1
CAPSULE, DELAYED RELEASE ORAL
Qty: 90 CAPSULE | Refills: 1 | Status: SHIPPED | OUTPATIENT
Start: 2023-08-22

## 2023-08-22 NOTE — TELEPHONE ENCOUNTER
Medication:   Requested Prescriptions     Pending Prescriptions Disp Refills    vitamin D (ERGOCALCIFEROL) 1.25 MG (87746 UT) CAPS capsule [Pharmacy Med Name: VITAMIN D2 1.25MG(50,000 UNIT)] 4 capsule 2     Sig: TAKE 1 CAPSULE BY MOUTH ONCE WEEKLY     Last Filled:  5/17/23    Last appt: 6/1/2023   Next appt: Visit date not found    Last Labs DM:   Lab Results   Component Value Date/Time    LABA1C 7.3 05/25/2023 08:17 AM     Last Lipid:   Lab Results   Component Value Date/Time    CHOL 151 12/02/2022 08:10 AM    TRIG 140 12/02/2022 08:10 AM    HDL 40 12/02/2022 08:10 AM    LDLCALC 83 12/02/2022 08:10 AM     Last PSA: No results found for: PSA  Last Thyroid:   Lab Results   Component Value Date/Time    TSH 1.81 12/02/2022 08:10 AM

## 2023-08-22 NOTE — TELEPHONE ENCOUNTER
Medication:   Requested Prescriptions     Pending Prescriptions Disp Refills    omeprazole (PRILOSEC) 40 MG delayed release capsule [Pharmacy Med Name: OMEPRAZOLE DR 40 MG CAPSULE] 90 capsule 1     Sig: TAKE 1 CAPSULE BY MOUTH EVERY DAY     Last Filled:  5/17/23    Last appt: 6/1/2023   Next appt: 8/21/2023    Last Labs DM:   Lab Results   Component Value Date/Time    LABA1C 7.3 05/25/2023 08:17 AM     Last Lipid:   Lab Results   Component Value Date/Time    CHOL 151 12/02/2022 08:10 AM    TRIG 140 12/02/2022 08:10 AM    HDL 40 12/02/2022 08:10 AM    LDLCALC 83 12/02/2022 08:10 AM     Last PSA: No results found for: PSA  Last Thyroid:   Lab Results   Component Value Date/Time    TSH 1.81 12/02/2022 08:10 AM

## 2023-08-29 DIAGNOSIS — E11.9 TYPE 2 DIABETES MELLITUS WITHOUT COMPLICATION, WITHOUT LONG-TERM CURRENT USE OF INSULIN (HCC): ICD-10-CM

## 2023-08-29 LAB
ANION GAP SERPL CALCULATED.3IONS-SCNC: 9 MMOL/L (ref 3–16)
BUN SERPL-MCNC: 14 MG/DL (ref 7–20)
CALCIUM SERPL-MCNC: 9 MG/DL (ref 8.3–10.6)
CHLORIDE SERPL-SCNC: 104 MMOL/L (ref 99–110)
CO2 SERPL-SCNC: 23 MMOL/L (ref 21–32)
CREAT SERPL-MCNC: 0.7 MG/DL (ref 0.6–1.1)
EST. AVERAGE GLUCOSE BLD GHB EST-MCNC: 165.7 MG/DL
GFR SERPLBLD CREATININE-BSD FMLA CKD-EPI: >60 ML/MIN/{1.73_M2}
GLUCOSE SERPL-MCNC: 162 MG/DL (ref 70–99)
HBA1C MFR BLD: 7.4 %
POTASSIUM SERPL-SCNC: 4.6 MMOL/L (ref 3.5–5.1)
SODIUM SERPL-SCNC: 136 MMOL/L (ref 136–145)

## 2023-09-01 ENCOUNTER — OFFICE VISIT (OUTPATIENT)
Dept: PRIMARY CARE CLINIC | Age: 43
End: 2023-09-01
Payer: COMMERCIAL

## 2023-09-01 VITALS
SYSTOLIC BLOOD PRESSURE: 120 MMHG | HEIGHT: 64 IN | RESPIRATION RATE: 13 BRPM | WEIGHT: 228 LBS | BODY MASS INDEX: 38.93 KG/M2 | HEART RATE: 68 BPM | DIASTOLIC BLOOD PRESSURE: 84 MMHG | OXYGEN SATURATION: 98 % | TEMPERATURE: 97.6 F

## 2023-09-01 DIAGNOSIS — K52.9 ARTHROPATHY ASSOCIATED WITH INFLAMMATORY BOWEL DISEASE: ICD-10-CM

## 2023-09-01 DIAGNOSIS — I10 ESSENTIAL HYPERTENSION: ICD-10-CM

## 2023-09-01 DIAGNOSIS — R76.0 ANTIPHOSPHOLIPID ANTIBODY POSITIVE: ICD-10-CM

## 2023-09-01 DIAGNOSIS — M12.9 ARTHROPATHY ASSOCIATED WITH INFLAMMATORY BOWEL DISEASE: ICD-10-CM

## 2023-09-01 DIAGNOSIS — E11.9 TYPE 2 DIABETES MELLITUS WITHOUT COMPLICATION, WITHOUT LONG-TERM CURRENT USE OF INSULIN (HCC): Primary | ICD-10-CM

## 2023-09-01 DIAGNOSIS — K50.919 CROHN'S DISEASE WITH COMPLICATION, UNSPECIFIED GASTROINTESTINAL TRACT LOCATION (HCC): ICD-10-CM

## 2023-09-01 DIAGNOSIS — Z23 NEED FOR INFLUENZA VACCINATION: ICD-10-CM

## 2023-09-01 PROCEDURE — 90471 IMMUNIZATION ADMIN: CPT | Performed by: FAMILY MEDICINE

## 2023-09-01 PROCEDURE — 3074F SYST BP LT 130 MM HG: CPT | Performed by: FAMILY MEDICINE

## 2023-09-01 PROCEDURE — 1036F TOBACCO NON-USER: CPT | Performed by: FAMILY MEDICINE

## 2023-09-01 PROCEDURE — 2022F DILAT RTA XM EVC RTNOPTHY: CPT | Performed by: FAMILY MEDICINE

## 2023-09-01 PROCEDURE — 99214 OFFICE O/P EST MOD 30 MIN: CPT | Performed by: FAMILY MEDICINE

## 2023-09-01 PROCEDURE — G8417 CALC BMI ABV UP PARAM F/U: HCPCS | Performed by: FAMILY MEDICINE

## 2023-09-01 PROCEDURE — 90674 CCIIV4 VAC NO PRSV 0.5 ML IM: CPT | Performed by: FAMILY MEDICINE

## 2023-09-01 PROCEDURE — 3079F DIAST BP 80-89 MM HG: CPT | Performed by: FAMILY MEDICINE

## 2023-09-01 PROCEDURE — G8427 DOCREV CUR MEDS BY ELIG CLIN: HCPCS | Performed by: FAMILY MEDICINE

## 2023-09-01 PROCEDURE — 3051F HG A1C>EQUAL 7.0%<8.0%: CPT | Performed by: FAMILY MEDICINE

## 2023-09-01 RX ORDER — ORAL SEMAGLUTIDE 14 MG/1
14 TABLET ORAL DAILY
Qty: 30 TABLET | Refills: 2 | Status: SHIPPED | OUTPATIENT
Start: 2023-09-01

## 2023-09-01 NOTE — PROGRESS NOTES
Subjective:      Patient ID: Marilu Gabriel is a 39 y.o. female. HPI  Patient is here for follow-up   Patient with Crohn disease she has been followed closely by [facility], , stable doing okay. She been seen by rheumatology at [facility], diagnosed with myositis and specific type recently was started on gabapentin seems helping with the pain  She was put prior on prednisone again try to taper down prednisone  She thinks with that has led to increased sugar and weight gain  Diabetes Mellitus Type II, Follow-up: Patient here for follow-up of Type 2 diabetes mellitus. Current symptoms/problems include none and have been stable. Symptoms have been present for a few years. Known diabetic complications: none  Cardiovascular risk factors: diabetes mellitus, hypertension and obesity (BMI >= 30 kg/m2)  Current diabetic medications include see med's list .      Eye exam current (within one year): yes  Weight trend: stable  Prior visit with dietician: no  Current diet: in general, a \"healthy\" diet    Current exercise: none  He has been experiencing low blood sugar so advised the patient stop the glimepiride  Current monitoring regimen: home blood tests - weekly  Home blood sugar records: fasting range: She has not been checking her sugar lately  Any episodes of hypoglycemia? No  Fasting and overall control has improved doing very good with the Rybelsus lost more than 10 pound  Hypertension: Patient is here follow up on  elevated blood pressures. Age at onset of elevated blood pressure:  Few years. Cardiac symptoms none. Patient denies chest pain, chest pressure/discomfort, dyspnea, exertional chest pressure/discomfort, lower extremity edema, near-syncope, orthopnea and palpitations. Cardiovascular risk factors: dyslipidemia, hypertension, obesity (BMI >= 30 kg/m2) and sedentary lifestyle. Use of agents associated with hypertension: steroids. History of target organ damage: none.   Patient with hyperlipidemia stable on current

## 2023-09-01 NOTE — PROGRESS NOTES
Immunization(s) given during visit:     Immunizations Administered       Name Date Dose Route    Influenza, FLUCELVAX, (age 10 mo+), MDCK, PF, 0.5mL 9/1/2023 0.5 mL Intramuscular    Site: Deltoid- Left    Lot: [id number]    NDC: 47693-281-15             Patient instructed to remain in clinic for 20 minutes after injection and was advised to report any adverse reaction to me immediately.

## 2023-09-07 DIAGNOSIS — F41.9 ANXIETY: ICD-10-CM

## 2023-09-07 RX ORDER — ESCITALOPRAM OXALATE 20 MG/1
TABLET ORAL
Qty: 30 TABLET | Refills: 1 | Status: SHIPPED | OUTPATIENT
Start: 2023-09-07

## 2023-09-07 NOTE — TELEPHONE ENCOUNTER
Medication:   Requested Prescriptions     Pending Prescriptions Disp Refills    escitalopram (LEXAPRO) 20 MG tablet [Pharmacy Med Name: ESCITALOPRAM 20 MG TABLET] 30 tablet 1     Sig: TAKE 1 TABLET BY MOUTH EVERY DAY     Last Filled:  8/10/23    Last appt: 9/1/2023   Next appt: Visit date not found    Last Labs DM:   Lab Results   Component Value Date/Time    LABA1C 7.4 08/29/2023 07:53 AM     Last Lipid:   Lab Results   Component Value Date/Time    CHOL 151 12/02/2022 08:10 AM    TRIG 140 12/02/2022 08:10 AM    HDL 40 12/02/2022 08:10 AM    LDLCALC 83 12/02/2022 08:10 AM     Last PSA: No results found for: PSA  Last Thyroid:   Lab Results   Component Value Date/Time    TSH 1.81 12/02/2022 08:10 AM

## 2023-09-29 ENCOUNTER — HOSPITAL ENCOUNTER (OUTPATIENT)
Dept: GENERAL RADIOLOGY | Age: 43
Discharge: HOME | End: 2023-09-29
Payer: COMMERCIAL

## 2023-09-29 DIAGNOSIS — Z79.52 CURRENT CHRONIC USE OF SYSTEMIC STEROIDS: ICD-10-CM

## 2023-09-29 DIAGNOSIS — K50.818 CROHN'S DISEASE OF BOTH SMALL AND LARGE INTESTINE WITH OTHER COMPLICATION (HCC): ICD-10-CM

## 2023-09-29 PROCEDURE — 77080 DXA BONE DENSITY AXIAL: CPT

## 2023-10-03 DIAGNOSIS — F41.9 ANXIETY: ICD-10-CM

## 2023-10-03 RX ORDER — ESCITALOPRAM OXALATE 20 MG/1
TABLET ORAL
Qty: 30 TABLET | Refills: 1 | Status: SHIPPED | OUTPATIENT
Start: 2023-10-03

## 2023-10-03 NOTE — TELEPHONE ENCOUNTER
Medication:   Requested Prescriptions     Pending Prescriptions Disp Refills    escitalopram (LEXAPRO) 20 MG tablet [Pharmacy Med Name: ESCITALOPRAM 20 MG TABLET] 30 tablet 1     Sig: TAKE 1 TABLET BY MOUTH EVERY DAY     Last Filled:  9/7/2023    Last appt: 9/1/2023   Next appt: Visit date not found    Last OARRS:        No data to display

## 2023-11-01 DIAGNOSIS — F41.9 ANXIETY: ICD-10-CM

## 2023-11-01 NOTE — TELEPHONE ENCOUNTER
Medication:   Requested Prescriptions     Pending Prescriptions Disp Refills    escitalopram (LEXAPRO) 20 MG tablet       Sig: Take 1 tablet by mouth daily     Last Filled: 10/03/23  Pharmacy requesting 90 day supply     Last appt: 9/1/2023   Next appt: Visit date not found    Last OARRS:        No data to display

## 2023-11-02 RX ORDER — ESCITALOPRAM OXALATE 20 MG/1
20 TABLET ORAL DAILY
Qty: 30 TABLET | Refills: 2 | Status: SHIPPED | OUTPATIENT
Start: 2023-11-02

## 2023-11-15 NOTE — TELEPHONE ENCOUNTER
Medication:   Requested Prescriptions     Pending Prescriptions Disp Refills    glimepiride (AMARYL) 2 MG tablet [Pharmacy Med Name: GLIMEPIRIDE 2 MG TABLET] 90 tablet 0     Sig: TAKE 1 TABLET BY MOUTH EVERY DAY IN THE MORNING     Last Filled:  6.26.23    Last appt: 9/1/2023   Next appt: Visit date not found    Last Labs DM:   Lab Results   Component Value Date/Time    LABA1C 7.4 08/29/2023 07:53 AM

## 2023-11-15 NOTE — TELEPHONE ENCOUNTER
Medication:   Requested Prescriptions     Pending Prescriptions Disp Refills    vitamin D (ERGOCALCIFEROL) 1.25 MG (19498 UT) CAPS capsule [Pharmacy Med Name: VITAMIN D2 1.25MG(50,000 UNIT)] 4 capsule 2     Sig: TAKE 1 CAPSULE BY MOUTH ONCE WEEKLY     Last Filled:  8.22.23    Last appt: 9/1/2023   Next appt: Visit date not found    Last OARRS:        No data to display

## 2023-11-16 RX ORDER — GLIMEPIRIDE 2 MG/1
TABLET ORAL
Qty: 90 TABLET | Refills: 0 | Status: SHIPPED | OUTPATIENT
Start: 2023-11-16

## 2023-11-17 RX ORDER — ERGOCALCIFEROL 1.25 MG/1
CAPSULE ORAL
Qty: 4 CAPSULE | Refills: 2 | Status: SHIPPED | OUTPATIENT
Start: 2023-11-17

## 2023-12-14 DIAGNOSIS — E11.9 TYPE 2 DIABETES MELLITUS WITHOUT COMPLICATION, WITHOUT LONG-TERM CURRENT USE OF INSULIN (HCC): ICD-10-CM

## 2023-12-14 LAB
ALBUMIN SERPL-MCNC: 4.2 G/DL (ref 3.4–5)
ALP SERPL-CCNC: 97 U/L (ref 40–129)
ALT SERPL-CCNC: 27 U/L (ref 10–40)
ANION GAP SERPL CALCULATED.3IONS-SCNC: 10 MMOL/L (ref 3–16)
AST SERPL-CCNC: 29 U/L (ref 15–37)
BASOPHILS # BLD: 0.1 K/UL (ref 0–0.2)
BASOPHILS NFR BLD: 0.7 %
BILIRUB DIRECT SERPL-MCNC: <0.2 MG/DL (ref 0–0.3)
BILIRUB INDIRECT SERPL-MCNC: NORMAL MG/DL (ref 0–1)
BILIRUB SERPL-MCNC: 0.3 MG/DL (ref 0–1)
BUN SERPL-MCNC: 12 MG/DL (ref 7–20)
CALCIUM SERPL-MCNC: 9.1 MG/DL (ref 8.3–10.6)
CHLORIDE SERPL-SCNC: 101 MMOL/L (ref 99–110)
CHOLEST SERPL-MCNC: 156 MG/DL (ref 0–199)
CO2 SERPL-SCNC: 25 MMOL/L (ref 21–32)
CREAT SERPL-MCNC: 0.5 MG/DL (ref 0.6–1.1)
DEPRECATED RDW RBC AUTO: 13.3 % (ref 12.4–15.4)
EOSINOPHIL # BLD: 0.4 K/UL (ref 0–0.6)
EOSINOPHIL NFR BLD: 5.6 %
GFR SERPLBLD CREATININE-BSD FMLA CKD-EPI: >60 ML/MIN/{1.73_M2}
GLUCOSE SERPL-MCNC: 127 MG/DL (ref 70–99)
HCT VFR BLD AUTO: 41.3 % (ref 36–48)
HDLC SERPL-MCNC: 46 MG/DL (ref 40–60)
HGB BLD-MCNC: 13.8 G/DL (ref 12–16)
LDLC SERPL CALC-MCNC: 75 MG/DL
LYMPHOCYTES # BLD: 0.9 K/UL (ref 1–5.1)
LYMPHOCYTES NFR BLD: 11.8 %
MCH RBC QN AUTO: 32 PG (ref 26–34)
MCHC RBC AUTO-ENTMCNC: 33.3 G/DL (ref 31–36)
MCV RBC AUTO: 96 FL (ref 80–100)
MONOCYTES # BLD: 0.7 K/UL (ref 0–1.3)
MONOCYTES NFR BLD: 8.3 %
NEUTROPHILS # BLD: 5.8 K/UL (ref 1.7–7.7)
NEUTROPHILS NFR BLD: 73.6 %
PLATELET # BLD AUTO: 296 K/UL (ref 135–450)
PMV BLD AUTO: 8.8 FL (ref 5–10.5)
POTASSIUM SERPL-SCNC: 4.7 MMOL/L (ref 3.5–5.1)
PROT SERPL-MCNC: 7.2 G/DL (ref 6.4–8.2)
RBC # BLD AUTO: 4.3 M/UL (ref 4–5.2)
SODIUM SERPL-SCNC: 136 MMOL/L (ref 136–145)
TRIGL SERPL-MCNC: 176 MG/DL (ref 0–150)
TSH SERPL DL<=0.005 MIU/L-ACNC: 1.75 UIU/ML (ref 0.27–4.2)
VLDLC SERPL CALC-MCNC: 35 MG/DL
WBC # BLD AUTO: 7.9 K/UL (ref 4–11)

## 2023-12-15 LAB
EST. AVERAGE GLUCOSE BLD GHB EST-MCNC: 148.5 MG/DL
HBA1C MFR BLD: 6.8 %

## 2023-12-15 RX ORDER — ERGOCALCIFEROL 1.25 MG/1
50000 CAPSULE ORAL WEEKLY
Qty: 12 CAPSULE | Refills: 1 | Status: SHIPPED | OUTPATIENT
Start: 2023-12-15

## 2023-12-15 NOTE — TELEPHONE ENCOUNTER
Medication:   Requested Prescriptions     Pending Prescriptions Disp Refills    vitamin D (ERGOCALCIFEROL) 1.25 MG (71796 UT) CAPS capsule [Pharmacy Med Name: VITAMIN D2 1.25MG(50,000 UNIT)] 12 capsule 1     Sig: TAKE 1 CAPSULE BY MOUTH ONE TIME PER WEEK     Last Filled:  11.17.23    Last appt: 9/1/2023   Next appt: 12/21/2023    Last OARRS:        No data to display

## 2024-01-11 ENCOUNTER — OFFICE VISIT (OUTPATIENT)
Dept: PRIMARY CARE CLINIC | Age: 44
End: 2024-01-11
Payer: COMMERCIAL

## 2024-01-11 VITALS
WEIGHT: 236 LBS | OXYGEN SATURATION: 97 % | SYSTOLIC BLOOD PRESSURE: 100 MMHG | BODY MASS INDEX: 40.29 KG/M2 | HEIGHT: 64 IN | DIASTOLIC BLOOD PRESSURE: 70 MMHG | HEART RATE: 96 BPM

## 2024-01-11 DIAGNOSIS — Z93.2 S/P ILEOSTOMY (HCC): ICD-10-CM

## 2024-01-11 DIAGNOSIS — Z23 NEED FOR PNEUMOCOCCAL VACCINE: ICD-10-CM

## 2024-01-11 DIAGNOSIS — E11.9 TYPE 2 DIABETES MELLITUS WITHOUT COMPLICATION, WITHOUT LONG-TERM CURRENT USE OF INSULIN (HCC): Primary | ICD-10-CM

## 2024-01-11 DIAGNOSIS — R76.0 ANTIPHOSPHOLIPID ANTIBODY POSITIVE: ICD-10-CM

## 2024-01-11 DIAGNOSIS — F41.9 ANXIETY: ICD-10-CM

## 2024-01-11 DIAGNOSIS — K50.919 CROHN'S DISEASE WITH COMPLICATION, UNSPECIFIED GASTROINTESTINAL TRACT LOCATION (HCC): ICD-10-CM

## 2024-01-11 DIAGNOSIS — I10 ESSENTIAL HYPERTENSION: ICD-10-CM

## 2024-01-11 PROCEDURE — 3074F SYST BP LT 130 MM HG: CPT | Performed by: FAMILY MEDICINE

## 2024-01-11 PROCEDURE — 90471 IMMUNIZATION ADMIN: CPT | Performed by: FAMILY MEDICINE

## 2024-01-11 PROCEDURE — 3078F DIAST BP <80 MM HG: CPT | Performed by: FAMILY MEDICINE

## 2024-01-11 PROCEDURE — G8417 CALC BMI ABV UP PARAM F/U: HCPCS | Performed by: FAMILY MEDICINE

## 2024-01-11 PROCEDURE — 3046F HEMOGLOBIN A1C LEVEL >9.0%: CPT | Performed by: FAMILY MEDICINE

## 2024-01-11 PROCEDURE — 2022F DILAT RTA XM EVC RTNOPTHY: CPT | Performed by: FAMILY MEDICINE

## 2024-01-11 PROCEDURE — 99214 OFFICE O/P EST MOD 30 MIN: CPT | Performed by: FAMILY MEDICINE

## 2024-01-11 PROCEDURE — 1036F TOBACCO NON-USER: CPT | Performed by: FAMILY MEDICINE

## 2024-01-11 PROCEDURE — G8427 DOCREV CUR MEDS BY ELIG CLIN: HCPCS | Performed by: FAMILY MEDICINE

## 2024-01-11 PROCEDURE — G8482 FLU IMMUNIZE ORDER/ADMIN: HCPCS | Performed by: FAMILY MEDICINE

## 2024-01-11 PROCEDURE — 90677 PCV20 VACCINE IM: CPT | Performed by: FAMILY MEDICINE

## 2024-01-11 RX ORDER — AZATHIOPRINE 50 MG/1
150 TABLET ORAL DAILY
COMMUNITY

## 2024-01-11 ASSESSMENT — PATIENT HEALTH QUESTIONNAIRE - PHQ9
9. THOUGHTS THAT YOU WOULD BE BETTER OFF DEAD, OR OF HURTING YOURSELF: 0
5. POOR APPETITE OR OVEREATING: 0
1. LITTLE INTEREST OR PLEASURE IN DOING THINGS: 0
6. FEELING BAD ABOUT YOURSELF - OR THAT YOU ARE A FAILURE OR HAVE LET YOURSELF OR YOUR FAMILY DOWN: 0
SUM OF ALL RESPONSES TO PHQ QUESTIONS 1-9: 1
8. MOVING OR SPEAKING SO SLOWLY THAT OTHER PEOPLE COULD HAVE NOTICED. OR THE OPPOSITE, BEING SO FIGETY OR RESTLESS THAT YOU HAVE BEEN MOVING AROUND A LOT MORE THAN USUAL: 0
SUM OF ALL RESPONSES TO PHQ QUESTIONS 1-9: 1
SUM OF ALL RESPONSES TO PHQ9 QUESTIONS 1 & 2: 0
SUM OF ALL RESPONSES TO PHQ QUESTIONS 1-9: 1
2. FEELING DOWN, DEPRESSED OR HOPELESS: 0
10. IF YOU CHECKED OFF ANY PROBLEMS, HOW DIFFICULT HAVE THESE PROBLEMS MADE IT FOR YOU TO DO YOUR WORK, TAKE CARE OF THINGS AT HOME, OR GET ALONG WITH OTHER PEOPLE: 0
SUM OF ALL RESPONSES TO PHQ QUESTIONS 1-9: 1
4. FEELING TIRED OR HAVING LITTLE ENERGY: 0
7. TROUBLE CONCENTRATING ON THINGS, SUCH AS READING THE NEWSPAPER OR WATCHING TELEVISION: 0
3. TROUBLE FALLING OR STAYING ASLEEP: 1

## 2024-01-11 NOTE — PROGRESS NOTES
(TYLENOL) 325 MG tablet Take 2 tablets by mouth every 4 hours      Ferrous Sulfate (IRON PO) Take by mouth daily      Cyanocobalamin (VITAMIN B 12 PO) Take by mouth      ONETOUCH ULTRA strip USE TO TEST ONCE DAILY 100 strip 2    ondansetron (ZOFRAN-ODT) 4 MG disintegrating tablet Take 1 tablet by mouth every 8 hours as needed for Nausea or Vomiting      ONETOUCH DELICA LANCETS FINE MISC USE ONE DAILY 100 each 1    Blood Glucose Monitoring Suppl (ONE TOUCH ULTRA MINI) w/Device KIT 1 kit by Does not apply route daily as needed (fasting) 1 kit 0    diphenoxylate-atropine (LOMOTIL) 2.5-0.025 MG per tablet diphenoxylate-atropine 2.5 mg-0.025 mg tablet      folic acid (FOLVITE) 1 MG tablet Take 1 tablet by mouth daily 90 tablet 0     No current facility-administered medications for this visit.     No Known Allergies      Objective:   Physical Exam   Constitutional: She appears well-developed and well-nourished. No distress.   HENT:   Head: Normocephalic and atraumatic.   Right Ear: External ear normal.   Left Ear: External ear normal.   Nose: Nose normal.   Mouth/Throat: Oropharynx is clear and moist. No oropharyngeal exudate.   Eyes: Conjunctivae and EOM are normal. Pupils are equal, round, and reactive to light. Right eye exhibits no discharge. Left eye exhibits no discharge. No scleral icterus.   Neck: Normal range of motion. Neck supple. No JVD present. No tracheal deviation present. No thyromegaly present.   Cardiovascular: Normal rate, regular rhythm, normal heart sounds and intact distal pulses.    Pulmonary/Chest: Effort normal and breath sounds normal. No stridor. No respiratory distress. She has no wheezes. She has no rales. She exhibits no tenderness.   Abdominal: Soft. Bowel sounds are normal. She exhibits no distension and no mass. There is no tenderness. There is no rebound and no guarding.   Musculoskeletal: Normal range of motion. She exhibits no edema or tenderness.   Lymphadenopathy:     She has no

## 2024-01-15 ENCOUNTER — OFFICE VISIT (OUTPATIENT)
Dept: PRIMARY CARE CLINIC | Age: 44
End: 2024-01-15
Payer: COMMERCIAL

## 2024-01-15 ENCOUNTER — PATIENT MESSAGE (OUTPATIENT)
Dept: PRIMARY CARE CLINIC | Age: 44
End: 2024-01-15

## 2024-01-15 VITALS
WEIGHT: 236.2 LBS | TEMPERATURE: 97.1 F | BODY MASS INDEX: 40.33 KG/M2 | HEIGHT: 64 IN | OXYGEN SATURATION: 97 % | SYSTOLIC BLOOD PRESSURE: 130 MMHG | DIASTOLIC BLOOD PRESSURE: 88 MMHG | HEART RATE: 98 BPM

## 2024-01-15 DIAGNOSIS — L73.9 HAIR FOLLICLE INFECTION: Primary | ICD-10-CM

## 2024-01-15 PROCEDURE — 1036F TOBACCO NON-USER: CPT | Performed by: FAMILY MEDICINE

## 2024-01-15 PROCEDURE — G8417 CALC BMI ABV UP PARAM F/U: HCPCS | Performed by: FAMILY MEDICINE

## 2024-01-15 PROCEDURE — G8482 FLU IMMUNIZE ORDER/ADMIN: HCPCS | Performed by: FAMILY MEDICINE

## 2024-01-15 PROCEDURE — 99213 OFFICE O/P EST LOW 20 MIN: CPT | Performed by: FAMILY MEDICINE

## 2024-01-15 PROCEDURE — G8427 DOCREV CUR MEDS BY ELIG CLIN: HCPCS | Performed by: FAMILY MEDICINE

## 2024-01-15 ASSESSMENT — ENCOUNTER SYMPTOMS
EYES NEGATIVE: 1
RESPIRATORY NEGATIVE: 1
GASTROINTESTINAL NEGATIVE: 1

## 2024-01-15 NOTE — TELEPHONE ENCOUNTER
From: Olamide Garcia  To: Dr. Tommy Hood  Sent: 1/15/2024 9:08 AM EST  Subject: Spider bite    Hi Dr. Hood. I believe I was bitten by a spider over the weekend. I don’t like the way it looks: it is swollen and tender and has a red spot in the center. You only had an appointment today in the middle of my project presentation. I am going go to urgent care after work and I will have them send the info. I’m certain I need antibiotics.

## 2024-01-15 NOTE — PROGRESS NOTES
SUBJECTIVE:  Patient ID: Olamide Garcia is a 43 y.o. y.o. female     HPI   Patient presents today with a concern about a spot on left hide she was already about infection and it was a little painful she thinks she was bit by something  No fever no chills no other symptoms just because of diabetes and comorbid medical issues she was concern  Past Medical History:   Diagnosis Date    Crohn's disease (HCC) 2006    GI @[facility]    IBD (inflammatory bowel disease)     PCOS (polycystic ovarian syndrome)     Psoriasis     Psoriatic arthritis (HCC)     sees Rheumatology       Past Surgical History:   Procedure Laterality Date     SECTION  2006     SECTION, CLASSIC  2009     Family History   Problem Relation Age of Onset    Diabetes Mother         age 73    Cancer Mother     Stroke Father          age 89      Social History     Socioeconomic History    Marital status:      Spouse name: None    Number of children: None    Years of education: None    Highest education level: None   Tobacco Use    Smoking status: Former     Current packs/day: 0.00     Average packs/day: 1 pack/day for 10.0 years (10.0 ttl pk-yrs)     Types: Cigarettes     Start date: 1995     Quit date: 2005     Years since quittin.3    Smokeless tobacco: Never   Substance and Sexual Activity    Alcohol use: Yes     Comment: ocassionally    Drug use: No    Sexual activity: Yes     Partners: Male     Birth control/protection: I.U.D.   Social History Narrative         [employer]    [employer] work [employer]    Children 14 & 11     She has half sister x 3 one     Half brother x 1     Social Determinants of Health     Financial Resource Strain: Low Risk  (3/7/2023)    Overall Financial Resource Strain (CARDIA)     Difficulty of Paying Living Expenses: Not hard at all   Transportation Needs: No Transportation Needs (3/7/2023)    PRAPARE - Transportation     Lack of Transportation (Medical): No     Lack of

## 2024-01-16 RX ORDER — GLIMEPIRIDE 2 MG/1
TABLET ORAL
Qty: 90 TABLET | Refills: 0 | Status: SHIPPED | OUTPATIENT
Start: 2024-01-16

## 2024-01-16 NOTE — TELEPHONE ENCOUNTER
Medication:   Requested Prescriptions     Pending Prescriptions Disp Refills    glimepiride (AMARYL) 2 MG tablet [Pharmacy Med Name: GLIMEPIRIDE 2 MG TABLET] 90 tablet 0     Sig: TAKE 1 TABLET BY MOUTH EVERY DAY IN THE MORNING     Last Filled:  11/16/2023    Last appt: 1/15/2024   Next appt: 4/11/2024    Last Labs DM:   Lab Results   Component Value Date/Time    LABA1C 6.8 12/14/2023 07:38 AM

## 2024-01-28 DIAGNOSIS — F41.9 ANXIETY: ICD-10-CM

## 2024-01-29 RX ORDER — ESCITALOPRAM OXALATE 20 MG/1
20 TABLET ORAL DAILY
Qty: 30 TABLET | Refills: 2 | Status: SHIPPED | OUTPATIENT
Start: 2024-01-29

## 2024-01-29 NOTE — TELEPHONE ENCOUNTER
Medication:   Requested Prescriptions     Pending Prescriptions Disp Refills    escitalopram (LEXAPRO) 20 MG tablet 30 tablet 2     Sig: Take 1 tablet by mouth daily     Last Filled:  11.2.23    Last appt: 1/15/2024   Next appt: 4/11/2024    Last OARRS:        No data to display

## 2024-01-31 DIAGNOSIS — K21.9 GASTRO-ESOPHAGEAL REFLUX DISEASE WITHOUT ESOPHAGITIS: ICD-10-CM

## 2024-01-31 NOTE — TELEPHONE ENCOUNTER
Medication:   Requested Prescriptions     Pending Prescriptions Disp Refills    omeprazole (PRILOSEC) 40 MG delayed release capsule       Sig: Take by mouth daily     Last Filled:  8.22.23    Last appt: 1/15/2024   Next appt: 4/11/2024    Return in about 3 months (around 4/11/2024), or if symptoms worsen or fail to improve.    Last OARRS:       No data to display

## 2024-01-31 NOTE — TELEPHONE ENCOUNTER
Medication:   Requested Prescriptions     Pending Prescriptions Disp Refills    lisinopril (PRINIVIL;ZESTRIL) 5 MG tablet [Pharmacy Med Name: LISINOPRIL 5 MG TABLET] 90 tablet 1     Sig: TAKE 1 TABLET BY MOUTH EVERY DAY     Last Filled:  5.17.23    Last appt: 1/15/2024   Next appt: 4/11/2024    Last OARRS:        No data to display

## 2024-02-01 RX ORDER — OMEPRAZOLE 40 MG/1
40 CAPSULE, DELAYED RELEASE ORAL DAILY
Qty: 90 CAPSULE | Refills: 1 | Status: SHIPPED | OUTPATIENT
Start: 2024-02-01

## 2024-02-01 RX ORDER — LISINOPRIL 5 MG/1
TABLET ORAL
Qty: 90 TABLET | Refills: 1 | Status: SHIPPED | OUTPATIENT
Start: 2024-02-01

## 2024-02-16 DIAGNOSIS — E11.9 TYPE 2 DIABETES MELLITUS WITHOUT COMPLICATION, WITHOUT LONG-TERM CURRENT USE OF INSULIN (HCC): ICD-10-CM

## 2024-02-16 NOTE — TELEPHONE ENCOUNTER
Medication:   Requested Prescriptions     Pending Prescriptions Disp Refills    Semaglutide (RYBELSUS) 14 MG TABS 30 tablet 2     Sig: Take 1 tablet by mouth daily     Last Filled:  12/21/2023    Last appt: 1/15/2024   Next appt: 4/11/2024    Last OARRS:        No data to display

## 2024-02-19 DIAGNOSIS — E11.9 TYPE 2 DIABETES MELLITUS WITHOUT COMPLICATION, WITHOUT LONG-TERM CURRENT USE OF INSULIN (HCC): ICD-10-CM

## 2024-02-19 RX ORDER — ORAL SEMAGLUTIDE 14 MG/1
1 TABLET ORAL DAILY
Qty: 30 TABLET | Refills: 0 | Status: SHIPPED | OUTPATIENT
Start: 2024-02-19

## 2024-02-19 NOTE — TELEPHONE ENCOUNTER
Medication:   Requested Prescriptions     Pending Prescriptions Disp Refills    RYBELSUS 14 MG TABS [Pharmacy Med Name: RYBELSUS 14 MG TABLET] 30 tablet 2     Sig: TAKE 1 TABLET BY MOUTH EVERY DAY     Last Filled:  12.21.23    Last appt: 1/15/2024   Next appt: 4/11/2024    Last OARRS:        No data to display

## 2024-02-20 RX ORDER — ORAL SEMAGLUTIDE 14 MG/1
1 TABLET ORAL DAILY
Qty: 30 TABLET | Refills: 2 | Status: SHIPPED | OUTPATIENT
Start: 2024-02-20

## 2024-04-08 DIAGNOSIS — E11.9 TYPE 2 DIABETES MELLITUS WITHOUT COMPLICATION, WITHOUT LONG-TERM CURRENT USE OF INSULIN (HCC): ICD-10-CM

## 2024-04-08 LAB
CREAT UR-MCNC: 233.3 MG/DL (ref 28–259)
MICROALBUMIN UR DL<=1MG/L-MCNC: 5.3 MG/DL
MICROALBUMIN/CREAT UR: 22.7 MG/G (ref 0–30)

## 2024-04-10 SDOH — ECONOMIC STABILITY: FOOD INSECURITY: WITHIN THE PAST 12 MONTHS, YOU WORRIED THAT YOUR FOOD WOULD RUN OUT BEFORE YOU GOT MONEY TO BUY MORE.: NEVER TRUE

## 2024-04-10 SDOH — ECONOMIC STABILITY: FOOD INSECURITY: WITHIN THE PAST 12 MONTHS, THE FOOD YOU BOUGHT JUST DIDN'T LAST AND YOU DIDN'T HAVE MONEY TO GET MORE.: NEVER TRUE

## 2024-04-10 SDOH — ECONOMIC STABILITY: INCOME INSECURITY: HOW HARD IS IT FOR YOU TO PAY FOR THE VERY BASICS LIKE FOOD, HOUSING, MEDICAL CARE, AND HEATING?: NOT HARD AT ALL

## 2024-04-10 SDOH — ECONOMIC STABILITY: HOUSING INSECURITY
IN THE LAST 12 MONTHS, WAS THERE A TIME WHEN YOU DID NOT HAVE A STEADY PLACE TO SLEEP OR SLEPT IN A SHELTER (INCLUDING NOW)?: YES

## 2024-04-10 SDOH — ECONOMIC STABILITY: TRANSPORTATION INSECURITY
IN THE PAST 12 MONTHS, HAS LACK OF TRANSPORTATION KEPT YOU FROM MEETINGS, WORK, OR FROM GETTING THINGS NEEDED FOR DAILY LIVING?: PATIENT DECLINED

## 2024-04-11 ENCOUNTER — OFFICE VISIT (OUTPATIENT)
Dept: PRIMARY CARE CLINIC | Age: 44
End: 2024-04-11
Payer: COMMERCIAL

## 2024-04-11 VITALS
WEIGHT: 239.8 LBS | HEIGHT: 64 IN | HEART RATE: 102 BPM | DIASTOLIC BLOOD PRESSURE: 88 MMHG | BODY MASS INDEX: 40.94 KG/M2 | OXYGEN SATURATION: 96 % | SYSTOLIC BLOOD PRESSURE: 132 MMHG

## 2024-04-11 DIAGNOSIS — R76.0 ANTIPHOSPHOLIPID ANTIBODY POSITIVE: ICD-10-CM

## 2024-04-11 DIAGNOSIS — K50.919 CROHN'S DISEASE WITH COMPLICATION, UNSPECIFIED GASTROINTESTINAL TRACT LOCATION (HCC): ICD-10-CM

## 2024-04-11 DIAGNOSIS — F41.9 ANXIETY: ICD-10-CM

## 2024-04-11 DIAGNOSIS — I10 ESSENTIAL HYPERTENSION: ICD-10-CM

## 2024-04-11 DIAGNOSIS — E11.9 TYPE 2 DIABETES MELLITUS WITHOUT COMPLICATION, WITHOUT LONG-TERM CURRENT USE OF INSULIN (HCC): Primary | ICD-10-CM

## 2024-04-11 PROCEDURE — 1036F TOBACCO NON-USER: CPT | Performed by: FAMILY MEDICINE

## 2024-04-11 PROCEDURE — 3046F HEMOGLOBIN A1C LEVEL >9.0%: CPT | Performed by: FAMILY MEDICINE

## 2024-04-11 PROCEDURE — G8427 DOCREV CUR MEDS BY ELIG CLIN: HCPCS | Performed by: FAMILY MEDICINE

## 2024-04-11 PROCEDURE — 3079F DIAST BP 80-89 MM HG: CPT | Performed by: FAMILY MEDICINE

## 2024-04-11 PROCEDURE — 3075F SYST BP GE 130 - 139MM HG: CPT | Performed by: FAMILY MEDICINE

## 2024-04-11 PROCEDURE — G8417 CALC BMI ABV UP PARAM F/U: HCPCS | Performed by: FAMILY MEDICINE

## 2024-04-11 PROCEDURE — 99214 OFFICE O/P EST MOD 30 MIN: CPT | Performed by: FAMILY MEDICINE

## 2024-04-11 PROCEDURE — 2022F DILAT RTA XM EVC RTNOPTHY: CPT | Performed by: FAMILY MEDICINE

## 2024-04-11 RX ORDER — ACYCLOVIR 400 MG/1
TABLET ORAL
Qty: 2 EACH | Refills: 2 | Status: SHIPPED | OUTPATIENT
Start: 2024-04-11

## 2024-04-11 RX ORDER — INFLIXIMAB-DYYB 100 MG/10ML
10 INJECTION, POWDER, LYOPHILIZED, FOR SOLUTION INTRAVENOUS
COMMUNITY

## 2024-04-11 RX ORDER — ESCITALOPRAM OXALATE 20 MG/1
20 TABLET ORAL DAILY
Qty: 30 TABLET | Refills: 2 | Status: SHIPPED | OUTPATIENT
Start: 2024-04-11

## 2024-04-11 RX ORDER — ACYCLOVIR 400 MG/1
TABLET ORAL
Qty: 1 EACH | Refills: 1 | Status: SHIPPED | OUTPATIENT
Start: 2024-04-11

## 2024-04-11 SDOH — ECONOMIC STABILITY: FOOD INSECURITY: WITHIN THE PAST 12 MONTHS, YOU WORRIED THAT YOUR FOOD WOULD RUN OUT BEFORE YOU GOT MONEY TO BUY MORE.: NEVER TRUE

## 2024-04-11 SDOH — ECONOMIC STABILITY: FOOD INSECURITY: WITHIN THE PAST 12 MONTHS, THE FOOD YOU BOUGHT JUST DIDN'T LAST AND YOU DIDN'T HAVE MONEY TO GET MORE.: NEVER TRUE

## 2024-04-11 SDOH — ECONOMIC STABILITY: INCOME INSECURITY: HOW HARD IS IT FOR YOU TO PAY FOR THE VERY BASICS LIKE FOOD, HOUSING, MEDICAL CARE, AND HEATING?: NOT HARD AT ALL

## 2024-04-11 NOTE — PROGRESS NOTES
Subjective:      Patient ID: Olamide Garcia is a 44 y.o. female.    HPI  Patient is here for follow-up   Patient with Crohn disease she has been followed closely by [facility], , stable doing okay.  She been seen by rheumatology at [facility], diagnosed with myositis and specific type recently was started on gabapentin seems helping with the pain  Overall doing well   Diabetes Mellitus Type II, Follow-up: Patient here for follow-up of Type 2 diabetes mellitus.  Current symptoms/problems include none and have been stable. Symptoms have been present for a few years.     Known diabetic complications: none  Cardiovascular risk factors: diabetes mellitus, hypertension and obesity (BMI >= 30 kg/m2)  Current diabetic medications include see med's list .      Eye exam current (within one year): yes  Weight trend: stable  Prior visit with dietician: no  Current diet: in general, a \"healthy\" diet    Current exercise: none  He has been experiencing low blood sugar so advised the patient stop the glimepiride  Current monitoring regimen: home blood tests - weekly  Home blood sugar records: fasting range: She has not been checking her sugar lately    Patient with hypertension stable with current medication  She is meeting with a counselor and she is doing a lot better mentally, on Lexapro 20    Review of Systems   Constitutional: Negative.  Negative for chills and fever.   HENT:  Negative for postnasal drip, rhinorrhea, sinus pressure, tinnitus and trouble swallowing.    Eyes: Negative.    Respiratory: Negative.    Cardiovascular: Negative.    Gastrointestinal: bleeding   Endocrine: Negative.    Genitourinary: Negative.    Musculoskeletal: . Negative for back pain, gait problem, joint swelling, neck pain and neck stiffness.   Skin:. Negative for color change and pallor.   Allergic/Immunologic: Negative.    Neurological: Negative.    Psychiatric/Behavioral: Very anxious nervous says she was in tears  All other systems reviewed and are

## 2024-04-12 ENCOUNTER — TELEPHONE (OUTPATIENT)
Dept: FAMILY MEDICINE CLINIC | Age: 44
End: 2024-04-12

## 2024-04-12 NOTE — TELEPHONE ENCOUNTER
The medication is APPROVED. LETTER AVAILABLE IN MEDIA  APPROVED THROUGH 04/12/2025    If this requires a response please respond to the pool ( P [facility] MEDICATION PRE-AUTH).      Thank you please advise patient.

## 2024-04-12 NOTE — TELEPHONE ENCOUNTER
SUBMITTED PA FOR Dexcom G7 Sensor  VIA [employer] Key: [id number]  STATUS PENDING.      FOLLOW UP DONE DAILY: IF NO RESPONSE IN 3 DAYS WE WILL REFAX FOR STATUS CHECK. IF ANOTHER 3 DAYS GOES BY WITH NO RESPONSE WILL CALL INSURANCE FOR STATUS.

## 2024-04-12 NOTE — TELEPHONE ENCOUNTER
SUBMITTED PA FOR Dexcom G7  device  VIA [employer] Key: [id number]  STATUS NOT SENT TO PLAN.    DOES THE PATIENT CHECK AND OR INJECT 3 TIMES PER DAY?  DOES THE PATIENT USE A CONTINUOUS INSULIN PUMP    If this requires a response please respond to the pool ( P MHCX PSC MEDICATION PRE-AUTH).      Thank you please advise patient.       FOLLOW UP DONE DAILY: IF NO RESPONSE IN 3 DAYS WE WILL REFAX FOR STATUS CHECK. IF ANOTHER 3 DAYS GOES BY WITH NO RESPONSE WILL CALL INSURANCE FOR STATUS.

## 2024-04-12 NOTE — TELEPHONE ENCOUNTER
Sent to plan    If this requires a response please respond to the pool ( P MHCX PSC MEDICATION PRE-AUTH).      Thank you please advise patient.

## 2024-04-12 NOTE — TELEPHONE ENCOUNTER
SUBMITTED PA FOR Dexcom G7 Sensor  VIA [facility] Key: [id number]  STATUS PENDING.    DOES THE PATIENT CHECK AND OR INJECT X3 PER DAY   DOES THE PATIENT HAVE A INSULIN PUMP      FOLLOW UP DONE DAILY: IF NO RESPONSE IN 3 DAYS WE WILL REFAX FOR STATUS CHECK. IF ANOTHER 3 DAYS GOES BY WITH NO RESPONSE WILL CALL INSURANCE FOR STATUS.

## 2024-04-17 NOTE — TELEPHONE ENCOUNTER
The medication is APPROVED.This medication or product was previously approved on [id number] from 04/12/2024 to 04/12/2025.     If this requires a response please respond to the pool ( P MHCX PSC MEDICATION PRE-AUTH).      Thank you please advise patient.

## 2024-04-22 DIAGNOSIS — E11.9 TYPE 2 DIABETES MELLITUS WITHOUT COMPLICATION, WITHOUT LONG-TERM CURRENT USE OF INSULIN (HCC): ICD-10-CM

## 2024-04-22 LAB
ALBUMIN SERPL-MCNC: 4.4 G/DL (ref 3.4–5)
ALP SERPL-CCNC: 98 U/L (ref 40–129)
ALT SERPL-CCNC: 51 U/L (ref 10–40)
ANION GAP SERPL CALCULATED.3IONS-SCNC: 14 MMOL/L (ref 3–16)
AST SERPL-CCNC: 53 U/L (ref 15–37)
BASOPHILS # BLD: 0 K/UL (ref 0–0.2)
BASOPHILS NFR BLD: 0.8 %
BILIRUB DIRECT SERPL-MCNC: <0.2 MG/DL (ref 0–0.3)
BILIRUB INDIRECT SERPL-MCNC: ABNORMAL MG/DL (ref 0–1)
BILIRUB SERPL-MCNC: 0.4 MG/DL (ref 0–1)
BUN SERPL-MCNC: 9 MG/DL (ref 7–20)
CALCIUM SERPL-MCNC: 9.2 MG/DL (ref 8.3–10.6)
CHLORIDE SERPL-SCNC: 101 MMOL/L (ref 99–110)
CHOLEST SERPL-MCNC: 148 MG/DL (ref 0–199)
CO2 SERPL-SCNC: 21 MMOL/L (ref 21–32)
CREAT SERPL-MCNC: 0.6 MG/DL (ref 0.6–1.1)
DEPRECATED RDW RBC AUTO: 13.3 % (ref 12.4–15.4)
EOSINOPHIL # BLD: 0.3 K/UL (ref 0–0.6)
EOSINOPHIL NFR BLD: 7 %
FOLATE SERPL-MCNC: >20 NG/ML (ref 4.78–24.2)
GFR SERPLBLD CREATININE-BSD FMLA CKD-EPI: >90 ML/MIN/{1.73_M2}
GLUCOSE SERPL-MCNC: 146 MG/DL (ref 70–99)
HCT VFR BLD AUTO: 42.7 % (ref 36–48)
HDLC SERPL-MCNC: 44 MG/DL (ref 40–60)
HGB BLD-MCNC: 14.5 G/DL (ref 12–16)
LDLC SERPL CALC-MCNC: 78 MG/DL
LYMPHOCYTES # BLD: 0.7 K/UL (ref 1–5.1)
LYMPHOCYTES NFR BLD: 15 %
MCH RBC QN AUTO: 32.1 PG (ref 26–34)
MCHC RBC AUTO-ENTMCNC: 34 G/DL (ref 31–36)
MCV RBC AUTO: 94.4 FL (ref 80–100)
MONOCYTES # BLD: 0.5 K/UL (ref 0–1.3)
MONOCYTES NFR BLD: 9.9 %
NEUTROPHILS # BLD: 3.3 K/UL (ref 1.7–7.7)
NEUTROPHILS NFR BLD: 67.3 %
PLATELET # BLD AUTO: 260 K/UL (ref 135–450)
PMV BLD AUTO: 9.2 FL (ref 5–10.5)
POTASSIUM SERPL-SCNC: 4.3 MMOL/L (ref 3.5–5.1)
PROT SERPL-MCNC: 8.1 G/DL (ref 6.4–8.2)
RBC # BLD AUTO: 4.52 M/UL (ref 4–5.2)
SODIUM SERPL-SCNC: 136 MMOL/L (ref 136–145)
TRIGL SERPL-MCNC: 129 MG/DL (ref 0–150)
TSH SERPL DL<=0.005 MIU/L-ACNC: 1.22 UIU/ML (ref 0.27–4.2)
VIT B12 SERPL-MCNC: 904 PG/ML (ref 211–911)
VLDLC SERPL CALC-MCNC: 26 MG/DL
WBC # BLD AUTO: 4.9 K/UL (ref 4–11)

## 2024-04-22 RX ORDER — LISINOPRIL 5 MG/1
5 TABLET ORAL DAILY
Qty: 90 TABLET | Refills: 1 | Status: SHIPPED | OUTPATIENT
Start: 2024-04-22

## 2024-04-22 NOTE — TELEPHONE ENCOUNTER
Medication:   Requested Prescriptions     Pending Prescriptions Disp Refills    lisinopril (PRINIVIL;ZESTRIL) 5 MG tablet 90 tablet 1     Sig: Take 1 tablet by mouth daily     Last Filled:  2/1/2024    Last appt: 4/11/2024   Next appt: Visit date not found    Last OARRS:        No data to display

## 2024-04-23 LAB
EST. AVERAGE GLUCOSE BLD GHB EST-MCNC: 151.3 MG/DL
HBA1C MFR BLD: 6.9 %

## 2024-04-26 ENCOUNTER — PATIENT MESSAGE (OUTPATIENT)
Dept: PRIMARY CARE CLINIC | Age: 44
End: 2024-04-26

## 2024-04-26 DIAGNOSIS — E11.9 TYPE 2 DIABETES MELLITUS WITHOUT COMPLICATION, WITHOUT LONG-TERM CURRENT USE OF INSULIN (HCC): ICD-10-CM

## 2024-04-26 RX ORDER — ACYCLOVIR 400 MG/1
TABLET ORAL
Qty: 2 EACH | Refills: 2 | Status: SHIPPED | OUTPATIENT
Start: 2024-04-26

## 2024-04-26 NOTE — TELEPHONE ENCOUNTER
From: Olamide Garcia  To: Dr. Tommy Hood  Sent: 4/26/2024 2:46 PM EDT  Subject: [facility] G7 sensor     Dr. Hood,  I just got and apples my G7 but it only came with 2 sensors and they are only good 10 days. I called the office and was told the can go up to 14 but my brian is telling me otherwise. I’m attaching a photo.

## 2024-04-26 NOTE — TELEPHONE ENCOUNTER
Medication:   Requested Prescriptions     Pending Prescriptions Disp Refills    Continuous Blood Gluc Sensor (DEXCOM G7 SENSOR) MISC 2 each 2     Sig: Use as directed     Last Filled:  04/11/2024    Last appt: 4/11/2024   Next appt: 7/23/2024    Last Labs DM:   Lab Results   Component Value Date/Time    LABA1C 6.9 04/22/2024 08:47 AM

## 2024-05-13 RX ORDER — GLIMEPIRIDE 2 MG/1
TABLET ORAL
Qty: 90 TABLET | Refills: 0 | Status: SHIPPED | OUTPATIENT
Start: 2024-05-13

## 2024-05-13 NOTE — TELEPHONE ENCOUNTER
Medication:   Requested Prescriptions     Pending Prescriptions Disp Refills    glimepiride (AMARYL) 2 MG tablet [Pharmacy Med Name: GLIMEPIRIDE 2 MG TABLET] 90 tablet 0     Sig: TAKE 1 TABLET BY MOUTH EVERY DAY IN THE MORNING     Last filled: 1/16/24  Last appt: 4/11/2024   Next appt: 7/23/2024    Last OARRS:        No data to display

## 2024-06-12 DIAGNOSIS — E11.9 TYPE 2 DIABETES MELLITUS WITHOUT COMPLICATION, WITHOUT LONG-TERM CURRENT USE OF INSULIN (HCC): ICD-10-CM

## 2024-06-12 NOTE — TELEPHONE ENCOUNTER
Medication:   Requested Prescriptions     Pending Prescriptions Disp Refills    RYBELSUS 14 MG TABS [Pharmacy Med Name: RYBELSUS 14 MG TABLET] 30 tablet 2     Sig: TAKE 1 TABLET BY MOUTH EVERY DAY     Last Filled:  2.20.24    Last appt: 4/11/2024   Next appt: 7/23/2024    Last OARRS:        No data to display

## 2024-06-13 RX ORDER — ORAL SEMAGLUTIDE 14 MG/1
1 TABLET ORAL DAILY
Qty: 30 TABLET | Refills: 2 | Status: SHIPPED | OUTPATIENT
Start: 2024-06-13

## 2024-06-17 RX ORDER — ERGOCALCIFEROL 1.25 MG/1
50000 CAPSULE ORAL WEEKLY
Qty: 12 CAPSULE | Refills: 1 | Status: SHIPPED | OUTPATIENT
Start: 2024-06-17

## 2024-07-08 ENCOUNTER — PATIENT MESSAGE (OUTPATIENT)
Dept: PRIMARY CARE CLINIC | Age: 44
End: 2024-07-08

## 2024-07-08 DIAGNOSIS — L88 PYODERMA GANGRENOSUM: ICD-10-CM

## 2024-07-08 DIAGNOSIS — M12.9 ARTHROPATHY ASSOCIATED WITH INFLAMMATORY BOWEL DISEASE: ICD-10-CM

## 2024-07-08 DIAGNOSIS — K52.9 ARTHROPATHY ASSOCIATED WITH INFLAMMATORY BOWEL DISEASE: ICD-10-CM

## 2024-07-08 RX ORDER — FOLIC ACID 1 MG/1
1 TABLET ORAL DAILY
Qty: 90 TABLET | Refills: 0 | Status: SHIPPED | OUTPATIENT
Start: 2024-07-08 | End: 2024-10-06

## 2024-07-08 NOTE — TELEPHONE ENCOUNTER
From: Olamide Garcia  To: Dr. Tommy Hood  Sent: 7/8/2024 6:17 AM EDT  Subject: Labs & script    Please put in my lab orders so I can get them before my next appointment.  I also need a new script for folic acid.

## 2024-07-10 LAB — PAP SMEAR, EXTERNAL: POSITIVE

## 2024-07-26 DIAGNOSIS — E11.9 TYPE 2 DIABETES MELLITUS WITHOUT COMPLICATION, WITHOUT LONG-TERM CURRENT USE OF INSULIN (HCC): ICD-10-CM

## 2024-07-26 LAB
ALBUMIN SERPL-MCNC: 4.6 G/DL (ref 3.4–5)
ALP SERPL-CCNC: 82 U/L (ref 40–129)
ALT SERPL-CCNC: 40 U/L (ref 10–40)
ANION GAP SERPL CALCULATED.3IONS-SCNC: 12 MMOL/L (ref 3–16)
AST SERPL-CCNC: 35 U/L (ref 15–37)
BASOPHILS # BLD: 0.1 K/UL (ref 0–0.2)
BASOPHILS NFR BLD: 1.1 %
BILIRUB DIRECT SERPL-MCNC: <0.2 MG/DL (ref 0–0.3)
BILIRUB INDIRECT SERPL-MCNC: ABNORMAL MG/DL (ref 0–1)
BILIRUB SERPL-MCNC: 0.5 MG/DL (ref 0–1)
BUN SERPL-MCNC: 10 MG/DL (ref 7–20)
CALCIUM SERPL-MCNC: 9.4 MG/DL (ref 8.3–10.6)
CHLORIDE SERPL-SCNC: 102 MMOL/L (ref 99–110)
CHOLEST SERPL-MCNC: 153 MG/DL (ref 0–199)
CO2 SERPL-SCNC: 23 MMOL/L (ref 21–32)
CREAT SERPL-MCNC: 0.6 MG/DL (ref 0.6–1.1)
DEPRECATED RDW RBC AUTO: 13.3 % (ref 12.4–15.4)
EOSINOPHIL # BLD: 0.5 K/UL (ref 0–0.6)
EOSINOPHIL NFR BLD: 8 %
EST. AVERAGE GLUCOSE BLD GHB EST-MCNC: 131.2 MG/DL
FOLATE SERPL-MCNC: 16.96 NG/ML (ref 4.78–24.2)
GFR SERPLBLD CREATININE-BSD FMLA CKD-EPI: >90 ML/MIN/{1.73_M2}
GLUCOSE SERPL-MCNC: 141 MG/DL (ref 70–99)
HBA1C MFR BLD: 6.2 %
HCT VFR BLD AUTO: 42.7 % (ref 36–48)
HDLC SERPL-MCNC: 53 MG/DL (ref 40–60)
HGB BLD-MCNC: 14.7 G/DL (ref 12–16)
LDLC SERPL CALC-MCNC: 73 MG/DL
LYMPHOCYTES # BLD: 0.7 K/UL (ref 1–5.1)
LYMPHOCYTES NFR BLD: 11 %
MCH RBC QN AUTO: 32 PG (ref 26–34)
MCHC RBC AUTO-ENTMCNC: 34.4 G/DL (ref 31–36)
MCV RBC AUTO: 93 FL (ref 80–100)
MONOCYTES # BLD: 0.5 K/UL (ref 0–1.3)
MONOCYTES NFR BLD: 7.7 %
NEUTROPHILS # BLD: 4.9 K/UL (ref 1.7–7.7)
NEUTROPHILS NFR BLD: 72.2 %
PLATELET # BLD AUTO: 270 K/UL (ref 135–450)
PMV BLD AUTO: 8.5 FL (ref 5–10.5)
POTASSIUM SERPL-SCNC: 4.7 MMOL/L (ref 3.5–5.1)
PROT SERPL-MCNC: 8.3 G/DL (ref 6.4–8.2)
RBC # BLD AUTO: 4.6 M/UL (ref 4–5.2)
SODIUM SERPL-SCNC: 137 MMOL/L (ref 136–145)
TRIGL SERPL-MCNC: 135 MG/DL (ref 0–150)
TSH SERPL DL<=0.005 MIU/L-ACNC: 1.31 UIU/ML (ref 0.27–4.2)
VIT B12 SERPL-MCNC: 841 PG/ML (ref 211–911)
VLDLC SERPL CALC-MCNC: 27 MG/DL
WBC # BLD AUTO: 6.7 K/UL (ref 4–11)

## 2024-08-01 ENCOUNTER — OFFICE VISIT (OUTPATIENT)
Dept: PRIMARY CARE CLINIC | Age: 44
End: 2024-08-01
Payer: COMMERCIAL

## 2024-08-01 VITALS
WEIGHT: 226 LBS | DIASTOLIC BLOOD PRESSURE: 82 MMHG | BODY MASS INDEX: 38.58 KG/M2 | OXYGEN SATURATION: 98 % | HEART RATE: 94 BPM | HEIGHT: 64 IN | SYSTOLIC BLOOD PRESSURE: 136 MMHG

## 2024-08-01 DIAGNOSIS — K52.9 ARTHROPATHY ASSOCIATED WITH INFLAMMATORY BOWEL DISEASE: ICD-10-CM

## 2024-08-01 DIAGNOSIS — K50.919 CROHN'S DISEASE WITH COMPLICATION, UNSPECIFIED GASTROINTESTINAL TRACT LOCATION (HCC): ICD-10-CM

## 2024-08-01 DIAGNOSIS — F41.9 ANXIETY: ICD-10-CM

## 2024-08-01 DIAGNOSIS — I10 ESSENTIAL HYPERTENSION: ICD-10-CM

## 2024-08-01 DIAGNOSIS — E11.9 TYPE 2 DIABETES MELLITUS WITHOUT COMPLICATION, WITHOUT LONG-TERM CURRENT USE OF INSULIN (HCC): Primary | ICD-10-CM

## 2024-08-01 DIAGNOSIS — Z23 NEED FOR HEPATITIS B VACCINATION: ICD-10-CM

## 2024-08-01 DIAGNOSIS — R76.0 ANTIPHOSPHOLIPID ANTIBODY POSITIVE: ICD-10-CM

## 2024-08-01 DIAGNOSIS — M12.9 ARTHROPATHY ASSOCIATED WITH INFLAMMATORY BOWEL DISEASE: ICD-10-CM

## 2024-08-01 PROCEDURE — 3044F HG A1C LEVEL LT 7.0%: CPT | Performed by: FAMILY MEDICINE

## 2024-08-01 PROCEDURE — 90739 HEPB VACC 2/4 DOSE ADULT IM: CPT | Performed by: FAMILY MEDICINE

## 2024-08-01 PROCEDURE — 90471 IMMUNIZATION ADMIN: CPT | Performed by: FAMILY MEDICINE

## 2024-08-01 PROCEDURE — 3079F DIAST BP 80-89 MM HG: CPT | Performed by: FAMILY MEDICINE

## 2024-08-01 PROCEDURE — 3075F SYST BP GE 130 - 139MM HG: CPT | Performed by: FAMILY MEDICINE

## 2024-08-01 PROCEDURE — 1036F TOBACCO NON-USER: CPT | Performed by: FAMILY MEDICINE

## 2024-08-01 PROCEDURE — 2022F DILAT RTA XM EVC RTNOPTHY: CPT | Performed by: FAMILY MEDICINE

## 2024-08-01 PROCEDURE — G8417 CALC BMI ABV UP PARAM F/U: HCPCS | Performed by: FAMILY MEDICINE

## 2024-08-01 PROCEDURE — G8427 DOCREV CUR MEDS BY ELIG CLIN: HCPCS | Performed by: FAMILY MEDICINE

## 2024-08-01 PROCEDURE — 99214 OFFICE O/P EST MOD 30 MIN: CPT | Performed by: FAMILY MEDICINE

## 2024-08-01 NOTE — PROGRESS NOTES
Anxiety        5. Essential hypertension        6. Crohn's disease with complication, unspecified gastrointestinal tract location (HCC)        7. Antiphospholipid antibody positive                    Plan:     See orders,   Reviewed recent blood work in detail with the patient  Blood glucose overall control has improved significantly  Encouraged the patient continue the same  Answered all her concerns questions  Continue to follow with GI and rheumatology  Reviewed prior blood work in different facility

## 2024-08-29 DIAGNOSIS — L88 PYODERMA GANGRENOSUM: ICD-10-CM

## 2024-08-29 DIAGNOSIS — M12.9 ARTHROPATHY ASSOCIATED WITH INFLAMMATORY BOWEL DISEASE: ICD-10-CM

## 2024-08-29 DIAGNOSIS — K52.9 ARTHROPATHY ASSOCIATED WITH INFLAMMATORY BOWEL DISEASE: ICD-10-CM

## 2024-08-29 RX ORDER — FOLIC ACID 1 MG/1
1000 TABLET ORAL DAILY
Qty: 90 TABLET | Refills: 0 | Status: SHIPPED | OUTPATIENT
Start: 2024-08-29

## 2024-08-29 NOTE — TELEPHONE ENCOUNTER
Medication:   Requested Prescriptions     Pending Prescriptions Disp Refills    folic acid (FOLVITE) 1 MG tablet [Pharmacy Med Name: FOLIC ACID 1 MG TABLET] 90 tablet 0     Sig: TAKE 1 TABLET BY MOUTH EVERY DAY     Last Filled:  7/8/24    Last appt: 8/1/2024   Next appt: 11/12/2024    Last OARRS:        No data to display

## 2024-09-09 ENCOUNTER — TELEPHONE (OUTPATIENT)
Dept: PRIMARY CARE CLINIC | Age: 44
End: 2024-09-09

## 2024-09-09 DIAGNOSIS — Z23 NEED FOR HEPATITIS B VACCINATION: Primary | ICD-10-CM

## 2024-09-11 DIAGNOSIS — F41.9 ANXIETY: ICD-10-CM

## 2024-09-11 DIAGNOSIS — E11.9 TYPE 2 DIABETES MELLITUS WITHOUT COMPLICATION, WITHOUT LONG-TERM CURRENT USE OF INSULIN (HCC): ICD-10-CM

## 2024-09-12 RX ORDER — ERGOCALCIFEROL 1.25 MG/1
50000 CAPSULE, LIQUID FILLED ORAL WEEKLY
Qty: 12 CAPSULE | Refills: 1 | Status: SHIPPED | OUTPATIENT
Start: 2024-09-12

## 2024-09-12 RX ORDER — ESCITALOPRAM OXALATE 20 MG/1
20 TABLET ORAL DAILY
Qty: 90 TABLET | Refills: 0 | Status: SHIPPED | OUTPATIENT
Start: 2024-09-12

## 2024-09-12 RX ORDER — ACYCLOVIR 400 MG/1
TABLET ORAL
Qty: 4 EACH | Refills: 1 | Status: SHIPPED | OUTPATIENT
Start: 2024-09-12

## 2024-09-12 RX ORDER — GLIMEPIRIDE 2 MG/1
TABLET ORAL
Qty: 90 TABLET | Refills: 0 | Status: SHIPPED | OUTPATIENT
Start: 2024-09-12

## 2024-09-13 ENCOUNTER — NURSE ONLY (OUTPATIENT)
Dept: PRIMARY CARE CLINIC | Age: 44
End: 2024-09-13
Payer: COMMERCIAL

## 2024-09-13 PROCEDURE — 90739 HEPB VACC 2/4 DOSE ADULT IM: CPT | Performed by: FAMILY MEDICINE

## 2024-09-13 PROCEDURE — 90471 IMMUNIZATION ADMIN: CPT | Performed by: FAMILY MEDICINE

## 2024-09-15 DIAGNOSIS — E11.9 TYPE 2 DIABETES MELLITUS WITHOUT COMPLICATION, WITHOUT LONG-TERM CURRENT USE OF INSULIN (HCC): ICD-10-CM

## 2024-09-16 RX ORDER — ORAL SEMAGLUTIDE 14 MG/1
1 TABLET ORAL DAILY
Qty: 30 TABLET | Refills: 0 | Status: SHIPPED | OUTPATIENT
Start: 2024-09-16

## 2024-10-01 RX ORDER — BLOOD SUGAR DIAGNOSTIC
1 STRIP MISCELLANEOUS DAILY
Qty: 100 STRIP | Refills: 2 | Status: SHIPPED | OUTPATIENT
Start: 2024-10-01

## 2024-10-01 NOTE — TELEPHONE ENCOUNTER
Medication:   Requested Prescriptions     Pending Prescriptions Disp Refills    blood glucose test strips (ONETOUCH ULTRA) strip 100 strip 2     Sig: As needed.     Last Filled:  7.20.20    Last appt: 8/1/2024   Next appt: 11/12/2024    Last Labs DM:   Lab Results   Component Value Date/Time    LABA1C 6.2 07/26/2024 08:14 AM

## 2024-10-16 DIAGNOSIS — E11.9 TYPE 2 DIABETES MELLITUS WITHOUT COMPLICATION, WITHOUT LONG-TERM CURRENT USE OF INSULIN (HCC): ICD-10-CM

## 2024-10-16 NOTE — TELEPHONE ENCOUNTER
Medication:   Requested Prescriptions     Pending Prescriptions Disp Refills    RYBELSUS 14 MG TABS [Pharmacy Med Name: RYBELSUS 14 MG TABLET] 30 tablet 0     Sig: TAKE 1 TABLET BY MOUTH EVERY DAY     Last Filled:  09/16/2024    Last appt: 8/1/2024   Next appt: 11/12/2024    Last OARRS:        No data to display

## 2024-10-17 RX ORDER — ORAL SEMAGLUTIDE 14 MG/1
1 TABLET ORAL DAILY
Qty: 30 TABLET | Refills: 2 | Status: SHIPPED | OUTPATIENT
Start: 2024-10-17

## 2024-11-07 DIAGNOSIS — E11.9 TYPE 2 DIABETES MELLITUS WITHOUT COMPLICATION, WITHOUT LONG-TERM CURRENT USE OF INSULIN (HCC): ICD-10-CM

## 2024-11-07 RX ORDER — ACYCLOVIR 400 MG/1
TABLET ORAL
Qty: 2 EACH | Refills: 2 | Status: SHIPPED | OUTPATIENT
Start: 2024-11-07

## 2024-11-07 NOTE — TELEPHONE ENCOUNTER
Medication:   Requested Prescriptions     Pending Prescriptions Disp Refills    Continuous Glucose Sensor (DEXCOM G7 SENSOR) MISC [Pharmacy Med Name: DEXCOM G7 SENSOR]  2     Sig: USE AS DIRECTED     Last Filled:  9/12/2024    Last appt: 8/1/2024   Next appt: 11/12/2024    Last Labs DM:   Lab Results   Component Value Date/Time    LABA1C 6.2 07/26/2024 08:14 AM

## 2024-11-12 ENCOUNTER — OFFICE VISIT (OUTPATIENT)
Dept: PRIMARY CARE CLINIC | Age: 44
End: 2024-11-12
Payer: COMMERCIAL

## 2024-11-12 VITALS
DIASTOLIC BLOOD PRESSURE: 84 MMHG | SYSTOLIC BLOOD PRESSURE: 132 MMHG | HEART RATE: 103 BPM | BODY MASS INDEX: 39.2 KG/M2 | OXYGEN SATURATION: 96 % | WEIGHT: 228.4 LBS

## 2024-11-12 DIAGNOSIS — I10 ESSENTIAL HYPERTENSION: ICD-10-CM

## 2024-11-12 DIAGNOSIS — E11.9 TYPE 2 DIABETES MELLITUS WITHOUT COMPLICATION, WITHOUT LONG-TERM CURRENT USE OF INSULIN (HCC): Primary | ICD-10-CM

## 2024-11-12 DIAGNOSIS — R63.8 WEIGHT DISORDER: ICD-10-CM

## 2024-11-12 PROCEDURE — 3075F SYST BP GE 130 - 139MM HG: CPT | Performed by: FAMILY MEDICINE

## 2024-11-12 PROCEDURE — 3044F HG A1C LEVEL LT 7.0%: CPT | Performed by: FAMILY MEDICINE

## 2024-11-12 PROCEDURE — G8484 FLU IMMUNIZE NO ADMIN: HCPCS | Performed by: FAMILY MEDICINE

## 2024-11-12 PROCEDURE — 3079F DIAST BP 80-89 MM HG: CPT | Performed by: FAMILY MEDICINE

## 2024-11-12 PROCEDURE — G8427 DOCREV CUR MEDS BY ELIG CLIN: HCPCS | Performed by: FAMILY MEDICINE

## 2024-11-12 PROCEDURE — 99214 OFFICE O/P EST MOD 30 MIN: CPT | Performed by: FAMILY MEDICINE

## 2024-11-12 PROCEDURE — 1036F TOBACCO NON-USER: CPT | Performed by: FAMILY MEDICINE

## 2024-11-12 PROCEDURE — 2022F DILAT RTA XM EVC RTNOPTHY: CPT | Performed by: FAMILY MEDICINE

## 2024-11-12 PROCEDURE — G8417 CALC BMI ABV UP PARAM F/U: HCPCS | Performed by: FAMILY MEDICINE

## 2024-11-12 RX ORDER — ACYCLOVIR 400 MG/1
TABLET ORAL
Qty: 3 EACH | Refills: 2 | Status: SHIPPED | OUTPATIENT
Start: 2024-11-12

## 2024-11-12 NOTE — PROGRESS NOTES
HA medicated per MAR. Patient resting in room. 1 to 1 sitter at bedside   Diarrhea.      sulfaSALAzine (AZULFIDINE) 500 MG tablet Take 2 tablets by mouth 2 times daily 360 tablet 0    hydroxychloroquine (PLAQUENIL) 200 MG tablet Take 1 tablet by mouth 2 times daily (Patient taking differently: Take 1 tablet by mouth daily) 180 tablet 0     No current facility-administered medications for this visit.     No Known Allergies      Objective:   Physical Exam   Constitutional: She appears well-developed and well-nourished. No distress.   HENT:   Head: Normocephalic and atraumatic.   Right Ear: External ear normal.   Left Ear: External ear normal.   Nose: Nose normal.   Mouth/Throat: Oropharynx is clear and moist. No oropharyngeal exudate.   Eyes: Conjunctivae and EOM are normal. Pupils are equal, round, and reactive to light. Right eye exhibits no discharge. Left eye exhibits no discharge. No scleral icterus.   Neck: Normal range of motion. Neck supple. No JVD present. No tracheal deviation present. No thyromegaly present.   Cardiovascular: Normal rate, regular rhythm, normal heart sounds and intact distal pulses.    Pulmonary/Chest: Effort normal and breath sounds normal. No stridor. No respiratory distress. She has no wheezes. She has no rales. She exhibits no tenderness.   Abdominal: Soft. Bowel sounds are normal. She exhibits no distension and no mass. There is no tenderness. There is no rebound and no guarding.   Musculoskeletal: Normal range of motion. She exhibits no edema or tenderness.   Lymphadenopathy:     She has no cervical adenopathy.   Skin: Skin is warm and dry. No rash noted. She is not diaphoretic. No erythema. No pallor.   Mild irritation anterior above the ankle now right foot mild blanching no other abnormality full range of motion of the ankle both left and right  microfilaments test exam was negative   Vitals reviewed.      Assessment:     1. Type 2 diabetes mellitus without complication, without long-term current use of insulin ([facility])  Comments:  Stable on current

## 2024-12-12 LAB — DIABETIC RETINOPATHY: NEGATIVE

## 2024-12-28 DIAGNOSIS — K21.9 GASTRO-ESOPHAGEAL REFLUX DISEASE WITHOUT ESOPHAGITIS: ICD-10-CM

## 2024-12-30 RX ORDER — OMEPRAZOLE 40 MG/1
CAPSULE, DELAYED RELEASE ORAL DAILY
Qty: 90 CAPSULE | Refills: 1 | Status: SHIPPED | OUTPATIENT
Start: 2024-12-30

## 2024-12-30 NOTE — TELEPHONE ENCOUNTER
Medication:   Requested Prescriptions     Pending Prescriptions Disp Refills    omeprazole (PRILOSEC) 40 MG delayed release capsule [Pharmacy Med Name: OMEPRAZOLE DR 40 MG CAPSULE] 90 capsule 1     Sig: TAKE 1 CAPSULE BY MOUTH EVERY DAY     Last filled: 2/1/24  Last appt: 11/12/2024   Next appt: 2/11/2025    Last OARRS:        No data to display

## 2025-01-02 NOTE — TELEPHONE ENCOUNTER
Medication:   Requested Prescriptions     Pending Prescriptions Disp Refills    glimepiride (AMARYL) 2 MG tablet [Pharmacy Med Name: GLIMEPIRIDE 2 MG TABLET] 90 tablet 0     Sig: TAKE 1 TABLET BY MOUTH EVERY DAY IN THE MORNING     Last Filled:  09/12/2024    Last appt: 11/12/2024   Next appt: 2/11/2025    Last Labs DM:   Lab Results   Component Value Date/Time    LABA1C 6.2 07/26/2024 08:14 AM

## 2025-01-03 DIAGNOSIS — K52.9 ARTHROPATHY ASSOCIATED WITH INFLAMMATORY BOWEL DISEASE: ICD-10-CM

## 2025-01-03 DIAGNOSIS — M12.9 ARTHROPATHY ASSOCIATED WITH INFLAMMATORY BOWEL DISEASE: ICD-10-CM

## 2025-01-03 DIAGNOSIS — L88 PYODERMA GANGRENOSUM: ICD-10-CM

## 2025-01-03 RX ORDER — FOLIC ACID 1 MG/1
1000 TABLET ORAL DAILY
Qty: 90 TABLET | Refills: 0 | Status: SHIPPED | OUTPATIENT
Start: 2025-01-03

## 2025-01-03 RX ORDER — GLIMEPIRIDE 2 MG/1
TABLET ORAL
Qty: 90 TABLET | Refills: 0 | Status: SHIPPED | OUTPATIENT
Start: 2025-01-03

## 2025-01-03 NOTE — TELEPHONE ENCOUNTER
Medication:   Requested Prescriptions     Pending Prescriptions Disp Refills    folic acid (FOLVITE) 1 MG tablet [Pharmacy Med Name: FOLIC ACID 1 MG TABLET] 90 tablet 0     Sig: TAKE 1 TABLET BY MOUTH EVERY DAY     Last Filled:  8.29.24    Last appt: 11/12/2024   Next appt: 2/11/2025    Last OARRS:        No data to display

## 2025-01-13 DIAGNOSIS — E11.9 TYPE 2 DIABETES MELLITUS WITHOUT COMPLICATION, WITHOUT LONG-TERM CURRENT USE OF INSULIN (HCC): ICD-10-CM

## 2025-01-13 RX ORDER — ORAL SEMAGLUTIDE 14 MG/1
1 TABLET ORAL DAILY
Qty: 30 TABLET | Refills: 2 | Status: SHIPPED | OUTPATIENT
Start: 2025-01-13

## 2025-01-24 DIAGNOSIS — F41.9 ANXIETY: ICD-10-CM

## 2025-01-24 RX ORDER — ESCITALOPRAM OXALATE 20 MG/1
20 TABLET ORAL DAILY
Qty: 90 TABLET | Refills: 0 | Status: SHIPPED | OUTPATIENT
Start: 2025-01-24

## 2025-01-24 NOTE — TELEPHONE ENCOUNTER
Medication:   Requested Prescriptions     Pending Prescriptions Disp Refills    escitalopram (LEXAPRO) 20 MG tablet [Pharmacy Med Name: ESCITALOPRAM 20 MG TABLET] 90 tablet 0     Sig: TAKE 1 TABLET BY MOUTH EVERY DAY     Last Filled:  9.12.24    Last appt: 11/12/2024   Next appt: 2/11/2025    Last OARRS:        No data to display

## 2025-01-30 ENCOUNTER — TELEPHONE (OUTPATIENT)
Dept: PRIMARY CARE CLINIC | Age: 45
End: 2025-01-30

## 2025-01-30 DIAGNOSIS — E11.9 TYPE 2 DIABETES MELLITUS WITHOUT COMPLICATION, WITHOUT LONG-TERM CURRENT USE OF INSULIN (HCC): Primary | ICD-10-CM

## 2025-01-30 NOTE — TELEPHONE ENCOUNTER
Pt called in to ask for lab orders to be put in the system before her up coming appt. Please contact pt for more info if needed.

## 2025-02-06 DIAGNOSIS — E11.9 TYPE 2 DIABETES MELLITUS WITHOUT COMPLICATION, WITHOUT LONG-TERM CURRENT USE OF INSULIN (HCC): ICD-10-CM

## 2025-02-06 LAB
ALBUMIN SERPL-MCNC: 4.3 G/DL (ref 3.4–5)
ALBUMIN/GLOB SERPL: 1.2 {RATIO} (ref 1.1–2.2)
ALP SERPL-CCNC: 83 U/L (ref 40–129)
ALT SERPL-CCNC: 35 U/L (ref 10–40)
ANION GAP SERPL CALCULATED.3IONS-SCNC: 8 MMOL/L (ref 3–16)
AST SERPL-CCNC: 29 U/L (ref 15–37)
BILIRUB DIRECT SERPL-MCNC: 0.2 MG/DL (ref 0–0.3)
BILIRUB INDIRECT SERPL-MCNC: 0.2 MG/DL (ref 0–1)
BILIRUB SERPL-MCNC: 0.4 MG/DL (ref 0–1)
BUN SERPL-MCNC: 15 MG/DL (ref 7–20)
CALCIUM SERPL-MCNC: 9.4 MG/DL (ref 8.3–10.6)
CHLORIDE SERPL-SCNC: 104 MMOL/L (ref 99–110)
CHOLEST SERPL-MCNC: 175 MG/DL (ref 0–199)
CO2 SERPL-SCNC: 24 MMOL/L (ref 21–32)
CREAT SERPL-MCNC: 0.7 MG/DL (ref 0.6–1.1)
CRP SERPL-MCNC: 5.2 MG/L (ref 0–5.1)
DEPRECATED RDW RBC AUTO: 13 % (ref 12.4–15.4)
EST. AVERAGE GLUCOSE BLD GHB EST-MCNC: 134.1 MG/DL
FERRITIN SERPL IA-MCNC: 207 NG/ML (ref 15–150)
FOLATE SERPL-MCNC: 16.8 NG/ML (ref 4.78–24.2)
GFR SERPLBLD CREATININE-BSD FMLA CKD-EPI: >90 ML/MIN/{1.73_M2}
GLUCOSE SERPL-MCNC: 154 MG/DL (ref 70–99)
HBA1C MFR BLD: 6.3 %
HCT VFR BLD AUTO: 41.7 % (ref 36–48)
HDLC SERPL-MCNC: 46 MG/DL (ref 40–60)
HGB BLD-MCNC: 14.1 G/DL (ref 12–16)
IRON SATN MFR SERPL: 35 % (ref 15–50)
IRON SERPL-MCNC: 101 UG/DL (ref 37–145)
LDLC SERPL CALC-MCNC: 92 MG/DL
MCH RBC QN AUTO: 31.8 PG (ref 26–34)
MCHC RBC AUTO-ENTMCNC: 33.9 G/DL (ref 31–36)
MCV RBC AUTO: 93.8 FL (ref 80–100)
PLATELET # BLD AUTO: 245 K/UL (ref 135–450)
PMV BLD AUTO: 9.3 FL (ref 5–10.5)
POTASSIUM SERPL-SCNC: 4.9 MMOL/L (ref 3.5–5.1)
PROT SERPL-MCNC: 7.9 G/DL (ref 6.4–8.2)
RBC # BLD AUTO: 4.44 M/UL (ref 4–5.2)
SODIUM SERPL-SCNC: 136 MMOL/L (ref 136–145)
TIBC SERPL-MCNC: 292 UG/DL (ref 260–445)
TRIGL SERPL-MCNC: 185 MG/DL (ref 0–150)
TSH SERPL DL<=0.005 MIU/L-ACNC: 1.55 UIU/ML (ref 0.27–4.2)
VIT B12 SERPL-MCNC: 668 PG/ML (ref 211–911)
VLDLC SERPL CALC-MCNC: 37 MG/DL
WBC # BLD AUTO: 7 K/UL (ref 4–11)

## 2025-02-06 RX ORDER — LISINOPRIL 5 MG/1
5 TABLET ORAL DAILY
Qty: 90 TABLET | Refills: 1 | Status: SHIPPED | OUTPATIENT
Start: 2025-02-06

## 2025-02-06 NOTE — TELEPHONE ENCOUNTER
Medication:   Requested Prescriptions     Pending Prescriptions Disp Refills    lisinopril (PRINIVIL;ZESTRIL) 5 MG tablet [Pharmacy Med Name: LISINOPRIL 5 MG TABLET] 90 tablet 1     Sig: TAKE 1 TABLET BY MOUTH EVERY DAY     Last filled: 4/22/24  Last appt: 11/12/2024   Next appt: 2/11/2025    Last OARRS:        No data to display

## 2025-02-08 SDOH — ECONOMIC STABILITY: FOOD INSECURITY: WITHIN THE PAST 12 MONTHS, THE FOOD YOU BOUGHT JUST DIDN'T LAST AND YOU DIDN'T HAVE MONEY TO GET MORE.: NEVER TRUE

## 2025-02-08 SDOH — ECONOMIC STABILITY: FOOD INSECURITY: WITHIN THE PAST 12 MONTHS, YOU WORRIED THAT YOUR FOOD WOULD RUN OUT BEFORE YOU GOT MONEY TO BUY MORE.: NEVER TRUE

## 2025-02-08 SDOH — ECONOMIC STABILITY: INCOME INSECURITY: IN THE LAST 12 MONTHS, WAS THERE A TIME WHEN YOU WERE NOT ABLE TO PAY THE MORTGAGE OR RENT ON TIME?: NO

## 2025-02-08 ASSESSMENT — PATIENT HEALTH QUESTIONNAIRE - PHQ9
4. FEELING TIRED OR HAVING LITTLE ENERGY: SEVERAL DAYS
1. LITTLE INTEREST OR PLEASURE IN DOING THINGS: NOT AT ALL
9. THOUGHTS THAT YOU WOULD BE BETTER OFF DEAD, OR OF HURTING YOURSELF: NOT AT ALL
5. POOR APPETITE OR OVEREATING: NOT AT ALL
4. FEELING TIRED OR HAVING LITTLE ENERGY: SEVERAL DAYS
8. MOVING OR SPEAKING SO SLOWLY THAT OTHER PEOPLE COULD HAVE NOTICED. OR THE OPPOSITE - BEING SO FIDGETY OR RESTLESS THAT YOU HAVE BEEN MOVING AROUND A LOT MORE THAN USUAL: NOT AT ALL
5. POOR APPETITE OR OVEREATING: NOT AT ALL
SUM OF ALL RESPONSES TO PHQ QUESTIONS 1-9: 2
2. FEELING DOWN, DEPRESSED OR HOPELESS: NOT AT ALL
SUM OF ALL RESPONSES TO PHQ QUESTIONS 1-9: 2
6. FEELING BAD ABOUT YOURSELF - OR THAT YOU ARE A FAILURE OR HAVE LET YOURSELF OR YOUR FAMILY DOWN: NOT AT ALL
7. TROUBLE CONCENTRATING ON THINGS, SUCH AS READING THE NEWSPAPER OR WATCHING TELEVISION: SEVERAL DAYS
SUM OF ALL RESPONSES TO PHQ9 QUESTIONS 1 & 2: 0
3. TROUBLE FALLING OR STAYING ASLEEP: NOT AT ALL
2. FEELING DOWN, DEPRESSED OR HOPELESS: NOT AT ALL
10. IF YOU CHECKED OFF ANY PROBLEMS, HOW DIFFICULT HAVE THESE PROBLEMS MADE IT FOR YOU TO DO YOUR WORK, TAKE CARE OF THINGS AT HOME, OR GET ALONG WITH OTHER PEOPLE: NOT DIFFICULT AT ALL
10. IF YOU CHECKED OFF ANY PROBLEMS, HOW DIFFICULT HAVE THESE PROBLEMS MADE IT FOR YOU TO DO YOUR WORK, TAKE CARE OF THINGS AT HOME, OR GET ALONG WITH OTHER PEOPLE: NOT DIFFICULT AT ALL
1. LITTLE INTEREST OR PLEASURE IN DOING THINGS: NOT AT ALL
SUM OF ALL RESPONSES TO PHQ QUESTIONS 1-9: 2
SUM OF ALL RESPONSES TO PHQ QUESTIONS 1-9: 2
3. TROUBLE FALLING OR STAYING ASLEEP: NOT AT ALL
6. FEELING BAD ABOUT YOURSELF - OR THAT YOU ARE A FAILURE OR HAVE LET YOURSELF OR YOUR FAMILY DOWN: NOT AT ALL
7. TROUBLE CONCENTRATING ON THINGS, SUCH AS READING THE NEWSPAPER OR WATCHING TELEVISION: SEVERAL DAYS
8. MOVING OR SPEAKING SO SLOWLY THAT OTHER PEOPLE COULD HAVE NOTICED. OR THE OPPOSITE, BEING SO FIGETY OR RESTLESS THAT YOU HAVE BEEN MOVING AROUND A LOT MORE THAN USUAL: NOT AT ALL
SUM OF ALL RESPONSES TO PHQ QUESTIONS 1-9: 2
9. THOUGHTS THAT YOU WOULD BE BETTER OFF DEAD, OR OF HURTING YOURSELF: NOT AT ALL

## 2025-02-11 ENCOUNTER — OFFICE VISIT (OUTPATIENT)
Dept: PRIMARY CARE CLINIC | Age: 45
End: 2025-02-11

## 2025-02-11 VITALS
BODY MASS INDEX: 39.95 KG/M2 | HEIGHT: 64 IN | OXYGEN SATURATION: 95 % | SYSTOLIC BLOOD PRESSURE: 128 MMHG | HEART RATE: 100 BPM | DIASTOLIC BLOOD PRESSURE: 80 MMHG | WEIGHT: 234 LBS

## 2025-02-11 DIAGNOSIS — F41.9 ANXIETY: ICD-10-CM

## 2025-02-11 DIAGNOSIS — R76.0 ANTIPHOSPHOLIPID ANTIBODY POSITIVE: ICD-10-CM

## 2025-02-11 DIAGNOSIS — K50.919 CROHN'S DISEASE WITH COMPLICATION, UNSPECIFIED GASTROINTESTINAL TRACT LOCATION (HCC): ICD-10-CM

## 2025-02-11 DIAGNOSIS — E11.9 TYPE 2 DIABETES MELLITUS WITHOUT COMPLICATION, WITHOUT LONG-TERM CURRENT USE OF INSULIN (HCC): Primary | ICD-10-CM

## 2025-02-11 DIAGNOSIS — Z23 NEED FOR INFLUENZA VACCINATION: ICD-10-CM

## 2025-02-11 DIAGNOSIS — I10 ESSENTIAL HYPERTENSION: ICD-10-CM

## 2025-02-11 DIAGNOSIS — C43.30 MALIGNANT MELANOMA OF FACE (HCC): ICD-10-CM

## 2025-02-11 DIAGNOSIS — R76.8 POSITIVE ANA (ANTINUCLEAR ANTIBODY): ICD-10-CM

## 2025-02-11 NOTE — PROGRESS NOTES
joint swelling, neck pain and neck stiffness.   Skin:. Negative for color change and pallor.   Allergic/Immunologic: Negative.    Neurological: Negative.    Psychiatric/Behavioral: Very anxious nervous says she was in tears  All other systems reviewed and are negative.  Past Medical History:   Diagnosis Date    Cancer ([facility]) 2024    Melanoma in situ and stage 1 invasive    Crohn's disease (HCC) 2006    GI @[facility]    Diabetes mellitus ([facility]) 2006    GERD (gastroesophageal reflux disease) 2006    Severe Crohn’s    Hypertension     IBD (inflammatory bowel disease)     Liver disease 2006    NAFLD    Obesity     PCOS (polycystic ovarian syndrome)     Psoriasis     Psoriatic arthritis (HCC)     sees Rheumatology     Type 2 diabetes mellitus without complication ([facility]) 2006    Started with gestational      Past Surgical History:   Procedure Laterality Date     SECTION  2006     SECTION, CLASSIC  2009    COSMETIC SURGERY  2025    Skin graft for melanoma    TOTAL COLECTOMY       Family History   Problem Relation Age of Onset    Diabetes Mother         age 73    Cancer Mother     Mental Illness Mother         Undiagnosed    Miscarriages / Stillbirths Mother         4    Obesity Mother     Stroke Father         He wasnt under 50. 81 and 88    Heart Attack Father     Heart Disease Father         A-fib    Obesity Maternal Grandmother     Obesity Maternal Grandfather     Colon Cancer Brother         1/2 brother    Obesity Brother     Obesity Maternal Aunt     Obesity Maternal Uncle      Social History     Socioeconomic History    Marital status:      Spouse name: None    Number of children: None    Years of education: None    Highest education level: None   Tobacco Use    Smoking status: Former     Current packs/day: 0.00     Average packs/day: 1 pack/day for 10.0 years (10.0 ttl pk-yrs)     Types: Cigarettes     Start date: 1995     Quit date: 2005     Years since quittin.4

## 2025-03-19 DIAGNOSIS — E11.9 TYPE 2 DIABETES MELLITUS WITHOUT COMPLICATION, WITHOUT LONG-TERM CURRENT USE OF INSULIN: ICD-10-CM

## 2025-03-19 NOTE — TELEPHONE ENCOUNTER
Medication:   Requested Prescriptions     Pending Prescriptions Disp Refills    Continuous Glucose Sensor (DEXCOM G7 SENSOR) MISC [Pharmacy Med Name: DEXCOM G7 SENSOR]  1     Sig: USE AS DIRECTED     Last Filled:  11/12/2024    Last appt: 2/11/2025   Next appt: 5/13/2025    Last Labs DM:   Lab Results   Component Value Date/Time    LABA1C 6.3 02/06/2025 07:44 AM

## 2025-03-20 RX ORDER — ACYCLOVIR 400 MG/1
TABLET ORAL
Qty: 2 EACH | Refills: 1 | Status: SHIPPED | OUTPATIENT
Start: 2025-03-20

## 2025-03-30 DIAGNOSIS — K52.9 ARTHROPATHY ASSOCIATED WITH INFLAMMATORY BOWEL DISEASE: ICD-10-CM

## 2025-03-30 DIAGNOSIS — L88 PYODERMA GANGRENOSUM (HCC): ICD-10-CM

## 2025-03-30 DIAGNOSIS — M12.9 ARTHROPATHY ASSOCIATED WITH INFLAMMATORY BOWEL DISEASE: ICD-10-CM

## 2025-03-31 RX ORDER — GLIMEPIRIDE 2 MG/1
2 TABLET ORAL EVERY MORNING
Qty: 90 TABLET | Refills: 0 | Status: SHIPPED | OUTPATIENT
Start: 2025-03-31

## 2025-03-31 RX ORDER — FOLIC ACID 1 MG/1
1000 TABLET ORAL DAILY
Qty: 90 TABLET | Refills: 0 | Status: SHIPPED | OUTPATIENT
Start: 2025-03-31

## 2025-03-31 NOTE — TELEPHONE ENCOUNTER
Medication:   Requested Prescriptions     Pending Prescriptions Disp Refills    glimepiride (AMARYL) 2 MG tablet [Pharmacy Med Name: GLIMEPIRIDE 2 MG TABLET] 90 tablet 0     Sig: TAKE 1 TABLET BY MOUTH EVERY DAY IN THE MORNING    folic acid (FOLVITE) 1 MG tablet [Pharmacy Med Name: FOLIC ACID 1 MG TABLET] 90 tablet 0     Sig: TAKE 1 TABLET BY MOUTH EVERY DAY     Last Filled:  1.3.25    Last appt: 2/11/2025   Next appt: 5/13/2025    Last Labs DM:   Lab Results   Component Value Date/Time    LABA1C 6.3 02/06/2025 07:44 AM

## 2025-04-02 RX ORDER — GLIMEPIRIDE 2 MG/1
2 TABLET ORAL EVERY MORNING
Qty: 90 TABLET | Refills: 0 | OUTPATIENT
Start: 2025-04-02

## 2025-04-13 DIAGNOSIS — E11.9 TYPE 2 DIABETES MELLITUS WITHOUT COMPLICATION, WITHOUT LONG-TERM CURRENT USE OF INSULIN: ICD-10-CM

## 2025-04-14 RX ORDER — ORAL SEMAGLUTIDE 14 MG/1
1 TABLET ORAL DAILY
Qty: 30 TABLET | Refills: 2 | Status: SHIPPED | OUTPATIENT
Start: 2025-04-14

## 2025-04-14 NOTE — TELEPHONE ENCOUNTER
Medication:   Requested Prescriptions     Pending Prescriptions Disp Refills    RYBELSUS 14 MG TABS [Pharmacy Med Name: RYBELSUS 14 MG TABLET] 30 tablet 2     Sig: TAKE 1 TABLET BY MOUTH EVERY DAY     Last Filled:  1.13.25    Last appt: 2/11/2025   Next appt: 5/13/2025    Last OARRS:        No data to display

## 2025-04-24 DIAGNOSIS — F41.9 ANXIETY: ICD-10-CM

## 2025-04-24 RX ORDER — ESCITALOPRAM OXALATE 20 MG/1
20 TABLET ORAL DAILY
Qty: 90 TABLET | Refills: 0 | Status: SHIPPED | OUTPATIENT
Start: 2025-04-24

## 2025-04-24 NOTE — TELEPHONE ENCOUNTER
Medication:   Requested Prescriptions     Pending Prescriptions Disp Refills    escitalopram (LEXAPRO) 20 MG tablet [Pharmacy Med Name: ESCITALOPRAM 20 MG TABLET] 90 tablet 0     Sig: TAKE 1 TABLET BY MOUTH EVERY DAY     Last Filled:  01/24/2025    Last appt: 2/11/2025   Next appt: 5/13/2025    Last OARRS:        No data to display

## 2025-04-28 RX ORDER — ERGOCALCIFEROL 1.25 MG/1
50000 CAPSULE, LIQUID FILLED ORAL WEEKLY
Qty: 12 CAPSULE | Refills: 0 | Status: SHIPPED | OUTPATIENT
Start: 2025-04-28

## 2025-04-28 NOTE — TELEPHONE ENCOUNTER
Medication:   Requested Prescriptions     Pending Prescriptions Disp Refills    vitamin D (ERGOCALCIFEROL) 1.25 MG (19242 UT) CAPS capsule [Pharmacy Med Name: VITAMIN D2 1.25MG(50,000 UNIT)] 12 capsule 1     Sig: TAKE 1 CAPSULE BY MOUTH ONE TIME PER WEEK     Last Filled:  09/12/24    Last appt: 2/11/2025   Next appt: 5/13/2025    Last OARRS:        No data to display

## 2025-05-13 ENCOUNTER — OFFICE VISIT (OUTPATIENT)
Dept: PRIMARY CARE CLINIC | Age: 45
End: 2025-05-13
Payer: COMMERCIAL

## 2025-05-13 VITALS
SYSTOLIC BLOOD PRESSURE: 136 MMHG | BODY MASS INDEX: 39.99 KG/M2 | DIASTOLIC BLOOD PRESSURE: 86 MMHG | WEIGHT: 233 LBS | TEMPERATURE: 97.8 F | OXYGEN SATURATION: 99 % | HEART RATE: 97 BPM

## 2025-05-13 DIAGNOSIS — E11.9 TYPE 2 DIABETES MELLITUS WITHOUT COMPLICATION, WITHOUT LONG-TERM CURRENT USE OF INSULIN (HCC): Primary | ICD-10-CM

## 2025-05-13 DIAGNOSIS — I10 ESSENTIAL HYPERTENSION: ICD-10-CM

## 2025-05-13 DIAGNOSIS — M12.9 ARTHROPATHY ASSOCIATED WITH INFLAMMATORY BOWEL DISEASE: ICD-10-CM

## 2025-05-13 DIAGNOSIS — L02.219 CELLULITIS AND ABSCESS OF TRUNK: ICD-10-CM

## 2025-05-13 DIAGNOSIS — K52.9 ARTHROPATHY ASSOCIATED WITH INFLAMMATORY BOWEL DISEASE: ICD-10-CM

## 2025-05-13 DIAGNOSIS — L03.319 CELLULITIS AND ABSCESS OF TRUNK: ICD-10-CM

## 2025-05-13 PROCEDURE — 2022F DILAT RTA XM EVC RTNOPTHY: CPT | Performed by: FAMILY MEDICINE

## 2025-05-13 PROCEDURE — G8417 CALC BMI ABV UP PARAM F/U: HCPCS | Performed by: FAMILY MEDICINE

## 2025-05-13 PROCEDURE — 3075F SYST BP GE 130 - 139MM HG: CPT | Performed by: FAMILY MEDICINE

## 2025-05-13 PROCEDURE — G8427 DOCREV CUR MEDS BY ELIG CLIN: HCPCS | Performed by: FAMILY MEDICINE

## 2025-05-13 PROCEDURE — 1036F TOBACCO NON-USER: CPT | Performed by: FAMILY MEDICINE

## 2025-05-13 PROCEDURE — 3079F DIAST BP 80-89 MM HG: CPT | Performed by: FAMILY MEDICINE

## 2025-05-13 PROCEDURE — 3044F HG A1C LEVEL LT 7.0%: CPT | Performed by: FAMILY MEDICINE

## 2025-05-13 PROCEDURE — 99214 OFFICE O/P EST MOD 30 MIN: CPT | Performed by: FAMILY MEDICINE

## 2025-05-13 RX ORDER — SULFAMETHOXAZOLE AND TRIMETHOPRIM 800; 160 MG/1; MG/1
1 TABLET ORAL 2 TIMES DAILY
Qty: 20 TABLET | Refills: 0 | Status: SHIPPED | OUTPATIENT
Start: 2025-05-13 | End: 2025-05-23

## 2025-05-13 NOTE — PROGRESS NOTES
Subjective:      Patient ID: Olamide Garcia is a 45 y.o. female.    HPI  Patient is here for follow-up   Patient with Crohn disease she has been followed closely by OSDIANE MENDOZA, , stable doing okay.  Was diagnosed with melanoma had surgical procedure she had a graft on the left temporal area healing okay  Diabetes Mellitus Type II, Follow-up: Patient here for follow-up of Type 2 diabetes mellitus.  Current symptoms/problems include none and have been stable. Symptoms have been present for a few years.     Known diabetic complications: none  Cardiovascular risk factors: diabetes mellitus, hypertension and obesity (BMI >= 30 kg/m2)  Current diabetic medications include see med's list .      Eye exam current (within one year): yes  Weight trend: stable  Prior visit with dietician: no  Current diet: in general, a \"healthy\" diet    Current exercise: none  He has been experiencing low blood sugar so advised the patient stop the glimepiride  Current monitoring regimen: home blood tests - weekly  Home blood sugar records: fasting range: 150  Patient with hypertension stable with current medication  She is meeting with a counselor and she is doing a lot better patient with depression anxiety stable on current medication  Patient noticed a little redness and fullness lower abdomen seems getting worse she has been to look at it  Her blood glucose readings been going a little higher she is frustrated today with that she wants to meet with any medications she will call and make an appointment she will start working out more regularly    Review of Systems   Constitutional: Negative.  Negative for chills and fever.   HENT:  Negative for postnasal drip, rhinorrhea, sinus pressure, tinnitus and trouble swallowing.    Eyes: Negative.    Respiratory: Negative.    Cardiovascular: Negative.    Gastrointestinal: bleeding   Endocrine: Negative.    Genitourinary: Negative.    Musculoskeletal: . Negative for back pain, gait problem, joint

## 2025-06-05 DIAGNOSIS — E11.9 TYPE 2 DIABETES MELLITUS WITHOUT COMPLICATION, WITHOUT LONG-TERM CURRENT USE OF INSULIN (HCC): ICD-10-CM

## 2025-06-05 RX ORDER — ACYCLOVIR 400 MG/1
TABLET ORAL
Qty: 2 EACH | Refills: 1 | Status: SHIPPED | OUTPATIENT
Start: 2025-06-05

## 2025-06-05 NOTE — TELEPHONE ENCOUNTER
Medication:   Requested Prescriptions     Pending Prescriptions Disp Refills    Continuous Glucose Sensor (DEXCOM G7 SENSOR) MISC [Pharmacy Med Name: DEXCOM G7 SENSOR]  1     Sig: USE AS DIRECTED     Last Filled:  3/20/25    Last appt: 5/13/2025   Next appt: Visit date not found    Last Labs DM:   Lab Results   Component Value Date/Time    LABA1C 6.3 02/06/2025 07:44 AM

## 2025-06-19 DIAGNOSIS — K21.9 GASTRO-ESOPHAGEAL REFLUX DISEASE WITHOUT ESOPHAGITIS: ICD-10-CM

## 2025-06-19 RX ORDER — OMEPRAZOLE 40 MG/1
CAPSULE, DELAYED RELEASE ORAL DAILY
Qty: 90 CAPSULE | Refills: 1 | Status: SHIPPED | OUTPATIENT
Start: 2025-06-19

## 2025-06-19 NOTE — TELEPHONE ENCOUNTER
Medication:   Requested Prescriptions     Pending Prescriptions Disp Refills    omeprazole (PRILOSEC) 40 MG delayed release capsule [Pharmacy Med Name: OMEPRAZOLE DR 40 MG CAPSULE] 90 capsule 1     Sig: TAKE 1 CAPSULE BY MOUTH EVERY DAY     Last filled: 12/30/24  Last appt: 5/13/2025   Next appt: Visit date not found    Last OARRS:        No data to display

## 2025-06-24 RX ORDER — BLOOD SUGAR DIAGNOSTIC
1 STRIP MISCELLANEOUS DAILY
Qty: 100 STRIP | Refills: 2 | Status: SHIPPED | OUTPATIENT
Start: 2025-06-24

## 2025-06-24 NOTE — TELEPHONE ENCOUNTER
Medication:   Requested Prescriptions     Pending Prescriptions Disp Refills    ONETOUCH ULTRA strip [Pharmacy Med Name: ONE TOUCH ULTRA BLUE TEST STRP] 100 strip 2     Si EACH BY DOES NOT APPLY ROUTE DAILY AS NEEDED.     Last Filled:  10.1.24    Last appt: 2025   Next appt: Visit date not found    Last Labs DM:   Lab Results   Component Value Date/Time    LABA1C 6.3 2025 07:44 AM

## 2025-06-27 NOTE — TELEPHONE ENCOUNTER
Medication:   Requested Prescriptions     Pending Prescriptions Disp Refills    glimepiride (AMARYL) 2 MG tablet [Pharmacy Med Name: GLIMEPIRIDE 2 MG TABLET] 90 tablet 0     Sig: TAKE 1 TABLET BY MOUTH EVERY DAY IN THE MORNING     Last filled: 3/31/25  Last appt: 5/13/2025   Next appt: Visit date not found    Last OARRS:        No data to display

## 2025-06-30 RX ORDER — GLIMEPIRIDE 2 MG/1
2 TABLET ORAL EVERY MORNING
Qty: 90 TABLET | Refills: 0 | Status: SHIPPED | OUTPATIENT
Start: 2025-06-30

## 2025-07-13 DIAGNOSIS — E11.9 TYPE 2 DIABETES MELLITUS WITHOUT COMPLICATION, WITHOUT LONG-TERM CURRENT USE OF INSULIN (HCC): ICD-10-CM

## 2025-07-14 RX ORDER — ORAL SEMAGLUTIDE 14 MG/1
1 TABLET ORAL DAILY
Qty: 30 TABLET | Refills: 2 | Status: SHIPPED | OUTPATIENT
Start: 2025-07-14

## 2025-07-14 NOTE — TELEPHONE ENCOUNTER
Medication:   Requested Prescriptions     Pending Prescriptions Disp Refills    RYBELSUS 14 MG TABS [Pharmacy Med Name: RYBELSUS 14 MG TABLET] 30 tablet 2     Sig: TAKE 1 TABLET BY MOUTH EVERY DAY     Last Filled:  4.14.25    Last appt: 5/13/2025   Next appt: Visit date not found    Last OARRS:        No data to display

## 2025-07-21 RX ORDER — ERGOCALCIFEROL 1.25 MG/1
50000 CAPSULE, LIQUID FILLED ORAL WEEKLY
Qty: 12 CAPSULE | Refills: 0 | Status: SHIPPED | OUTPATIENT
Start: 2025-07-21

## 2025-07-21 NOTE — TELEPHONE ENCOUNTER
Medication:   Requested Prescriptions     Pending Prescriptions Disp Refills    vitamin D (ERGOCALCIFEROL) 1.25 MG (96583 UT) CAPS capsule [Pharmacy Med Name: VITAMIN D2 1.25MG(50,000 UNIT)] 12 capsule 0     Sig: TAKE 1 CAPSULE BY MOUTH ONE TIME PER WEEK     Last filled: 4/28/25  Last appt: 5/13/2025   Next appt: Visit date not found    Last OARRS:        No data to display

## 2025-07-25 DIAGNOSIS — F41.9 ANXIETY: ICD-10-CM

## 2025-07-25 NOTE — TELEPHONE ENCOUNTER
Medication:   Requested Prescriptions     Pending Prescriptions Disp Refills    escitalopram (LEXAPRO) 20 MG tablet [Pharmacy Med Name: ESCITALOPRAM 20 MG TABLET] 90 tablet 0     Sig: TAKE 1 TABLET BY MOUTH EVERY DAY     Last Filled:  4.24.25    Last appt: 5/13/2025   Next appt: Visit date not found    Last OARRS:        No data to display

## 2025-07-28 RX ORDER — ESCITALOPRAM OXALATE 20 MG/1
20 TABLET ORAL DAILY
Qty: 90 TABLET | Refills: 0 | Status: SHIPPED | OUTPATIENT
Start: 2025-07-28

## 2025-07-31 DIAGNOSIS — E11.9 TYPE 2 DIABETES MELLITUS WITHOUT COMPLICATION, WITHOUT LONG-TERM CURRENT USE OF INSULIN (HCC): ICD-10-CM

## 2025-07-31 RX ORDER — ACYCLOVIR 400 MG/1
TABLET ORAL
Qty: 1 EACH | Refills: 1 | Status: SHIPPED | OUTPATIENT
Start: 2025-07-31

## 2025-07-31 NOTE — TELEPHONE ENCOUNTER
Medication:   Requested Prescriptions     Pending Prescriptions Disp Refills    Continuous Glucose Sensor (DEXCOM G7 SENSOR) MISC [Pharmacy Med Name: DEXCOM G7 SENSOR]  1     Sig: USE AS DIRECTED     Last Filled:  06/05/2025    Last appt: 5/13/2025   Next appt: 8/19/2025    Last OARRS:        No data to display

## 2025-08-03 DIAGNOSIS — K52.9 ARTHROPATHY ASSOCIATED WITH INFLAMMATORY BOWEL DISEASE: ICD-10-CM

## 2025-08-03 DIAGNOSIS — M12.9 ARTHROPATHY ASSOCIATED WITH INFLAMMATORY BOWEL DISEASE: ICD-10-CM

## 2025-08-03 DIAGNOSIS — L88 PYODERMA GANGRENOSUM (HCC): ICD-10-CM

## 2025-08-04 RX ORDER — FOLIC ACID 1 MG/1
1000 TABLET ORAL DAILY
Qty: 90 TABLET | Refills: 0 | Status: SHIPPED | OUTPATIENT
Start: 2025-08-04

## 2025-08-06 RX ORDER — LISINOPRIL 5 MG/1
5 TABLET ORAL DAILY
Qty: 90 TABLET | Refills: 1 | Status: SHIPPED | OUTPATIENT
Start: 2025-08-06

## 2025-08-15 DIAGNOSIS — E11.9 TYPE 2 DIABETES MELLITUS WITHOUT COMPLICATION, WITHOUT LONG-TERM CURRENT USE OF INSULIN (HCC): ICD-10-CM

## 2025-08-16 LAB
ANION GAP SERPL CALCULATED.3IONS-SCNC: 13 MMOL/L (ref 3–16)
BUN SERPL-MCNC: 11 MG/DL (ref 7–20)
CALCIUM SERPL-MCNC: 9.1 MG/DL (ref 8.3–10.6)
CHLORIDE SERPL-SCNC: 103 MMOL/L (ref 99–110)
CO2 SERPL-SCNC: 21 MMOL/L (ref 21–32)
CREAT SERPL-MCNC: 0.6 MG/DL (ref 0.6–1.1)
EST. AVERAGE GLUCOSE BLD GHB EST-MCNC: 159.9 MG/DL
GFR SERPLBLD CREATININE-BSD FMLA CKD-EPI: >90 ML/MIN/{1.73_M2}
GLUCOSE SERPL-MCNC: 177 MG/DL (ref 70–99)
HBA1C MFR BLD: 7.2 %
POTASSIUM SERPL-SCNC: 4.8 MMOL/L (ref 3.5–5.1)
SODIUM SERPL-SCNC: 137 MMOL/L (ref 136–145)

## 2025-08-19 ENCOUNTER — OFFICE VISIT (OUTPATIENT)
Dept: PRIMARY CARE CLINIC | Age: 45
End: 2025-08-19
Payer: COMMERCIAL

## 2025-08-19 ENCOUNTER — PATIENT MESSAGE (OUTPATIENT)
Dept: PRIMARY CARE CLINIC | Age: 45
End: 2025-08-19

## 2025-08-19 VITALS
SYSTOLIC BLOOD PRESSURE: 138 MMHG | OXYGEN SATURATION: 98 % | DIASTOLIC BLOOD PRESSURE: 90 MMHG | BODY MASS INDEX: 39.98 KG/M2 | HEART RATE: 97 BPM | HEIGHT: 64 IN | WEIGHT: 234.2 LBS

## 2025-08-19 DIAGNOSIS — K50.919 CROHN'S DISEASE WITH COMPLICATION, UNSPECIFIED GASTROINTESTINAL TRACT LOCATION (HCC): ICD-10-CM

## 2025-08-19 DIAGNOSIS — L88 PYODERMA GANGRENOSUM (HCC): ICD-10-CM

## 2025-08-19 DIAGNOSIS — E11.9 TYPE 2 DIABETES MELLITUS WITHOUT COMPLICATION, WITHOUT LONG-TERM CURRENT USE OF INSULIN (HCC): Primary | ICD-10-CM

## 2025-08-19 DIAGNOSIS — I10 ESSENTIAL HYPERTENSION: ICD-10-CM

## 2025-08-19 DIAGNOSIS — C43.30 MALIGNANT MELANOMA OF FACE (HCC): ICD-10-CM

## 2025-08-19 PROCEDURE — G8427 DOCREV CUR MEDS BY ELIG CLIN: HCPCS | Performed by: FAMILY MEDICINE

## 2025-08-19 PROCEDURE — 2022F DILAT RTA XM EVC RTNOPTHY: CPT | Performed by: FAMILY MEDICINE

## 2025-08-19 PROCEDURE — 3075F SYST BP GE 130 - 139MM HG: CPT | Performed by: FAMILY MEDICINE

## 2025-08-19 PROCEDURE — 3080F DIAST BP >= 90 MM HG: CPT | Performed by: FAMILY MEDICINE

## 2025-08-19 PROCEDURE — 3051F HG A1C>EQUAL 7.0%<8.0%: CPT | Performed by: FAMILY MEDICINE

## 2025-08-19 PROCEDURE — 1036F TOBACCO NON-USER: CPT | Performed by: FAMILY MEDICINE

## 2025-08-19 PROCEDURE — G8417 CALC BMI ABV UP PARAM F/U: HCPCS | Performed by: FAMILY MEDICINE

## 2025-08-19 PROCEDURE — 99214 OFFICE O/P EST MOD 30 MIN: CPT | Performed by: FAMILY MEDICINE

## 2025-08-19 RX ORDER — LISINOPRIL 10 MG/1
10 TABLET ORAL DAILY
Qty: 90 TABLET | Refills: 1 | Status: SHIPPED | OUTPATIENT
Start: 2025-08-19

## 2025-08-27 ENCOUNTER — OFFICE VISIT (OUTPATIENT)
Dept: ENDOCRINOLOGY | Age: 45
End: 2025-08-27
Payer: COMMERCIAL

## 2025-08-27 DIAGNOSIS — E11.9 TYPE 2 DIABETES MELLITUS WITHOUT COMPLICATION, WITHOUT LONG-TERM CURRENT USE OF INSULIN (HCC): Primary | ICD-10-CM

## 2025-08-27 PROCEDURE — G8417 CALC BMI ABV UP PARAM F/U: HCPCS

## 2025-08-27 PROCEDURE — G8427 DOCREV CUR MEDS BY ELIG CLIN: HCPCS

## 2025-08-27 PROCEDURE — 3051F HG A1C>EQUAL 7.0%<8.0%: CPT

## 2025-08-27 PROCEDURE — 97802 MEDICAL NUTRITION INDIV IN: CPT

## 2025-08-29 DIAGNOSIS — E11.9 TYPE 2 DIABETES MELLITUS WITHOUT COMPLICATION, WITHOUT LONG-TERM CURRENT USE OF INSULIN (HCC): ICD-10-CM

## 2025-08-29 RX ORDER — ACYCLOVIR 400 MG/1
TABLET ORAL
Qty: 1 EACH | Refills: 1 | Status: SHIPPED | OUTPATIENT
Start: 2025-08-29

## 2025-09-04 ENCOUNTER — PATIENT MESSAGE (OUTPATIENT)
Dept: PRIMARY CARE CLINIC | Age: 45
End: 2025-09-04

## 2025-09-04 DIAGNOSIS — E11.9 TYPE 2 DIABETES MELLITUS WITHOUT COMPLICATION, WITHOUT LONG-TERM CURRENT USE OF INSULIN (HCC): ICD-10-CM

## 2025-09-05 RX ORDER — ACYCLOVIR 400 MG/1
TABLET ORAL
Qty: 1 EACH | Refills: 1 | Status: SHIPPED | OUTPATIENT
Start: 2025-09-05